# Patient Record
Sex: FEMALE | Race: WHITE | NOT HISPANIC OR LATINO | Employment: OTHER | URBAN - METROPOLITAN AREA
[De-identification: names, ages, dates, MRNs, and addresses within clinical notes are randomized per-mention and may not be internally consistent; named-entity substitution may affect disease eponyms.]

---

## 2017-01-03 ENCOUNTER — OFFICE VISIT (OUTPATIENT)
Dept: CARDIAC SURGERY | Facility: CLINIC | Age: 67
End: 2017-01-03

## 2017-01-03 VITALS
TEMPERATURE: 96.6 F | DIASTOLIC BLOOD PRESSURE: 70 MMHG | HEIGHT: 66 IN | BODY MASS INDEX: 24.59 KG/M2 | SYSTOLIC BLOOD PRESSURE: 110 MMHG | OXYGEN SATURATION: 99 % | WEIGHT: 153 LBS | HEART RATE: 48 BPM

## 2017-01-03 DIAGNOSIS — I05.9 MITRAL VALVE DISORDER: Primary | ICD-10-CM

## 2017-01-03 PROCEDURE — 99204 OFFICE O/P NEW MOD 45 MIN: CPT | Performed by: THORACIC SURGERY (CARDIOTHORACIC VASCULAR SURGERY)

## 2017-01-03 RX ORDER — LEVOTHYROXINE SODIUM 175 UG/1
200 TABLET ORAL DAILY
Refills: 1 | COMMUNITY
Start: 2016-12-19 | End: 2017-03-10

## 2017-01-03 RX ORDER — PRIMIDONE 250 MG/1
250 TABLET ORAL 3 TIMES DAILY
COMMUNITY

## 2017-01-03 RX ORDER — FUROSEMIDE 20 MG/1
40 TABLET ORAL DAILY
Refills: 1 | COMMUNITY
Start: 2016-12-19 | End: 2017-03-10

## 2017-01-03 RX ORDER — CHOLECALCIFEROL (VITAMIN D3) 125 MCG
2000 CAPSULE ORAL DAILY
COMMUNITY

## 2017-01-03 RX ORDER — ASPIRIN 81 MG/1
81 TABLET ORAL DAILY
COMMUNITY
End: 2020-07-10 | Stop reason: DRUGHIGH

## 2017-01-03 RX ORDER — CHLORAL HYDRATE 500 MG
1000 CAPSULE ORAL
COMMUNITY
End: 2020-10-22 | Stop reason: ALTCHOICE

## 2017-01-03 RX ORDER — ETHOSUXIMIDE 250 MG/1
500 CAPSULE ORAL 2 TIMES DAILY
COMMUNITY

## 2017-01-03 RX ORDER — LISINOPRIL 5 MG/1
5 TABLET ORAL DAILY
Refills: 1 | COMMUNITY
Start: 2016-12-19 | End: 2017-02-14 | Stop reason: HOSPADM

## 2017-01-03 NOTE — PROGRESS NOTES
2017  Patient Information  Tiana Cronin                                                                                          [address] [facility] [place] [address]   1950  'PCP/Referring Physician'  Aubrey Christy MD  [phone]  No ref. provider found    Chief Complaint   Patient presents with   • Shortness of Breath     NP for Severe Mitral Regurgitation per Dr. Cha       History of Present Illness:  66 year old female with a history of hypertension and seizures presents for evaluation of mitral valve regurgitation.  Ms Cronin notes worsening shortness of breath for approximately the last year.  She denies chest pain or syncope.    Patient Active Problem List   Diagnosis   • Mitral valve disorder     Past Medical History   Diagnosis Date   • Anxiety    • Aortic regurgitation    • Arthritis      Hands and Knees   • Depression    • Hypertension    • Hypothyroidism    • Mitral regurgitation    • Mitral valve prolapse    • Seizures      Several Years since last seizure   • Skin cancer, basal cell      Past Surgical History   Procedure Laterality Date   • Plantar fascia surgery Bilateral    •  section       x 2   • Elbow procedure Left      Due to FX, Plates and Screws Placed   • Knee surgery Left    • Skin cancer excision Bilateral      Bilatera Ears, Basil Cell       Current Outpatient Prescriptions:   •  aspirin 81 MG EC tablet, Take 81 mg by mouth Daily., Disp: , Rfl:   •  Cholecalciferol (VITAMIN D3) 2000 UNITS tablet, Take  by mouth Daily., Disp: , Rfl:   •  ethosuximide (ZARONTIN) 250 MG capsule, Take 250 mg by mouth 2 (Two) Times a Day. 2 Tablets BID, Disp: , Rfl:   •  furosemide (LASIX) 20 MG tablet, Take  by mouth Daily., Disp: , Rfl: 1  •  levothyroxine (SYNTHROID, LEVOTHROID) 175 MCG tablet, Daily., Disp: , Rfl: 1  •  lisinopril (PRINIVIL,ZESTRIL) 5 MG tablet, Take  by mouth Daily., Disp: , Rfl: 1  •  metoprolol tartrate (LOPRESSOR) 25 MG tablet, Take  by mouth Daily., Disp:  , Rfl: 1  •  Multiple Vitamins-Minerals (CENTRUM SILVER PO), Take  by mouth Daily., Disp: , Rfl:   •  Omega-3 Fatty Acids (FISH OIL) 1000 MG capsule capsule, Take  by mouth Daily With Breakfast., Disp: , Rfl:   •  primidone (MYSOLINE) 250 MG tablet, Take 250 mg by mouth 3 (Three) Times a Day., Disp: , Rfl:   No Known Allergies  Social History     Social History   • Marital status:      Spouse name: N/A   • Number of children: 2   • Years of education: N/A     Occupational History   • Restaurant Work      Disabled-[facility]     Social History Main Topics   • Smoking status: Never Smoker   • Smokeless tobacco: Never Used   • Alcohol use No   • Drug use: No   • Sexual activity: Not on file     Other Topics Concern   • Not on file     Social History Narrative     Family History   Problem Relation Age of Onset   • Hypertension Mother    • Alzheimer's disease Mother    • Coronary artery disease Father    • Hypertension Father    • Diabetes Father    • Heart failure Father      Review of Systems   Constitution: Positive for malaise/fatigue. Negative for chills, fever, night sweats and weight loss.   HENT: Negative for headaches, hearing loss, odynophagia and sore throat.    Cardiovascular: Positive for claudication, dyspnea on exertion, leg swelling and palpitations. Negative for chest pain and orthopnea.   Respiratory: Positive for shortness of breath and wheezing. Negative for cough and hemoptysis.    Endocrine: Negative for cold intolerance, heat intolerance, polydipsia, polyphagia and polyuria.   Hematologic/Lymphatic: Does not bruise/bleed easily.   Skin: Negative for itching and rash.   Musculoskeletal: Positive for arthritis and back pain. Negative for joint pain, joint swelling and myalgias.   Gastrointestinal: Positive for constipation. Negative for abdominal pain, diarrhea, hematemesis, hematochezia, melena, nausea and vomiting.   Genitourinary: Negative for dysuria, frequency and hematuria.   Neurological:  "Negative for focal weakness, numbness and seizures.   Psychiatric/Behavioral: Negative for suicidal ideas.   All other systems reviewed and are negative.    Vitals:    01/03/17 0757   BP: 110/70   BP Location: Right arm   Patient Position: Sitting   Pulse: (!) 48   Temp: 96.6 °F (35.9 °C)   SpO2: 99%   Weight: 153 lb (69.4 kg)   Height: 66\" (167.6 cm)      Physical Exam   Constitutional: She is oriented to person, place, and time. She appears well-developed and well-nourished. No distress.   HENT:   Head: Normocephalic and atraumatic.   Eyes: Conjunctivae and EOM are normal. No scleral icterus.   Neck: Normal range of motion. Neck supple. No JVD present. No tracheal deviation present.   Cardiovascular: Normal rate, regular rhythm and normal heart sounds.  Exam reveals no gallop and no friction rub.    No murmur heard.  Pulmonary/Chest: Effort normal and breath sounds normal. No stridor. No respiratory distress. She has no wheezes. She has no rales.   Abdominal: Soft. She exhibits no distension and no mass. There is no tenderness. There is no rebound and no guarding.   Musculoskeletal: Normal range of motion. She exhibits edema. She exhibits no deformity.   1+ pedal edema bilaterally   Lymphadenopathy:     She has no cervical adenopathy.     She has no axillary adenopathy.        Right: No supraclavicular adenopathy present.        Left: No supraclavicular adenopathy present.   Neurological: She is alert and oriented to person, place, and time.   Skin: No rash noted. No erythema.   Psychiatric: She has a normal mood and affect. Her behavior is normal. Judgment and thought content normal.       Labs/Imaging:  -I obtained and reviewed medical records from Dr. Cha.  The patient has a family member with her today who provides additional information.  -Transthoracic echocardiogram performed 11/30/16, per report (images unavailable), demonstrates severe mitral regurgitation, mild aortic, tricuspid and pulmonic " regurgitation.  EF 65-70%.       Assessment/Plan:  66 year old female with a history of seizures and hypertension who presents with symptomatic severe mitral valve regurgitation.  I will need to obtain the echo for review.  The patient will need a cardiac catheterization prior to the surgery to rule out significant stenosis.  We discussed mitral valve repair versus replacement.  The risks and benefits of surgery were discussed with the patient including pain, bleeding, infection,renal failure, stroke, heart block and death.  She understood these risks and wished to proceed with surgery.  I have answered the patient and her sister's questions to their satisfaction.  Ms. Cronin will think about which type of valve she would like.  We will get the cardiac cath set up and then proceed.    Patient Active Problem List   Diagnosis   • Mitral valve disorder     Signed by: Jeromy Gaona MD    1/3/2017    Scribed for Jeromy Gaona MD by Chantell Montaño. 1/3/2017  10:35 AM  CC:  MD Aubrey Espino MD

## 2017-01-20 ENCOUNTER — HOSPITAL ENCOUNTER (OUTPATIENT)
Dept: CARDIOLOGY | Facility: HOSPITAL | Age: 67
Discharge: HOME | End: 2017-01-20

## 2017-01-20 DIAGNOSIS — Z98.890 HISTORY OF LEFT HEART CATHETERIZATION (LHC): ICD-10-CM

## 2017-01-23 ENCOUNTER — PREP FOR SURGERY (OUTPATIENT)
Dept: CARDIAC SURGERY | Facility: CLINIC | Age: 67
End: 2017-01-23

## 2017-01-23 DIAGNOSIS — R79.1 ABNORMAL COAGULATION PROFILE: ICD-10-CM

## 2017-01-23 DIAGNOSIS — Z51.81 ENCOUNTER FOR THERAPEUTIC DRUG LEVEL MONITORING: ICD-10-CM

## 2017-01-23 DIAGNOSIS — I34.0 NON-RHEUMATIC MITRAL REGURGITATION: Primary | ICD-10-CM

## 2017-01-23 DIAGNOSIS — E11.9 TYPE 2 DIABETES MELLITUS WITHOUT COMPLICATION, UNSPECIFIED LONG TERM INSULIN USE STATUS: ICD-10-CM

## 2017-01-24 RX ORDER — CHLORHEXIDINE GLUCONATE 500 MG/1
1 CLOTH TOPICAL EVERY 12 HOURS PRN
Status: CANCELLED | OUTPATIENT
Start: 2017-02-02

## 2017-01-24 RX ORDER — ASPIRIN 325 MG
325 TABLET ORAL NIGHTLY
Status: CANCELLED | OUTPATIENT
Start: 2017-01-24 | End: 2017-01-25

## 2017-01-24 RX ORDER — NITROGLYCERIN 0.4 MG/1
0.4 TABLET SUBLINGUAL
Status: CANCELLED | OUTPATIENT
Start: 2017-01-24

## 2017-01-24 RX ORDER — CHLORHEXIDINE GLUCONATE 0.12 MG/ML
15 RINSE ORAL ONCE
Status: CANCELLED | OUTPATIENT
Start: 2017-01-24 | End: 2017-01-24

## 2017-01-24 RX ORDER — CHLORHEXIDINE GLUCONATE 500 MG/1
1 CLOTH TOPICAL EVERY 12 HOURS PRN
Status: CANCELLED | OUTPATIENT
Start: 2017-02-03

## 2017-01-24 RX ORDER — ACETAMINOPHEN 325 MG/1
650 TABLET ORAL EVERY 4 HOURS PRN
Status: CANCELLED | OUTPATIENT
Start: 2017-01-24

## 2017-02-02 ENCOUNTER — APPOINTMENT (OUTPATIENT)
Dept: PREADMISSION TESTING | Facility: HOSPITAL | Age: 67
End: 2017-02-02

## 2017-02-02 ENCOUNTER — HOSPITAL ENCOUNTER (OUTPATIENT)
Dept: GENERAL RADIOLOGY | Facility: HOSPITAL | Age: 67
Discharge: HOME | End: 2017-02-02
Admitting: PHYSICIAN ASSISTANT

## 2017-02-02 ENCOUNTER — HOSPITAL ENCOUNTER (OUTPATIENT)
Dept: PULMONOLOGY | Facility: HOSPITAL | Age: 67
Discharge: HOME | End: 2017-02-02

## 2017-02-02 VITALS — BODY MASS INDEX: 23.92 KG/M2 | HEIGHT: 66 IN | WEIGHT: 148.81 LBS

## 2017-02-02 DIAGNOSIS — I34.0 NON-RHEUMATIC MITRAL REGURGITATION: ICD-10-CM

## 2017-02-02 DIAGNOSIS — R79.1 ABNORMAL COAGULATION PROFILE: ICD-10-CM

## 2017-02-02 DIAGNOSIS — Z51.81 ENCOUNTER FOR THERAPEUTIC DRUG LEVEL MONITORING: ICD-10-CM

## 2017-02-02 DIAGNOSIS — E11.9 TYPE 2 DIABETES MELLITUS WITHOUT COMPLICATION, UNSPECIFIED LONG TERM INSULIN USE STATUS: ICD-10-CM

## 2017-02-02 LAB
ABO GROUP BLD: NORMAL
ALBUMIN SERPL-MCNC: 4.6 G/DL (ref 3.2–4.8)
ALBUMIN/GLOB SERPL: 1.5 G/DL (ref 1.5–2.5)
ALP SERPL-CCNC: 120 U/L (ref 25–100)
ALT SERPL W P-5'-P-CCNC: 15 U/L (ref 7–40)
AMPHET+METHAMPHET UR QL: NEGATIVE
AMPHETAMINES UR QL: NEGATIVE
ANION GAP SERPL CALCULATED.3IONS-SCNC: 8 MMOL/L (ref 3–11)
APTT PPP: 29.4 SECONDS (ref 24–31)
AST SERPL-CCNC: 27 U/L (ref 0–33)
BARBITURATES UR QL SCN: POSITIVE
BASOPHILS # BLD AUTO: 0.02 10*3/MM3 (ref 0–0.2)
BASOPHILS NFR BLD AUTO: 0.6 % (ref 0–1)
BENZODIAZ UR QL SCN: NEGATIVE
BILIRUB SERPL-MCNC: 0.3 MG/DL (ref 0.3–1.2)
BLD GP AB SCN SERPL QL: NEGATIVE
BUN BLD-MCNC: 18 MG/DL (ref 9–23)
BUN/CREAT SERPL: 20 (ref 7–25)
BUPRENORPHINE SERPL-MCNC: NEGATIVE NG/ML
CALCIUM SPEC-SCNC: 10.4 MG/DL (ref 8.7–10.4)
CANNABINOIDS SERPL QL: NEGATIVE
CHLORIDE SERPL-SCNC: 102 MMOL/L (ref 99–109)
CO2 SERPL-SCNC: 31 MMOL/L (ref 20–31)
COCAINE UR QL: NEGATIVE
CREAT BLD-MCNC: 0.9 MG/DL (ref 0.6–1.3)
DEPRECATED RDW RBC AUTO: 50.2 FL (ref 37–54)
EOSINOPHIL # BLD AUTO: 0.17 10*3/MM3 (ref 0.1–0.3)
EOSINOPHIL NFR BLD AUTO: 5.1 % (ref 0–3)
ERYTHROCYTE [DISTWIDTH] IN BLOOD BY AUTOMATED COUNT: 14 % (ref 11.3–14.5)
GFR SERPL CREATININE-BSD FRML MDRD: 63 ML/MIN/1.73
GLOBULIN UR ELPH-MCNC: 3.1 GM/DL
GLUCOSE BLD-MCNC: 95 MG/DL (ref 70–100)
HBA1C MFR BLD: 5.1 % (ref 4.8–5.6)
HCT VFR BLD AUTO: 37.2 % (ref 34.5–44)
HGB BLD-MCNC: 12.4 G/DL (ref 11.5–15.5)
IMM GRANULOCYTES # BLD: 0 10*3/MM3 (ref 0–0.03)
IMM GRANULOCYTES NFR BLD: 0 % (ref 0–0.6)
INR PPP: 1.04
LYMPHOCYTES # BLD AUTO: 1.12 10*3/MM3 (ref 0.6–4.8)
LYMPHOCYTES NFR BLD AUTO: 33.4 % (ref 24–44)
MAGNESIUM SERPL-MCNC: 2.1 MG/DL (ref 1.3–2.7)
MCH RBC QN AUTO: 32.5 PG (ref 27–31)
MCHC RBC AUTO-ENTMCNC: 33.3 G/DL (ref 32–36)
MCV RBC AUTO: 97.4 FL (ref 80–99)
METHADONE UR QL SCN: NEGATIVE
MONOCYTES # BLD AUTO: 0.31 10*3/MM3 (ref 0–1)
MONOCYTES NFR BLD AUTO: 9.3 % (ref 0–12)
NEUTROPHILS # BLD AUTO: 1.73 10*3/MM3 (ref 1.5–8.3)
NEUTROPHILS NFR BLD AUTO: 51.6 % (ref 41–71)
OPIATES UR QL: NEGATIVE
OXYCODONE UR QL SCN: NEGATIVE
PA ADP PRP-ACNC: 241 PRU
PCP UR QL SCN: NEGATIVE
PLATELET # BLD AUTO: 191 10*3/MM3 (ref 150–450)
PMV BLD AUTO: 9.6 FL (ref 6–12)
POTASSIUM BLD-SCNC: 4 MMOL/L (ref 3.5–5.5)
PROPOXYPH UR QL: NEGATIVE
PROT SERPL-MCNC: 7.7 G/DL (ref 5.7–8.2)
PROTHROMBIN TIME: 11.3 SECONDS (ref 9.6–11.5)
RBC # BLD AUTO: 3.82 10*6/MM3 (ref 3.89–5.14)
RH BLD: POSITIVE
SODIUM BLD-SCNC: 141 MMOL/L (ref 132–146)
TRICYCLICS UR QL SCN: NEGATIVE
WBC NRBC COR # BLD: 3.35 10*3/MM3 (ref 3.5–10.8)

## 2017-02-02 PROCEDURE — 86920 COMPATIBILITY TEST SPIN: CPT

## 2017-02-02 PROCEDURE — 94010 BREATHING CAPACITY TEST: CPT | Performed by: INTERNAL MEDICINE

## 2017-02-02 PROCEDURE — 93010 ELECTROCARDIOGRAM REPORT: CPT | Performed by: INTERNAL MEDICINE

## 2017-02-02 RX ORDER — CITALOPRAM 20 MG/1
20 TABLET ORAL DAILY
COMMUNITY

## 2017-02-02 RX ORDER — ASPIRIN 325 MG
325 TABLET ORAL NIGHTLY
Status: SHIPPED | OUTPATIENT
Start: 2017-02-02 | End: 2017-02-03

## 2017-02-02 RX ORDER — ASCORBIC ACID 500 MG
1000 TABLET ORAL DAILY
COMMUNITY
End: 2017-09-12 | Stop reason: ALTCHOICE

## 2017-02-02 RX ORDER — CHLORHEXIDINE GLUCONATE 500 MG/1
1 CLOTH TOPICAL EVERY 12 HOURS PRN
Status: SHIPPED | OUTPATIENT
Start: 2017-02-02

## 2017-02-02 NOTE — PAT
Pt wants to verify with md in preop before signing permit. Consent in computer states repair or replace. Pt reports she was informed surgery was for replacement

## 2017-02-02 NOTE — PAT
BACTROBAN APPLIED TO EACH NOSTRIL DURING PAT VISIT   MEASURED FOR TEDS/SCDS IN PAT    CALF MEASUREMENT    13.5  LENGTH MEASUREMENT 16

## 2017-02-03 ENCOUNTER — HOSPITAL ENCOUNTER (INPATIENT)
Facility: HOSPITAL | Age: 67
LOS: 11 days | Discharge: NURSING, REHAB OR LONG-TERM CARE | End: 2017-02-14
Attending: THORACIC SURGERY (CARDIOTHORACIC VASCULAR SURGERY) | Admitting: THORACIC SURGERY (CARDIOTHORACIC VASCULAR SURGERY)

## 2017-02-03 ENCOUNTER — APPOINTMENT (OUTPATIENT)
Dept: GENERAL RADIOLOGY | Facility: HOSPITAL | Age: 67
End: 2017-02-03

## 2017-02-03 ENCOUNTER — ANESTHESIA EVENT (OUTPATIENT)
Dept: PERIOP | Facility: HOSPITAL | Age: 67
End: 2017-02-03

## 2017-02-03 ENCOUNTER — ANESTHESIA (OUTPATIENT)
Dept: PERIOP | Facility: HOSPITAL | Age: 67
End: 2017-02-03

## 2017-02-03 DIAGNOSIS — I46.9 ASYSTOLE (HCC): ICD-10-CM

## 2017-02-03 DIAGNOSIS — I05.9 MITRAL VALVE DISORDER: ICD-10-CM

## 2017-02-03 DIAGNOSIS — I25.810 CORONARY ARTERY DISEASE INVOLVING OTHER CORONARY ARTERY BYPASS GRAFT: ICD-10-CM

## 2017-02-03 DIAGNOSIS — Z74.09 IMPAIRED MOBILITY AND ADLS: ICD-10-CM

## 2017-02-03 DIAGNOSIS — Z78.9 IMPAIRED MOBILITY AND ADLS: ICD-10-CM

## 2017-02-03 DIAGNOSIS — Z74.09 IMPAIRED FUNCTIONAL MOBILITY, BALANCE, GAIT, AND ENDURANCE: Primary | ICD-10-CM

## 2017-02-03 DIAGNOSIS — I34.0 NON-RHEUMATIC MITRAL REGURGITATION: ICD-10-CM

## 2017-02-03 DIAGNOSIS — I34.0 MITRAL VALVE INSUFFICIENCY, UNSPECIFIED ETIOLOGY: ICD-10-CM

## 2017-02-03 LAB
ABO GROUP BLD: NORMAL
ACT BLD: 116 SECONDS (ref 82–152)
ACT BLD: 126 SECONDS (ref 82–152)
ACT BLD: 126 SECONDS (ref 82–152)
ACT BLD: 477 SECONDS (ref 82–152)
ACT BLD: 667 SECONDS (ref 82–152)
ACT BLD: 698 SECONDS (ref 82–152)
ALBUMIN SERPL-MCNC: 3.4 G/DL (ref 3.2–4.8)
ALBUMIN SERPL-MCNC: 4 G/DL (ref 3.2–4.8)
ANION GAP SERPL CALCULATED.3IONS-SCNC: 10 MMOL/L (ref 3–11)
ANION GAP SERPL CALCULATED.3IONS-SCNC: 5 MMOL/L (ref 3–11)
APTT PPP: 34.6 SECONDS (ref 24–31)
ARTERIAL PATENCY WRIST A: ABNORMAL
ATMOSPHERIC PRESS: ABNORMAL MMHG
BACTERIA UR QL AUTO: ABNORMAL /HPF
BASE EXCESS BLDA CALC-SCNC: -2 MMOL/L (ref -5–5)
BASE EXCESS BLDA CALC-SCNC: -2 MMOL/L (ref -5–5)
BASE EXCESS BLDA CALC-SCNC: -3 MMOL/L (ref -5–5)
BASE EXCESS BLDA CALC-SCNC: -3 MMOL/L (ref -5–5)
BASE EXCESS BLDA CALC-SCNC: -4.4 MMOL/L (ref 0–2)
BASE EXCESS BLDA CALC-SCNC: 0 MMOL/L (ref -5–5)
BASE EXCESS BLDA CALC-SCNC: 2 MMOL/L (ref -5–5)
BASE EXCESS BLDA CALC-SCNC: 4 MMOL/L (ref -5–5)
BDY SITE: ABNORMAL
BILIRUB UR QL STRIP: NEGATIVE
BUN BLD-MCNC: 17 MG/DL (ref 9–23)
BUN BLD-MCNC: 17 MG/DL (ref 9–23)
BUN/CREAT SERPL: 21.3 (ref 7–25)
BUN/CREAT SERPL: 21.3 (ref 7–25)
CA-I BLDA-SCNC: 0.95 MMOL/L (ref 1.2–1.32)
CA-I BLDA-SCNC: 1.12 MMOL/L (ref 1.2–1.32)
CA-I BLDA-SCNC: 1.16 MMOL/L (ref 1.2–1.32)
CA-I BLDA-SCNC: 1.18 MMOL/L (ref 1.2–1.32)
CA-I BLDA-SCNC: 1.21 MMOL/L (ref 1.2–1.32)
CA-I BLDA-SCNC: 1.21 MMOL/L (ref 1.2–1.32)
CA-I BLDA-SCNC: 1.24 MMOL/L (ref 1.2–1.32)
CA-I SERPL ISE-MCNC: 1.24 MMOL/L (ref 1.12–1.32)
CALCIUM SPEC-SCNC: 9.1 MG/DL (ref 8.7–10.4)
CALCIUM SPEC-SCNC: 9.3 MG/DL (ref 8.7–10.4)
CHLORIDE SERPL-SCNC: 113 MMOL/L (ref 99–109)
CHLORIDE SERPL-SCNC: 114 MMOL/L (ref 99–109)
CLARITY UR: CLEAR
CO2 BLDA-SCNC: 21.4 MMOL/L (ref 22–33)
CO2 BLDA-SCNC: 23 MMOL/L (ref 24–29)
CO2 BLDA-SCNC: 23 MMOL/L (ref 24–29)
CO2 BLDA-SCNC: 24 MMOL/L (ref 24–29)
CO2 BLDA-SCNC: 25 MMOL/L (ref 24–29)
CO2 BLDA-SCNC: 27 MMOL/L (ref 24–29)
CO2 BLDA-SCNC: 29 MMOL/L (ref 24–29)
CO2 BLDA-SCNC: 30 MMOL/L (ref 24–29)
CO2 SERPL-SCNC: 22 MMOL/L (ref 20–31)
CO2 SERPL-SCNC: 26 MMOL/L (ref 20–31)
COHGB MFR BLD: 0.9 % (ref 0–2)
COLOR UR: YELLOW
CREAT BLD-MCNC: 0.8 MG/DL (ref 0.6–1.3)
CREAT BLD-MCNC: 0.8 MG/DL (ref 0.6–1.3)
DEPRECATED RDW RBC AUTO: 49.7 FL (ref 37–54)
DEPRECATED RDW RBC AUTO: 51.4 FL (ref 37–54)
ERYTHROCYTE [DISTWIDTH] IN BLOOD BY AUTOMATED COUNT: 14.1 % (ref 11.3–14.5)
ERYTHROCYTE [DISTWIDTH] IN BLOOD BY AUTOMATED COUNT: 14.4 % (ref 11.3–14.5)
GFR SERPL CREATININE-BSD FRML MDRD: 72 ML/MIN/1.73
GFR SERPL CREATININE-BSD FRML MDRD: 72 ML/MIN/1.73
GLUCOSE BLD-MCNC: 125 MG/DL (ref 70–100)
GLUCOSE BLD-MCNC: 135 MG/DL (ref 70–100)
GLUCOSE BLDC GLUCOMTR-MCNC: 102 MG/DL (ref 70–130)
GLUCOSE BLDC GLUCOMTR-MCNC: 117 MG/DL (ref 70–130)
GLUCOSE BLDC GLUCOMTR-MCNC: 118 MG/DL (ref 70–130)
GLUCOSE BLDC GLUCOMTR-MCNC: 119 MG/DL (ref 70–130)
GLUCOSE BLDC GLUCOMTR-MCNC: 124 MG/DL (ref 70–130)
GLUCOSE BLDC GLUCOMTR-MCNC: 146 MG/DL (ref 70–130)
GLUCOSE BLDC GLUCOMTR-MCNC: 164 MG/DL (ref 70–130)
GLUCOSE BLDC GLUCOMTR-MCNC: 167 MG/DL (ref 70–130)
GLUCOSE BLDC GLUCOMTR-MCNC: 167 MG/DL (ref 70–130)
GLUCOSE BLDC GLUCOMTR-MCNC: 93 MG/DL (ref 70–130)
GLUCOSE UR STRIP-MCNC: NEGATIVE MG/DL
HCO3 BLDA-SCNC: 20.4 MMOL/L (ref 20–26)
HCO3 BLDA-SCNC: 21.5 MMOL/L (ref 22–26)
HCO3 BLDA-SCNC: 22.1 MMOL/L (ref 22–26)
HCO3 BLDA-SCNC: 22.6 MMOL/L (ref 22–26)
HCO3 BLDA-SCNC: 23.8 MMOL/L (ref 22–26)
HCO3 BLDA-SCNC: 25.2 MMOL/L (ref 22–26)
HCO3 BLDA-SCNC: 27.3 MMOL/L (ref 22–26)
HCO3 BLDA-SCNC: 29 MMOL/L (ref 22–26)
HCT VFR BLD AUTO: 16.1 % (ref 34.5–44)
HCT VFR BLD AUTO: 22.5 % (ref 34.5–44)
HCT VFR BLD CALC: 21.8 %
HCT VFR BLDA CALC: 19 % (ref 38–51)
HCT VFR BLDA CALC: 20 % (ref 38–51)
HCT VFR BLDA CALC: 20 % (ref 38–51)
HCT VFR BLDA CALC: 21 % (ref 38–51)
HCT VFR BLDA CALC: 22 % (ref 38–51)
HCT VFR BLDA CALC: 27 % (ref 38–51)
HCT VFR BLDA CALC: 32 % (ref 38–51)
HGB BLD-MCNC: 5.4 G/DL (ref 11.5–15.5)
HGB BLD-MCNC: 7.6 G/DL (ref 11.5–15.5)
HGB BLDA-MCNC: 10.9 G/DL (ref 12–17)
HGB BLDA-MCNC: 6.5 G/DL (ref 12–17)
HGB BLDA-MCNC: 6.8 G/DL (ref 12–17)
HGB BLDA-MCNC: 6.8 G/DL (ref 12–17)
HGB BLDA-MCNC: 7.1 G/DL (ref 12–17)
HGB BLDA-MCNC: 7.1 G/DL (ref 14–18)
HGB BLDA-MCNC: 7.5 G/DL (ref 12–17)
HGB BLDA-MCNC: 9.2 G/DL (ref 12–17)
HGB UR QL STRIP.AUTO: ABNORMAL
HOROWITZ INDEX BLD+IHG-RTO: 100 %
HYALINE CASTS UR QL AUTO: ABNORMAL /LPF
INR PPP: 1.39
KETONES UR QL STRIP: NEGATIVE
LEUKOCYTE ESTERASE UR QL STRIP.AUTO: NEGATIVE
MAGNESIUM SERPL-MCNC: 2.5 MG/DL (ref 1.3–2.7)
MAGNESIUM SERPL-MCNC: 3 MG/DL (ref 1.3–2.7)
MCH RBC QN AUTO: 32.7 PG (ref 27–31)
MCH RBC QN AUTO: 33 PG (ref 27–31)
MCHC RBC AUTO-ENTMCNC: 33.5 G/DL (ref 32–36)
MCHC RBC AUTO-ENTMCNC: 33.8 G/DL (ref 32–36)
MCV RBC AUTO: 97.6 FL (ref 80–99)
MCV RBC AUTO: 97.8 FL (ref 80–99)
METHGB BLD QL: 1.1 % (ref 0–1.5)
MODALITY: ABNORMAL
NITRITE UR QL STRIP: NEGATIVE
OXYHGB MFR BLDV: 98.5 % (ref 94–99)
PCO2 BLDA: 32.2 MM HG (ref 35–45)
PCO2 BLDA: 34.3 MM HG (ref 35–45)
PCO2 BLDA: 35.4 MM HG (ref 35–45)
PCO2 BLDA: 36.5 MM HG (ref 35–45)
PCO2 BLDA: 43.3 MM HG (ref 35–45)
PCO2 BLDA: 45.1 MM HG (ref 35–45)
PCO2 BLDA: 45.2 MM HG (ref 35–45)
PCO2 BLDA: 49.6 MM HG (ref 35–45)
PH BLDA: 7.29 PH UNITS (ref 7.35–7.6)
PH BLDA: 7.37 PH UNITS (ref 7.35–7.6)
PH BLDA: 7.38 PH UNITS (ref 7.35–7.45)
PH BLDA: 7.39 PH UNITS (ref 7.35–7.6)
PH BLDA: 7.39 PH UNITS (ref 7.35–7.6)
PH BLDA: 7.4 PH UNITS (ref 7.35–7.6)
PH BLDA: 7.42 PH UNITS (ref 7.35–7.6)
PH BLDA: 7.44 PH UNITS (ref 7.35–7.6)
PH UR STRIP.AUTO: 5.5 [PH] (ref 5–8)
PHOSPHATE SERPL-MCNC: 2.1 MG/DL (ref 2.4–5.1)
PHOSPHATE SERPL-MCNC: 3.7 MG/DL (ref 2.4–5.1)
PLATELET # BLD AUTO: 204 10*3/MM3 (ref 150–450)
PLATELET # BLD AUTO: 93 10*3/MM3 (ref 150–450)
PMV BLD AUTO: 8.8 FL (ref 6–12)
PMV BLD AUTO: 9.2 FL (ref 6–12)
PO2 BLDA: 374 MMHG (ref 80–105)
PO2 BLDA: 430 MM HG (ref 83–108)
PO2 BLDA: 430 MMHG (ref 80–105)
PO2 BLDA: 432 MMHG (ref 80–105)
PO2 BLDA: 434 MMHG (ref 80–105)
PO2 BLDA: 447 MMHG (ref 80–105)
PO2 BLDA: 455 MMHG (ref 80–105)
PO2 BLDA: 83 MMHG (ref 80–105)
POTASSIUM BLD-SCNC: 3.7 MMOL/L (ref 3.5–5.5)
POTASSIUM BLD-SCNC: 3.8 MMOL/L (ref 3.5–5.5)
POTASSIUM BLDA-SCNC: 3.3 MMOL/L (ref 3.5–4.9)
POTASSIUM BLDA-SCNC: 3.5 MMOL/L (ref 3.5–4.9)
POTASSIUM BLDA-SCNC: 3.6 MMOL/L (ref 3.5–4.9)
POTASSIUM BLDA-SCNC: 3.6 MMOL/L (ref 3.5–4.9)
POTASSIUM BLDA-SCNC: 3.8 MMOL/L (ref 3.5–4.9)
POTASSIUM BLDA-SCNC: 4 MMOL/L (ref 3.5–4.9)
POTASSIUM BLDA-SCNC: 4.4 MMOL/L (ref 3.5–4.9)
PROT UR QL STRIP: ABNORMAL
PROTHROMBIN TIME: 15.2 SECONDS (ref 9.6–11.5)
RBC # BLD AUTO: 1.65 10*6/MM3 (ref 3.89–5.14)
RBC # BLD AUTO: 2.3 10*6/MM3 (ref 3.89–5.14)
RBC # UR: ABNORMAL /HPF
REF LAB TEST METHOD: ABNORMAL
RH BLD: POSITIVE
SAO2 % BLDA: 100 % (ref 95–98)
SAO2 % BLDA: 96 % (ref 95–98)
SODIUM BLD-SCNC: 145 MMOL/L (ref 132–146)
SODIUM BLD-SCNC: 145 MMOL/L (ref 132–146)
SODIUM BLDA-SCNC: 136 MMOL/L (ref 138–146)
SODIUM BLDA-SCNC: 136 MMOL/L (ref 138–146)
SODIUM BLDA-SCNC: 137 MMOL/L (ref 138–146)
SODIUM BLDA-SCNC: 138 MMOL/L (ref 138–146)
SODIUM BLDA-SCNC: 139 MMOL/L (ref 138–146)
SODIUM BLDA-SCNC: 140 MMOL/L (ref 138–146)
SODIUM BLDA-SCNC: 140 MMOL/L (ref 138–146)
SP GR UR STRIP: 1.02 (ref 1–1.03)
SQUAMOUS #/AREA URNS HPF: ABNORMAL /HPF
UROBILINOGEN UR QL STRIP: ABNORMAL
WBC NRBC COR # BLD: 5.4 10*3/MM3 (ref 3.5–10.8)
WBC NRBC COR # BLD: 6.12 10*3/MM3 (ref 3.5–10.8)
WBC UR QL AUTO: ABNORMAL /HPF

## 2017-02-03 PROCEDURE — 25010000002 MIDAZOLAM PER 1 MG: Performed by: ANESTHESIOLOGY

## 2017-02-03 PROCEDURE — 71010 HC CHEST PA OR AP: CPT

## 2017-02-03 PROCEDURE — 25010000002 FENTANYL CITRATE (PF) 100 MCG/2ML SOLUTION: Performed by: THORACIC SURGERY (CARDIOTHORACIC VASCULAR SURGERY)

## 2017-02-03 PROCEDURE — 94799 UNLISTED PULMONARY SVC/PX: CPT

## 2017-02-03 PROCEDURE — 25010000002 ALBUMIN HUMAN 5% PER 50 ML: Performed by: PHYSICIAN ASSISTANT

## 2017-02-03 PROCEDURE — 25010000002 MANNITOL PER 50 ML

## 2017-02-03 PROCEDURE — 33430 REPLACEMENT OF MITRAL VALVE: CPT | Performed by: THORACIC SURGERY (CARDIOTHORACIC VASCULAR SURGERY)

## 2017-02-03 PROCEDURE — 25010000002 CEFUROXIME PER 750 MG: Performed by: ANESTHESIOLOGY

## 2017-02-03 PROCEDURE — 25010000002 HEPARIN (PORCINE) PER 1000 UNITS: Performed by: ANESTHESIOLOGY

## 2017-02-03 PROCEDURE — C1894 INTRO/SHEATH, NON-LASER: HCPCS | Performed by: THORACIC SURGERY (CARDIOTHORACIC VASCULAR SURGERY)

## 2017-02-03 PROCEDURE — 25810000003 DEXTROSE 5 % WITH KCL 20 MEQ 20-5 MEQ/L-% SOLUTION: Performed by: PHYSICIAN ASSISTANT

## 2017-02-03 PROCEDURE — 81001 URINALYSIS AUTO W/SCOPE: CPT | Performed by: PHYSICIAN ASSISTANT

## 2017-02-03 PROCEDURE — 85347 COAGULATION TIME ACTIVATED: CPT

## 2017-02-03 PROCEDURE — 82805 BLOOD GASES W/O2 SATURATION: CPT | Performed by: PHYSICIAN ASSISTANT

## 2017-02-03 PROCEDURE — 25010000002 MORPHINE PER 10 MG: Performed by: ANESTHESIOLOGY

## 2017-02-03 PROCEDURE — P9016 RBC LEUKOCYTES REDUCED: HCPCS

## 2017-02-03 PROCEDURE — 87086 URINE CULTURE/COLONY COUNT: CPT | Performed by: PHYSICIAN ASSISTANT

## 2017-02-03 PROCEDURE — 86900 BLOOD TYPING SEROLOGIC ABO: CPT

## 2017-02-03 PROCEDURE — 02RG0KZ REPLACEMENT OF MITRAL VALVE WITH NONAUTOLOGOUS TISSUE SUBSTITUTE, OPEN APPROACH: ICD-10-PCS | Performed by: THORACIC SURGERY (CARDIOTHORACIC VASCULAR SURGERY)

## 2017-02-03 PROCEDURE — 25010000002 HEPARIN (PORCINE) PER 1000 UNITS: Performed by: THORACIC SURGERY (CARDIOTHORACIC VASCULAR SURGERY)

## 2017-02-03 PROCEDURE — 82947 ASSAY GLUCOSE BLOOD QUANT: CPT

## 2017-02-03 PROCEDURE — 85730 THROMBOPLASTIN TIME PARTIAL: CPT | Performed by: PHYSICIAN ASSISTANT

## 2017-02-03 PROCEDURE — 88305 TISSUE EXAM BY PATHOLOGIST: CPT | Performed by: THORACIC SURGERY (CARDIOTHORACIC VASCULAR SURGERY)

## 2017-02-03 PROCEDURE — C1751 CATH, INF, PER/CENT/MIDLINE: HCPCS | Performed by: ANESTHESIOLOGY

## 2017-02-03 PROCEDURE — 25010000002 CEFUROXIME PER 750 MG: Performed by: PHYSICIAN ASSISTANT

## 2017-02-03 PROCEDURE — P9041 ALBUMIN (HUMAN),5%, 50ML: HCPCS | Performed by: PHYSICIAN ASSISTANT

## 2017-02-03 PROCEDURE — 85610 PROTHROMBIN TIME: CPT | Performed by: PHYSICIAN ASSISTANT

## 2017-02-03 PROCEDURE — 85027 COMPLETE CBC AUTOMATED: CPT | Performed by: PHYSICIAN ASSISTANT

## 2017-02-03 PROCEDURE — 94002 VENT MGMT INPAT INIT DAY: CPT

## 2017-02-03 PROCEDURE — 25010000002 HEPARIN (PORCINE) PER 1000 UNITS

## 2017-02-03 PROCEDURE — 80069 RENAL FUNCTION PANEL: CPT | Performed by: PHYSICIAN ASSISTANT

## 2017-02-03 PROCEDURE — 85014 HEMATOCRIT: CPT

## 2017-02-03 PROCEDURE — 5A1221Z PERFORMANCE OF CARDIAC OUTPUT, CONTINUOUS: ICD-10-PCS | Performed by: THORACIC SURGERY (CARDIOTHORACIC VASCULAR SURGERY)

## 2017-02-03 PROCEDURE — B24BZZ4 ULTRASONOGRAPHY OF HEART WITH AORTA, TRANSESOPHAGEAL: ICD-10-PCS | Performed by: ANESTHESIOLOGY

## 2017-02-03 PROCEDURE — 25010000002 PROPOFOL 1000 MG/ML EMULSION: Performed by: THORACIC SURGERY (CARDIOTHORACIC VASCULAR SURGERY)

## 2017-02-03 PROCEDURE — C1887 CATHETER, GUIDING: HCPCS | Performed by: THORACIC SURGERY (CARDIOTHORACIC VASCULAR SURGERY)

## 2017-02-03 PROCEDURE — 86901 BLOOD TYPING SEROLOGIC RH(D): CPT

## 2017-02-03 PROCEDURE — 25010000002 PHENYLEPHRINE PER 1 ML

## 2017-02-03 PROCEDURE — 82330 ASSAY OF CALCIUM: CPT | Performed by: PHYSICIAN ASSISTANT

## 2017-02-03 PROCEDURE — 82803 BLOOD GASES ANY COMBINATION: CPT

## 2017-02-03 PROCEDURE — 25010000002 MAGNESIUM SULFATE PER 500 MG OF MAGNESIUM

## 2017-02-03 PROCEDURE — P9035 PLATELET PHERES LEUKOREDUCED: HCPCS

## 2017-02-03 PROCEDURE — 99291 CRITICAL CARE FIRST HOUR: CPT | Performed by: INTERNAL MEDICINE

## 2017-02-03 PROCEDURE — P9041 ALBUMIN (HUMAN),5%, 50ML: HCPCS | Performed by: ANESTHESIOLOGY

## 2017-02-03 PROCEDURE — 84295 ASSAY OF SERUM SODIUM: CPT

## 2017-02-03 PROCEDURE — 83735 ASSAY OF MAGNESIUM: CPT | Performed by: PHYSICIAN ASSISTANT

## 2017-02-03 PROCEDURE — 93005 ELECTROCARDIOGRAM TRACING: CPT | Performed by: PHYSICIAN ASSISTANT

## 2017-02-03 PROCEDURE — 25010000002 PROTAMINE SULFATE PER 10 MG: Performed by: PHYSICIAN ASSISTANT

## 2017-02-03 PROCEDURE — 25010000002 PROTAMINE SULFATE PER 10 MG: Performed by: ANESTHESIOLOGY

## 2017-02-03 PROCEDURE — 82330 ASSAY OF CALCIUM: CPT

## 2017-02-03 PROCEDURE — 94760 N-INVAS EAR/PLS OXIMETRY 1: CPT

## 2017-02-03 PROCEDURE — 84132 ASSAY OF SERUM POTASSIUM: CPT

## 2017-02-03 PROCEDURE — 36430 TRANSFUSION BLD/BLD COMPNT: CPT

## 2017-02-03 PROCEDURE — 25010000002 ALBUMIN HUMAN 5% PER 50 ML: Performed by: ANESTHESIOLOGY

## 2017-02-03 PROCEDURE — 06BP4ZZ EXCISION OF RIGHT SAPHENOUS VEIN, PERCUTANEOUS ENDOSCOPIC APPROACH: ICD-10-PCS | Performed by: THORACIC SURGERY (CARDIOTHORACIC VASCULAR SURGERY)

## 2017-02-03 DEVICE — IMPLANTABLE DEVICE: Type: IMPLANTABLE DEVICE | Site: HEART | Status: FUNCTIONAL

## 2017-02-03 RX ORDER — FAMOTIDINE 10 MG/ML
20 INJECTION, SOLUTION INTRAVENOUS 2 TIMES DAILY
Status: DISCONTINUED | OUTPATIENT
Start: 2017-02-03 | End: 2017-02-04

## 2017-02-03 RX ORDER — SODIUM CHLORIDE, SODIUM LACTATE, POTASSIUM CHLORIDE, CALCIUM CHLORIDE 600; 310; 30; 20 MG/100ML; MG/100ML; MG/100ML; MG/100ML
9 INJECTION, SOLUTION INTRAVENOUS CONTINUOUS
Status: DISCONTINUED | OUTPATIENT
Start: 2017-02-03 | End: 2017-02-06

## 2017-02-03 RX ORDER — SUFENTANIL CITRATE 50 UG/ML
INJECTION EPIDURAL; INTRAVENOUS AS NEEDED
Status: DISCONTINUED | OUTPATIENT
Start: 2017-02-03 | End: 2017-02-03 | Stop reason: SURG

## 2017-02-03 RX ORDER — ALBUMIN, HUMAN INJ 5% 5 %
500 SOLUTION INTRAVENOUS AS NEEDED
Status: DISCONTINUED | OUTPATIENT
Start: 2017-02-03 | End: 2017-02-09

## 2017-02-03 RX ORDER — NALOXONE HCL 0.4 MG/ML
0.4 VIAL (ML) INJECTION
Status: DISCONTINUED | OUTPATIENT
Start: 2017-02-03 | End: 2017-02-14 | Stop reason: HOSPADM

## 2017-02-03 RX ORDER — NITROGLYCERIN 0.4 MG/1
0.4 TABLET SUBLINGUAL
Status: DISCONTINUED | OUTPATIENT
Start: 2017-02-03 | End: 2017-02-03 | Stop reason: HOSPADM

## 2017-02-03 RX ORDER — DOBUTAMINE HYDROCHLORIDE 100 MG/100ML
2-20 INJECTION INTRAVENOUS CONTINUOUS PRN
Status: DISCONTINUED | OUTPATIENT
Start: 2017-02-03 | End: 2017-02-09

## 2017-02-03 RX ORDER — METOPROLOL TARTRATE 5 MG/5ML
2.5 INJECTION INTRAVENOUS EVERY 6 HOURS SCHEDULED
Status: DISCONTINUED | OUTPATIENT
Start: 2017-02-03 | End: 2017-02-04

## 2017-02-03 RX ORDER — DOPAMINE HYDROCHLORIDE 160 MG/100ML
2-20 INJECTION, SOLUTION INTRAVENOUS CONTINUOUS PRN
Status: DISCONTINUED | OUTPATIENT
Start: 2017-02-03 | End: 2017-02-09

## 2017-02-03 RX ORDER — SODIUM CHLORIDE 9 MG/ML
30 INJECTION, SOLUTION INTRAVENOUS CONTINUOUS PRN
Status: DISCONTINUED | OUTPATIENT
Start: 2017-02-03 | End: 2017-02-09

## 2017-02-03 RX ORDER — MAGNESIUM HYDROXIDE 1200 MG/15ML
LIQUID ORAL AS NEEDED
Status: DISCONTINUED | OUTPATIENT
Start: 2017-02-03 | End: 2017-02-03 | Stop reason: HOSPADM

## 2017-02-03 RX ORDER — FAMOTIDINE 20 MG/1
20 TABLET, FILM COATED ORAL ONCE
Status: DISCONTINUED | OUTPATIENT
Start: 2017-02-03 | End: 2017-02-03 | Stop reason: HOSPADM

## 2017-02-03 RX ORDER — MORPHINE SULFATE 2 MG/ML
2 INJECTION, SOLUTION INTRAMUSCULAR; INTRAVENOUS
Status: DISCONTINUED | OUTPATIENT
Start: 2017-02-03 | End: 2017-02-04

## 2017-02-03 RX ORDER — HEPARIN SODIUM 1000 [USP'U]/ML
INJECTION, SOLUTION INTRAVENOUS; SUBCUTANEOUS AS NEEDED
Status: DISCONTINUED | OUTPATIENT
Start: 2017-02-03 | End: 2017-02-03 | Stop reason: SURG

## 2017-02-03 RX ORDER — FENTANYL CITRATE 50 UG/ML
50 INJECTION, SOLUTION INTRAMUSCULAR; INTRAVENOUS
Status: DISCONTINUED | OUTPATIENT
Start: 2017-02-03 | End: 2017-02-03

## 2017-02-03 RX ORDER — OXYCODONE HYDROCHLORIDE AND ACETAMINOPHEN 5; 325 MG/1; MG/1
2 TABLET ORAL EVERY 4 HOURS PRN
Status: DISPENSED | OUTPATIENT
Start: 2017-02-03 | End: 2017-02-13

## 2017-02-03 RX ORDER — BISACODYL 10 MG
10 SUPPOSITORY, RECTAL RECTAL DAILY PRN
Status: DISCONTINUED | OUTPATIENT
Start: 2017-02-04 | End: 2017-02-14 | Stop reason: HOSPADM

## 2017-02-03 RX ORDER — FENTANYL CITRATE 50 UG/ML
25 INJECTION, SOLUTION INTRAMUSCULAR; INTRAVENOUS
Status: DISCONTINUED | OUTPATIENT
Start: 2017-02-03 | End: 2017-02-04

## 2017-02-03 RX ORDER — ATORVASTATIN CALCIUM 40 MG/1
40 TABLET, FILM COATED ORAL NIGHTLY
Status: DISCONTINUED | OUTPATIENT
Start: 2017-02-03 | End: 2017-02-14 | Stop reason: HOSPADM

## 2017-02-03 RX ORDER — VECURONIUM BROMIDE 1 MG/ML
10 INJECTION, POWDER, LYOPHILIZED, FOR SOLUTION INTRAVENOUS ONCE
Status: COMPLETED | OUTPATIENT
Start: 2017-02-03 | End: 2017-02-03

## 2017-02-03 RX ORDER — ACETAMINOPHEN 325 MG/1
650 TABLET ORAL EVERY 4 HOURS PRN
Status: DISCONTINUED | OUTPATIENT
Start: 2017-02-03 | End: 2017-02-03 | Stop reason: HOSPADM

## 2017-02-03 RX ORDER — MEPERIDINE HYDROCHLORIDE 25 MG/ML
25 INJECTION INTRAMUSCULAR; INTRAVENOUS; SUBCUTANEOUS EVERY 4 HOURS PRN
Status: ACTIVE | OUTPATIENT
Start: 2017-02-03 | End: 2017-02-04

## 2017-02-03 RX ORDER — DEXMEDETOMIDINE HYDROCHLORIDE 4 UG/ML
.2-1.5 INJECTION, SOLUTION INTRAVENOUS CONTINUOUS PRN
Status: DISCONTINUED | OUTPATIENT
Start: 2017-02-03 | End: 2017-02-09

## 2017-02-03 RX ORDER — CHLORHEXIDINE GLUCONATE 0.12 MG/ML
15 RINSE ORAL EVERY 12 HOURS SCHEDULED
Status: DISCONTINUED | OUTPATIENT
Start: 2017-02-03 | End: 2017-02-06

## 2017-02-03 RX ORDER — LIDOCAINE HYDROCHLORIDE 10 MG/ML
INJECTION, SOLUTION INFILTRATION; PERINEURAL AS NEEDED
Status: DISCONTINUED | OUTPATIENT
Start: 2017-02-03 | End: 2017-02-03 | Stop reason: SURG

## 2017-02-03 RX ORDER — POTASSIUM CHLORIDE 1.5 G/1.77G
40 POWDER, FOR SOLUTION ORAL AS NEEDED
Status: DISCONTINUED | OUTPATIENT
Start: 2017-02-03 | End: 2017-02-14

## 2017-02-03 RX ORDER — MORPHINE SULFATE 4 MG/ML
10 INJECTION, SOLUTION INTRAMUSCULAR; INTRAVENOUS ONCE
Status: COMPLETED | OUTPATIENT
Start: 2017-02-03 | End: 2017-02-03

## 2017-02-03 RX ORDER — POTASSIUM CHLORIDE 29.8 MG/ML
20 INJECTION INTRAVENOUS
Status: DISCONTINUED | OUTPATIENT
Start: 2017-02-03 | End: 2017-02-14

## 2017-02-03 RX ORDER — SENNA AND DOCUSATE SODIUM 50; 8.6 MG/1; MG/1
2 TABLET, FILM COATED ORAL 2 TIMES DAILY
Status: DISCONTINUED | OUTPATIENT
Start: 2017-02-03 | End: 2017-02-14 | Stop reason: HOSPADM

## 2017-02-03 RX ORDER — PROTAMINE SULFATE 10 MG/ML
INJECTION, SOLUTION INTRAVENOUS AS NEEDED
Status: DISCONTINUED | OUTPATIENT
Start: 2017-02-03 | End: 2017-02-03 | Stop reason: SURG

## 2017-02-03 RX ORDER — VECURONIUM BROMIDE 1 MG/ML
INJECTION, POWDER, LYOPHILIZED, FOR SOLUTION INTRAVENOUS
Status: COMPLETED
Start: 2017-02-03 | End: 2017-02-03

## 2017-02-03 RX ORDER — ROCURONIUM BROMIDE 10 MG/ML
INJECTION, SOLUTION INTRAVENOUS AS NEEDED
Status: DISCONTINUED | OUTPATIENT
Start: 2017-02-03 | End: 2017-02-03 | Stop reason: SURG

## 2017-02-03 RX ORDER — POTASSIUM CHLORIDE, DEXTROSE MONOHYDRATE 150; 5 MG/100ML; G/100ML
30 INJECTION, SOLUTION INTRAVENOUS CONTINUOUS
Status: DISCONTINUED | OUTPATIENT
Start: 2017-02-03 | End: 2017-02-06

## 2017-02-03 RX ORDER — PROTAMINE SULFATE 10 MG/ML
50 INJECTION, SOLUTION INTRAVENOUS ONCE
Status: COMPLETED | OUTPATIENT
Start: 2017-02-03 | End: 2017-02-03

## 2017-02-03 RX ORDER — SODIUM CHLORIDE 0.9 % (FLUSH) 0.9 %
30 SYRINGE (ML) INJECTION ONCE AS NEEDED
Status: DISCONTINUED | OUTPATIENT
Start: 2017-02-03 | End: 2017-02-14 | Stop reason: HOSPADM

## 2017-02-03 RX ORDER — AMINOCAPROIC ACID 250 MG/ML
INJECTION, SOLUTION INTRAVENOUS AS NEEDED
Status: DISCONTINUED | OUTPATIENT
Start: 2017-02-03 | End: 2017-02-03 | Stop reason: SURG

## 2017-02-03 RX ORDER — MORPHINE SULFATE 2 MG/ML
10 INJECTION, SOLUTION INTRAMUSCULAR; INTRAVENOUS ONCE
Status: DISCONTINUED | OUTPATIENT
Start: 2017-02-03 | End: 2017-02-03 | Stop reason: SDUPTHER

## 2017-02-03 RX ORDER — DOCUSATE SODIUM 100 MG/1
100 CAPSULE, LIQUID FILLED ORAL 2 TIMES DAILY PRN
Status: DISCONTINUED | OUTPATIENT
Start: 2017-02-03 | End: 2017-02-14 | Stop reason: HOSPADM

## 2017-02-03 RX ORDER — CHLORHEXIDINE GLUCONATE 500 MG/1
1 CLOTH TOPICAL EVERY 12 HOURS PRN
Status: DISCONTINUED | OUTPATIENT
Start: 2017-02-03 | End: 2017-02-03 | Stop reason: HOSPADM

## 2017-02-03 RX ORDER — NALOXONE HCL 0.4 MG/ML
0.4 VIAL (ML) INJECTION
Status: DISCONTINUED | OUTPATIENT
Start: 2017-02-03 | End: 2017-02-04

## 2017-02-03 RX ORDER — POTASSIUM CHLORIDE 750 MG/1
40 CAPSULE, EXTENDED RELEASE ORAL AS NEEDED
Status: DISCONTINUED | OUTPATIENT
Start: 2017-02-03 | End: 2017-02-14

## 2017-02-03 RX ORDER — ALBUTEROL SULFATE 90 UG/1
4 AEROSOL, METERED RESPIRATORY (INHALATION) EVERY 4 HOURS PRN
Status: ACTIVE | OUTPATIENT
Start: 2017-02-03 | End: 2017-02-04

## 2017-02-03 RX ORDER — ACETAMINOPHEN 325 MG/1
650 TABLET ORAL EVERY 4 HOURS PRN
Status: DISCONTINUED | OUTPATIENT
Start: 2017-02-03 | End: 2017-02-14 | Stop reason: HOSPADM

## 2017-02-03 RX ORDER — MIDAZOLAM HYDROCHLORIDE 1 MG/ML
INJECTION INTRAMUSCULAR; INTRAVENOUS AS NEEDED
Status: DISCONTINUED | OUTPATIENT
Start: 2017-02-03 | End: 2017-02-03 | Stop reason: SURG

## 2017-02-03 RX ORDER — SODIUM CHLORIDE 0.9 % (FLUSH) 0.9 %
1-10 SYRINGE (ML) INJECTION AS NEEDED
Status: DISCONTINUED | OUTPATIENT
Start: 2017-02-03 | End: 2017-02-03 | Stop reason: HOSPADM

## 2017-02-03 RX ORDER — SODIUM CHLORIDE 9 MG/ML
INJECTION, SOLUTION INTRAVENOUS CONTINUOUS PRN
Status: DISCONTINUED | OUTPATIENT
Start: 2017-02-03 | End: 2017-02-03 | Stop reason: SURG

## 2017-02-03 RX ORDER — HYDROCODONE BITARTRATE AND ACETAMINOPHEN 7.5; 325 MG/1; MG/1
1 TABLET ORAL EVERY 4 HOURS PRN
Status: DISCONTINUED | OUTPATIENT
Start: 2017-02-03 | End: 2017-02-10

## 2017-02-03 RX ORDER — CHLORHEXIDINE GLUCONATE 0.12 MG/ML
15 RINSE ORAL ONCE
Status: COMPLETED | OUTPATIENT
Start: 2017-02-03 | End: 2017-02-03

## 2017-02-03 RX ORDER — PHENYLEPHRINE HCL IN 0.9% NACL 0.5 MG/5ML
.5-3 SYRINGE (ML) INTRAVENOUS CONTINUOUS PRN
Status: DISCONTINUED | OUTPATIENT
Start: 2017-02-03 | End: 2017-02-10

## 2017-02-03 RX ORDER — ALBUMIN, HUMAN INJ 5% 5 %
SOLUTION INTRAVENOUS CONTINUOUS PRN
Status: DISCONTINUED | OUTPATIENT
Start: 2017-02-03 | End: 2017-02-03 | Stop reason: SURG

## 2017-02-03 RX ORDER — FAMOTIDINE 10 MG/ML
20 INJECTION, SOLUTION INTRAVENOUS ONCE
Status: CANCELLED | OUTPATIENT
Start: 2017-02-03 | End: 2017-02-03

## 2017-02-03 RX ORDER — ASPIRIN 325 MG
325 TABLET ORAL ONCE
Status: COMPLETED | OUTPATIENT
Start: 2017-02-03 | End: 2017-02-03

## 2017-02-03 RX ORDER — ETOMIDATE 2 MG/ML
INJECTION INTRAVENOUS AS NEEDED
Status: DISCONTINUED | OUTPATIENT
Start: 2017-02-03 | End: 2017-02-03 | Stop reason: SURG

## 2017-02-03 RX ORDER — ONDANSETRON 2 MG/ML
4 INJECTION INTRAMUSCULAR; INTRAVENOUS EVERY 6 HOURS PRN
Status: DISCONTINUED | OUTPATIENT
Start: 2017-02-03 | End: 2017-02-10

## 2017-02-03 RX ORDER — NITROGLYCERIN 20 MG/100ML
5-200 INJECTION INTRAVENOUS CONTINUOUS PRN
Status: DISCONTINUED | OUTPATIENT
Start: 2017-02-03 | End: 2017-02-14

## 2017-02-03 RX ORDER — FAMOTIDINE 20 MG/1
20 TABLET, FILM COATED ORAL 2 TIMES DAILY
Status: DISCONTINUED | OUTPATIENT
Start: 2017-02-03 | End: 2017-02-14 | Stop reason: HOSPADM

## 2017-02-03 RX ORDER — VECURONIUM BROMIDE 1 MG/ML
INJECTION, POWDER, LYOPHILIZED, FOR SOLUTION INTRAVENOUS AS NEEDED
Status: DISCONTINUED | OUTPATIENT
Start: 2017-02-03 | End: 2017-02-03 | Stop reason: SURG

## 2017-02-03 RX ORDER — LIDOCAINE HYDROCHLORIDE 10 MG/ML
1 INJECTION, SOLUTION EPIDURAL; INFILTRATION; INTRACAUDAL; PERINEURAL ONCE
Status: COMPLETED | OUTPATIENT
Start: 2017-02-03 | End: 2017-02-03

## 2017-02-03 RX ORDER — ASPIRIN 325 MG
325 TABLET, DELAYED RELEASE (ENTERIC COATED) ORAL DAILY
Status: DISCONTINUED | OUTPATIENT
Start: 2017-02-04 | End: 2017-02-14 | Stop reason: HOSPADM

## 2017-02-03 RX ADMIN — POTASSIUM CHLORIDE AND DEXTROSE MONOHYDRATE 30 ML/HR: 150; 5 INJECTION, SOLUTION INTRAVENOUS at 14:27

## 2017-02-03 RX ADMIN — SODIUM CHLORIDE: 9 INJECTION, SOLUTION INTRAVENOUS at 07:19

## 2017-02-03 RX ADMIN — VECURONIUM BROMIDE 10 MG: 1 INJECTION, POWDER, LYOPHILIZED, FOR SOLUTION INTRAVENOUS at 14:30

## 2017-02-03 RX ADMIN — Medication 0.02 MCG/KG/MIN: at 14:26

## 2017-02-03 RX ADMIN — ALBUMIN HUMAN: 0.05 INJECTION, SOLUTION INTRAVENOUS at 12:56

## 2017-02-03 RX ADMIN — PROTAMINE SULFATE 50 MG: 10 INJECTION, SOLUTION INTRAVENOUS at 15:41

## 2017-02-03 RX ADMIN — LIDOCAINE HYDROCHLORIDE 50 MG: 10 INJECTION, SOLUTION INFILTRATION; PERINEURAL at 07:22

## 2017-02-03 RX ADMIN — FENTANYL CITRATE 25 MCG: 50 INJECTION, SOLUTION INTRAMUSCULAR; INTRAVENOUS at 19:50

## 2017-02-03 RX ADMIN — SODIUM CHLORIDE, POTASSIUM CHLORIDE, SODIUM LACTATE AND CALCIUM CHLORIDE 9 ML/HR: 600; 310; 30; 20 INJECTION, SOLUTION INTRAVENOUS at 06:25

## 2017-02-03 RX ADMIN — AMINOCAPROIC ACID 10 G: 250 INJECTION, SOLUTION INTRAVENOUS at 12:32

## 2017-02-03 RX ADMIN — POTASSIUM CHLORIDE AND DEXTROSE MONOHYDRATE 30 ML/HR: 150; 5 INJECTION, SOLUTION INTRAVENOUS at 14:26

## 2017-02-03 RX ADMIN — AMINOCAPROIC ACID 10 G: 250 INJECTION, SOLUTION INTRAVENOUS at 07:58

## 2017-02-03 RX ADMIN — SODIUM BICARBONATE 285 MEQ: 84 INJECTION INTRAVENOUS at 15:22

## 2017-02-03 RX ADMIN — MORPHINE SULFATE 10 MG: 4 INJECTION, SOLUTION INTRAMUSCULAR; INTRAVENOUS at 14:30

## 2017-02-03 RX ADMIN — LIDOCAINE HYDROCHLORIDE 1 ML: 10 INJECTION, SOLUTION EPIDURAL; INFILTRATION; INTRACAUDAL; PERINEURAL at 06:26

## 2017-02-03 RX ADMIN — MIDAZOLAM HYDROCHLORIDE 2 MG: 1 INJECTION, SOLUTION INTRAMUSCULAR; INTRAVENOUS at 11:28

## 2017-02-03 RX ADMIN — VECURONIUM BROMIDE 20 MG: 1 INJECTION, POWDER, LYOPHILIZED, FOR SOLUTION INTRAVENOUS at 07:50

## 2017-02-03 RX ADMIN — CHLORHEXIDINE GLUCONATE 15 ML: 1.2 RINSE ORAL at 06:26

## 2017-02-03 RX ADMIN — CEFUROXIME 1.5 G: 1.5 INJECTION, SOLUTION INTRAVENOUS at 20:41

## 2017-02-03 RX ADMIN — FENTANYL CITRATE 25 MCG: 50 INJECTION, SOLUTION INTRAMUSCULAR; INTRAVENOUS at 17:34

## 2017-02-03 RX ADMIN — ATORVASTATIN CALCIUM 40 MG: 40 TABLET, FILM COATED ORAL at 20:40

## 2017-02-03 RX ADMIN — CEFUROXIME 1.5 G: 1.5 INJECTION, POWDER, FOR SOLUTION INTRAVENOUS at 12:32

## 2017-02-03 RX ADMIN — PROPOFOL 75 MCG/KG/MIN: 10 INJECTION, EMULSION INTRAVENOUS at 20:41

## 2017-02-03 RX ADMIN — HEPARIN SODIUM 25000 UNITS: 1000 INJECTION, SOLUTION INTRAVENOUS; SUBCUTANEOUS at 09:36

## 2017-02-03 RX ADMIN — ETOMIDATE 20 MG: 2 INJECTION INTRAVENOUS at 07:20

## 2017-02-03 RX ADMIN — PROPOFOL 75 MCG/KG/MIN: 10 INJECTION, EMULSION INTRAVENOUS at 17:51

## 2017-02-03 RX ADMIN — CHLORHEXIDINE GLUCONATE 15 ML: 1.2 RINSE ORAL at 20:40

## 2017-02-03 RX ADMIN — MUPIROCIN 1 APPLICATION: 20 OINTMENT TOPICAL at 06:26

## 2017-02-03 RX ADMIN — MIDAZOLAM HYDROCHLORIDE 10 MG: 1 INJECTION, SOLUTION INTRAMUSCULAR; INTRAVENOUS at 07:20

## 2017-02-03 RX ADMIN — PROTAMINE SULFATE 400 MG: 10 INJECTION, SOLUTION INTRAVENOUS at 12:33

## 2017-02-03 RX ADMIN — FAMOTIDINE 20 MG: 10 INJECTION, SOLUTION INTRAVENOUS at 20:40

## 2017-02-03 RX ADMIN — PROPOFOL 50 MCG/KG/MIN: 10 INJECTION, EMULSION INTRAVENOUS at 14:26

## 2017-02-03 RX ADMIN — NITROGLYCERIN 15 MCG: 5 INJECTION, SOLUTION INTRAVENOUS at 08:33

## 2017-02-03 RX ADMIN — ASPIRIN 325 MG ORAL TABLET 325 MG: 325 PILL ORAL at 20:40

## 2017-02-03 RX ADMIN — CEFUROXIME 1.5 G: 1.5 INJECTION, POWDER, FOR SOLUTION INTRAVENOUS at 07:58

## 2017-02-03 RX ADMIN — ROCURONIUM BROMIDE 100 MG: 10 INJECTION INTRAVENOUS at 07:20

## 2017-02-03 RX ADMIN — ALBUMIN HUMAN 500 ML: 0.05 INJECTION, SOLUTION INTRAVENOUS at 15:00

## 2017-02-03 RX ADMIN — SUFENTANIL CITRATE 200 MCG: 50 INJECTION EPIDURAL; INTRAVENOUS at 07:20

## 2017-02-03 NOTE — ANESTHESIA PREPROCEDURE EVALUATION
Anesthesia Evaluation      No history of anesthetic complications   Airway   Mallampati: II  TM distance: >3 FB  Neck ROM: full  no difficulty expected  Dental      Pulmonary    (-) asthma  Cardiovascular   (+) hypertension, valvular problems/murmurs (no echo seen at this time .  Done in [place] per patient) MR, murmur,   (-) past MI, cardiac stents    ECG reviewed  Rhythm: regular  Rate: normal    Neuro/Psych  (-) TIA, CVASeizures: years ago last seizure.  GI/Hepatic/Renal/Endo    (-) liver disease, renal disease, diabetes    Musculoskeletal     Abdominal    Substance History      OB/GYN          Other                             Anesthesia Plan    ASA 3     general   (GA swan a line cortis, DARRIAN)  intravenous induction

## 2017-02-03 NOTE — H&P
"Pre-Op H&P (See Recent Office Note Attached)    Chief complaint:  Shortness of breath.    HPI:    Patient is a 66 y.o. female who presents with 1 year history of worsening shortness of breath.  She denies any changes in presenting symptoms since last seen by MD.  No recent fever or chills. Takes ASA blood thinning agents only.    Review of Systems:  General ROS:  no fever, chills, rashes, No change since last office visit  Cardiovascular ROS: no chest pain or dyspnea on exertion  Respiratory ROS: no cough, shortness of breath, or wheezing    Immunization History:   Influenza:  unknown  Pneumococcal:  yes  Tetanus:  no    Vital Signs:  Visit Vitals   • /72 (BP Location: Left arm, Patient Position: Lying)   • Pulse 62   • Resp 16   • Ht 66\" (167.6 cm)   • Wt 148 lb 13 oz (67.5 kg)   • SpO2 97%   • BMI 24.02 kg/m2       Physical Exam:    CV:  S1S2 regular rate and rhythm, no murmur               Resp:  Clear to auscultation; respirations regular, even and unlabored    Results Review:    I reviewed the patient's new clinical results. CXR-cardiomegaly, EKG w/ brent cardia    Assessment/Plan:Mitral valve regurgitation/for mitral valve repair or replacement,DARRIAN, CABG today       SUSANA Shoemaker  2/3/2017 6:52 AM    "

## 2017-02-03 NOTE — PLAN OF CARE
Problem: Patient Care Overview (Adult)  Goal: Plan of Care Review  Outcome: Ongoing (interventions implemented as appropriate)    02/03/17 1828   Coping/Psychosocial Response Interventions   Plan Of Care Reviewed With family   Patient Care Overview   Progress improving   Outcome Evaluation   Outcome Summary/Follow up Plan give plts to reverse bleeding process.       Goal: Adult Individualization and Mutuality  Outcome: Ongoing (interventions implemented as appropriate)  Goal: Discharge Needs Assessment  Outcome: Ongoing (interventions implemented as appropriate)    Problem: Perioperative Period (Adult)  Goal: Signs and Symptoms of Listed Potential Problems Will be Absent or Manageable (Perioperative Period)  Outcome: Ongoing (interventions implemented as appropriate)    02/03/17 1828   Perioperative Period   Problems Assessed (Perioperative Period) all   Problems Present (Perioperative Period) hypothermia

## 2017-02-03 NOTE — ANESTHESIA POSTPROCEDURE EVALUATION
Patient: Tiana SHERMAN Roseanne    Procedure Summary     Date Anesthesia Start Anesthesia Stop Room / Location    02/03/17 0719  [facility] / [facility]       Procedure Diagnosis Surgeon Provider    DARRIAN, MEDIAN STERNOTOMY, CORONARY ARTERY BYPASS GRAFTING ATTEMPTED, UTILIZING THE ENDOSCOPIC VEIN HARVEST OF THE RIGHT GREATER SAPHENOUS VEIN, MITRAL VALVE REPLACEMENT (N/A Chest) Mitral valve disorder  (Mitral valve disorder [I05.9]) MD Jamie Henson MD          Anesthesia Type: general  Last vitals  BP      Temp      Pulse     Resp      SpO2        Post Anesthesia Care and Evaluation    Patient location during evaluation: PACU  Patient participation: complete - patient cannot participate  Pain score: 0  Pain management: adequate  Anesthetic complications: No anesthetic complications  PONV Status: none  Cardiovascular status: acceptable  Respiratory status: acceptable, ETT and ventilator  Hydration status: acceptable    Comments: Transport to the ICU,  Report to RN, Stable  BBS=

## 2017-02-03 NOTE — ANESTHESIA PROCEDURE NOTES
Central Line    Patient location during procedure: OR  Staff  Anesthesiologist: LUCIA DAIZ  Preanesthetic Checklist  Completed: patient identified, site marked, surgical consent, pre-op evaluation, timeout performed, IV checked, risks and benefits discussed and monitors and equipment checked  Central Line Prep  Sterile Tech:gloves, cap, gown, mask and sterile barriers  Prep: chloraprep  Patient monitoring: blood pressure monitoring, continuous pulse oximetry and EKGCentral Line Procedure  Laterality:right  Location:internal jugular  Catheter Type:Cordis  Catheter Size:9 Fr  Guidance:ultrasound guided, landmark technique and palpation technique  Assessment  Post procedure:biopatch applied, line sutured and occlusive dressing applied  Assessement:blood return through all ports, free fluid flow, chest x-ray ordered and Isaias Test  Complications:no  Patient Tolerance:patient tolerated the procedure well with no apparent complications

## 2017-02-03 NOTE — PROGRESS NOTES
INTENSIVIST / PULMONARY FOLLOW UP NOTE     Hospital:  LOS: 0 days   Ms. Tiana Cronin, 66 y.o. female is followed for:   Active Problems:    Mitral regurgitation          SUBJECTIVE   Post op 1vCABG & Bioprosthetic MVR, on vent, pacer dependent    The patient's relevant past medical, surgical, family, and social history were reviewed    Allergies and medications were reviewed    ROS:  Per subjective, all other systems were reviewed and were negative        OBJECTIVE     Vitals:    02/03/17 1430   BP: 122/79   Pulse: 85   Resp: 12   Temp: 96.2 °F (35.7 °C)   SpO2: 100%     General Appearance:  Intubated, in no acute distress  Eyes:  No scleral icterus or pallor, PERRLA  Ears, Nose, Mouth, Throat:  Atraumatic, oral endotracheal tube  Neck:  Trachea midline, thyroid normal  Respiratory:  Clear to auscultation bilaterally, normal effort  Cardiovascular:  Regular rate and rhythm, no murmurs, no peripheral edema  Gastrointestinal:  Soft, non-tender, non-distended, no hepatosplenomegaly  Skin:  Normal temperature, no rash  Psychiatric:  Intubated, sedated, no agitation  Neuro:  No focal neurologic deficits observed    Relevant imaging studies and labs from 02/03/17 were reviewed and interpreted by me    Assessment/Plan   IMPRESSION / PLAN     Inpatient Problem List:  66 y.o.female:  Active Problems:    Mitral regurgitation       Impression:  66 y.o.female with relevant PMH of HTN, Mitral Regurgitation, EF 65-70% Depression, Hypothyroidism, Arthritis, Lifetime Non-smoker admitted 2/3/2017 post op 1vCABG and Bioprosthetic MVR (op report pending)    Plan:  -post op care per ct surg  -wean vent per protocol  -transfuse prn  -continue appropriate home meds  -foot pumps, pepcid    Plan of care and goals reviewed with mulitdisciplinary team at daily rounds    Critical care time: 30 min       Hossein Del Valle MD  Intensive Care Medicine  02/03/17 3:19 PM

## 2017-02-03 NOTE — ANESTHESIA PROCEDURE NOTES
Airway  Urgency: elective    Airway not difficult    General Information and Staff    Patient location during procedure: OR  Anesthesiologist: LUCIA DIAZ    Indications and Patient Condition  Indications for airway management: airway protection    Preoxygenated: yes  Mask difficulty assessment: 1 - vent by mask    Final Airway Details  Final airway type: endotracheal airway      Successful airway: ETT  Cuffed: yes   Successful intubation technique: direct laryngoscopy  Endotracheal tube insertion site: oral  Blade: Gina  Blade size: #3  ETT size: 6.5 mm  Cormack-Lehane Classification: grade I - full view of glottis  Measured from: gums  Number of attempts at approach: 1    Additional Comments  Dl Easy, BBS=

## 2017-02-03 NOTE — ANESTHESIA PROCEDURE NOTES
Arterial Line    Patient location during procedure: pre-op Line placed for hemodynamic monitoring.  Performed By   Anesthesiologist: LUCIA DIAZ  Preanesthetic Checklist  Completed: patient identified, site marked, surgical consent, pre-op evaluation, timeout performed, IV checked, risks and benefits discussed and monitors and equipment checked  Arterial Line Prep   Sterile Tech: cap, gloves and sterile barriers  Prep: ChloraPrep  Patient monitoring: blood pressure monitoring, continuous pulse oximetry and EKG  Arterial Line Procedure   Laterality:right  Location:  radial artery  Catheter size: 20 G   Guidance: landmark technique and palpation technique  Number of attempts: 1  Successful placement: yes          Post Assessment   Dressing Type: line sutured, occlusive dressing applied, secured with tape and wrist guard applied.   Complications no  Circ/Move/Sens Assessment: normal and unchanged.   Patient Tolerance: patient tolerated the procedure well with no apparent complications

## 2017-02-04 ENCOUNTER — APPOINTMENT (OUTPATIENT)
Dept: GENERAL RADIOLOGY | Facility: HOSPITAL | Age: 67
End: 2017-02-04

## 2017-02-04 LAB
ABO + RH BLD: NORMAL
ALBUMIN SERPL-MCNC: 3.6 G/DL (ref 3.2–4.8)
ANION GAP SERPL CALCULATED.3IONS-SCNC: 6 MMOL/L (ref 3–11)
ARTERIAL PATENCY WRIST A: ABNORMAL
ARTERIAL PATENCY WRIST A: ABNORMAL
ATMOSPHERIC PRESS: ABNORMAL MMHG
ATMOSPHERIC PRESS: ABNORMAL MMHG
BASE EXCESS BLDA CALC-SCNC: -1.1 MMOL/L (ref 0–2)
BASE EXCESS BLDA CALC-SCNC: -1.5 MMOL/L (ref 0–2)
BASOPHILS # BLD AUTO: 0.02 10*3/MM3 (ref 0–0.2)
BASOPHILS # BLD AUTO: 0.02 10*3/MM3 (ref 0–0.2)
BASOPHILS NFR BLD AUTO: 0.3 % (ref 0–1)
BASOPHILS NFR BLD AUTO: 0.3 % (ref 0–1)
BDY SITE: ABNORMAL
BDY SITE: ABNORMAL
BH BB BLOOD EXPIRATION DATE: NORMAL
BH BB BLOOD TYPE BARCODE: 5100
BH BB BLOOD TYPE BARCODE: 5100
BH BB BLOOD TYPE BARCODE: 6200
BH BB BLOOD TYPE BARCODE: 6200
BH BB DISPENSE STATUS: NORMAL
BH BB PRODUCT CODE: NORMAL
BH BB UNIT NUMBER: NORMAL
BUN BLD-MCNC: 17 MG/DL (ref 9–23)
BUN/CREAT SERPL: 18.9 (ref 7–25)
CALCIUM SPEC-SCNC: 8.8 MG/DL (ref 8.7–10.4)
CHLORIDE SERPL-SCNC: 110 MMOL/L (ref 99–109)
CO2 BLDA-SCNC: 24.6 MMOL/L (ref 22–33)
CO2 BLDA-SCNC: 25.9 MMOL/L (ref 22–33)
CO2 SERPL-SCNC: 23 MMOL/L (ref 20–31)
COHGB MFR BLD: 1.3 % (ref 0–2)
COHGB MFR BLD: 1.3 % (ref 0–2)
CREAT BLD-MCNC: 0.9 MG/DL (ref 0.6–1.3)
CROSSMATCH INTERPRETATION: NORMAL
CROSSMATCH INTERPRETATION: NORMAL
DEPRECATED RDW RBC AUTO: 52.7 FL (ref 37–54)
DEPRECATED RDW RBC AUTO: 54.7 FL (ref 37–54)
EOSINOPHIL # BLD AUTO: 0.17 10*3/MM3 (ref 0.1–0.3)
EOSINOPHIL # BLD AUTO: 0.17 10*3/MM3 (ref 0.1–0.3)
EOSINOPHIL NFR BLD AUTO: 2.4 % (ref 0–3)
EOSINOPHIL NFR BLD AUTO: 2.8 % (ref 0–3)
ERYTHROCYTE [DISTWIDTH] IN BLOOD BY AUTOMATED COUNT: 15.6 % (ref 11.3–14.5)
ERYTHROCYTE [DISTWIDTH] IN BLOOD BY AUTOMATED COUNT: 16 % (ref 11.3–14.5)
GFR SERPL CREATININE-BSD FRML MDRD: 63 ML/MIN/1.73
GLUCOSE BLD-MCNC: 118 MG/DL (ref 70–100)
GLUCOSE BLDC GLUCOMTR-MCNC: 106 MG/DL (ref 70–130)
GLUCOSE BLDC GLUCOMTR-MCNC: 107 MG/DL (ref 70–130)
GLUCOSE BLDC GLUCOMTR-MCNC: 108 MG/DL (ref 70–130)
GLUCOSE BLDC GLUCOMTR-MCNC: 113 MG/DL (ref 70–130)
GLUCOSE BLDC GLUCOMTR-MCNC: 116 MG/DL (ref 70–130)
GLUCOSE BLDC GLUCOMTR-MCNC: 117 MG/DL (ref 70–130)
GLUCOSE BLDC GLUCOMTR-MCNC: 117 MG/DL (ref 70–130)
GLUCOSE BLDC GLUCOMTR-MCNC: 118 MG/DL (ref 70–130)
GLUCOSE BLDC GLUCOMTR-MCNC: 120 MG/DL (ref 70–130)
GLUCOSE BLDC GLUCOMTR-MCNC: 121 MG/DL (ref 70–130)
GLUCOSE BLDC GLUCOMTR-MCNC: 121 MG/DL (ref 70–130)
GLUCOSE BLDC GLUCOMTR-MCNC: 122 MG/DL (ref 70–130)
GLUCOSE BLDC GLUCOMTR-MCNC: 135 MG/DL (ref 70–130)
GLUCOSE BLDC GLUCOMTR-MCNC: 145 MG/DL (ref 70–130)
GLUCOSE BLDC GLUCOMTR-MCNC: 146 MG/DL (ref 70–130)
HCO3 BLDA-SCNC: 23.4 MMOL/L (ref 20–26)
HCO3 BLDA-SCNC: 24.5 MMOL/L (ref 20–26)
HCT VFR BLD AUTO: 26.6 % (ref 34.5–44)
HCT VFR BLD AUTO: 26.8 % (ref 34.5–44)
HCT VFR BLD CALC: 27.9 %
HCT VFR BLD CALC: 28.1 %
HGB BLD-MCNC: 9 G/DL (ref 11.5–15.5)
HGB BLD-MCNC: 9.1 G/DL (ref 11.5–15.5)
HGB BLDA-MCNC: 9.1 G/DL (ref 14–18)
HGB BLDA-MCNC: 9.2 G/DL (ref 14–18)
HOROWITZ INDEX BLD+IHG-RTO: 30 %
HOROWITZ INDEX BLD+IHG-RTO: 30 %
IMM GRANULOCYTES # BLD: 0.01 10*3/MM3 (ref 0–0.03)
IMM GRANULOCYTES # BLD: 0.01 10*3/MM3 (ref 0–0.03)
IMM GRANULOCYTES NFR BLD: 0.1 % (ref 0–0.6)
IMM GRANULOCYTES NFR BLD: 0.2 % (ref 0–0.6)
INR PPP: 1.1
LYMPHOCYTES # BLD AUTO: 0.47 10*3/MM3 (ref 0.6–4.8)
LYMPHOCYTES # BLD AUTO: 0.85 10*3/MM3 (ref 0.6–4.8)
LYMPHOCYTES NFR BLD AUTO: 12.1 % (ref 24–44)
LYMPHOCYTES NFR BLD AUTO: 7.6 % (ref 24–44)
MAGNESIUM SERPL-MCNC: 2.4 MG/DL (ref 1.3–2.7)
MCH RBC QN AUTO: 31.8 PG (ref 27–31)
MCH RBC QN AUTO: 31.8 PG (ref 27–31)
MCHC RBC AUTO-ENTMCNC: 33.8 G/DL (ref 32–36)
MCHC RBC AUTO-ENTMCNC: 34 G/DL (ref 32–36)
MCV RBC AUTO: 93.7 FL (ref 80–99)
MCV RBC AUTO: 94 FL (ref 80–99)
METHGB BLD QL: 0.7 % (ref 0–1.5)
METHGB BLD QL: 0.9 % (ref 0–1.5)
MODALITY: ABNORMAL
MODALITY: ABNORMAL
MONOCYTES # BLD AUTO: 0.9 10*3/MM3 (ref 0–1)
MONOCYTES # BLD AUTO: 0.92 10*3/MM3 (ref 0–1)
MONOCYTES NFR BLD AUTO: 12.8 % (ref 0–12)
MONOCYTES NFR BLD AUTO: 14.9 % (ref 0–12)
NEUTROPHILS # BLD AUTO: 4.58 10*3/MM3 (ref 1.5–8.3)
NEUTROPHILS # BLD AUTO: 5.09 10*3/MM3 (ref 1.5–8.3)
NEUTROPHILS NFR BLD AUTO: 72.3 % (ref 41–71)
NEUTROPHILS NFR BLD AUTO: 74.2 % (ref 41–71)
OXYHGB MFR BLDV: 92.8 % (ref 94–99)
OXYHGB MFR BLDV: 96.3 % (ref 94–99)
PCO2 BLDA: 39 MM HG (ref 35–45)
PCO2 BLDA: 45.5 MM HG (ref 35–45)
PH BLDA: 7.34 PH UNITS (ref 7.35–7.45)
PH BLDA: 7.39 PH UNITS (ref 7.35–7.45)
PHOSPHATE SERPL-MCNC: 2.7 MG/DL (ref 2.4–5.1)
PLATELET # BLD AUTO: 211 10*3/MM3 (ref 150–450)
PLATELET # BLD AUTO: 222 10*3/MM3 (ref 150–450)
PMV BLD AUTO: 10.1 FL (ref 6–12)
PMV BLD AUTO: 9.9 FL (ref 6–12)
PO2 BLDA: 72.2 MM HG (ref 83–108)
PO2 BLDA: 92.4 MM HG (ref 83–108)
POTASSIUM BLD-SCNC: 4.1 MMOL/L (ref 3.5–5.5)
POTASSIUM BLD-SCNC: 4.2 MMOL/L (ref 3.5–5.5)
PROTHROMBIN TIME: 12 SECONDS (ref 9.6–11.5)
RBC # BLD AUTO: 2.83 10*6/MM3 (ref 3.89–5.14)
RBC # BLD AUTO: 2.86 10*6/MM3 (ref 3.89–5.14)
SODIUM BLD-SCNC: 139 MMOL/L (ref 132–146)
UNIT  ABO: NORMAL
UNIT  RH: NORMAL
WBC NRBC COR # BLD: 6.17 10*3/MM3 (ref 3.5–10.8)
WBC NRBC COR # BLD: 7.04 10*3/MM3 (ref 3.5–10.8)

## 2017-02-04 PROCEDURE — 25810000003 DEXTROSE 5 % WITH KCL 20 MEQ 20-5 MEQ/L-% SOLUTION: Performed by: PHYSICIAN ASSISTANT

## 2017-02-04 PROCEDURE — 85610 PROTHROMBIN TIME: CPT | Performed by: PHYSICIAN ASSISTANT

## 2017-02-04 PROCEDURE — 83735 ASSAY OF MAGNESIUM: CPT | Performed by: PHYSICIAN ASSISTANT

## 2017-02-04 PROCEDURE — 93010 ELECTROCARDIOGRAM REPORT: CPT | Performed by: INTERNAL MEDICINE

## 2017-02-04 PROCEDURE — 99291 CRITICAL CARE FIRST HOUR: CPT | Performed by: INTERNAL MEDICINE

## 2017-02-04 PROCEDURE — 94799 UNLISTED PULMONARY SVC/PX: CPT | Performed by: NURSE PRACTITIONER

## 2017-02-04 PROCEDURE — 82805 BLOOD GASES W/O2 SATURATION: CPT | Performed by: THORACIC SURGERY (CARDIOTHORACIC VASCULAR SURGERY)

## 2017-02-04 PROCEDURE — 84132 ASSAY OF SERUM POTASSIUM: CPT | Performed by: THORACIC SURGERY (CARDIOTHORACIC VASCULAR SURGERY)

## 2017-02-04 PROCEDURE — 85025 COMPLETE CBC W/AUTO DIFF WBC: CPT | Performed by: THORACIC SURGERY (CARDIOTHORACIC VASCULAR SURGERY)

## 2017-02-04 PROCEDURE — 25010000002 PROPOFOL 1000 MG/ML EMULSION: Performed by: THORACIC SURGERY (CARDIOTHORACIC VASCULAR SURGERY)

## 2017-02-04 PROCEDURE — 25010000002 MORPHINE SULFATE (PF) 2 MG/ML SOLUTION: Performed by: INTERNAL MEDICINE

## 2017-02-04 PROCEDURE — 94799 UNLISTED PULMONARY SVC/PX: CPT

## 2017-02-04 PROCEDURE — 85025 COMPLETE CBC W/AUTO DIFF WBC: CPT | Performed by: PHYSICIAN ASSISTANT

## 2017-02-04 PROCEDURE — 25010000002 MORPHINE SULFATE (PF) 2 MG/ML SOLUTION: Performed by: THORACIC SURGERY (CARDIOTHORACIC VASCULAR SURGERY)

## 2017-02-04 PROCEDURE — 82962 GLUCOSE BLOOD TEST: CPT

## 2017-02-04 PROCEDURE — 25010000002 CEFUROXIME PER 750 MG: Performed by: PHYSICIAN ASSISTANT

## 2017-02-04 PROCEDURE — 80069 RENAL FUNCTION PANEL: CPT | Performed by: PHYSICIAN ASSISTANT

## 2017-02-04 PROCEDURE — 93005 ELECTROCARDIOGRAM TRACING: CPT | Performed by: PHYSICIAN ASSISTANT

## 2017-02-04 PROCEDURE — 94003 VENT MGMT INPAT SUBQ DAY: CPT

## 2017-02-04 PROCEDURE — 71010 HC CHEST PA OR AP: CPT

## 2017-02-04 RX ORDER — LEVOTHYROXINE SODIUM 175 UG/1
175 TABLET ORAL DAILY
Status: DISCONTINUED | OUTPATIENT
Start: 2017-02-04 | End: 2017-02-14 | Stop reason: HOSPADM

## 2017-02-04 RX ORDER — MORPHINE SULFATE 2 MG/ML
2 INJECTION, SOLUTION INTRAMUSCULAR; INTRAVENOUS
Status: DISCONTINUED | OUTPATIENT
Start: 2017-02-04 | End: 2017-02-14 | Stop reason: HOSPADM

## 2017-02-04 RX ORDER — NICOTINE POLACRILEX 4 MG
15 LOZENGE BUCCAL
Status: DISCONTINUED | OUTPATIENT
Start: 2017-02-04 | End: 2017-02-14 | Stop reason: HOSPADM

## 2017-02-04 RX ORDER — DEXTROSE MONOHYDRATE 25 G/50ML
25 INJECTION, SOLUTION INTRAVENOUS
Status: DISCONTINUED | OUTPATIENT
Start: 2017-02-04 | End: 2017-02-14

## 2017-02-04 RX ORDER — PRIMIDONE 250 MG/1
250 TABLET ORAL 3 TIMES DAILY
Status: DISCONTINUED | OUTPATIENT
Start: 2017-02-04 | End: 2017-02-14 | Stop reason: HOSPADM

## 2017-02-04 RX ORDER — CITALOPRAM 20 MG/1
20 TABLET ORAL DAILY
Status: DISCONTINUED | OUTPATIENT
Start: 2017-02-04 | End: 2017-02-14 | Stop reason: HOSPADM

## 2017-02-04 RX ADMIN — HYDROCODONE BITARTRATE AND ACETAMINOPHEN 1 TABLET: 7.5; 325 TABLET ORAL at 04:23

## 2017-02-04 RX ADMIN — MORPHINE SULFATE 2 MG: 2 INJECTION, SOLUTION INTRAMUSCULAR; INTRAVENOUS at 00:01

## 2017-02-04 RX ADMIN — ACETAMINOPHEN 650 MG: 325 TABLET, FILM COATED ORAL at 02:31

## 2017-02-04 RX ADMIN — DOCUSATE SODIUM AND SENNOSIDES 2 TABLET: 8.6; 5 TABLET, FILM COATED ORAL at 08:01

## 2017-02-04 RX ADMIN — HYDROCODONE BITARTRATE AND ACETAMINOPHEN 1 TABLET: 7.5; 325 TABLET ORAL at 14:03

## 2017-02-04 RX ADMIN — LEVOTHYROXINE SODIUM 175 MCG: 175 TABLET ORAL at 17:34

## 2017-02-04 RX ADMIN — CEFUROXIME 1.5 G: 1.5 INJECTION, SOLUTION INTRAVENOUS at 12:22

## 2017-02-04 RX ADMIN — FAMOTIDINE 20 MG: 10 INJECTION, SOLUTION INTRAVENOUS at 08:00

## 2017-02-04 RX ADMIN — POTASSIUM CHLORIDE AND DEXTROSE MONOHYDRATE 30 ML/HR: 150; 5 INJECTION, SOLUTION INTRAVENOUS at 21:32

## 2017-02-04 RX ADMIN — ATORVASTATIN CALCIUM 40 MG: 40 TABLET, FILM COATED ORAL at 22:05

## 2017-02-04 RX ADMIN — OXYCODONE AND ACETAMINOPHEN 2 TABLET: 5; 325 TABLET ORAL at 17:34

## 2017-02-04 RX ADMIN — PROPOFOL 75 MCG/KG/MIN: 10 INJECTION, EMULSION INTRAVENOUS at 00:01

## 2017-02-04 RX ADMIN — MORPHINE SULFATE 2 MG: 2 INJECTION, SOLUTION INTRAMUSCULAR; INTRAVENOUS at 01:34

## 2017-02-04 RX ADMIN — OXYCODONE AND ACETAMINOPHEN 2 TABLET: 5; 325 TABLET ORAL at 22:05

## 2017-02-04 RX ADMIN — Medication 0.08 MCG/KG/MIN: at 10:36

## 2017-02-04 RX ADMIN — CHLORHEXIDINE GLUCONATE 15 ML: 1.2 RINSE ORAL at 08:00

## 2017-02-04 RX ADMIN — MORPHINE SULFATE 2 MG: 2 INJECTION, SOLUTION INTRAMUSCULAR; INTRAVENOUS at 04:21

## 2017-02-04 RX ADMIN — FAMOTIDINE 20 MG: 20 TABLET ORAL at 22:05

## 2017-02-04 RX ADMIN — CEFUROXIME 1.5 G: 1.5 INJECTION, SOLUTION INTRAVENOUS at 04:21

## 2017-02-04 RX ADMIN — DOCUSATE SODIUM AND SENNOSIDES 2 TABLET: 8.6; 5 TABLET, FILM COATED ORAL at 17:35

## 2017-02-04 RX ADMIN — PRIMIDONE 250 MG: 250 TABLET ORAL at 17:34

## 2017-02-04 RX ADMIN — HYDROCODONE BITARTRATE AND ACETAMINOPHEN 1 TABLET: 7.5; 325 TABLET ORAL at 00:01

## 2017-02-04 RX ADMIN — ASPIRIN 325 MG: 325 TABLET, DELAYED RELEASE ORAL at 08:01

## 2017-02-04 RX ADMIN — CEFUROXIME 1.5 G: 1.5 INJECTION, SOLUTION INTRAVENOUS at 22:04

## 2017-02-04 RX ADMIN — PRIMIDONE 250 MG: 250 TABLET ORAL at 22:05

## 2017-02-04 RX ADMIN — MORPHINE SULFATE 2 MG: 2 INJECTION, SOLUTION INTRAMUSCULAR; INTRAVENOUS at 19:28

## 2017-02-04 RX ADMIN — CHLORHEXIDINE GLUCONATE 15 ML: 1.2 RINSE ORAL at 22:04

## 2017-02-04 RX ADMIN — CITALOPRAM HYDROBROMIDE 20 MG: 20 TABLET ORAL at 17:35

## 2017-02-04 NOTE — PLAN OF CARE
"Problem: Patient Care Overview (Adult)  Goal: Plan of Care Review  Outcome: Ongoing (interventions implemented as appropriate)    02/03/17 2100 02/04/17 0209   Coping/Psychosocial Response Interventions   Plan Of Care Reviewed With family --    Patient Care Overview   Progress --  improving   Outcome Evaluation   Outcome Summary/Follow up Plan --  Pt. given 2un. PRBCs for critically low H&H, attempting to wean vent with goal of extubation       Goal: Adult Individualization and Mutuality  Outcome: Ongoing (interventions implemented as appropriate)    02/04/17 0209   Mutuality/Individual Preferences   What Information Would Help Us Give You More Personalized Care? Pt. family stated that the patient suffers from \"severe anxiousness\" at home; also stated that the pt. takes no meds for this         Problem: Mechanical Ventilation, Invasive (Adult)  Goal: Signs and Symptoms of Listed Potential Problems Will be Absent or Manageable (Mechanical Ventilation, Invasive)  Outcome: Ongoing (interventions implemented as appropriate)    02/04/17 0209   Mechanical Ventilation, Invasive   Problems Assessed (Mechanical Ventilation, Invasive) all   Problems Present (Mechanical Ventilation, Invasive) none         Problem: Cardiac Surgery (Adult)  Goal: Signs and Symptoms of Listed Potential Problems Will be Absent or Manageable (Cardiac Surgery)  Outcome: Ongoing (interventions implemented as appropriate)    02/04/17 0209   Cardiac Surgery   Problems Assessed (Cardiac Surgery) all   Problems Present (Cardiac Surgery) fluid imbalance;hemodynamic instability;pain           "

## 2017-02-04 NOTE — PROGRESS NOTES
"Critical Care Note     LOS: 1 day   Patient Care Team:  Aubrey Christy MD as PCP - General (Cardiology)  Rangel Lopez MD as Consulting Physician (Family Medicine)  Geoffrey Hoyt MD as Consulting Physician (Cardiology)    Chief Complaint/Reason for visit:  MVR 2/3/17      Subjective   66 y.o.female with relevant PMH of HTN, Mitral Regurgitation, EF 65-70% Depression, Hypothyroidism, Arthritis, Lifetime Non-smoker admitted 2/3/2017 post op 1vCABG and Bioprosthetic MVR     Interval History:   Patient was extubated night of surgery without difficulty. She does have some incisional pain. She is currently paced.    Review of Systems:    All systems were reviewed and negative except as noted in subjective.    Medical history, surgical history, social history, family history reviewed    Objective     Intake/Output:    Intake/Output Summary (Last 24 hours) at 02/04/17 1354  Last data filed at 02/04/17 1300   Gross per 24 hour   Intake 4793.74 ml   Output   3270 ml   Net 1523.74 ml       Nutrition: Clear liquids    Infusions:    dexmedetomidine 0.2-1.5 mcg/kg/hr    dextrose 5 % with KCl 20 mEq 30 mL/hr Last Rate: 30 mL/hr (02/03/17 1427)   DOBUTamine 2-20 mcg/kg/min    DOPamine 2-20 mcg/kg/min    EPINEPHrine 0.02-0.3 mcg/kg/min    insulin regular infusion 1 unit/mL (CCU use) 0-50 Units/hr Last Rate: 0.6 Units/hr (02/04/17 0900)   lactated ringers 9 mL/hr Last Rate: 9 mL/hr (02/03/17 0625)   niCARdipine 5-15 mg/hr    nitroglycerin 5-200 mcg/min    norepinephrine 0.02-0.3 mcg/kg/min Last Rate: 0.08 mcg/kg/min (02/04/17 1036)   phenylephrine 0.5-3 mcg/kg/min    propofol 5-50 mcg/kg/min Last Rate: Stopped (02/04/17 0200)   sodium chloride 30 mL/hr    vasopressin 0.02-0.1 Units/min        Respiratory: Nasal cannula    Telemetry: Paced    Vital Signs  Blood pressure 116/66, pulse 91, temperature 100 °F (37.8 °C), temperature source Core, resp. rate 26, height 66\" (167.6 cm), weight 149 lb 14.6 oz (68 kg), SpO2 100 " %.  PA 30/11, CO 3.8, CI 2.1  Physical Exam:  General Appearance:   Pleasant older woman in no acute distress    Head:  NC/AT   Eyes:           ANURADHA, EOMI   Ears:     Throat: clear   Neck: Right IJ line, trachea midline   Back:      Lungs:    sternotomy incision dressing intact. Mediastinal tubes with serosanguineous output, scant, lungs with bilateral breath sounds, equal, clear    Heart:   regular, S1, S2 auscultated, no murmur or rub    Abdomen:    bowel sounds are present, nondistended, soft    Rectal:   Deferred   Extremities:  no edema, right radial arterial line, hand is pink with good capillary refill    Pulses:    Skin:    Lymph nodes:    Neurologic:  alert, oriented, cooperative       Results Review:     I reviewed the patient's new clinical results.     Results from last 7 days  Lab Units 02/04/17 0417 02/04/17 0024 02/03/17 1916 02/03/17  1437 02/02/17  1339   SODIUM mmol/L 139  --  145 145 141   POTASSIUM mmol/L 4.2 4.1 3.8 3.7 4.0   CHLORIDE mmol/L 110*  --  113* 114* 102   TOTAL CO2 mmol/L 23.0  --  22.0 26.0 31.0   BUN mg/dL 17 -- 17 17 18   CREATININE mg/dL 0.90  --  0.80 0.80 0.90   CALCIUM mg/dL 8.8  --  9.1 9.3 10.4   BILIRUBIN mg/dL  --   --   --   --  0.3   ALK PHOS U/L  --   --   --   --  120*   ALT (SGPT) U/L  --   --   --   --  15   AST (SGOT) U/L  --   --   --   --  27   GLUCOSE mg/dL 118*  --  125* 135* 95       Results from last 7 days  Lab Units 02/04/17 0417 02/04/17 0024 02/03/17 1916   WBC 10*3/mm3 7.04 6.17 6.12   HEMOGLOBIN g/dL 9.1* 9.0* 5.4*   HEMATOCRIT % 26.8* 26.6* 16.1*   PLATELETS 10*3/mm3 222 211 204       Results from last 7 days  Lab Units 02/04/17  0403   PH, ARTERIAL pH units 7.340*   PO2 ART mm Hg 72.2*   PCO2, ARTERIAL mm Hg 45.5*   HCO3 ART mmol/L 24.5     No results found for: BLOODCX  Lab Results   Component Value Date    URINECX No growth 02/03/2017       I reviewed the patient's new imaging including images and reports.  IMPRESSION:  Patchy left lower lobe  airspace disease, slightly more  prominent than on the previous examination. Support tubes remain well  positioned and there has otherwise been no change.      DICTATED: 02/04/2017    All medications reviewed.     aspirin 325 mg Oral Daily   atorvastatin 40 mg Oral Nightly   cefuroxime 1.5 g Intravenous Q8H   chlorhexidine 15 mL Mouth/Throat Q12H   famotidine 20 mg Intravenous BID   Or      famotidine 20 mg Oral BID   metoprolol tartrate 12.5 mg Oral Q12H   metoprolol tartrate 2.5 mg Intravenous Q6H   pharmacy consult - MTM  Does not apply Daily   sennosides-docusate sodium 2 tablet Oral BID         Assessment/Plan     Active Problems:    Mitral regurgitation    66-year-old woman, non smoker who presented with progressive shortness of air on exertion for one year. Echocardiogram revealed severe mitral regurgitation. Left heart catheterization was done at an outside hospital, normal left main, 30% LAD, 50-60% ostial circumflex, luminal irregularities right coronary artery, ejection fraction 55-60%. . 2/3 she underwent mitral valve replacement, CABGx1. She was extubated without difficulty. Preoperatively she had sinus bradycardia. Postoperatively she is requiring pacing. She is on a norepinephrine drip to support her blood pressure and heart rate. She was transfused with 2 units of packed red cells for a low hematocrit.     PLAN:   wean norepinephrine as tolerates   leave in that as long as she is requiring pacing  Restart thyroid replacement and tremor medications  Advance diet as tolerates  Transition insulin  Hold beta blocker  Aspirin, statin    VTE Prophylaxis: Foot pumps    Stress Ulcer Prophylaxis: Alfie Hoff MD  02/04/17  1:54 PM      Time: 35min  I personally provided care to this critically ill patient as documented above.  Critical care time does not include time spent on separately billed procedures.  Non of my critical care time was concurrent with other critical care providers.

## 2017-02-04 NOTE — PROGRESS NOTES
Cardiothoracic Surgery Progress Note      POD # 1 s/p MVR       LOS: 1 day      Subjective:  Sore.  Wants her chapstick.  Denies shortness of breath.  Junctional rhythm under pacer.    Objective:  Vital Signs  Temp:  [96.2 °F (35.7 °C)-101.3 °F (38.5 °C)] 99.7 °F (37.6 °C)  Heart Rate:  [] 86  Resp:  [12-28] 22  BP: ()/(50-79) 111/66  Arterial Line BP: ()/(41-73) 113/43  FiO2 (%):  [30 %-100 %] 30 %    Physical Exam:   General Appearance: alert, appears stated age and cooperative   Lungs: clear to auscultation, respirations regular, respirations even and respirations unlabored   Heart: regular rhythm & normal rate, normal S1, S2 and no murmur, no tammy, no rub   Skin: Incision c/d/i     Results:    Results from last 7 days  Lab Units 02/04/17  0417   WBC 10*3/mm3 7.04   HEMOGLOBIN g/dL 9.1*   HEMATOCRIT % 26.8*   PLATELETS 10*3/mm3 222       Results from last 7 days  Lab Units 02/04/17  0417   SODIUM mmol/L 139   POTASSIUM mmol/L 4.2   CHLORIDE mmol/L 110*   TOTAL CO2 mmol/L 23.0   BUN mg/dL 17   CREATININE mg/dL 0.90   GLUCOSE mg/dL 118*   CALCIUM mg/dL 8.8         Assessment:  POD # 1 s/p MVR, expected recovery    Plan:  D/C jessy  Wean Levophed to off  D/C hunter  Continue pacing wires and chest tubes    Jeromy Gaona MD  02/04/17  1:01 PM

## 2017-02-04 NOTE — PLAN OF CARE
Problem: Patient Care Overview (Adult)  Goal: Plan of Care Review  Outcome: Ongoing (interventions implemented as appropriate)    02/04/17 1654   Coping/Psychosocial Response Interventions   Plan Of Care Reviewed With patient;family   Patient Care Overview   Progress improving   Outcome Evaluation   Outcome Summary/Follow up Plan d/c'd swan and levophed off, attempt to get pt oob but very lightheaded and falling over.. put back in bed, advanced diet to clear liquids passed dysphagia evaluation, and patient pain is being controlled with oral narcotics, transitioned to [facility] accu checks.          Problem: Cardiac Surgery (Adult)  Goal: Signs and Symptoms of Listed Potential Problems Will be Absent or Manageable (Cardiac Surgery)  Outcome: Ongoing (interventions implemented as appropriate)    02/04/17 1654   Cardiac Surgery   Problems Assessed (Cardiac Surgery) all   Problems Present (Cardiac Surgery) pain;dysrhythmia/arrhythmia;hemodynamic instability         Problem: Pain, Acute (Adult)  Goal: Identify Related Risk Factors and Signs and Symptoms  Outcome: Ongoing (interventions implemented as appropriate)    02/04/17 1654   Pain, Acute   Related Risk Factors (Acute Pain) surgery;fear;positioning   Signs and Symptoms (Acute Pain) facial mask of pain/grimace;guarding/abnormal posturing/positioning;questions meaning of pain;verbalization of pain descriptors       Goal: Acceptable Pain Control/Comfort Level  Outcome: Ongoing (interventions implemented as appropriate)    02/04/17 1654   Pain, Acute (Adult)   Acceptable Pain Control/Comfort Level making progress toward outcome

## 2017-02-05 ENCOUNTER — APPOINTMENT (OUTPATIENT)
Dept: GENERAL RADIOLOGY | Facility: HOSPITAL | Age: 67
End: 2017-02-05

## 2017-02-05 LAB
ANION GAP SERPL CALCULATED.3IONS-SCNC: 2 MMOL/L (ref 3–11)
BACTERIA SPEC AEROBE CULT: NORMAL
BUN BLD-MCNC: 15 MG/DL (ref 9–23)
BUN/CREAT SERPL: 21.4 (ref 7–25)
CALCIUM SPEC-SCNC: 8.6 MG/DL (ref 8.7–10.4)
CHLORIDE SERPL-SCNC: 106 MMOL/L (ref 99–109)
CO2 SERPL-SCNC: 25 MMOL/L (ref 20–31)
CREAT BLD-MCNC: 0.7 MG/DL (ref 0.6–1.3)
DEPRECATED RDW RBC AUTO: 55.9 FL (ref 37–54)
ERYTHROCYTE [DISTWIDTH] IN BLOOD BY AUTOMATED COUNT: 16.2 % (ref 11.3–14.5)
GFR SERPL CREATININE-BSD FRML MDRD: 84 ML/MIN/1.73
GLUCOSE BLD-MCNC: 141 MG/DL (ref 70–100)
GLUCOSE BLDC GLUCOMTR-MCNC: 114 MG/DL (ref 70–130)
GLUCOSE BLDC GLUCOMTR-MCNC: 128 MG/DL (ref 70–130)
GLUCOSE BLDC GLUCOMTR-MCNC: 136 MG/DL (ref 70–130)
GLUCOSE BLDC GLUCOMTR-MCNC: 99 MG/DL (ref 70–130)
HCT VFR BLD AUTO: 25.8 % (ref 34.5–44)
HGB BLD-MCNC: 8.5 G/DL (ref 11.5–15.5)
MAGNESIUM SERPL-MCNC: 2.2 MG/DL (ref 1.3–2.7)
MCH RBC QN AUTO: 30.9 PG (ref 27–31)
MCHC RBC AUTO-ENTMCNC: 32.9 G/DL (ref 32–36)
MCV RBC AUTO: 93.8 FL (ref 80–99)
PLATELET # BLD AUTO: 115 10*3/MM3 (ref 150–450)
PMV BLD AUTO: 10.1 FL (ref 6–12)
POTASSIUM BLD-SCNC: 4.6 MMOL/L (ref 3.5–5.5)
RBC # BLD AUTO: 2.75 10*6/MM3 (ref 3.89–5.14)
SODIUM BLD-SCNC: 133 MMOL/L (ref 132–146)
WBC NRBC COR # BLD: 7.43 10*3/MM3 (ref 3.5–10.8)

## 2017-02-05 PROCEDURE — 93010 ELECTROCARDIOGRAM REPORT: CPT | Performed by: INTERNAL MEDICINE

## 2017-02-05 PROCEDURE — 71010 HC CHEST PA OR AP: CPT

## 2017-02-05 PROCEDURE — 82962 GLUCOSE BLOOD TEST: CPT

## 2017-02-05 PROCEDURE — 25010000002 CEFUROXIME PER 750 MG: Performed by: PHYSICIAN ASSISTANT

## 2017-02-05 PROCEDURE — 99291 CRITICAL CARE FIRST HOUR: CPT | Performed by: INTERNAL MEDICINE

## 2017-02-05 PROCEDURE — 80048 BASIC METABOLIC PNL TOTAL CA: CPT | Performed by: THORACIC SURGERY (CARDIOTHORACIC VASCULAR SURGERY)

## 2017-02-05 PROCEDURE — 93005 ELECTROCARDIOGRAM TRACING: CPT | Performed by: PHYSICIAN ASSISTANT

## 2017-02-05 PROCEDURE — 85027 COMPLETE CBC AUTOMATED: CPT | Performed by: PHYSICIAN ASSISTANT

## 2017-02-05 PROCEDURE — 83735 ASSAY OF MAGNESIUM: CPT | Performed by: THORACIC SURGERY (CARDIOTHORACIC VASCULAR SURGERY)

## 2017-02-05 RX ORDER — BUMETANIDE 0.25 MG/ML
1 INJECTION INTRAMUSCULAR; INTRAVENOUS ONCE
Status: COMPLETED | OUTPATIENT
Start: 2017-02-05 | End: 2017-02-05

## 2017-02-05 RX ADMIN — PRIMIDONE 250 MG: 250 TABLET ORAL at 20:31

## 2017-02-05 RX ADMIN — CHLORHEXIDINE GLUCONATE 15 ML: 1.2 RINSE ORAL at 08:16

## 2017-02-05 RX ADMIN — BUMETANIDE 1 MG: 0.25 INJECTION, SOLUTION INTRAMUSCULAR; INTRAVENOUS at 19:27

## 2017-02-05 RX ADMIN — ASPIRIN 325 MG: 325 TABLET, DELAYED RELEASE ORAL at 08:16

## 2017-02-05 RX ADMIN — OXYCODONE AND ACETAMINOPHEN 2 TABLET: 5; 325 TABLET ORAL at 02:33

## 2017-02-05 RX ADMIN — FAMOTIDINE 20 MG: 20 TABLET ORAL at 20:31

## 2017-02-05 RX ADMIN — HYDROCODONE BITARTRATE AND ACETAMINOPHEN 1 TABLET: 7.5; 325 TABLET ORAL at 17:33

## 2017-02-05 RX ADMIN — PRIMIDONE 250 MG: 250 TABLET ORAL at 08:16

## 2017-02-05 RX ADMIN — HYDROCODONE BITARTRATE AND ACETAMINOPHEN 1 TABLET: 7.5; 325 TABLET ORAL at 08:15

## 2017-02-05 RX ADMIN — LEVOTHYROXINE SODIUM 175 MCG: 175 TABLET ORAL at 08:16

## 2017-02-05 RX ADMIN — FAMOTIDINE 20 MG: 20 TABLET ORAL at 08:16

## 2017-02-05 RX ADMIN — PRIMIDONE 250 MG: 250 TABLET ORAL at 16:03

## 2017-02-05 RX ADMIN — CEFUROXIME 1.5 G: 1.5 INJECTION, SOLUTION INTRAVENOUS at 04:23

## 2017-02-05 RX ADMIN — OXYCODONE AND ACETAMINOPHEN 2 TABLET: 5; 325 TABLET ORAL at 19:33

## 2017-02-05 RX ADMIN — DOCUSATE SODIUM AND SENNOSIDES 2 TABLET: 8.6; 5 TABLET, FILM COATED ORAL at 08:16

## 2017-02-05 RX ADMIN — CITALOPRAM HYDROBROMIDE 20 MG: 20 TABLET ORAL at 08:16

## 2017-02-05 RX ADMIN — ATORVASTATIN CALCIUM 40 MG: 40 TABLET, FILM COATED ORAL at 20:31

## 2017-02-05 NOTE — PLAN OF CARE
Problem: Patient Care Overview (Adult)  Goal: Plan of Care Review  Outcome: Ongoing (interventions implemented as appropriate)    02/05/17 0339   Coping/Psychosocial Response Interventions   Plan Of Care Reviewed With patient   Patient Care Overview   Progress improving       Goal: Adult Individualization and Mutuality  Outcome: Ongoing (interventions implemented as appropriate)    Problem: Cardiac Surgery (Adult)  Goal: Signs and Symptoms of Listed Potential Problems Will be Absent or Manageable (Cardiac Surgery)  Outcome: Ongoing (interventions implemented as appropriate)    02/05/17 0339   Cardiac Surgery   Problems Assessed (Cardiac Surgery) all   Problems Present (Cardiac Surgery) pain;dysrhythmia/arrhythmia;hemodynamic instability         Problem: Pain, Acute (Adult)  Goal: Identify Related Risk Factors and Signs and Symptoms  Outcome: Ongoing (interventions implemented as appropriate)    02/05/17 0339   Pain, Acute   Related Risk Factors (Acute Pain) surgery   Signs and Symptoms (Acute Pain) verbalization of pain descriptors       Goal: Acceptable Pain Control/Comfort Level  Outcome: Ongoing (interventions implemented as appropriate)    02/05/17 0339   Pain, Acute (Adult)   Acceptable Pain Control/Comfort Level making progress toward outcome

## 2017-02-05 NOTE — PLAN OF CARE
Problem: Pain, Acute (Adult)  Goal: Identify Related Risk Factors and Signs and Symptoms  Outcome: Ongoing (interventions implemented as appropriate)    02/05/17 1653   Pain, Acute   Related Risk Factors (Acute Pain) disease process   Signs and Symptoms (Acute Pain) impaired thought process/concentration       Goal: Acceptable Pain Control/Comfort Level    02/05/17 1653   Pain, Acute (Adult)   Acceptable Pain Control/Comfort Level making progress toward outcome         02/05/17 1653   Pain, Acute (Adult)   Acceptable Pain Control/Comfort Level       02/05/17 1653   Pain, Acute (Adult)   Acceptable Pain Control/Comfort Level making progress toward outcome     making progress toward outcome

## 2017-02-05 NOTE — PROGRESS NOTES
"Critical Care Note     LOS: 2 days   Patient Care Team:  Aubrey Christy MD as PCP - General (Cardiology)  Rangel Lopez MD as Consulting Physician (Family Medicine)  Geoffrey Hoyt MD as Consulting Physician (Cardiology)    Chief Complaint/Reason for visit:  MVR 2/3/17      Subjective   66 y.o.female with relevant PMH of HTN, Mitral Regurgitation, EF 65-70% Depression, Hypothyroidism, Arthritis, Lifetime Non-smoker admitted 2/3/2017 post op 1vCABG and Bioprosthetic MVR     Interval History:   Patient was extubated night of surgery without difficulty. She does have some incisional pain. She is currently paced.    Review of Systems:    All systems were reviewed and negative except as noted in subjective.    Medical history, surgical history, social history, family history reviewed    Objective     Intake/Output:    Intake/Output Summary (Last 24 hours) at 02/05/17 1521  Last data filed at 02/05/17 0800   Gross per 24 hour   Intake  521.1 ml   Output    980 ml   Net -458.9 ml       Nutrition: Clear liquids    Infusions:    dexmedetomidine 0.2-1.5 mcg/kg/hr    dextrose 5 % with KCl 20 mEq 30 mL/hr Last Rate: 30 mL/hr (02/04/17 2132)   DOBUTamine 2-20 mcg/kg/min    DOPamine 2-20 mcg/kg/min    EPINEPHrine 0.02-0.3 mcg/kg/min    lactated ringers 9 mL/hr Last Rate: 9 mL/hr (02/03/17 0625)   niCARdipine 5-15 mg/hr    nitroglycerin 5-200 mcg/min    norepinephrine 0.02-0.3 mcg/kg/min Last Rate: 0.04 mcg/kg/min (02/05/17 0500)   phenylephrine 0.5-3 mcg/kg/min    sodium chloride 30 mL/hr    vasopressin 0.02-0.1 Units/min        Respiratory: Nasal cannula    Telemetry: Paced    Vital Signs  Blood pressure 107/70, pulse 82, temperature 98.6 °F (37 °C), temperature source Oral, resp. rate 20, height 66\" (167.6 cm), weight 149 lb 14.6 oz (68 kg), SpO2 100 %.  PA 30/11, CO 3.8, CI 2.1  Physical Exam:  General Appearance:   Pleasant older woman in no acute distress    Head:  NC/AT   Eyes:           ANURADHA, EOMI   Ears:   "   Throat: clear   Neck: Right IJ line, trachea midline   Back:      Lungs:    sternotomy incision dressing intact. Mediastinal tubes with serosanguineous output, scant, lungs with bilateral breath sounds, equal, clear    Heart:   regular, S1, S2 auscultated, no murmur or rub    Abdomen:    bowel sounds are present, nondistended, soft    Rectal:   Deferred   Extremities:  no edema, right radial arterial line, hand is pink with good capillary refill    Pulses:    Skin:    Lymph nodes:    Neurologic:  alert, oriented, cooperative       Results Review:     I reviewed the patient's new clinical results.     Results from last 7 days  Lab Units 02/05/17  0334 02/04/17 0417 02/04/17  0024 02/03/17  1916  02/02/17  1339   SODIUM mmol/L 133 139  --  145  < > 141   POTASSIUM mmol/L 4.6 4.2 4.1 3.8  < > 4.0   CHLORIDE mmol/L 106 110*  --  113*  < > 102   TOTAL CO2 mmol/L 25.0 23.0  --  22.0  < > 31.0   BUN mg/dL 15 17  --  17  < > 18   CREATININE mg/dL 0.70 0.90  --  0.80  < > 0.90   CALCIUM mg/dL 8.6* 8.8  --  9.1  < > 10.4   BILIRUBIN mg/dL  --   --   --   --   --  0.3   ALK PHOS U/L  --   --   --   --   --  120*   ALT (SGPT) U/L  --   --   --   --   --  15   AST (SGOT) U/L  --   --   --   --   --  27   GLUCOSE mg/dL 141* 118*  --  125*  < > 95   < > = values in this interval not displayed.    Results from last 7 days  Lab Units 02/05/17  0334 02/04/17 0417 02/04/17  0024   WBC 10*3/mm3 7.43 7.04 6.17   HEMOGLOBIN g/dL 8.5* 9.1* 9.0*   HEMATOCRIT % 25.8* 26.8* 26.6*   PLATELETS 10*3/mm3 115* 222 211       Results from last 7 days  Lab Units 02/04/17  0403   PH, ARTERIAL pH units 7.340*   PO2 ART mm Hg 72.2*   PCO2, ARTERIAL mm Hg 45.5*   HCO3 ART mmol/L 24.5     No results found for: BLOODCX  Lab Results   Component Value Date    URINECX No growth at 2 days 02/03/2017       I reviewed the patient's new imaging including images and reports.  IMPRESSION:  Patchy left lower lobe airspace disease, slightly more  prominent than  on the previous examination. Support tubes remain well  positioned and there has otherwise been no change.      DICTATED: 02/04/2017    All medications reviewed.     aspirin 325 mg Oral Daily   atorvastatin 40 mg Oral Nightly   chlorhexidine 15 mL Mouth/Throat Q12H   citalopram 20 mg Oral Daily   famotidine 20 mg Oral BID   insulin lispro 2-7 Units Subcutaneous 4x Daily AC & at Bedtime   levothyroxine 175 mcg Oral Daily   metoprolol tartrate 12.5 mg Oral Q12H   pharmacy consult - MTM  Does not apply Daily   primidone 250 mg Oral TID   sennosides-docusate sodium 2 tablet Oral BID         Assessment/Plan     Active Problems:    Mitral regurgitation    66-year-old woman, non smoker who presented with progressive shortness of air on exertion for one year. Echocardiogram revealed severe mitral regurgitation. Left heart catheterization was done at an outside hospital, normal left main, 30% LAD, 50-60% ostial circumflex, luminal irregularities right coronary artery, ejection fraction 55-60%. . 2/3 she underwent mitral valve replacement, CABGx1. She was extubated without difficulty. Preoperatively she had sinus bradycardia. Postoperatively she is requiring pacing. She was on a norepinephrine drip to support her blood pressure and heart rate, however that has been stopped. She was transfused with 2 units of packed red cells, 2 units platelets on 2/3  for a low hematocrit,16%.  Post transfusion HCT improved to 26.8%, and today is 25.8%. MT output was 820 yesterday, but only 70ml so far today.     PLAN:   monitor HCT, and MT output  Continue pacing  Cardiology to see tomorrow if remains pacer dependent  ASA/statin/  Lopressor ordered  Up to chair with PT     VTE Prophylaxis: Foot pumps    Stress Ulcer Prophylaxis: Alfie Hoff MD  02/05/17  3:21 PM      Time: 35min  I personally provided care to this critically ill patient as documented above.  Critical care time does not include time spent on separately  billed procedures.  Non of my critical care time was concurrent with other critical care providers.

## 2017-02-06 PROBLEM — I46.9 ASYSTOLE (HCC): Status: ACTIVE | Noted: 2017-02-06

## 2017-02-06 LAB
ANION GAP SERPL CALCULATED.3IONS-SCNC: 6 MMOL/L (ref 3–11)
BUN BLD-MCNC: 18 MG/DL (ref 9–23)
BUN/CREAT SERPL: 25.7 (ref 7–25)
CALCIUM SPEC-SCNC: 8.6 MG/DL (ref 8.7–10.4)
CHLORIDE SERPL-SCNC: 101 MMOL/L (ref 99–109)
CO2 SERPL-SCNC: 24 MMOL/L (ref 20–31)
CREAT BLD-MCNC: 0.7 MG/DL (ref 0.6–1.3)
DEPRECATED RDW RBC AUTO: 55.1 FL (ref 37–54)
ERYTHROCYTE [DISTWIDTH] IN BLOOD BY AUTOMATED COUNT: 15.7 % (ref 11.3–14.5)
GFR SERPL CREATININE-BSD FRML MDRD: 84 ML/MIN/1.73
GLUCOSE BLD-MCNC: 108 MG/DL (ref 70–100)
GLUCOSE BLDC GLUCOMTR-MCNC: 100 MG/DL (ref 70–130)
GLUCOSE BLDC GLUCOMTR-MCNC: 91 MG/DL (ref 70–130)
GLUCOSE BLDC GLUCOMTR-MCNC: 95 MG/DL (ref 70–130)
GLUCOSE BLDC GLUCOMTR-MCNC: 97 MG/DL (ref 70–130)
HCT VFR BLD AUTO: 24.9 % (ref 34.5–44)
HGB BLD-MCNC: 8.3 G/DL (ref 11.5–15.5)
MCH RBC QN AUTO: 31.7 PG (ref 27–31)
MCHC RBC AUTO-ENTMCNC: 33.3 G/DL (ref 32–36)
MCV RBC AUTO: 95 FL (ref 80–99)
PLATELET # BLD AUTO: 108 10*3/MM3 (ref 150–450)
PMV BLD AUTO: 10.4 FL (ref 6–12)
POTASSIUM BLD-SCNC: 4.6 MMOL/L (ref 3.5–5.5)
RBC # BLD AUTO: 2.62 10*6/MM3 (ref 3.89–5.14)
SODIUM BLD-SCNC: 131 MMOL/L (ref 132–146)
WBC NRBC COR # BLD: 8.19 10*3/MM3 (ref 3.5–10.8)

## 2017-02-06 PROCEDURE — 85027 COMPLETE CBC AUTOMATED: CPT | Performed by: PHYSICIAN ASSISTANT

## 2017-02-06 PROCEDURE — 80048 BASIC METABOLIC PNL TOTAL CA: CPT | Performed by: PHYSICIAN ASSISTANT

## 2017-02-06 PROCEDURE — 97163 PT EVAL HIGH COMPLEX 45 MIN: CPT

## 2017-02-06 PROCEDURE — 25010000002 ONDANSETRON PER 1 MG: Performed by: PHYSICIAN ASSISTANT

## 2017-02-06 PROCEDURE — 99233 SBSQ HOSP IP/OBS HIGH 50: CPT | Performed by: INTERNAL MEDICINE

## 2017-02-06 PROCEDURE — 93005 ELECTROCARDIOGRAM TRACING: CPT | Performed by: THORACIC SURGERY (CARDIOTHORACIC VASCULAR SURGERY)

## 2017-02-06 PROCEDURE — 82962 GLUCOSE BLOOD TEST: CPT

## 2017-02-06 PROCEDURE — 93010 ELECTROCARDIOGRAM REPORT: CPT | Performed by: INTERNAL MEDICINE

## 2017-02-06 RX ADMIN — FAMOTIDINE 20 MG: 20 TABLET ORAL at 20:18

## 2017-02-06 RX ADMIN — LEVOTHYROXINE SODIUM 175 MCG: 175 TABLET ORAL at 08:34

## 2017-02-06 RX ADMIN — PRIMIDONE 250 MG: 250 TABLET ORAL at 08:35

## 2017-02-06 RX ADMIN — DOCUSATE SODIUM AND SENNOSIDES 2 TABLET: 8.6; 5 TABLET, FILM COATED ORAL at 08:35

## 2017-02-06 RX ADMIN — ONDANSETRON 4 MG: 2 INJECTION INTRAMUSCULAR; INTRAVENOUS at 20:18

## 2017-02-06 RX ADMIN — CITALOPRAM HYDROBROMIDE 20 MG: 20 TABLET ORAL at 08:35

## 2017-02-06 RX ADMIN — ATORVASTATIN CALCIUM 40 MG: 40 TABLET, FILM COATED ORAL at 20:18

## 2017-02-06 RX ADMIN — DOCUSATE SODIUM AND SENNOSIDES 2 TABLET: 8.6; 5 TABLET, FILM COATED ORAL at 17:54

## 2017-02-06 RX ADMIN — ASPIRIN 325 MG: 325 TABLET, DELAYED RELEASE ORAL at 08:35

## 2017-02-06 RX ADMIN — PRIMIDONE 250 MG: 250 TABLET ORAL at 17:54

## 2017-02-06 RX ADMIN — FAMOTIDINE 20 MG: 20 TABLET ORAL at 08:34

## 2017-02-06 RX ADMIN — HYDROCODONE BITARTRATE AND ACETAMINOPHEN 1 TABLET: 7.5; 325 TABLET ORAL at 03:39

## 2017-02-06 RX ADMIN — OXYCODONE AND ACETAMINOPHEN 1 TABLET: 5; 325 TABLET ORAL at 12:35

## 2017-02-06 RX ADMIN — OXYCODONE AND ACETAMINOPHEN 2 TABLET: 5; 325 TABLET ORAL at 20:18

## 2017-02-06 RX ADMIN — OXYCODONE AND ACETAMINOPHEN 2 TABLET: 5; 325 TABLET ORAL at 01:05

## 2017-02-06 RX ADMIN — HYDROCODONE BITARTRATE AND ACETAMINOPHEN 1 TABLET: 7.5; 325 TABLET ORAL at 09:08

## 2017-02-06 RX ADMIN — PRIMIDONE 250 MG: 250 TABLET ORAL at 20:18

## 2017-02-06 NOTE — PROGRESS NOTES
CTS Progress Note      POD # 3 s/p MVR       LOS: 3 days     Subjective  In bed this am, doing well, Complaints of back pain.     Objective    Vital Signs  Temp:  [97.5 °F (36.4 °C)-98.8 °F (37.1 °C)] 97.5 °F (36.4 °C)  Heart Rate:  [82-88] 82  Resp:  [16-22] 16  BP: ()/(57-73) 106/65  Arterial Line BP: ()/(43-62) 109/54    Physical Exam:   General Appearance: alert, appears stated age and cooperative   Lungs: clear to auscultation   Heart: paced, regular rhythm & normal rate, normal S1, S2 and no murmur, no tammy, no rub   Skin: Incision c/d/i     CT 1 - 80cc / 24hrs   CT 2 - 430cc / 24hrs     Results     Results from last 7 days  Lab Units 02/06/17  0320   WBC 10*3/mm3 8.19   HEMOGLOBIN g/dL 8.3*   HEMATOCRIT % 24.9*   PLATELETS 10*3/mm3 108*       Results from last 7 days  Lab Units 02/06/17  0320   SODIUM mmol/L 131*   POTASSIUM mmol/L 4.6   CHLORIDE mmol/L 101   TOTAL CO2 mmol/L 24.0   BUN mg/dL 18   CREATININE mg/dL 0.70   GLUCOSE mg/dL 108*   CALCIUM mg/dL 8.6*         Assessment  POD # 3 s/p MVR, expected recovery  Heart block requiring pacing    Plan   Pulm toilet  Ambulate  Continue pacing wires and chest tubes  Diuresis  Keep in ICU while paced    SUSANA Hurt  02/06/17  7:35 AM

## 2017-02-06 NOTE — NURSING NOTE
Order received for Phase II Cardiac Rehab. Staff will follow up once patient is transferred to telemetry.     .

## 2017-02-06 NOTE — PLAN OF CARE
Problem: Patient Care Overview (Adult)  Goal: Plan of Care Review  Outcome: Ongoing (interventions implemented as appropriate)    02/06/17 1817   Coping/Psychosocial Response Interventions   Plan Of Care Reviewed With patient   Patient Care Overview   Progress improving   Outcome Evaluation   Outcome Summary/Follow up Plan Levo off at 0400, 2L NC, up to chair with PT. Asystole underlying rhythm. V-Paced. Consulting cardiology tomorrow. MT tubes, F/C, pacing wires remain.         Problem: Cardiac Surgery (Adult)  Goal: Signs and Symptoms of Listed Potential Problems Will be Absent or Manageable (Cardiac Surgery)  Outcome: Ongoing (interventions implemented as appropriate)    02/06/17 1817   Cardiac Surgery   Problems Assessed (Cardiac Surgery) all   Problems Present (Cardiac Surgery) pain;dysrhythmia/arrhythmia         Problem: Pressure Ulcer Risk (Chris Scale) (Adult,Obstetrics,Pediatric)  Goal: Identify Related Risk Factors and Signs and Symptoms  Outcome: Ongoing (interventions implemented as appropriate)    02/06/17 1817   Pressure Ulcer Risk (Chris Scale)   Related Risk Factors (Pressure Ulcer Risk (Chris Scale)) critical care admission;length of surgery;mobility impaired

## 2017-02-06 NOTE — PROGRESS NOTES
Discharge Planning Assessment  [facility]     Patient Name: Tiana Cronin  MRN: [id number]  Today's Date: 2/6/2017    Admit Date: 2/3/2017          Discharge Needs Assessment       02/06/17 1022    Living Environment    Lives With alone    Living Arrangements apartment    Provides Primary Care For no one    Quality Of Family Relationships supportive    Able to Return to Prior Living Arrangements yes    Discharge Needs Assessment    Concerns To Be Addressed no discharge needs identified;denies needs/concerns at this time    Readmission Within The Last 30 Days no previous admission in last 30 days    Anticipated Changes Related to Illness none    Equipment Currently Used at Home none    Equipment Needed After Discharge walker, rolling   patient states she needs a RW. will have PT eval for need.    Transportation Available car;family or friend will provide    Discharge Disposition home or self-care    Discharge Contact Information if Applicable [phone]    Discharge Planning Comments home with sister and Brother in law to [facility]            Discharge Plan       02/06/17 1022    Case Management/Social Work Plan    Plan home with sister and Brother in law to [facility]    Patient/Family In Agreement With Plan yes    Additional Comments Ms. Cronin lives alone in [place]. She was independent prior to this admission. Her sister in law is at bedside. Ms. Cronin states she has ample support at home and she will be staying with her sister and brother in law in [place] upon discharge from this admission. She was independent prior to this admission and currently she feels like she could use a RW at home as her MICH states she was a little unsteady prior to this surgery. Will have PT eval for need. She has no preference of DME provider and from the list of providers she has elected to use [facility]. CM will continue to follow.         Discharge Placement     No information found        Expected Discharge Date  and Time     Expected Discharge Date Expected Discharge Time    Feb 8, 2017               Demographic Summary       02/06/17 1020    Referral Information    Admission Type inpatient    Arrived From home or self-care    Referral Source admission list    Reason For Consult discharge planning    Record Reviewed clinical discipline documentation;history and physical;medical record    Contact Information    Permission Granted to Share Information With     Primary Care Physician Information    Name Berny Lopez            Functional Status       02/06/17 1020    Functional Status Current    Ambulation 2-->assistive person    Transferring 2-->assistive person    Toileting 2-->assistive person    Bathing 2-->assistive person    Dressing 2-->assistive person    Eating 0-->independent    Communication 0-->understands/communicates without difficulty    Swallowing (if score 2 or more for any item, consult Rehab Services) 0-->swallows foods/liquids without difficulty    Change in Functional Status Since Onset of Current Illness/Injury yes    Functional Status Prior    Ambulation 0-->independent    Transferring 0-->independent    Toileting 0-->independent    Bathing 0-->independent    Dressing 0-->independent    Eating 0-->independent    Communication 0-->understands/communicates without difficulty    Swallowing 0-->swallows foods/liquids without difficulty    IADL    Medications independent   has rx drug coverage with no issues in obtaining or affording copays    Meal Preparation independent    Housekeeping independent    Laundry independent    Shopping independent    Oral Care independent    Activity Tolerance    Current Activity Limitations driving restrictions;lifting restrictions    Usual Activity Tolerance good    Current Activity Tolerance moderate    Cognitive/Perceptual/Developmental    Current Mental Status/Cognitive Functioning no deficits noted    Recent Changes in Mental Status/Cognitive Functioning no  changes    Employment/Financial    Financial Concerns none   has medicare a/b as primary insurance and [place]icaid as secondary insurance with no recent changes to coverage            Psychosocial     None            Abuse/Neglect     None            Legal     None            Substance Abuse     None            Patient Forms     None          Loreta Ospina RN

## 2017-02-06 NOTE — PLAN OF CARE
Problem: Patient Care Overview (Adult)  Goal: Plan of Care Review  Outcome: Ongoing (interventions implemented as appropriate)    02/06/17 0200   Coping/Psychosocial Response Interventions   Plan Of Care Reviewed With patient;mother   Patient Care Overview   Progress no change   Outcome Evaluation   Outcome Summary/Follow up Plan Requiring transvenous pacing. restarted low dose levo.         Problem: Cardiac Surgery (Adult)  Goal: Signs and Symptoms of Listed Potential Problems Will be Absent or Manageable (Cardiac Surgery)  Outcome: Ongoing (interventions implemented as appropriate)    02/06/17 0200   Cardiac Surgery   Problems Assessed (Cardiac Surgery) all   Problems Present (Cardiac Surgery) pain;dysrhythmia/arrhythmia;hemodynamic instability         Problem: Pain, Acute (Adult)  Goal: Identify Related Risk Factors and Signs and Symptoms  Outcome: Ongoing (interventions implemented as appropriate)    02/06/17 0200   Pain, Acute   Related Risk Factors (Acute Pain) disease process   Signs and Symptoms (Acute Pain) fatigue/weakness

## 2017-02-06 NOTE — PROGRESS NOTES
Acute Care - Physical Therapy Initial Evaluation  [facility]     Patient Name: Tiana Cronin  : 1950  MRN: [id number]  Today's Date: 2017   Onset of Illness/Injury or Date of Surgery Date: 17  Date of Referral to PT: 17  Referring Physician: SUSANA Tucker      Admit Date: 2/3/2017     Visit Dx:    ICD-10-CM ICD-9-CM   1. Impaired functional mobility, balance, gait, and endurance Z74.09 V49.89   2. Non-rheumatic mitral regurgitation I34.0 424.0   3. Mitral valve disorder I05.9 424.0     Patient Active Problem List   Diagnosis   • Mitral valve disorder   • Mitral regurgitation     Past Medical History   Diagnosis Date   • Anxiety    • Aortic regurgitation    • Arthritis      Hands and Knees   • Depression    • Hypertension    • Hypothyroidism    • Mitral regurgitation    • Mitral valve prolapse    • Seizures      Several Years since last seizure ( last seizure cannot remember)    • Skin cancer, basal cell      basal cell skin cancer on face    • Wears dentures      full set    • Wears glasses      reading     Past Surgical History   Procedure Laterality Date   • Plantar fascia surgery Bilateral    •  section       x 2   • Elbow procedure Left      Due to FX, Plates and Screws Placed   • Knee surgery Left    • Skin cancer excision Bilateral      Bilatera Ears, Basil Cell   • Skin biopsy     • Teeth extraction     • Cardiac catheterization            PT ASSESSMENT (last 72 hours)      PT Evaluation       17 0815 17 1000    Rehab Evaluation    Document Type evaluation  -SJ     Subjective Information no complaints;agree to therapy  -SJ     Patient Effort, Rehab Treatment adequate  -SJ     Symptoms Noted During/After Treatment fatigue  -SJ     General Information    Patient Profile Review yes  -SJ     Onset of Illness/Injury or Date of Surgery Date 17  -SJ     Referring Physician SUSANA Tucker  -SJ     General Observations Pt supine in bed, awake, with multiple lines, chest  tubes, in ICU, O2 nc  -SJ     Pertinent History Of Current Problem s/p MVR, DARRIAN, CABG  -SJ     Precautions/Limitations fall precautions;oxygen therapy device and L/min;other (see comments);pacemaker   external PM  -SJ     Prior Level of Function independent:;all household mobility;community mobility;gait;transfer;bed mobility;ADL's;driving  -SJ     Equipment Currently Used at Home none  -SJ     Plans/Goals Discussed With patient;agreed upon  -SJ     Risks Reviewed patient:;LOB;dizziness;increased discomfort;lines disloged;change in vital signs  -SJ     Benefits Reviewed patient:;improve function;increase independence;increase strength;increase balance;increase knowledge  -SJ     Barriers to Rehab medically complex  -SJ     Living Environment    Lives With alone  -SJ alone  -    Living Arrangements apartment  -SJ apartment  -    Home Accessibility no concerns  - no concerns  -    Stair Railings at Home  none  -    Type of Financial/Environmental Concern  none  -    Transportation Available  car;family or friend will provide  -    Clinical Impression    Date of Referral to PT 02/03/17  -SJ     PT Diagnosis impaired mobility  -SJ     Patient/Family Goals Statement return to home  -     Criteria for Skilled Therapeutic Interventions Met yes;treatment indicated  -SJ     Pathology/Pathophysiology Noted (Describe Specifically for Each System) cardiovascular;musculoskeletal  -SJ     Impairments Found (describe specific impairments) aerobic capacity/endurance;gait, locomotion, and balance  -SJ     Functional Limitations in Following Categories (Describe Specific Limitations) self-care;home management  -SJ     Rehab Potential good, to achieve stated therapy goals  -SJ     Predicted Duration of Therapy Intervention (days/wks) 2wks  -SJ     Vital Signs    Pre Systolic BP Rehab 94  -SJ     Pre Treatment Diastolic BP 61  -SJ     Post Systolic BP Rehab 91  -SJ     Post Treatment Diastolic BP 59  -SJ      Pretreatment Heart Rate (beats/min) 82  -SJ     Posttreatment Heart Rate (beats/min) 82  -SJ     Pre SpO2 (%) 91  -SJ     O2 Delivery Pre Treatment supplemental O2  -SJ     Post SpO2 (%) 92  -SJ     O2 Delivery Post Treatment supplemental O2  -SJ     Pre Patient Position Supine  -SJ     Intra Patient Position Standing  -SJ     Post Patient Position Sitting  -SJ     Pain Assessment    Pain Assessment No/denies pain  -SJ     Cognitive Assessment/Intervention    Current Cognitive/Communication Assessment functional  -SJ     Orientation Status oriented x 4  -SJ     Follows Commands/Answers Questions 100% of the time;able to follow single-step instructions;needs cueing;needs increased time  -SJ     Personal Safety moderate impairment;decreased awareness, need for assist;decreased awareness, need for safety  -SJ     Personal Safety Interventions fall prevention program maintained;muscle strengthening facilitated;nonskid shoes/slippers when out of bed;gait belt  -SJ     ROM (Range of Motion)    General ROM no range of motion deficits identified  -SJ     MMT (Manual Muscle Testing)    General MMT Assessment lower extremity strength deficits identified  -SJ     General MMT Assessment Detail grossly 3+/5  -SJ     Bed Mobility, Assessment/Treatment    Bed Mobility, Assistive Device draw sheet  -SJ     Bed Mob, Supine to Sit, [place] maximum assist (25% patient effort);2 person assist required;verbal cues required  -SJ     Bed Mobility, Safety Issues decreased use of arms for pushing/pulling;decreased use of legs for bridging/pushing;impaired trunk control for bed mobility  -SJ     Bed Mobility, Impairments strength decreased;impaired balance;coordination impaired  -SJ     Transfer Assessment/Treatment    Transfers, Bed-Chair [place] moderate assist (50% patient effort);2 person assist required;verbal cues required  -SJ     Transfers, Sit-Stand [place] moderate assist (50% patient effort);2 person assist  required;verbal cues required  -SJ     Transfers, Stand-Sit [place] moderate assist (50% patient effort);2 person assist required;verbal cues required  -SJ     Transfer, Safety Issues balance decreased during turns;step length decreased;sequencing ability decreased;weight-shifting ability decreased;loses balance backward;knees buckling  -SJ     Transfer, Impairments strength decreased;impaired balance;coordination impaired;motor control impaired  -SJ     Transfer, Comment stand pivot t/f to chair, RN present to assist with lines  -SJ     Gait Assessment/Treatment    Gait, [place] Level not appropriate to assess  -SJ     Motor Skills/Interventions    Additional Documentation Balance Skills Training (Group)  -SJ     Balance Skills Training    Sitting-Level of Assistance Minimum assistance  -SJ     Sitting-Balance Support Left upper extremity supported;Feet supported  -SJ     Sitting-Balance Activities Trunk control activities  -SJ     Sitting # of Minutes 5  -SJ     Standing-Level of Assistance Moderate assistance;x2  -SJ     Positioning and Restraints    Pre-Treatment Position in bed  -SJ     Post Treatment Position chair  -SJ     In Chair notified nsg;reclined;call light within reach;exit alarm on;heels elevated  -SJ       User Key  (r) = Recorded By, (t) = Taken By, (c) = Cosigned By    Initials Name Provider Type     Patricia Cox PT Physical Therapist    MISTY Christy, RN Registered Nurse          Physical Therapy Education     Title: PT OT SLP Therapies (Active)     Topic: Physical Therapy (Active)     Point: Mobility training (Active)    Learning Progress Summary    Learner Readiness Method Response Comment Documented by Status   Patient Acceptance E NR   02/06/17 0940 Active               Point: Home exercise program (Active)    Learning Progress Summary    Learner Readiness Method Response Comment Documented by Status   Patient Acceptance E NR   02/06/17 0940 Active                Point: Body mechanics (Active)    Learning Progress Summary    Learner Readiness Method Response Comment Documented by Status   Patient Acceptance E NR   02/06/17 0940 Active               Point: Precautions (Active)    Learning Progress Summary    Learner Readiness Method Response Comment Documented by Status   Patient Acceptance E NR   02/06/17 0940 Active                      User Key     Initials Effective Dates Name Provider Type Discipline     06/19/15 -  Patricia Cox, PT Physical Therapist PT                PT Recommendation and Plan  Anticipated Equipment Needs At Discharge: front wheeled walker  Anticipated Discharge Disposition: inpatient rehabilitation facility  Demonstrates Need for Referral to Another Service: occupational therapy  Planned Therapy Interventions: balance training, bed mobility training, gait training, home exercise program, patient/family education, strengthening, transfer training  PT Frequency: daily  Plan of Care Review  Plan Of Care Reviewed With: patient  Progress: progress toward functional goals as expected  Outcome Summary/Follow up Plan: PT eval completed. Pt dem significant weakness, decr independence with mobility per PLOF and will benefit from PT services to address limitations. Pt with external PM, tx session limited to t/f to recliner only.           IP PT Goals       02/06/17 0943          Bed Mobility PT LTG    Bed Mobility PT LTG, Date Established 02/06/17  -      Bed Mobility PT LTG, Time to Achieve 2 wks  -SJ      Bed Mobility PT LTG, Activity Type roll left/roll right;supine to sit/sit to supine  -      Bed Mobility PT LTG, [place] Level contact guard assist  -SJ      Bed Mobility PT Goal  LTG, Assist Device bed rails  -      Transfer Training PT LTG    Transfer Training PT LTG, Date Established 02/06/17  -      Transfer Training PT LTG, Time to Achieve 2 wks  -SJ      Transfer Training PT LTG, Activity Type bed to chair /chair to bed;sit to  stand/stand to sit  -SJ      Transfer Training PT LTG, [place] Level contact guard assist  -SJ      Transfer Training PT LTG, Assist Device walker, rolling  -SJ      Gait Training PT LTG    Gait Training Goal PT LTG, Date Established 02/06/17  -SJ      Gait Training Goal PT LTG, Time to Achieve 2 wks  -SJ      Gait Training Goal PT LTG, [place] Level contact guard assist  -SJ      Gait Training Goal PT LTG, Assist Device walker, rolling  -SJ      Gait Training Goal PT LTG, Distance to Achieve 200  -SJ      Static Sitting Balance PT LTG    Static Sitting Balance PT LTG, Date Established 02/06/17  -SJ      Static Sitting Balance PT LTG, Time to Achieve 2 wks  -SJ      Static Sitting Balance PT LTG, [place] Level conditional independence  -SJ        User Key  (r) = Recorded By, (t) = Taken By, (c) = Cosigned By    Initials Name Provider Type    ZEYNEP Cox PT Physical Therapist                Outcome Measures       02/06/17 0815          How much help from another person do you currently need...    Turning from your back to your side while in flat bed without using bedrails? 2  -SJ      Moving from lying on back to sitting on the side of a flat bed without bedrails? 2  -SJ      Moving to and from a bed to a chair (including a wheelchair)? 2  -SJ      Standing up from a chair using your arms (e.g., wheelchair, bedside chair)? 2  -SJ      Climbing 3-5 steps with a railing? 1  -SJ      To walk in hospital room? 1  -SJ      AM-PAC 6 Clicks Score 10  -SJ      Functional Assessment    Outcome Measure Options AM-PAC 6 Clicks Basic Mobility (PT)  -        User Key  (r) = Recorded By, (t) = Taken By, (c) = Cosigned By    Initials Name Provider Type    ZEYNEP Cox PT Physical Therapist           Time Calculation:         PT Charges       02/06/17 0945          Time Calculation    Start Time 0815  -SJ      PT Received On 02/06/17  -      PT Goal Re-Cert Due Date 02/16/17  -        User Key   (r) = Recorded By, (t) = Taken By, (c) = Cosigned By    Initials Name Provider Type     Patricia Cox, PT Physical Therapist          Therapy Charges for Today     Code Description Service Date Service Provider Modifiers Qty    99005296938 HC PT EVAL HIGH COMPLEXITY 4 2/6/2017 Patricia Cox, PT GP 1    80966235728 HC PT THER SUPP EA 15 MIN 2/6/2017 Patricia Cox, PT GP 3          PT G-Codes  Outcome Measure Options: AM-PAC 6 Clicks Basic Mobility (PT)      Patricia Cox PT  2/6/2017

## 2017-02-06 NOTE — OP NOTE
DATE OF PROCEDURE:  02/03/2017    PREOPERATIVE DIAGNOSES:  1. Severe mitral valve regurgitation.  2. Hypertension.  3. Seizures.     POSTOPERATIVE DIAGNOSES:  1. Severe mitral valve regurgitation.   2. Hypertension.   3. Seizures.     PROCEDURES PERFORMED:  1. Mitral valve replacement (31 Magna Ease mitral tissue valve).   2. Endoscopic vein harvest (right greater saphenous vein).     SURGEON: Jeromy Gaona MD    ASSISTANTS:   1. Asaf Hilario MD   2. Dileep Tucker PA-C.     ANESTHESIA: General endotracheal anesthesia with Jamie Nguyen MD    ESTIMATED BLOOD LOSS: 700 mL.     CROSSCLAMP TIME: 117 minutes.     TOTAL CARDIOPULMONARY BYPASS TIME: 124 minutes.     INDICATIONS: The patient is a 66-year-old  female with a history of hypertension and seizures who presented with shortness of breath over the past year. The patient was found to have severe mitral regurgitation on echocardiogram and was felt to be a reasonable candidate for mitral valve repair versus replacement. The patient was found to have a 50% to 60% circumflex coronary artery disease on cardiac catheterization and was felt to be a reasonable candidate for single vessel coronary artery bypass graft. The risks and benefits of surgery were discussed with the patient including pain, bleeding, infection, renal failure, stroke, heart block, and death. The patient understood these risks and wished to proceed with surgery.     DESCRIPTION OF PROCEDURE: The patient was taken to the operating room and placed under general endotracheal anesthesia. A central line, Plain-Keke catheter, radial arterial line and Cotton catheter were placed. The patient was prepped and draped in the usual sterile fashion. A timeout was performed confirming the patient’s name, procedure, and consent. Beta blockade administration and antibiotics were verified. The right greater saphenous vein was harvested from the groin to the ankle using EVH technique. Subcutaneous  tissues were closed with a running 3-0 Vicryl suture and 4-0 Monocryl subcuticular stitch. Simultaneously, a median sternotomy incision was made. Electrocautery was utilized to gain access the sternum. A midline sternotomy was performed after lung desufflation and hemostasis was achieved with electrocautery. The pericardium was opened and stay sutures were placed to create a pericardial well. The superior vena cava was dissected and an umbilical tape was placed circumferentially around the structure and the inferior vena cava. Next, 3-0 Prolene sutures were placed in the ascending aorta and systemic heparin was administered. Additional cannulation sutures were placed in the superior vena cava, right atrial appendage, ascending aorta, and right atrium. After verification of satisfactory activated clotting time, the arterial cannula was placed and connected to the cardiopulmonary bypass circuit after being de-aired. The line was tested and a wrap was performed. The venous cannula was inserted in the superior vena cava, right atrial appendage followed by antegrade and cardioplegia lines. Vein was inspected and found to be of appropriate caliber and quality for bypass grafting. Cardiopulmonary bypass was initiated. The patient was allowed to drift in temperature and distal bypass targets were inspected. The circumflex coronary distribution was inspected. Bypass flows were dropped and the aortic crossclamp was applied. Cardioplegia was administered in an antegrade fashion with immediate cessation of cardiac activity and acceptable septal temperature response. The root vent with suction was turned on high and the circumflex coronary artery distribution was again inspected. There was a tiny obtuse marginal branch measuring well under 1 mm in diameter. This was dissected proximally to the native circumflex which was small in caliber and felt to not warrant bypass grafting, especially with a 50% to 60% stenosis. The  possibility of performing CABG was then abandoned and attention was turned to the left atrium, which was exposed along Lori's groove. The left atrium was opened and this was extended cephalad and caudally. The mitral valve was inspected and was extremely redundant in segment P1 through P3. Additionally, there was a prolapse within the anterior leaflet of the valve. The significant amount of tissue and prolapse above the valvular annular plane was consistent with a Barlows valve. I could not see any method of repairing this valve given the extensive redundant tissue, only 1 area in P2 with a possibility of chordal replacement. I elected to replace this patient’s mitral valve to safely come up with a solution for her mitral regurgitation and ovoid a prolonged mitral valve repair with possible poor result. The anterior leaflet was sharply excised along with chordae tendineae. The posterior leaflet was preserved. Pledgeted commissure sutures were placed and the valve was sized as a 31. This Magna Ease mitral valve was then prepared for insertion and the remaining pledgeted annular sutures were placed. The sutures were then connected to the 31 valve and lowered into position and tied down. The posterior leaflet was preserved and the valve was inspected and found to be seated well within the annulus. Delivery system was removed and the left atrium was closed with a running 4-0 Prolene suture in 2 layers. The patient was placed in steep Trendelenburg position and a hotshot of cardioplegia was administered. Bypass flows were dropped and aortic root suction was turned on high and the crossclamp was removed. Bypass flows were returned to normal and the patient returned to normal sinus rhythm spontaneously without defibrillation. The patient was then weaned from cardiopulmonary bypass and all cannulation sites were reinforced with additional 4-0 Prolene sutures. The left atrium was inspected and found to be hemostatic.  Atrial and ventricular pacing wires were placed along with 3 mediastinal chest tubes, 1 within the left pleural space, the mediastinum and the right pleural space. These chest tubes were secured using 0 Ethibond suture in a mattress fashion for later thoracostomy site closure. The sternum was then reapproximated with #7 stainless steel wire and the linea alba was closed with a running 0 Vicryl suture. Dermal layer was closed with a running 2-0 Vicryl suture followed by interrupted 2-0 Vicryl sutures in the dermal layer and caudal aspect of the incision. The skin was closed with a 4-0 Monocryl subcuticular stitch. Overlying skin glue was applied to the incision and gauze and tape to the chest tube sites. The patient tolerated the procedure well and was transported to the cardiac ICU in stable condition paced.      Jeromy Gaona M.D.  LAUREN/fabienne  DD: 02/06/2017 00:51:19  DT: 02/06/2017 01:24:54  Voice Rec. ID #[id number]  Voice Original ID #[id number]  Doc ID #[id number]  Rev. #1  cc:

## 2017-02-06 NOTE — PROGRESS NOTES
"Intensive Care Follow-up      LOS: 3 days     Ms. Tiana Cronin, 66 y.o. female is followed for: Mitral regurgitation     Subjective - Interval History     Awake.  Complaining of sternal incision and back pain  Eating \"okay\"  Remains a systolic under pacemaker    The patient's relevant past medical, surgical and social history were reviewed and updated in Epic as appropriate.     Objective     Infusions:    dexmedetomidine 0.2-1.5 mcg/kg/hr    DOBUTamine 2-20 mcg/kg/min    DOPamine 2-20 mcg/kg/min    EPINEPHrine 0.02-0.3 mcg/kg/min    niCARdipine 5-15 mg/hr    nitroglycerin 5-200 mcg/min    norepinephrine 0.02-0.3 mcg/kg/min Last Rate: Stopped (02/06/17 0348)   phenylephrine 0.5-3 mcg/kg/min    sodium chloride 30 mL/hr    vasopressin 0.02-0.1 Units/min      Medications:    aspirin 325 mg Oral Daily   atorvastatin 40 mg Oral Nightly   citalopram 20 mg Oral Daily   famotidine 20 mg Oral BID   insulin lispro 2-7 Units Subcutaneous 4x Daily AC & at Bedtime   levothyroxine 175 mcg Oral Daily   pharmacy consult - MTM  Does not apply Daily   primidone 250 mg Oral TID   sennosides-docusate sodium 2 tablet Oral BID     Intake/Output       02/05/17 0700 - 02/06/17 0659    Intake (ml) 240    Output (ml) 1430    Net (ml) -1190        Vital Sign Min/Max for last 24 hours  Temp  Min: 97.5 °F (36.4 °C)  Max: 98.8 °F (37.1 °C)   BP  Min: 82/63  Max: 107/70   Pulse  Min: 82  Max: 88   Resp  Min: 16  Max: 22   SpO2  Min: 94 %  Max: 100 %   Flow (L/min)  Min: 2  Max: 2        Physical Exam:   GENERAL: Awake, no distress   HEENT: Right internal jugular site okay.  No adenopathy   LUNGS: Decreased breath sounds with crackles in both bases   HEART: Regular rate and rhythm.  Sternal incision without drainage or dehiscence   ABDOMEN: Soft.  Nontender.  Bowel sounds present   EXTREMITIES: No edema or cyanosis   NEURO/PSYCH: Awake and alert.  Follows commands.  No deficits      Results from last 7 days  Lab Units 02/06/17 0320 " 02/05/17  0334 02/04/17  0417   WBC 10*3/mm3 8.19 7.43 7.04   HEMOGLOBIN g/dL 8.3* 8.5* 9.1*   PLATELETS 10*3/mm3 108* 115* 222       Results from last 7 days  Lab Units 02/06/17  0320 02/05/17  0334 02/04/17  0417  02/03/17  1916 02/03/17  1437   SODIUM mmol/L 131* 133 139  --  145 145   POTASSIUM mmol/L 4.6 4.6 4.2  < > 3.8 3.7   TOTAL CO2 mmol/L 24.0 25.0 23.0  --  22.0 26.0   BUN mg/dL 18 15 17  --  17 17   CREATININE mg/dL 0.70 0.70 0.90  --  0.80 0.80   MAGNESIUM mg/dL  --  2.2 2.4  --  2.5 3.0*   PHOSPHORUS mg/dL  --   --  2.7  --  2.1* 3.7   GLUCOSE mg/dL 108* 141* 118*  --  125* 135*   < > = values in this interval not displayed.  Estimated Creatinine Clearance: 65.6 mL/min (by C-G formula based on Cr of 0.7).      Results from last 7 days  Lab Units 02/02/17  1339   HEMOGLOBIN A1C % 5.10         Results from last 7 days  Lab Units 02/04/17  0403   PH, ARTERIAL pH units 7.340*   PCO2, ARTERIAL mm Hg 45.5*   PO2 ART mm Hg 72.2*     No results found for: LACTATE       Images: Chest x-ray reveals cardiomegaly and bibasilar atelectasis or infiltrates.  Small pleural effusions cannot be excluded.    I reviewed the patient's results and images.     Impression      Hospital Problem List     * (Principal)Mitral regurgitation    Post-Op Asystole               Plan        Push mobilization  Push nutritional supplementation  Discontinue arterial line and right IJ introducer when okay with CVT  Follow-up labs including electrolytes  Possible pacemaker per cardiology  Discontinue Lopressor; being held by nursing staff anyway     Plan of care and goals reviewed with mulitdisciplinary team at daily rounds   I discussed the patient's findings and my recommendations with patient and nursing staff       ASHVIN Del Valle MD  Pulmonary and Critical Care Medicine  02/06/17 10:00 AM     Please note that portions of this note were completed with a voice recognition program. Efforts were made to edit the dictations, but  occasionally words are mistranscribed.

## 2017-02-06 NOTE — PLAN OF CARE
Problem: Patient Care Overview (Adult)  Goal: Plan of Care Review  Outcome: Ongoing (interventions implemented as appropriate)    02/06/17 0943   Coping/Psychosocial Response Interventions   Plan Of Care Reviewed With patient   Patient Care Overview   Progress progress toward functional goals as expected   Outcome Evaluation   Outcome Summary/Follow up Plan PT eval completed. Pt dem significant weakness, decr independence with mobility per PLOF and will benefit from PT services to address limitations. Pt with external PM, tx session limited to t/f to recliner only.          Problem: Inpatient Physical Therapy  Goal: Bed Mobility Goal LTG- PT  Outcome: Ongoing (interventions implemented as appropriate)    02/06/17 0943   Bed Mobility PT LTG   Bed Mobility PT LTG, Date Established 02/06/17   Bed Mobility PT LTG, Time to Achieve 2 wks   Bed Mobility PT LTG, Activity Type roll left/roll right;supine to sit/sit to supine   Bed Mobility PT LTG, [place] Level contact guard assist   Bed Mobility PT Goal LTG, Assist Device bed rails       Goal: Transfer Training Goal 1 LTG- PT  Outcome: Ongoing (interventions implemented as appropriate)    02/06/17 0943   Transfer Training PT LTG   Transfer Training PT LTG, Date Established 02/06/17   Transfer Training PT LTG, Time to Achieve 2 wks   Transfer Training PT LTG, Activity Type bed to chair /chair to bed;sit to stand/stand to sit   Transfer Training PT LTG, [place] Level contact guard assist   Transfer Training PT LTG, Assist Device walker, rolling       Goal: Gait Training Goal LTG- PT  Outcome: Ongoing (interventions implemented as appropriate)    02/06/17 0943   Gait Training PT LTG   Gait Training Goal PT LTG, Date Established 02/06/17   Gait Training Goal PT LTG, Time to Achieve 2 wks   Gait Training Goal PT LTG, [place] Level contact guard assist   Gait Training Goal PT LTG, Assist Device walker, rolling   Gait Training Goal PT LTG, Distance to Achieve 200        Goal: Static Sitting Balance Goal LTG- PT  Outcome: Ongoing (interventions implemented as appropriate)    02/06/17 0943   Static Sitting Balance PT LTG   Static Sitting Balance PT LTG, Date Established 02/06/17   Static Sitting Balance PT LTG, Time to Achieve 2 wks   Static Sitting Balance PT LTG, Door Level conditional independence

## 2017-02-07 ENCOUNTER — APPOINTMENT (OUTPATIENT)
Dept: GENERAL RADIOLOGY | Facility: HOSPITAL | Age: 67
End: 2017-02-07

## 2017-02-07 LAB
ALBUMIN SERPL-MCNC: 3.2 G/DL (ref 3.2–4.8)
ALBUMIN/GLOB SERPL: 1.4 G/DL (ref 1.5–2.5)
ALP SERPL-CCNC: 68 U/L (ref 25–100)
ALT SERPL W P-5'-P-CCNC: 18 U/L (ref 7–40)
ANION GAP SERPL CALCULATED.3IONS-SCNC: 4 MMOL/L (ref 3–11)
AST SERPL-CCNC: 38 U/L (ref 0–33)
BILIRUB SERPL-MCNC: 0.4 MG/DL (ref 0.3–1.2)
BUN BLD-MCNC: 16 MG/DL (ref 9–23)
BUN/CREAT SERPL: 26.7 (ref 7–25)
CA-I SERPL ISE-MCNC: 1.16 MMOL/L (ref 1.12–1.32)
CALCIUM SPEC-SCNC: 8.7 MG/DL (ref 8.7–10.4)
CHLORIDE SERPL-SCNC: 101 MMOL/L (ref 99–109)
CO2 SERPL-SCNC: 25 MMOL/L (ref 20–31)
CREAT BLD-MCNC: 0.6 MG/DL (ref 0.6–1.3)
DEPRECATED RDW RBC AUTO: 52.7 FL (ref 37–54)
ERYTHROCYTE [DISTWIDTH] IN BLOOD BY AUTOMATED COUNT: 15.3 % (ref 11.3–14.5)
GFR SERPL CREATININE-BSD FRML MDRD: 100 ML/MIN/1.73
GLOBULIN UR ELPH-MCNC: 2.3 GM/DL
GLUCOSE BLD-MCNC: 87 MG/DL (ref 70–100)
GLUCOSE BLDC GLUCOMTR-MCNC: 102 MG/DL (ref 70–130)
GLUCOSE BLDC GLUCOMTR-MCNC: 113 MG/DL (ref 70–130)
GLUCOSE BLDC GLUCOMTR-MCNC: 84 MG/DL (ref 70–130)
GLUCOSE BLDC GLUCOMTR-MCNC: 96 MG/DL (ref 70–130)
HCT VFR BLD AUTO: 25.5 % (ref 34.5–44)
HGB BLD-MCNC: 8.3 G/DL (ref 11.5–15.5)
MAGNESIUM SERPL-MCNC: 2.1 MG/DL (ref 1.3–2.7)
MCH RBC QN AUTO: 31 PG (ref 27–31)
MCHC RBC AUTO-ENTMCNC: 32.5 G/DL (ref 32–36)
MCV RBC AUTO: 95.1 FL (ref 80–99)
PHOSPHATE SERPL-MCNC: 2.6 MG/DL (ref 2.4–5.1)
PLATELET # BLD AUTO: 113 10*3/MM3 (ref 150–450)
PMV BLD AUTO: 10.3 FL (ref 6–12)
POTASSIUM BLD-SCNC: 4.4 MMOL/L (ref 3.5–5.5)
PROT SERPL-MCNC: 5.5 G/DL (ref 5.7–8.2)
RBC # BLD AUTO: 2.68 10*6/MM3 (ref 3.89–5.14)
SODIUM BLD-SCNC: 130 MMOL/L (ref 132–146)
WBC NRBC COR # BLD: 7.11 10*3/MM3 (ref 3.5–10.8)

## 2017-02-07 PROCEDURE — 97110 THERAPEUTIC EXERCISES: CPT

## 2017-02-07 PROCEDURE — 71010 HC CHEST PA OR AP: CPT

## 2017-02-07 PROCEDURE — 82962 GLUCOSE BLOOD TEST: CPT

## 2017-02-07 PROCEDURE — 82330 ASSAY OF CALCIUM: CPT | Performed by: INTERNAL MEDICINE

## 2017-02-07 PROCEDURE — 84100 ASSAY OF PHOSPHORUS: CPT | Performed by: INTERNAL MEDICINE

## 2017-02-07 PROCEDURE — 99233 SBSQ HOSP IP/OBS HIGH 50: CPT | Performed by: INTERNAL MEDICINE

## 2017-02-07 PROCEDURE — 25010000002 MORPHINE SULFATE (PF) 2 MG/ML SOLUTION: Performed by: INTERNAL MEDICINE

## 2017-02-07 PROCEDURE — 83735 ASSAY OF MAGNESIUM: CPT | Performed by: INTERNAL MEDICINE

## 2017-02-07 PROCEDURE — 80053 COMPREHEN METABOLIC PANEL: CPT | Performed by: INTERNAL MEDICINE

## 2017-02-07 PROCEDURE — 85027 COMPLETE CBC AUTOMATED: CPT | Performed by: INTERNAL MEDICINE

## 2017-02-07 RX ORDER — BUMETANIDE 0.25 MG/ML
2 INJECTION INTRAMUSCULAR; INTRAVENOUS ONCE
Status: COMPLETED | OUTPATIENT
Start: 2017-02-07 | End: 2017-02-07

## 2017-02-07 RX ORDER — BUMETANIDE 0.25 MG/ML
1 INJECTION INTRAMUSCULAR; INTRAVENOUS EVERY 12 HOURS
Status: DISCONTINUED | OUTPATIENT
Start: 2017-02-07 | End: 2017-02-08

## 2017-02-07 RX ADMIN — PRIMIDONE 250 MG: 250 TABLET ORAL at 16:25

## 2017-02-07 RX ADMIN — BUMETANIDE 2 MG: 0.25 INJECTION, SOLUTION INTRAMUSCULAR; INTRAVENOUS at 06:38

## 2017-02-07 RX ADMIN — OXYCODONE AND ACETAMINOPHEN 2 TABLET: 5; 325 TABLET ORAL at 09:30

## 2017-02-07 RX ADMIN — FAMOTIDINE 20 MG: 20 TABLET ORAL at 20:09

## 2017-02-07 RX ADMIN — OXYCODONE AND ACETAMINOPHEN 2 TABLET: 5; 325 TABLET ORAL at 20:09

## 2017-02-07 RX ADMIN — PRIMIDONE 250 MG: 250 TABLET ORAL at 20:09

## 2017-02-07 RX ADMIN — ASPIRIN 325 MG: 325 TABLET, DELAYED RELEASE ORAL at 09:13

## 2017-02-07 RX ADMIN — DOCUSATE SODIUM AND SENNOSIDES 2 TABLET: 8.6; 5 TABLET, FILM COATED ORAL at 18:14

## 2017-02-07 RX ADMIN — DOCUSATE SODIUM AND SENNOSIDES 2 TABLET: 8.6; 5 TABLET, FILM COATED ORAL at 09:13

## 2017-02-07 RX ADMIN — ATORVASTATIN CALCIUM 40 MG: 40 TABLET, FILM COATED ORAL at 20:09

## 2017-02-07 RX ADMIN — MORPHINE SULFATE 2 MG: 2 INJECTION, SOLUTION INTRAMUSCULAR; INTRAVENOUS at 09:48

## 2017-02-07 RX ADMIN — CITALOPRAM HYDROBROMIDE 20 MG: 20 TABLET ORAL at 09:13

## 2017-02-07 RX ADMIN — MORPHINE SULFATE 2 MG: 2 INJECTION, SOLUTION INTRAMUSCULAR; INTRAVENOUS at 22:18

## 2017-02-07 RX ADMIN — LEVOTHYROXINE SODIUM 175 MCG: 175 TABLET ORAL at 09:13

## 2017-02-07 RX ADMIN — BUMETANIDE 1 MG: 0.25 INJECTION, SOLUTION INTRAMUSCULAR; INTRAVENOUS at 18:14

## 2017-02-07 RX ADMIN — PRIMIDONE 250 MG: 250 TABLET ORAL at 09:13

## 2017-02-07 RX ADMIN — FAMOTIDINE 20 MG: 20 TABLET ORAL at 09:13

## 2017-02-07 RX ADMIN — HYDROCODONE BITARTRATE AND ACETAMINOPHEN 1 TABLET: 7.5; 325 TABLET ORAL at 10:30

## 2017-02-07 NOTE — PROGRESS NOTES
Acute Care - Physical Therapy Treatment Note  [facility]     Patient Name: Tiana Cronin  : 1950  MRN: [id number]  Today's Date: 2017  Onset of Illness/Injury or Date of Surgery Date: 17  Date of Referral to PT: 17  Referring Physician: SUSANA Tucker    Admit Date: 2/3/2017    Visit Dx:    ICD-10-CM ICD-9-CM   1. Impaired functional mobility, balance, gait, and endurance Z74.09 V49.89   2. Non-rheumatic mitral regurgitation I34.0 424.0   3. Mitral valve disorder I05.9 424.0     Patient Active Problem List   Diagnosis   • Mitral valve disorder   • Mitral regurgitation   • Post-Op Asystole               Adult Rehabilitation Note       17 1255          Rehab Assessment/Intervention    Discipline physical therapist  -PAUL      Document Type therapy note (daily note)  -PAUL      Subjective Information no complaints;agree to therapy  -PAUL      Patient Effort, Rehab Treatment adequate  -PAUL      Symptoms Noted During/After Treatment other (see comments)   patient on external pacemaker asystole underlying  -PAUL      Recorded by [PAUL] Faina Madison, PT      Vital Signs    Pre Systolic BP Rehab 97  -PAUL      Pre Treatment Diastolic BP 51  -PAUL      Post Systolic BP Rehab 96  -PAUL      Post Treatment Diastolic BP 56  -PAUL      Pretreatment Heart Rate (beats/min) 69   externally paced underlying rhythm is asystole  -PAUL      Posttreatment Heart Rate (beats/min) 69  -PAUL      Pre SpO2 (%) 95  -PAUL      O2 Delivery Pre Treatment supplemental O2  -PAUL      Post SpO2 (%) 96  -PAUL      O2 Delivery Post Treatment supplemental O2  -PAUL      Pre Patient Position Supine  -PAUL      Intra Patient Position Supine  -PAUL      Post Patient Position Supine  -PAUL      Recorded by [PAUL] Faina Madison PT      Pain Assessment    Pain Assessment No/denies pain  -PAUL      Recorded by [PAUL] Faina Madison PT      Cognitive Assessment/Intervention    Current Cognitive/Communication Assessment functional  -PAUL      Orientation  Status oriented x 4  -PAUL      Follows Commands/Answers Questions 100% of the time  -PAUL      Personal Safety mild impairment;decreased awareness, need for assist;decreased awareness, need for safety;decreased insight to deficits  -PAUL      Personal Safety Interventions fall prevention program maintained  -PAUL      Recorded by [PAUL] Faina Madison PT      ROM (Range of Motion)    General ROM --   left elbow at 90 degrees very little movement  -PAUL      General ROM Detail left elbow sx very little movement  -PAUL      Recorded by [PAUL] Faina Madison PT      Bed Mobility, Assessment/Treatment    Bed Mobility, Assistive Device draw sheet  -PAUL      Bed Mobility, Roll Left, [place] moderate assist (50% patient effort)  -PAUL      Bed Mobility, Roll Right, [place] moderate assist (50% patient effort)  -PAUL      Bed Mobility, Comment deferred OOB due to external pacemaker with patient having asystole as an underlying rhythm  -PAUL      Recorded by [PAUL] Faina Madison PT      Transfer Assessment/Treatment    Transfer, Comment deferred OOB  -PAUL      Recorded by [PAUL] Faina Madison PT      Gait Assessment/Treatment    Gait, Comment deferred  -PAUL      Recorded by [PAUL] Faina Madison PT      Therapy Exercises    Bilateral Lower Extremities AAROM:;10 reps;supine;ankle pumps/circles;calf stretch;glut sets;heel slides;hip abduction/adduction;SAQ  -PAUL      Right Upper Extremity AROM:;10 reps;supine;elbow flexion/extension;hand pumps;shoulder abduction/adduction;shoulder extension/flexion  -PAUL      Left Upper Extremity AAROM:;10 reps;supine;hand pumps;shoulder abduction/adduction;shoulder extension/flexion  -PAUL      Recorded by [PAUL] Faina Madison PT      Positioning and Restraints    Pre-Treatment Position in bed  -PAUL      Post Treatment Position bed  -PAUL      In Bed notified nsg;supine;call light within reach;encouraged to call for assist;with family/caregiver  -PAUL      Recorded by [PAUL] Faina Madison PT         User Key  (r) = Recorded By, (t) = Taken By, (c) = Cosigned By    Initials Name Effective Dates    PAUL Madison, PT 06/19/15 -                 IP PT Goals       02/07/17 1336 02/06/17 0943       Bed Mobility PT LTG    Bed Mobility PT LTG, Date Established  02/06/17  -SJ     Bed Mobility PT LTG, Time to Achieve  2 wks  -SJ     Bed Mobility PT LTG, Activity Type  roll left/roll right;supine to sit/sit to supine  -SJ     Bed Mobility PT LTG, [place] Level  contact guard assist  -SJ     Bed Mobility PT Goal  LTG, Assist Device  bed rails  -SJ     Bed Mobility PT LTG, Outcome goal ongoing  -PAUL      Transfer Training PT LTG    Transfer Training PT LTG, Date Established  02/06/17  -SJ     Transfer Training PT LTG, Time to Achieve  2 wks  -SJ     Transfer Training PT LTG, Activity Type  bed to chair /chair to bed;sit to stand/stand to sit  -SJ     Transfer Training PT LTG, [place] Level  contact guard assist  -SJ     Transfer Training PT LTG, Assist Device  walker, rolling  -SJ     Transfer Training PT LTG, Outcome goal ongoing  -PAUL      Gait Training PT LTG    Gait Training Goal PT LTG, Date Established  02/06/17  -SJ     Gait Training Goal PT LTG, Time to Achieve  2 wks  -SJ     Gait Training Goal PT LTG, [place] Level  contact guard assist  -SJ     Gait Training Goal PT LTG, Assist Device  walker, rolling  -SJ     Gait Training Goal PT LTG, Distance to Achieve  200  -SJ     Gait Training Goal PT LTG, Outcome goal ongoing  -PAUL      Static Sitting Balance PT LTG    Static Sitting Balance PT LTG, Date Established  02/06/17  -SJ     Static Sitting Balance PT LTG, Time to Achieve  2 wks  -SJ     Static Sitting Balance PT LTG, [place] Level  conditional independence  -SJ     Static Sitting Balance PT LTG, Outcome goal ongoing  -PAUL        User Key  (r) = Recorded By, (t) = Taken By, (c) = Cosigned By    Initials Name Provider Type    PAUL Madison, PT Physical Therapist    ZEYNEP Gomez  Kenny, PT Physical Therapist          Physical Therapy Education     Title: PT OT SLP Therapies (Active)     Topic: Physical Therapy (Active)     Point: Mobility training (Active)    Learning Progress Summary    Learner Readiness Method Response Comment Documented by Status   Patient Acceptance E NR discussed HEP and bed exercises patient can do on her own patient needs cues today to perform activity PAUL 02/07/17 1335 Active    Acceptance E NR   02/06/17 0940 Active               Point: Home exercise program (Active)    Learning Progress Summary    Learner Readiness Method Response Comment Documented by Status   Patient Acceptance E NR discussed HEP and bed exercises patient can do on her own patient needs cues today to perform activity PAUL 02/07/17 1335 Active    Acceptance E NR   02/06/17 0940 Active               Point: Body mechanics (Active)    Learning Progress Summary    Learner Readiness Method Response Comment Documented by Status   Patient Acceptance E NR discussed HEP and bed exercises patient can do on her own patient needs cues today to perform activity PAUL 02/07/17 1335 Active    Acceptance E NR   02/06/17 0940 Active               Point: Precautions (Active)    Learning Progress Summary    Learner Readiness Method Response Comment Documented by Status   Patient Acceptance E NR discussed HEP and bed exercises patient can do on her own patient needs cues today to perform activity PAUL 02/07/17 1335 Active    Acceptance E NR   02/06/17 0940 Active                      User Key     Initials Effective Dates Name Provider Type Discipline    PAUL 06/19/15 -  Faina Madison, PT Physical Therapist PT     06/19/15 -  Patricia Cox, PT Physical Therapist PT                    PT Recommendation and Plan  Anticipated Equipment Needs At Discharge: front wheeled walker  Anticipated Discharge Disposition: inpatient rehabilitation facility  Demonstrates Need for Referral to Another Service: occupational  therapy  Planned Therapy Interventions: balance training, bed mobility training, gait training, home exercise program, patient/family education, strengthening, transfer training  PT Frequency: daily  Plan of Care Review  Plan Of Care Reviewed With: patient  Progress: progress toward functional goals is gradual  Outcome Summary/Follow up Plan: patient unable to make progress with activitty due to patient having asystole as an underlying rhythm and requires external pacing. Possibly will need a pacemaker implant. we will continue to follow for strengthening and mobility training as tolerated          Outcome Measures       02/07/17 1255 02/06/17 0815       How much help from another person do you currently need...    Turning from your back to your side while in flat bed without using bedrails? 2  -PAUL 2  -SJ     Moving from lying on back to sitting on the side of a flat bed without bedrails? 2  -PAUL 2  -SJ     Moving to and from a bed to a chair (including a wheelchair)? 2  -PAUL 2  -SJ     Standing up from a chair using your arms (e.g., wheelchair, bedside chair)? 2  -PAUL 2  -SJ     Climbing 3-5 steps with a railing? 1  -PAUL 1  -SJ     To walk in hospital room? 1  -PAUL 1  -SJ     AM-PAC 6 Clicks Score 10  -PAUL 10  -SJ     Functional Assessment    Outcome Measure Options  AM-PAC 6 Clicks Basic Mobility (PT)  -       User Key  (r) = Recorded By, (t) = Taken By, (c) = Cosigned By    Initials Name Provider Type    PAUL Madison, PT Physical Therapist     Patricia Cox, PT Physical Therapist           Time Calculation:         PT Charges       02/07/17 1339          Time Calculation    Start Time 1255  -      PT Received On 02/07/17  -      PT Goal Re-Cert Due Date 02/16/17  -      Time Calculation- PT    Total Timed Code Minutes- PT 24 minute(s)  -        User Key  (r) = Recorded By, (t) = Taken By, (c) = Cosigned By    Initials Name Provider Type    PAUL Madison, DEZ Physical Therapist           Therapy Charges for Today     Code Description Service Date Service Provider Modifiers Qty    33709660946 HC PT THER PROC EA 15 MIN 2/7/2017 Faina Madison, PT GP 2          PT G-Codes  Outcome Measure Options: AM-PAC 6 Clicks Basic Mobility (PT)    Faina Madison, PT  2/7/2017

## 2017-02-07 NOTE — PROGRESS NOTES
"Adult Nutrition  Assessment/PES    Patient Name:  Tiana Cronin  YOB: 1950  MRN: [id number]  Admit Date:  2/3/2017    Assessment Date:  2/7/2017        Reason for Assessment       02/07/17 1314    Reason for Assessment    Reason For Assessment/Visit admission assessment;multidisciplinary rounds    Time Spent (min) 20    Diagnosis Diagnosis    Cardiac CABG;MVR              Nutrition/Diet History       02/07/17 1315    Nutrition/Diet History    Factors Affecting Nutritional Intake Factors    Reported/Observed By Patient;RN    Appetite Poor at this Time            Anthropometrics       02/07/17 1315    Anthropometrics    Height 167.6 cm (65.98\")    Weight 67.6 kg (149 lb)    Ideal Body Weight (IBW)    Ideal Body Weight (IBW), Female 59.94    % Ideal Body Weight 112.99    Body Mass Index (BMI)    BMI (kg/m2) 24.11            Labs/Tests/Procedures/Meds       02/07/17 1315    Labs/Tests/Procedures/Meds    Labs/Tests Review Reviewed    Medication Review Reviewed, pertinent                Nutrition Prescription Ordered       02/07/17 1316    Nutrition Prescription PO    Current PO Diet Regular    Common Modifiers Cardiac;Consistent Carbohydrate            Evaluation of Received Nutrient/Fluid Intake       02/07/17 1316    PO Evaluation    Number of Days PO Intake Evaluated 2 days    Number of Meals 4    % PO Intake 25              Problem/Interventions:        Problem 1       02/07/17 1317    Nutrition Diagnoses Problem 1    Problem 1 Inadequate Nutrient Intake    Etiology (related to) Factors Affecting Nutrition    Appetite Poor at this Time    Signs/Symptoms (evidenced by) PO Intake    Percent (%) intake recorded 25 %    Over number of meals 4                    Intervention Goal       02/07/17 1317    Intervention Goal    General Nutrition support treatment    PO Increase intake            Nutrition Intervention       02/07/17 1317    Nutrition Intervention    RD/Tech Action Interview for " preference;Menu provided;Menu adjusted;Supplement provided    Recommended/Ordered Supplement            Nutrition Prescription       02/07/17 1317    Nutrition Prescription PO    PO Prescription Begin/change supplement    Supplement Boost HP    Supplement Frequency 3 times a day    New PO Prescription Ordered? Yes            Education/Evaluation       02/07/17 1318    Monitor/Evaluation    Monitor Per protocol;PO intake;Supplement intake;Pertinent labs        Comments:      Electronically signed by:  Michelle Pedersen RD  02/07/17 1:18 PM

## 2017-02-07 NOTE — CONSULTS
[facility] Consult Note      Referring Provider: Jeromy Gaona MD  Primary Provider:  Rangel Lopez MD  Primary Cardiologist:  Dr Geoffrey Hoyt MD  Reason for Consultation: asystole     Patient Care Team:  Rangel Lopez MD as PCP - General (Family Medicine)  Rangel Lopez MD as Consulting Physician (Family Medicine)    Chief complaint asystole under temporary pacing wire    Identification:  66 year old   female    Problem list:    1. Valvular Heart Disease   A.  Severe MR with MVP   B.  MVR with Dr Gaona 2/3/2017:  Bioprosthetic MVR (31 Magna Ease mitral tissue)  2.  Coronary Artery Disease    A.  CABG x 1 Dr Gaona 2/3/2017: SVG to circumflex  3.  Hypertension  4.  Seizures  5.  Hypothyroidism  6.  Anxiety/ Depression  7.  Skin cancer; basal cell  8.  Surgeries:   A.  Plantar fascia- bilateral   B.   x 2   C.  Elbow repair   D.  Knee surgery    Allergies:  Review of patient's allergies indicates no known allergies.    Medications:        aspirin 325 mg Oral Daily   atorvastatin 40 mg Oral Nightly   bumetanide 1 mg Intravenous Q12H   citalopram 20 mg Oral Daily   famotidine 20 mg Oral BID   insulin lispro 2-7 Units Subcutaneous 4x Daily AC & at Bedtime   levothyroxine 175 mcg Oral Daily   pharmacy consult - MTM  Does not apply Daily   primidone 250 mg Oral TID   sennosides-docusate sodium 2 tablet Oral BID     •  acetaminophen  •  albumin human  •  bisacodyl  •  bisacodyl  •  calcium gluconate IVPB  •  dexmedetomidine  •  dextrose  •  dextrose  •  DOBUTamine  •  docusate sodium  •  DOPamine  •  EPINEPHrine  •  glucagon (human recombinant)  •  HYDROcodone-acetaminophen  •  magnesium hydroxide  •  magnesium sulfate in D5W 1g/100mL (PREMIX) **OR** magnesium sulfate bolus in 250 mL **OR** magnesium sulfate bolus in 250 mL **OR** magnesium sulfate in D5W 1g/100mL (PREMIX)  •  Morphine  •  [] fentanyl 10 mcg/mL **AND** naloxone  •  niCARdipine  •   "nitroglycerin  •  norepinephrine  •  ondansetron  •  oxyCODONE-acetaminophen  •  phenylephrine  •  potassium chloride **OR** potassium chloride  •  potassium chloride **OR** potassium chloride  •  sodium chloride  •  sodium chloride  •  vasopressin    dexmedetomidine 0.2-1.5 mcg/kg/hr    DOBUTamine 2-20 mcg/kg/min    DOPamine 2-20 mcg/kg/min    EPINEPHrine 0.02-0.3 mcg/kg/min    niCARdipine 5-15 mg/hr    nitroglycerin 5-200 mcg/min    norepinephrine 0.02-0.3 mcg/kg/min Last Rate: Stopped (02/06/17 0348)   phenylephrine 0.5-3 mcg/kg/min    sodium chloride 30 mL/hr    vasopressin 0.02-0.1 Units/min        History of present illness:  Patient is a pleasant 66 year old female with the above noted medical history who recently underwent a MVR and CABG x 1 with Dr Gaona for severe MR and mitral valve prolapse.  Post-operatively, the patient developed a junctional rhythm under the pacing wire.  It has been left in place thus far.  Now, she is exhibiting asystole under the pacing wire and it is felt that she may require a permanent pacemaker to be placed.  She is doing very well from the post-operative standpoint otherwise.      Review of Systems  Pertinent positives as listed in the HPI.  All other systems reviewed are negative.     History  Family History   Problem Relation Age of Onset   • Hypertension Mother    • Alzheimer's disease Mother    • Coronary artery disease Father    • Hypertension Father    • Diabetes Father    • Heart failure Father     and   Social History   Substance Use Topics   • Smoking status: Never Smoker   • Smokeless tobacco: Never Used   • Alcohol use No       Objective     Vital Sign Min/Max for last 24 hours  Temp  Min: 98.2 °F (36.8 °C)  Max: 99 °F (37.2 °C)   BP  Min: 86/62  Max: 116/65   Pulse  Min: 69  Max: 87   Resp  Min: 16  Max: 22   SpO2  Min: 91 %  Max: 100 %   Flow (L/min)  Min: 2  Max: 2   No Data Recorded     Flowsheet Rows         First Filed Value    Admission Height  66\" (167.6 " cm) Documented at 02/03/2017 0621    Admission Weight  148 lb 13 oz (67.5 kg) Documented at 02/03/2017 0621          Physical Exam:     General Appearance:    Alert, cooperative, in no acute distress   Head:    Normocephalic, without obvious abnormality, atraumatic               Neck:   No adenopathy, supple, trachea midline, no thyromegaly, no     carotid bruit, no JVD   Back:     No kyphosis present, no scoliosis present, no skin lesions,       erythema or scars, no tenderness to percussion or                   palpation,   range of motion normal   Lungs:     Clear to auscultation,respirations regular, even and                   unlabored    Heart:    Regular rhythm and normal rate, normal S1 and S2, no            murmur, no gallop, no rub, no click       Abdomen:     Normal bowel sounds, no masses, no organomegaly, soft        non-tender, non-distended, no guarding, no rebound                 tenderness       Extremities:   Moves all extremities well, trace edema, no cyanosis, no              redness   Pulses:   Pulses palpable and equal bilaterally   Skin:   No bleeding, bruising or rash           Echo:  65-70% per echo at outlying     Labs:      Results from last 7 days  Lab Units 02/07/17  0346 02/06/17  0320 02/05/17  0334  02/02/17  1339   SODIUM mmol/L 130* 131* 133  < > 141   POTASSIUM mmol/L 4.4 4.6 4.6  < > 4.0   CHLORIDE mmol/L 101 101 106  < > 102   TOTAL CO2 mmol/L 25.0 24.0 25.0  < > 31.0   BUN mg/dL 16 18 15  < > 18   CREATININE mg/dL 0.60 0.70 0.70  < > 0.90   CALCIUM mg/dL 8.7 8.6* 8.6*  < > 10.4   BILIRUBIN mg/dL 0.4  --   --   --  0.3   ALK PHOS U/L 68  --   --   --  120*   ALT (SGPT) U/L 18  --   --   --  15   AST (SGOT) U/L 38*  --   --   --  27   GLUCOSE mg/dL 87 108* 141*  < > 95     Lab Results (last 24 hours)     Procedure Component Value Units Date/Time    CBC (No Diff) [[id number]]  (Abnormal) Collected:  02/07/17 0346    Specimen:  Blood Updated:  02/07/17 0426     WBC 7.11 10*3/mm3       RBC 2.68 (L) 10*6/mm3      Hemoglobin 8.3 (L) g/dL      Hematocrit 25.5 (L) %      MCV 95.1 fL      MCH 31.0 pg      MCHC 32.5 g/dL      RDW 15.3 (H) %      RDW-SD 52.7 fl      MPV 10.3 fL      Platelets 113 (L) 10*3/mm3     Calcium, Ionized [[id number]]  (Normal) Collected:  02/07/17 0346    Specimen:  Blood Updated:  02/07/17 0429     Ionized Calcium 1.16 mmol/L     Magnesium [[id number]]  (Normal) Collected:  02/07/17 0346    Specimen:  Blood Updated:  02/07/17 0444     Magnesium 2.1 mg/dL     Phosphorus [[id number]]  (Normal) Collected:  02/07/17 0346    Specimen:  Blood Updated:  02/07/17 0444     Phosphorus 2.6 mg/dL            Ejection Fraction: 65-70% per outlying echo      Results Review:  I reviewed the patients new clinical results.      Assessment/Plan   1.  Asystole under temporary pacing wire  2.  VHD    -s/p MVR 2/3/2017  3.  CAD   -s/p CABG x 1 2/3/2017      Plan  Consider permanent PPM implant in the future depending on the recovery of her conduction system.  This is postop day 4.  I feel that it is reasonable to be conservative and give the patient a couple more days for edema in the paravalvular region, near the conduction system to resolve.  I discussed the patients findings and my recommendations with patient.  MCKENZIE Shelby  02/07/17  11:22 AM    Scribed for Abigail Bee MD by MCKENZIE Ibanez on February 7, 2017 at 12:02 PM    I Abigail Bee MD personally performed the services described in this documentation as scribed by the above individual in my presence, and it is both accurate and complete.

## 2017-02-07 NOTE — PLAN OF CARE
Problem: Patient Care Overview (Adult)  Goal: Plan of Care Review  Outcome: Ongoing (interventions implemented as appropriate)    02/07/17 1710   Coping/Psychosocial Response Interventions   Plan Of Care Reviewed With patient   Patient Care Overview   Progress no change   Outcome Evaluation   Outcome Summary/Follow up Plan Pt still asystole under pacing wires. Cardiology consulted. Plan to wait a couple more days before placing PPM. Pt still on 2L NC. PT doing active ROM with pt in bed.          Problem: Cardiac Surgery (Adult)  Goal: Signs and Symptoms of Listed Potential Problems Will be Absent or Manageable (Cardiac Surgery)  Outcome: Ongoing (interventions implemented as appropriate)    02/07/17 1710   Cardiac Surgery   Problems Assessed (Cardiac Surgery) all   Problems Present (Cardiac Surgery) dysrhythmia/arrhythmia;pain         Problem: Pressure Ulcer Risk (Chris Scale) (Adult,Obstetrics,Pediatric)  Goal: Identify Related Risk Factors and Signs and Symptoms  Outcome: Ongoing (interventions implemented as appropriate)    02/07/17 1710   Pressure Ulcer Risk (Chris Scale)   Related Risk Factors (Pressure Ulcer Risk (Chris Scale)) critical care admission;length of surgery;mobility impaired

## 2017-02-07 NOTE — PLAN OF CARE
Problem: Patient Care Overview (Adult)  Goal: Plan of Care Review  Outcome: Ongoing (interventions implemented as appropriate)    02/07/17 0144   Coping/Psychosocial Response Interventions   Plan Of Care Reviewed With patient;family   Outcome Evaluation   Outcome Summary/Follow up Plan Pt. still asystole underneath temporary pacer. MT's/wires remain.        Goal: Adult Individualization and Mutuality  Outcome: Ongoing (interventions implemented as appropriate)    02/07/17 0144   Individualization   Patient Specific Goals To get home as soon as possible         Problem: Cardiac Surgery (Adult)  Goal: Signs and Symptoms of Listed Potential Problems Will be Absent or Manageable (Cardiac Surgery)  Outcome: Ongoing (interventions implemented as appropriate)    02/07/17 0144   Cardiac Surgery   Problems Assessed (Cardiac Surgery) all   Problems Present (Cardiac Surgery) pain;dysrhythmia/arrhythmia         Problem: Pressure Ulcer Risk (Chris Scale) (Adult,Obstetrics,Pediatric)  Goal: Identify Related Risk Factors and Signs and Symptoms  Outcome: Ongoing (interventions implemented as appropriate)    02/07/17 0144   Pressure Ulcer Risk (Chris Scale)   Related Risk Factors (Pressure Ulcer Risk (Chris Scale)) critical care admission;mobility impaired;length of surgery       Goal: Skin Integrity  Outcome: Ongoing (interventions implemented as appropriate)    02/07/17 0144   Pressure Ulcer Risk (Chris Scale) (Adult,Obstetrics,Pediatric)   Skin Integrity making progress toward outcome

## 2017-02-07 NOTE — PROGRESS NOTES
Cardiothoracic Surgery Progress Note      POD # 4 s/p MVR       LOS: 4 days      Subjective:  No complaints  Pleasant , talkative  Pacer dependent    Objective:  Vital Signs  Temp:  [98.2 °F (36.8 °C)-98.7 °F (37.1 °C)] 98.3 °F (36.8 °C)  Heart Rate:  [69-87] 69  Resp:  [16-20] 20  BP: ()/(54-77) 99/74  Arterial Line BP: ()/(46-56) 109/52    Physical Exam:   Gen: Laying in bed comfortably   Lungs: clear to auscultation, respirations regular, respirations even and respirations unlabored   Heart: regular rhythm & normal rate, normal S1, S2, no murmur, no tammy, no rub and no click   Skin: Incision c/d/i     Results:    Results from last 7 days  Lab Units 02/07/17  0346   WBC 10*3/mm3 7.11   HEMOGLOBIN g/dL 8.3*   HEMATOCRIT % 25.5*   PLATELETS 10*3/mm3 113*       Results from last 7 days  Lab Units 02/07/17  0346   SODIUM mmol/L 130*   POTASSIUM mmol/L 4.4   CHLORIDE mmol/L 101   TOTAL CO2 mmol/L 25.0   BUN mg/dL 16   CREATININE mg/dL 0.60   GLUCOSE mg/dL 87   CALCIUM mg/dL 8.7         Assessment:  POD # 4 s/p MVR, expected postoperative recovery.  Heart block  CV stable  CTs 400cc last 24 hours    Plan:  Decrease pacer to 70 bpm  Consult cardiology for possible pacer on Friday if remains pacer dependent  Keep in ICU while paced    SUSANA Ruiz  02/07/17  7:43 AM

## 2017-02-08 ENCOUNTER — APPOINTMENT (OUTPATIENT)
Dept: GENERAL RADIOLOGY | Facility: HOSPITAL | Age: 67
End: 2017-02-08

## 2017-02-08 LAB
ALBUMIN SERPL-MCNC: 3.1 G/DL (ref 3.2–4.8)
ALBUMIN/GLOB SERPL: 1.3 G/DL (ref 1.5–2.5)
ALP SERPL-CCNC: 75 U/L (ref 25–100)
ALT SERPL W P-5'-P-CCNC: 20 U/L (ref 7–40)
ANION GAP SERPL CALCULATED.3IONS-SCNC: 8 MMOL/L (ref 3–11)
AST SERPL-CCNC: 35 U/L (ref 0–33)
BILIRUB SERPL-MCNC: 0.3 MG/DL (ref 0.3–1.2)
BUN BLD-MCNC: 20 MG/DL (ref 9–23)
BUN/CREAT SERPL: 28.6 (ref 7–25)
CALCIUM SPEC-SCNC: 8.7 MG/DL (ref 8.7–10.4)
CHLORIDE SERPL-SCNC: 98 MMOL/L (ref 99–109)
CO2 SERPL-SCNC: 27 MMOL/L (ref 20–31)
CREAT BLD-MCNC: 0.7 MG/DL (ref 0.6–1.3)
CYTO UR: NORMAL
DEPRECATED RDW RBC AUTO: 52.5 FL (ref 37–54)
ERYTHROCYTE [DISTWIDTH] IN BLOOD BY AUTOMATED COUNT: 15.2 % (ref 11.3–14.5)
GFR SERPL CREATININE-BSD FRML MDRD: 84 ML/MIN/1.73
GLOBULIN UR ELPH-MCNC: 2.4 GM/DL
GLUCOSE BLD-MCNC: 99 MG/DL (ref 70–100)
GLUCOSE BLDC GLUCOMTR-MCNC: 100 MG/DL (ref 70–130)
GLUCOSE BLDC GLUCOMTR-MCNC: 102 MG/DL (ref 70–130)
GLUCOSE BLDC GLUCOMTR-MCNC: 124 MG/DL (ref 70–130)
GLUCOSE BLDC GLUCOMTR-MCNC: 163 MG/DL (ref 70–130)
HCT VFR BLD AUTO: 25.9 % (ref 34.5–44)
HGB BLD-MCNC: 8.5 G/DL (ref 11.5–15.5)
LAB AP CASE REPORT: NORMAL
LAB AP CLINICAL INFORMATION: NORMAL
Lab: NORMAL
MCH RBC QN AUTO: 31.3 PG (ref 27–31)
MCHC RBC AUTO-ENTMCNC: 32.8 G/DL (ref 32–36)
MCV RBC AUTO: 95.2 FL (ref 80–99)
PATH REPORT.FINAL DX SPEC: NORMAL
PATH REPORT.GROSS SPEC: NORMAL
PLATELET # BLD AUTO: 112 10*3/MM3 (ref 150–450)
PMV BLD AUTO: 9.5 FL (ref 6–12)
POTASSIUM BLD-SCNC: 4.1 MMOL/L (ref 3.5–5.5)
PROT SERPL-MCNC: 5.5 G/DL (ref 5.7–8.2)
RBC # BLD AUTO: 2.72 10*6/MM3 (ref 3.89–5.14)
SODIUM BLD-SCNC: 133 MMOL/L (ref 132–146)
WBC NRBC COR # BLD: 6.05 10*3/MM3 (ref 3.5–10.8)

## 2017-02-08 PROCEDURE — 63710000001 INSULIN LISPRO (HUMAN) PER 5 UNITS: Performed by: INTERNAL MEDICINE

## 2017-02-08 PROCEDURE — 71010 HC CHEST PA OR AP: CPT

## 2017-02-08 PROCEDURE — 80053 COMPREHEN METABOLIC PANEL: CPT | Performed by: INTERNAL MEDICINE

## 2017-02-08 PROCEDURE — 97110 THERAPEUTIC EXERCISES: CPT

## 2017-02-08 PROCEDURE — 25010000002 HEPARIN (PORCINE) PER 1000 UNITS: Performed by: INTERNAL MEDICINE

## 2017-02-08 PROCEDURE — 25010000002 ONDANSETRON PER 1 MG: Performed by: PHYSICIAN ASSISTANT

## 2017-02-08 PROCEDURE — 99233 SBSQ HOSP IP/OBS HIGH 50: CPT | Performed by: INTERNAL MEDICINE

## 2017-02-08 PROCEDURE — 25010000002 MORPHINE SULFATE (PF) 2 MG/ML SOLUTION: Performed by: INTERNAL MEDICINE

## 2017-02-08 PROCEDURE — 99232 SBSQ HOSP IP/OBS MODERATE 35: CPT | Performed by: INTERNAL MEDICINE

## 2017-02-08 PROCEDURE — 85027 COMPLETE CBC AUTOMATED: CPT | Performed by: INTERNAL MEDICINE

## 2017-02-08 PROCEDURE — 82962 GLUCOSE BLOOD TEST: CPT

## 2017-02-08 RX ORDER — POLYETHYLENE GLYCOL 3350 17 G/17G
17 POWDER, FOR SOLUTION ORAL DAILY
Status: DISCONTINUED | OUTPATIENT
Start: 2017-02-08 | End: 2017-02-14

## 2017-02-08 RX ORDER — HEPARIN SODIUM 5000 [USP'U]/ML
5000 INJECTION, SOLUTION INTRAVENOUS; SUBCUTANEOUS EVERY 8 HOURS SCHEDULED
Status: COMPLETED | OUTPATIENT
Start: 2017-02-08 | End: 2017-02-09

## 2017-02-08 RX ORDER — ETHOSUXIMIDE 250 MG/1
500 CAPSULE ORAL 2 TIMES DAILY
Status: DISCONTINUED | OUTPATIENT
Start: 2017-02-08 | End: 2017-02-14 | Stop reason: HOSPADM

## 2017-02-08 RX ORDER — BUMETANIDE 0.25 MG/ML
2 INJECTION INTRAMUSCULAR; INTRAVENOUS ONCE
Status: DISCONTINUED | OUTPATIENT
Start: 2017-02-08 | End: 2017-02-08

## 2017-02-08 RX ADMIN — OXYCODONE AND ACETAMINOPHEN 2 TABLET: 5; 325 TABLET ORAL at 00:11

## 2017-02-08 RX ADMIN — LEVOTHYROXINE SODIUM 175 MCG: 175 TABLET ORAL at 08:13

## 2017-02-08 RX ADMIN — PRIMIDONE 250 MG: 250 TABLET ORAL at 16:36

## 2017-02-08 RX ADMIN — OXYCODONE AND ACETAMINOPHEN 2 TABLET: 5; 325 TABLET ORAL at 16:36

## 2017-02-08 RX ADMIN — OXYCODONE AND ACETAMINOPHEN 2 TABLET: 5; 325 TABLET ORAL at 03:51

## 2017-02-08 RX ADMIN — DOCUSATE SODIUM AND SENNOSIDES 2 TABLET: 8.6; 5 TABLET, FILM COATED ORAL at 08:13

## 2017-02-08 RX ADMIN — MORPHINE SULFATE 2 MG: 2 INJECTION, SOLUTION INTRAMUSCULAR; INTRAVENOUS at 21:44

## 2017-02-08 RX ADMIN — POLYETHYLENE GLYCOL 3350 17 G: 17 POWDER, FOR SOLUTION ORAL at 16:52

## 2017-02-08 RX ADMIN — OXYCODONE AND ACETAMINOPHEN 2 TABLET: 5; 325 TABLET ORAL at 20:55

## 2017-02-08 RX ADMIN — ETHOSUXIMIDE 500 MG: 250 CAPSULE ORAL at 17:05

## 2017-02-08 RX ADMIN — HEPARIN SODIUM 5000 UNITS: 5000 INJECTION, SOLUTION INTRAVENOUS; SUBCUTANEOUS at 14:19

## 2017-02-08 RX ADMIN — INSULIN LISPRO 2 UNITS: 100 INJECTION, SOLUTION INTRAVENOUS; SUBCUTANEOUS at 16:38

## 2017-02-08 RX ADMIN — PRIMIDONE 250 MG: 250 TABLET ORAL at 08:13

## 2017-02-08 RX ADMIN — FAMOTIDINE 20 MG: 20 TABLET ORAL at 08:13

## 2017-02-08 RX ADMIN — ASPIRIN 325 MG: 325 TABLET, DELAYED RELEASE ORAL at 08:13

## 2017-02-08 RX ADMIN — PRIMIDONE 250 MG: 250 TABLET ORAL at 20:58

## 2017-02-08 RX ADMIN — BUMETANIDE 1 MG: 0.25 INJECTION, SOLUTION INTRAMUSCULAR; INTRAVENOUS at 05:01

## 2017-02-08 RX ADMIN — HEPARIN SODIUM 5000 UNITS: 5000 INJECTION, SOLUTION INTRAVENOUS; SUBCUTANEOUS at 21:02

## 2017-02-08 RX ADMIN — DOCUSATE SODIUM AND SENNOSIDES 2 TABLET: 8.6; 5 TABLET, FILM COATED ORAL at 17:05

## 2017-02-08 RX ADMIN — ONDANSETRON 4 MG: 2 INJECTION INTRAMUSCULAR; INTRAVENOUS at 08:13

## 2017-02-08 RX ADMIN — CITALOPRAM HYDROBROMIDE 20 MG: 20 TABLET ORAL at 08:13

## 2017-02-08 RX ADMIN — FAMOTIDINE 20 MG: 20 TABLET ORAL at 20:56

## 2017-02-08 RX ADMIN — HYDROCODONE BITARTRATE AND ACETAMINOPHEN 1 TABLET: 7.5; 325 TABLET ORAL at 08:20

## 2017-02-08 RX ADMIN — ATORVASTATIN CALCIUM 40 MG: 40 TABLET, FILM COATED ORAL at 20:55

## 2017-02-08 NOTE — PLAN OF CARE
Problem: Patient Care Overview (Adult)  Goal: Plan of Care Review  Outcome: Ongoing (interventions implemented as appropriate)    02/08/17 0935   Coping/Psychosocial Response Interventions   Plan Of Care Reviewed With patient   Patient Care Overview   Progress progress towards functional goals is fair   Outcome Evaluation   Outcome Summary/Follow up Plan Pt with good effort, limited activity due to external PM. Tx focused on t/f to chair and seated therex. COntinue POC as yoli.         Problem: Inpatient Physical Therapy  Goal: Bed Mobility Goal LTG- PT  Outcome: Ongoing (interventions implemented as appropriate)    02/06/17 0943 02/07/17 1336   Bed Mobility PT LTG   Bed Mobility PT LTG, Date Established 02/06/17 --    Bed Mobility PT LTG, Time to Achieve 2 wks --    Bed Mobility PT LTG, Activity Type roll left/roll right;supine to sit/sit to supine --    Bed Mobility PT LTG, [place] Level contact guard assist --    Bed Mobility PT Goal LTG, Assist Device bed rails --    Bed Mobility PT LTG, Outcome --  goal ongoing       Goal: Transfer Training Goal 1 LTG- PT  Outcome: Ongoing (interventions implemented as appropriate)    02/06/17 0943 02/07/17 1336   Transfer Training PT LTG   Transfer Training PT LTG, Date Established 02/06/17 --    Transfer Training PT LTG, Time to Achieve 2 wks --    Transfer Training PT LTG, Activity Type bed to chair /chair to bed;sit to stand/stand to sit --    Transfer Training PT LTG, [place] Level contact guard assist --    Transfer Training PT LTG, Assist Device walker, rolling --    Transfer Training PT LTG, Outcome --  goal ongoing       Goal: Gait Training Goal LTG- PT  Outcome: Ongoing (interventions implemented as appropriate)    02/06/17 0943 02/07/17 1336   Gait Training PT LTG   Gait Training Goal PT LTG, Date Established 02/06/17 --    Gait Training Goal PT LTG, Time to Achieve 2 wks --    Gait Training Goal PT LTG, [place] Level contact guard assist --    Gait  Training Goal PT LTG, Assist Device walker, rolling --    Gait Training Goal PT LTG, Distance to Achieve 200 --    Gait Training Goal PT LTG, Outcome --  goal ongoing       Goal: Static Sitting Balance Goal LTG- PT  Outcome: Ongoing (interventions implemented as appropriate)    02/06/17 0943 02/07/17 1336   Static Sitting Balance PT LTG   Static Sitting Balance PT LTG, Date Established 02/06/17 --    Static Sitting Balance PT LTG, Time to Achieve 2 wks --    Static Sitting Balance PT LTG, [place] Level conditional independence --    Static Sitting Balance PT LTG, Outcome --  goal ongoing

## 2017-02-08 NOTE — PROGRESS NOTES
Cardiothoracic Surgery Progress Note      POD # 5 s/p MVR       LOS: 5 days      Subjective:  Remains in good spirits  No complaints, up in chair    Objective:  Vital Signs  Temp:  [97.5 °F (36.4 °C)-99 °F (37.2 °C)] 98.6 °F (37 °C)  Heart Rate:  [69-85] 69  Resp:  [18-22] 20  BP: ()/(49-73) 107/65  Arterial Line BP: ()/(47-59) 119/59    Physical Exam:   Gen: Lying in bed comfortably   Lungs: clear to auscultation, respirations regular, respirations even and respirations unlabored   Heart: regular rhythm & normal rate, normal S1, S2, no murmur, no tammy, no rub and no click   Skin: Incision c/d/i     Results:    Results from last 7 days  Lab Units 02/08/17  0330   WBC 10*3/mm3 6.05   HEMOGLOBIN g/dL 8.5*   HEMATOCRIT % 25.9*   PLATELETS 10*3/mm3 112*       Results from last 7 days  Lab Units 02/08/17  0330   SODIUM mmol/L 133   POTASSIUM mmol/L 4.1   CHLORIDE mmol/L 98*   TOTAL CO2 mmol/L 27.0   BUN mg/dL 20   CREATININE mg/dL 0.70   GLUCOSE mg/dL 99   CALCIUM mg/dL 8.7         Assessment:  POD # 5 s/p MVR, expected recovery  Still asystole under pacer    Plan:  Continue support  Leave tubes  May very well need perm. pacer    SUSANA Ruiz  02/08/17  7:19 AM    Agree with above  Weaned pacemaker down to 60 bpm.  Consulted cardiology for possible pacer Friday if remains asystolic  Pulmonary toilet  Continue chest tubes  Diuresis

## 2017-02-08 NOTE — PLAN OF CARE
Problem: Patient Care Overview (Adult)  Goal: Plan of Care Review  Outcome: Ongoing (interventions implemented as appropriate)    02/08/17 0056   Coping/Psychosocial Response Interventions   Plan Of Care Reviewed With patient   Patient Care Overview   Progress no change       Goal: Adult Individualization and Mutuality  Outcome: Ongoing (interventions implemented as appropriate)    02/07/17 0144   Individualization   Patient Specific Goals To get home as soon as possible         Problem: Cardiac Surgery (Adult)  Goal: Signs and Symptoms of Listed Potential Problems Will be Absent or Manageable (Cardiac Surgery)  Outcome: Ongoing (interventions implemented as appropriate)    02/08/17 0056   Cardiac Surgery   Problems Assessed (Cardiac Surgery) all   Problems Present (Cardiac Surgery) pain;dysrhythmia/arrhythmia         Problem: Pressure Ulcer Risk (Chris Scale) (Adult,Obstetrics,Pediatric)  Goal: Identify Related Risk Factors and Signs and Symptoms  Outcome: Ongoing (interventions implemented as appropriate)    02/08/17 0056   Pressure Ulcer Risk (Chris Scale)   Related Risk Factors (Pressure Ulcer Risk (Chris Scale)) critical care admission;length of surgery;mobility impaired       Goal: Skin Integrity  Outcome: Ongoing (interventions implemented as appropriate)    02/08/17 0056   Pressure Ulcer Risk (Chris Scale) (Adult,Obstetrics,Pediatric)   Skin Integrity making progress toward outcome         02/08/17 0056   Pressure Ulcer Risk (Chris Scale) (Adult,Obstetrics,Pediatric)   Skin Integrity making progress toward outcome

## 2017-02-08 NOTE — PROGRESS NOTES
Continued Stay Note  [facility]     Patient Name: Tiana Cronin  MRN: [id number]  Today's Date: 2/8/2017    Admit Date: 2/3/2017          Discharge Plan       02/08/17 1242    Case Management/Social Work Plan    Plan Undecided    Additional Comments S/P 2H/SICU:  Dx with mitral regurgitation; possible pacemaker Thursday/Friday; PT when rhythm stable.              Discharge Codes     None        Expected Discharge Date and Time     Expected Discharge Date Expected Discharge Time    Feb 8, 2017             STEPHANI Elizalde

## 2017-02-08 NOTE — PATIENT CARE CONFERENCE
ICU Rounds: continue PT POC, continues with asystole under external PM, may receive permanent PM at the end of the week.

## 2017-02-08 NOTE — PROGRESS NOTES
Intensive Care Follow-up      LOS: 5 days     Ms. Tiana Cronin, 66 y.o. female is followed for: Mitral regurgitation     Subjective - Interval History     Awake and alert  Still hasn't had a bowel movement  Not eating well.  Would like some ensure supplement    Continues to have asystole underneath pacemaker    The patient's relevant past medical, surgical and social history were reviewed and updated in Epic as appropriate.     Objective     Infusions:    dexmedetomidine 0.2-1.5 mcg/kg/hr    DOBUTamine 2-20 mcg/kg/min    DOPamine 2-20 mcg/kg/min    EPINEPHrine 0.02-0.3 mcg/kg/min    niCARdipine 5-15 mg/hr    nitroglycerin 5-200 mcg/min    norepinephrine 0.02-0.3 mcg/kg/min Last Rate: Stopped (02/06/17 0348)   phenylephrine 0.5-3 mcg/kg/min    sodium chloride 30 mL/hr    vasopressin 0.02-0.1 Units/min      Medications:    aspirin 325 mg Oral Daily   atorvastatin 40 mg Oral Nightly   bumetanide 1 mg Intravenous Q12H   citalopram 20 mg Oral Daily   ethosuximide 500 mg Oral BID   famotidine 20 mg Oral BID   insulin lispro 2-7 Units Subcutaneous 4x Daily AC & at Bedtime   levothyroxine 175 mcg Oral Daily   pharmacy consult - MTM  Does not apply Daily   polyethylene glycol 17 g Oral Daily   primidone 250 mg Oral TID   sennosides-docusate sodium 2 tablet Oral BID     Intake/Output       02/07/17 0700 - 02/08/17 0659    Intake (ml) --    Output (ml) 2905    Net (ml) -2905    Last Weight 149 lb (67.6 kg)        Vital Sign Min/Max for last 24 hours  Temp  Min: 97.5 °F (36.4 °C)  Max: 98.7 °F (37.1 °C)   BP  Min: 87/55  Max: 116/62   Pulse  Min: 60  Max: 85   Resp  Min: 18  Max: 22   SpO2  Min: 93 %  Max: 100 %   Flow (L/min)  Min: 2  Max: 2        Physical Exam:   GENERAL: Awake, no distress   HEENT: Right IJ site okay.  No adenopathy   LUNGS: Decreased breath sounds without wheezes or rhonchi   HEART: Regular rate and rhythm.  Paced.  Sternal incision without drainage or dehiscence   ABDOMEN: Soft.  Nontender.  Bowel  sounds present   EXTREMITIES: Palpable pulses.  Trace edema.  No cyanosis   NEURO/PSYCH: Awake and alert.  Follows commands.  No deficit      Results from last 7 days  Lab Units 02/08/17  0330 02/07/17  0346 02/06/17  0320   WBC 10*3/mm3 6.05 7.11 8.19   HEMOGLOBIN g/dL 8.5* 8.3* 8.3*   PLATELETS 10*3/mm3 112* 113* 108*       Results from last 7 days  Lab Units 02/08/17  0330 02/07/17  0346 02/06/17  0320 02/05/17  0334 02/04/17  0417  02/03/17  1916   SODIUM mmol/L 133 130* 131* 133 139  --  145   POTASSIUM mmol/L 4.1 4.4 4.6 4.6 4.2  < > 3.8   TOTAL CO2 mmol/L 27.0 25.0 24.0 25.0 23.0  --  22.0   BUN mg/dL 20 16 18 15 17  --  17   CREATININE mg/dL 0.70 0.60 0.70 0.70 0.90  --  0.80   MAGNESIUM mg/dL  --  2.1  --  2.2 2.4  --  2.5   PHOSPHORUS mg/dL  --  2.6  --   --  2.7  --  2.1*   GLUCOSE mg/dL 99 87 108* 141* 118*  --  125*   < > = values in this interval not displayed.  Estimated Creatinine Clearance: 65.6 mL/min (by C-G formula based on Cr of 0.7).      Results from last 7 days  Lab Units 02/02/17  1339   HEMOGLOBIN A1C % 5.10         Results from last 7 days  Lab Units 02/04/17  0403   PH, ARTERIAL pH units 7.340*   PCO2, ARTERIAL mm Hg 45.5*   PO2 ART mm Hg 72.2*     No results found for: LACTATE       Images: Chest x-ray today reveals low lung volumes.  No pneumothorax.  No significant effusion or consolidation    I reviewed the patient's results and images.     Impression      Hospital Problem List     * (Principal)Mitral regurgitation    Post-Op Asystole               Plan        Possible pacemaker per Dr. Gaona  Mild diuresis  Bowel movement  Mobilize/physical therapy when rhythm stable     Plan of care and goals reviewed with mulitdisciplinary team at daily rounds   I discussed the patient's findings and my recommendations with patient and nursing staff       ASHVIN Del Valle MD  Pulmonary and Critical Care Medicine  02/08/17 8:24 AM     Please note that portions of this note were completed with a  voice recognition program. Efforts were made to edit the dictations, but occasionally words are mistranscribed.

## 2017-02-08 NOTE — PROGRESS NOTES
Acute Care - Physical Therapy Treatment Note  [facility]     Patient Name: Tiana Cronin  : 1950  MRN: [id number]  Today's Date: 2017  Onset of Illness/Injury or Date of Surgery Date: 17  Date of Referral to PT: 17  Referring Physician: SUSANA Tucker    Admit Date: 2/3/2017    Visit Dx:    ICD-10-CM ICD-9-CM   1. Impaired functional mobility, balance, gait, and endurance Z74.09 V49.89   2. Non-rheumatic mitral regurgitation I34.0 424.0   3. Mitral valve disorder I05.9 424.0     Patient Active Problem List   Diagnosis   • Mitral valve disorder   • Mitral regurgitation   • Post-Op Asystole               Adult Rehabilitation Note       17 0805 17 1255       Rehab Assessment/Intervention    Discipline physical therapist  -SJ physical therapist  -PAUL     Document Type therapy note (daily note)  -SJ therapy note (daily note)  -PAUL     Subjective Information no complaints;agree to therapy  -SJ no complaints;agree to therapy  -PAUL     Patient Effort, Rehab Treatment excellent  -SJ adequate  -PAUL     Symptoms Noted During/After Treatment  other (see comments)   patient on external pacemaker asystole underlying  -PAUL     Precautions/Limitations fall precautions;pacemaker  -SJ      Specific Treatment Considerations external PM with underlying asystole  -SJ      Recorded by [SJ] Patricia Cox PT [PAUL] Faina Madison PT     Vital Signs    Pre Systolic BP Rehab 94  -SJ 97  -PAUL     Pre Treatment Diastolic BP 70  -SJ 51  -PAUL     Post Systolic BP Rehab  96  -PAUL     Post Treatment Diastolic BP  56  -PAUL     Pretreatment Heart Rate (beats/min) 59  -SJ 69   externally paced underlying rhythm is asystole  -PAUL     Posttreatment Heart Rate (beats/min) 59  -SJ 69  -PAUL     Pre SpO2 (%) 98  -SJ 95  -PAUL     O2 Delivery Pre Treatment supplemental O2  -SJ supplemental O2  -PAUL     Post SpO2 (%) 98  -SJ 96  -PAUL     O2 Delivery Post Treatment supplemental O2  -SJ supplemental O2  -PAUL     Pre Patient Position  Supine  -SJ Supine  -PAUL     Intra Patient Position Standing  -SJ Supine  -PAUL     Post Patient Position Sitting  -SJ Supine  -PAUL     Recorded by [SJ] Patricia Cox PT [PAUL] Faina Madison PT     Pain Assessment    Pain Assessment No/denies pain  -SJ No/denies pain  -PAUL     Recorded by [SJ] Patricia Cox PT [PAUL] Faina Madison PT     Cognitive Assessment/Intervention    Current Cognitive/Communication Assessment functional  -SJ functional  -PAUL     Orientation Status oriented x 4  -SJ oriented x 4  -PAUL     Follows Commands/Answers Questions 100% of the time;able to follow single-step instructions;needs cueing  - 100% of the time  -PAUL     Personal Safety mild impairment;decreased awareness, need for assist  -SJ mild impairment;decreased awareness, need for assist;decreased awareness, need for safety;decreased insight to deficits  -PAUL     Personal Safety Interventions fall prevention program maintained;gait belt;muscle strengthening facilitated;nonskid shoes/slippers when out of bed  -SJ fall prevention program maintained  -PAUL     Recorded by [SJ] Patricia Cox PT [PAUL] Faina Madison PT     ROM (Range of Motion)    General ROM  --   left elbow at 90 degrees very little movement  -PAUL     General ROM Detail  left elbow sx very little movement  -PAUL     Recorded by  [PAUL] Faina Madison PT     Bed Mobility, Assessment/Treatment    Bed Mobility, Assistive Device draw sheet  - draw sheet  -PAUL     Bed Mobility, Roll Left, [place]  moderate assist (50% patient effort)  -PAUL     Bed Mobility, Roll Right, [place]  moderate assist (50% patient effort)  -PAUL     Bed Mob, Supine to Sit, [place] maximum assist (25% patient effort);2 person assist required;verbal cues required  -      Bed Mobility, Safety Issues decreased use of arms for pushing/pulling;decreased use of legs for bridging/pushing;impaired trunk control for bed mobility  -      Bed Mobility, Impairments strength  decreased;impaired balance;coordination impaired  -SJ      Bed Mobility, Comment RN present throughout session  -SJ deferred OOB due to external pacemaker with patient having asystole as an underlying rhythm  -PAUL     Recorded by [SJ] Patricia Cox PT [PAUL] Faina Madison PT     Transfer Assessment/Treatment    Transfers, Bed-Chair [place] moderate assist (50% patient effort);2 person assist required;verbal cues required  -SJ      Transfers, Sit-Stand [place] moderate assist (50% patient effort);2 person assist required;verbal cues required  -SJ      Transfers, Stand-Sit [place] moderate assist (50% patient effort);2 person assist required;verbal cues required  -SJ      Transfer, Safety Issues step length decreased;weight-shifting ability decreased  -SJ      Transfer, Impairments strength decreased;impaired balance;coordination impaired;motor control impaired  -SJ      Transfer, Comment Pt performed side-stepping for stand pivot t/f to recliner  -SJ deferred OOB  -PAUL     Recorded by [SJ] Patricia Cox PT [PAUL] Faina Madison PT     Gait Assessment/Treatment    Gait, [place] Level not appropriate to assess  -SJ      Gait, Comment  deferred  -PAUL     Recorded by [SJ] Patricia Cox, PT [PAUL] Faina Madison PT     Balance Skills Training    Sitting-Level of Assistance Minimum assistance  -SJ      Sitting-Balance Support Left upper extremity supported;Feet supported  -SJ      Sitting-Balance Activities Trunk control activities  -SJ      Sitting # of Minutes 5  -SJ      Standing-Level of Assistance Moderate assistance;x2  -SJ      Recorded by [SJ] Patricia Cox PT      Therapy Exercises    Bilateral Lower Extremities AROM:;10 reps;sitting;ankle pumps/circles;hip flexion;LAQ;glut sets  -SJ AAROM:;10 reps;supine;ankle pumps/circles;calf stretch;glut sets;heel slides;hip abduction/adduction;SAQ  -PAUL     Right Upper Extremity  AROM:;10 reps;supine;elbow flexion/extension;hand  pumps;shoulder abduction/adduction;shoulder extension/flexion  -PAUL     Left Upper Extremity  AAROM:;10 reps;supine;hand pumps;shoulder abduction/adduction;shoulder extension/flexion  -PAUL     Bilateral Upper Extremity AAROM:;10 reps;sitting;shoulder extension/flexion  -SJ      Recorded by [SJ] Patricia Cox, PT [PAUL] Faina Madison PT     Positioning and Restraints    Pre-Treatment Position in bed  -SJ in bed  -PAUL     Post Treatment Position chair  -SJ bed  -PAUL     In Bed  notified nsg;supine;call light within reach;encouraged to call for assist;with family/caregiver  -PAUL     In Chair notified nsg;reclined;encouraged to call for assist;call light within reach;heels elevated  -SJ      Recorded by [SJ] Patricia Cox PT [PAUL] Faina Madison PT       User Key  (r) = Recorded By, (t) = Taken By, (c) = Cosigned By    Initials Name Effective Dates    PAUL Faina Madison, PT 06/19/15 -     SJ Patricia Cox, PT 06/19/15 -                 IP PT Goals       02/07/17 1336 02/06/17 0943       Bed Mobility PT LTG    Bed Mobility PT LTG, Date Established  02/06/17  -SJ     Bed Mobility PT LTG, Time to Achieve  2 wks  -SJ     Bed Mobility PT LTG, Activity Type  roll left/roll right;supine to sit/sit to supine  -SJ     Bed Mobility PT LTG, [place] Level  contact guard assist  -SJ     Bed Mobility PT Goal  LTG, Assist Device  bed rails  -SJ     Bed Mobility PT LTG, Outcome goal ongoing  -PAUL      Transfer Training PT LTG    Transfer Training PT LTG, Date Established  02/06/17  -SJ     Transfer Training PT LTG, Time to Achieve  2 wks  -SJ     Transfer Training PT LTG, Activity Type  bed to chair /chair to bed;sit to stand/stand to sit  -SJ     Transfer Training PT LTG, [place] Level  contact guard assist  -SJ     Transfer Training PT LTG, Assist Device  walker, rolling  -SJ     Transfer Training PT LTG, Outcome goal ongoing  -PAUL      Gait Training PT LTG    Gait Training Goal PT LTG, Date Established  02/06/17   -SJ     Gait Training Goal PT LTG, Time to Achieve  2 wks  -SJ     Gait Training Goal PT LTG, [place] Level  contact guard assist  -SJ     Gait Training Goal PT LTG, Assist Device  walker, rolling  -SJ     Gait Training Goal PT LTG, Distance to Achieve  200  -SJ     Gait Training Goal PT LTG, Outcome goal ongoing  -PAUL      Static Sitting Balance PT LTG    Static Sitting Balance PT LTG, Date Established  02/06/17  -SJ     Static Sitting Balance PT LTG, Time to Achieve  2 wks  -SJ     Static Sitting Balance PT LTG, [place] Level  conditional independence  -SJ     Static Sitting Balance PT LTG, Outcome goal ongoing  -PAUL        User Key  (r) = Recorded By, (t) = Taken By, (c) = Cosigned By    Initials Name Provider Type    PAUL Madison, PT Physical Therapist     Patricia Cox, PT Physical Therapist          Physical Therapy Education     Title: PT OT SLP Therapies (Active)     Topic: Physical Therapy (Active)     Point: Mobility training (Active)    Learning Progress Summary    Learner Readiness Method Response Comment Documented by Status   Patient Acceptance E,D NR   02/08/17 0936 Active    Acceptance E NR discussed HEP and bed exercises patient can do on her own patient needs cues today to perform activity PAUL 02/07/17 1335 Active    Acceptance E NR   02/06/17 0940 Active               Point: Home exercise program (Active)    Learning Progress Summary    Learner Readiness Method Response Comment Documented by Status   Patient Acceptance ED NR   02/08/17 0936 Active    Acceptance E NR discussed HEP and bed exercises patient can do on her own patient needs cues today to perform activity PAUL 02/07/17 1335 Active    Acceptance E NR   02/06/17 0940 Active               Point: Body mechanics (Active)    Learning Progress Summary    Learner Readiness Method Response Comment Documented by Status   Patient Acceptance E,D NR   02/08/17 0936 Active    Acceptance E NR discussed HEP and bed  exercises patient can do on her own patient needs cues today to perform activity  02/07/17 1335 Active    Acceptance E NR   02/06/17 0940 Active               Point: Precautions (Active)    Learning Progress Summary    Learner Readiness Method Response Comment Documented by Status   Patient Acceptance E,D NR   02/08/17 0936 Active    Acceptance E NR discussed HEP and bed exercises patient can do on her own patient needs cues today to perform activity  02/07/17 1335 Active    Acceptance E NR   02/06/17 0940 Active                      User Key     Initials Effective Dates Name Provider Type Discipline     06/19/15 -  Faina Madison, PT Physical Therapist PT     06/19/15 -  Patricia Cox, PT Physical Therapist PT                    PT Recommendation and Plan  Anticipated Equipment Needs At Discharge: front wheeled walker  Anticipated Discharge Disposition: inpatient rehabilitation facility  Demonstrates Need for Referral to Another Service: occupational therapy  Planned Therapy Interventions: balance training, bed mobility training, gait training, home exercise program, patient/family education, strengthening, transfer training  PT Frequency: daily  Plan of Care Review  Plan Of Care Reviewed With: patient  Progress: progress towards functional goals is fair  Outcome Summary/Follow up Plan: Pt with good effort, limited activity due to external PM. Tx focused on t/f to chair and seated therex. COntinue POC as yoli.          Outcome Measures       02/08/17 0805 02/07/17 1255 02/06/17 0815    How much help from another person do you currently need...    Turning from your back to your side while in flat bed without using bedrails? 2  -SJ 2  -PAUL 2  -SJ    Moving from lying on back to sitting on the side of a flat bed without bedrails? 2  -SJ 2  -PAUL 2  -SJ    Moving to and from a bed to a chair (including a wheelchair)? 2  -SJ 2  -PAUL 2  -SJ    Standing up from a chair using your arms (e.g., wheelchair,  bedside chair)? 2  -SJ 2  -PAUL 2  -SJ    Climbing 3-5 steps with a railing? 1  -SJ 1  -PAUL 1  -SJ    To walk in hospital room? 1  -SJ 1  -PAUL 1  -SJ    AM-PAC 6 Clicks Score 10  -SJ 10  -PAUL 10  -SJ    Functional Assessment    Outcome Measure Options AM-PAC 6 Clicks Basic Mobility (PT)  -SJ  AM-PAC 6 Clicks Basic Mobility (PT)  -SJ      User Key  (r) = Recorded By, (t) = Taken By, (c) = Cosigned By    Initials Name Provider Type    PAUL Madison, PT Physical Therapist    SJ Patricia Cox, PT Physical Therapist           Time Calculation:         PT Charges       02/08/17 0937          Time Calculation    Start Time 0805  -      PT Received On 02/08/17  -      PT Goal Re-Cert Due Date 02/16/17  -      Time Calculation- PT    Total Timed Code Minutes- PT 25 minute(s)  -        User Key  (r) = Recorded By, (t) = Taken By, (c) = Cosigned By    Initials Name Provider Type    SJ Patricia Cox, PT Physical Therapist          Therapy Charges for Today     Code Description Service Date Service Provider Modifiers Qty    55332322165 HC PT THER PROC EA 15 MIN 2/8/2017 Patricia Cox, PT GP 2    98083616785 HC PT THER SUPP EA 15 MIN 2/8/2017 Patricia Cox, PT GP 2          PT G-Codes  Outcome Measure Options: AM-PAC 6 Clicks Basic Mobility (PT)    Patricia Cox PT  2/8/2017

## 2017-02-08 NOTE — PROGRESS NOTES
"[facility] Daily Note       LOS: 5 days   Patient Care Team:  Rangel Lopez MD as PCP - General (Family Medicine)  Rangel Lopez MD as Consulting Physician (Family Medicine)    Chief Complaint:  Asystole following MVR    Subjective: No complaints except that she is somewhat tired of lying in bed and that it's still somewhat difficult to take a deep breath    Review of Systems:   As above.    Medications:    aspirin 325 mg Oral Daily   atorvastatin 40 mg Oral Nightly   bumetanide 1 mg Intravenous Q12H   citalopram 20 mg Oral Daily   famotidine 20 mg Oral BID   insulin lispro 2-7 Units Subcutaneous 4x Daily AC & at Bedtime   levothyroxine 175 mcg Oral Daily   pharmacy consult - MTM  Does not apply Daily   primidone 250 mg Oral TID   sennosides-docusate sodium 2 tablet Oral BID       Vital Sign Min/Max for last 24 hours  Temp  Min: 97.5 °F (36.4 °C)  Max: 98.7 °F (37.1 °C)   BP  Min: 87/55  Max: 116/62   Pulse  Min: 60  Max: 85   Resp  Min: 18  Max: 22   SpO2  Min: 93 %  Max: 100 %   Flow (L/min)  Min: 2  Max: 2   Weight  Min: 149 lb (67.6 kg)  Max: 149 lb (67.6 kg)      Intake/Output Summary (Last 24 hours) at 02/08/17 0821  Last data filed at 02/08/17 0600   Gross per 24 hour   Intake      0 ml   Output   2175 ml   Net  -2175 ml        Flowsheet Rows         First Filed Value    Admission Height  66\" (167.6 cm) Documented at 02/03/2017 0621    Admission Weight  148 lb 13 oz (67.5 kg) Documented at 02/03/2017 0621          Physical Exam:     Cardiovascular: Heart has a nondisplaced focal PMI. Regular rate and rhythm without murmur, gallop or rub.  Lungs: Clear without rales or wheezes. Equal expansion is noted.   Abdomen: Soft, nontender.  Extremities: Show trace to 1+ edema.   Skin: warm and dry.     Results Review:    I reviewed the patient's new clinical results.  EKG:  Tele: paced.  No intrinsic heart rate at 30 bpm as of 4 AM this morning.  Labs:      Results from last 7 days  Lab Units " 02/08/17  0330 02/07/17  0346 02/06/17  0320  02/02/17  1339   SODIUM mmol/L 133 130* 131*  < > 141   POTASSIUM mmol/L 4.1 4.4 4.6  < > 4.0   CHLORIDE mmol/L 98* 101 101  < > 102   TOTAL CO2 mmol/L 27.0 25.0 24.0  < > 31.0   BUN mg/dL 20 16 18  < > 18   CREATININE mg/dL 0.70 0.60 0.70  < > 0.90   CALCIUM mg/dL 8.7 8.7 8.6*  < > 10.4   BILIRUBIN mg/dL 0.3 0.4  --   --  0.3   ALK PHOS U/L 75 68  --   --  120*   ALT (SGPT) U/L 20 18  --   --  15   AST (SGOT) U/L 35* 38*  --   --  27   GLUCOSE mg/dL 99 87 108*  < > 95   < > = values in this interval not displayed.    Results from last 7 days  Lab Units 02/08/17  0330 02/07/17  0346 02/06/17  0320   WBC 10*3/mm3 6.05 7.11 8.19   HEMOGLOBIN g/dL 8.5* 8.3* 8.3*   HEMATOCRIT % 25.9* 25.5* 24.9*   PLATELETS 10*3/mm3 112* 113* 108*     No results found for: TROPONINI, TROPONINT  No results found for: CHOL  No results found for: TRIG  No results found for: HDL  No results found for: LDLCALC  Lab Results   Component Value Date    INR 1.10 02/04/2017    INR 1.39 02/03/2017    INR 1.04 02/02/2017    PROTIME 12.0 (H) 02/04/2017    PROTIME 15.2 (H) 02/03/2017    PROTIME 11.3 02/02/2017         Ejection Fraction: 65-70%      Assessment:    1. Asystole under temporary pacing wire  2. 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 CAD  -s/p CABG x 1 2/3/2017       Plan:    Observe the patient's underlying rhythm for the next 24-48 hours.  A permanent pacemaker may be required.    Continue diuresis       Abigail Bee MD  02/08/17  8:21 AM

## 2017-02-08 NOTE — PLAN OF CARE
Problem: Patient Care Overview (Adult)  Goal: Plan of Care Review  Outcome: Ongoing (interventions implemented as appropriate)    02/08/17 1823   Coping/Psychosocial Response Interventions   Plan Of Care Reviewed With patient   Patient Care Overview   Progress improving   Outcome Evaluation   Outcome Summary/Follow up Plan Awaiting pacemaker placement. Still asystole underneath pacer. On 1L NC.          Problem: Cardiac Surgery (Adult)  Goal: Signs and Symptoms of Listed Potential Problems Will be Absent or Manageable (Cardiac Surgery)  Outcome: Ongoing (interventions implemented as appropriate)    02/08/17 1823   Cardiac Surgery   Problems Assessed (Cardiac Surgery) all   Problems Present (Cardiac Surgery) dysrhythmia/arrhythmia         Problem: Pressure Ulcer Risk (Chris Scale) (Adult,Obstetrics,Pediatric)  Goal: Identify Related Risk Factors and Signs and Symptoms  Outcome: Ongoing (interventions implemented as appropriate)    02/08/17 1823   Pressure Ulcer Risk (Chris Scale)   Related Risk Factors (Pressure Ulcer Risk (Chris Scale)) age extremes;critical care admission;hospitalization prolonged;mobility impaired

## 2017-02-09 ENCOUNTER — APPOINTMENT (OUTPATIENT)
Dept: GENERAL RADIOLOGY | Facility: HOSPITAL | Age: 67
End: 2017-02-09

## 2017-02-09 LAB
ALBUMIN SERPL-MCNC: 3.3 G/DL (ref 3.2–4.8)
ALBUMIN/GLOB SERPL: 1.4 G/DL (ref 1.5–2.5)
ALP SERPL-CCNC: 82 U/L (ref 25–100)
ALT SERPL W P-5'-P-CCNC: 28 U/L (ref 7–40)
ANION GAP SERPL CALCULATED.3IONS-SCNC: 7 MMOL/L (ref 3–11)
AST SERPL-CCNC: 42 U/L (ref 0–33)
BILIRUB SERPL-MCNC: 0.3 MG/DL (ref 0.3–1.2)
BUN BLD-MCNC: 20 MG/DL (ref 9–23)
BUN/CREAT SERPL: 33.3 (ref 7–25)
CALCIUM SPEC-SCNC: 9.1 MG/DL (ref 8.7–10.4)
CHLORIDE SERPL-SCNC: 98 MMOL/L (ref 99–109)
CO2 SERPL-SCNC: 30 MMOL/L (ref 20–31)
CREAT BLD-MCNC: 0.6 MG/DL (ref 0.6–1.3)
DEPRECATED RDW RBC AUTO: 52.9 FL (ref 37–54)
ERYTHROCYTE [DISTWIDTH] IN BLOOD BY AUTOMATED COUNT: 15.2 % (ref 11.3–14.5)
GFR SERPL CREATININE-BSD FRML MDRD: 100 ML/MIN/1.73
GLOBULIN UR ELPH-MCNC: 2.4 GM/DL
GLUCOSE BLD-MCNC: 98 MG/DL (ref 70–100)
GLUCOSE BLDC GLUCOMTR-MCNC: 102 MG/DL (ref 70–130)
GLUCOSE BLDC GLUCOMTR-MCNC: 110 MG/DL (ref 70–130)
GLUCOSE BLDC GLUCOMTR-MCNC: 131 MG/DL (ref 70–130)
GLUCOSE BLDC GLUCOMTR-MCNC: 98 MG/DL (ref 70–130)
HCT VFR BLD AUTO: 26.3 % (ref 34.5–44)
HGB BLD-MCNC: 8.6 G/DL (ref 11.5–15.5)
MCH RBC QN AUTO: 31.4 PG (ref 27–31)
MCHC RBC AUTO-ENTMCNC: 32.7 G/DL (ref 32–36)
MCV RBC AUTO: 96 FL (ref 80–99)
PLATELET # BLD AUTO: 135 10*3/MM3 (ref 150–450)
PMV BLD AUTO: 9.5 FL (ref 6–12)
POTASSIUM BLD-SCNC: 4.2 MMOL/L (ref 3.5–5.5)
PROT SERPL-MCNC: 5.7 G/DL (ref 5.7–8.2)
RBC # BLD AUTO: 2.74 10*6/MM3 (ref 3.89–5.14)
SODIUM BLD-SCNC: 135 MMOL/L (ref 132–146)
WBC NRBC COR # BLD: 6.78 10*3/MM3 (ref 3.5–10.8)

## 2017-02-09 PROCEDURE — 25010000002 HEPARIN (PORCINE) PER 1000 UNITS

## 2017-02-09 PROCEDURE — 71010 HC CHEST PA OR AP: CPT

## 2017-02-09 PROCEDURE — 80053 COMPREHEN METABOLIC PANEL: CPT | Performed by: INTERNAL MEDICINE

## 2017-02-09 PROCEDURE — 85027 COMPLETE CBC AUTOMATED: CPT | Performed by: INTERNAL MEDICINE

## 2017-02-09 PROCEDURE — 99232 SBSQ HOSP IP/OBS MODERATE 35: CPT | Performed by: INTERNAL MEDICINE

## 2017-02-09 PROCEDURE — 25010000002 HEPARIN (PORCINE) PER 1000 UNITS: Performed by: INTERNAL MEDICINE

## 2017-02-09 PROCEDURE — 99233 SBSQ HOSP IP/OBS HIGH 50: CPT | Performed by: INTERNAL MEDICINE

## 2017-02-09 PROCEDURE — 82962 GLUCOSE BLOOD TEST: CPT

## 2017-02-09 PROCEDURE — 97110 THERAPEUTIC EXERCISES: CPT

## 2017-02-09 RX ADMIN — DOCUSATE SODIUM AND SENNOSIDES 2 TABLET: 8.6; 5 TABLET, FILM COATED ORAL at 08:25

## 2017-02-09 RX ADMIN — POLYETHYLENE GLYCOL 3350 17 G: 17 POWDER, FOR SOLUTION ORAL at 08:25

## 2017-02-09 RX ADMIN — FAMOTIDINE 20 MG: 20 TABLET ORAL at 21:47

## 2017-02-09 RX ADMIN — ASPIRIN 325 MG: 325 TABLET, DELAYED RELEASE ORAL at 08:25

## 2017-02-09 RX ADMIN — ETHOSUXIMIDE 500 MG: 250 CAPSULE ORAL at 17:36

## 2017-02-09 RX ADMIN — PRIMIDONE 250 MG: 250 TABLET ORAL at 08:29

## 2017-02-09 RX ADMIN — PRIMIDONE 250 MG: 250 TABLET ORAL at 16:36

## 2017-02-09 RX ADMIN — PRIMIDONE 250 MG: 250 TABLET ORAL at 21:45

## 2017-02-09 RX ADMIN — HEPARIN SODIUM 5000 UNITS: 5000 INJECTION, SOLUTION INTRAVENOUS; SUBCUTANEOUS at 06:09

## 2017-02-09 RX ADMIN — LEVOTHYROXINE SODIUM 175 MCG: 175 TABLET ORAL at 08:29

## 2017-02-09 RX ADMIN — HEPARIN SODIUM 5000 UNITS: 5000 INJECTION, SOLUTION INTRAVENOUS; SUBCUTANEOUS at 14:38

## 2017-02-09 RX ADMIN — ETHOSUXIMIDE 500 MG: 250 CAPSULE ORAL at 08:29

## 2017-02-09 RX ADMIN — CITALOPRAM HYDROBROMIDE 20 MG: 20 TABLET ORAL at 08:30

## 2017-02-09 RX ADMIN — DOCUSATE SODIUM AND SENNOSIDES 2 TABLET: 8.6; 5 TABLET, FILM COATED ORAL at 17:37

## 2017-02-09 RX ADMIN — ATORVASTATIN CALCIUM 40 MG: 40 TABLET, FILM COATED ORAL at 21:48

## 2017-02-09 RX ADMIN — HEPARIN SODIUM 5000 UNITS: 5000 INJECTION, SOLUTION INTRAVENOUS; SUBCUTANEOUS at 21:46

## 2017-02-09 RX ADMIN — HYDROCODONE BITARTRATE AND ACETAMINOPHEN 1 TABLET: 7.5; 325 TABLET ORAL at 08:46

## 2017-02-09 RX ADMIN — OXYCODONE AND ACETAMINOPHEN 2 TABLET: 5; 325 TABLET ORAL at 01:26

## 2017-02-09 RX ADMIN — FAMOTIDINE 20 MG: 20 TABLET ORAL at 08:25

## 2017-02-09 NOTE — PROGRESS NOTES
Acute Care - Physical Therapy Treatment Note  [facility]     Patient Name: Tiana Cronin  : 1950  MRN: [id number]  Today's Date: 2017  Onset of Illness/Injury or Date of Surgery Date: 17  Date of Referral to PT: 17  Referring Physician: SUSANA Tucker    Admit Date: 2/3/2017    Visit Dx:    ICD-10-CM ICD-9-CM   1. Impaired functional mobility, balance, gait, and endurance Z74.09 V49.89   2. Non-rheumatic mitral regurgitation I34.0 424.0   3. Mitral valve disorder I05.9 424.0     Patient Active Problem List   Diagnosis   • Mitral valve disorder   • Mitral regurgitation   • Post-Op Asystole               Adult Rehabilitation Note       17 0840 17 0805 17 1255    Rehab Assessment/Intervention    Discipline physical therapist  -PAUL physical therapist  -SJ physical therapist  -PAUL    Document Type therapy note (daily note)  -PAUL therapy note (daily note)  -SJ therapy note (daily note)  -PAUL    Subjective Information no complaints;agree to therapy  -PAUL no complaints;agree to therapy  -SJ no complaints;agree to therapy  -PAUL    Patient Effort, Rehab Treatment excellent  -PAUL excellent  -SJ adequate  -PAUL    Symptoms Noted During/After Treatment   other (see comments)   patient on external pacemaker asystole underlying  -PAUL    Precautions/Limitations fall precautions;pacemaker  -PAUL fall precautions;pacemaker  -SJ     Specific Treatment Considerations external pacemaker with underlying rhythm of asystole  -PAUL external PM with underlying asystole  -SJ     Recorded by [PAUL] Faina Madison, PT [SJ] Patricia Cox PT [PAUL] Faina Madison, PT    Vital Signs    Pre Systolic BP Rehab 106  -PAUL 94  -SJ 97  -PAUL    Pre Treatment Diastolic BP 58  -PAUL 70  -SJ 51  -PAUL    Post Systolic BP Rehab 110  -PAUL  96  -PAUL    Post Treatment Diastolic BP 66  -PAUL  56  -PAUL    Pretreatment Heart Rate (beats/min) 59  -PAUL 59  -SJ 69   externally paced underlying rhythm is asystole  -PAUL    Posttreatment Heart Rate  (beats/min) 59  -PAUL 59  -SJ 69  -PAUL    Pre SpO2 (%) 98  -PAUL 98  -SJ 95  -PAUL    O2 Delivery Pre Treatment supplemental O2  -PAUL supplemental O2  -SJ supplemental O2  -PAUL    Post SpO2 (%) 98  -PAUL 98  -SJ 96  -PAUL    O2 Delivery Post Treatment supplemental O2  -PAUL supplemental O2  -SJ supplemental O2  -PAUL    Pre Patient Position Supine  -PAUL Supine  -SJ Supine  -PAUL    Intra Patient Position Standing  -PAUL Standing  -SJ Supine  -APUL    Post Patient Position Sitting  -PAUL Sitting  -SJ Supine  -APUL    Recorded by [PAUL] Faina Madison, PT [SJ] Patricia Cox PT [PAUL] Faina Madison, PT    Pain Assessment    Pain Assessment No/denies pain  -PAUL No/denies pain  -SJ No/denies pain  -PAUL    Recorded by [PAUL] Faina Madison PT [SJ] Patricia Cox PT [PAUL] Faina Madison, PT    Cognitive Assessment/Intervention    Current Cognitive/Communication Assessment functional  -PAUL functional  -SJ functional  -PAUL    Orientation Status oriented x 4  -PAUL oriented x 4  -SJ oriented x 4  -PAUL    Follows Commands/Answers Questions 100% of the time  -PAUL 100% of the time;able to follow single-step instructions;needs cueing  -% of the time  -PAUL    Personal Safety WNL/WFL  -PAUL mild impairment;decreased awareness, need for assist  -SJ mild impairment;decreased awareness, need for assist;decreased awareness, need for safety;decreased insight to deficits  -PAUL    Personal Safety Interventions fall prevention program maintained;gait belt;nonskid shoes/slippers when out of bed  -PAUL fall prevention program maintained;gait belt;muscle strengthening facilitated;nonskid shoes/slippers when out of bed  -SJ fall prevention program maintained  -PAUL    Recorded by [PAUL] Faina Madison, PT [SJ] Patricia Cox, PT [PAUL] Faina Madison, PT    ROM (Range of Motion)    General ROM   --   left elbow at 90 degrees very little movement  -PAUL    General ROM Detail   left elbow sx very little movement  -PAUL    Recorded by   [PAUL] Faina Madison PT    Bed  Mobility, Assessment/Treatment    Bed Mobility, Assistive Device draw sheet  -PAUL draw sheet  -SJ draw sheet  -PAUL    Bed Mobility, Roll Left, [place] moderate assist (50% patient effort);2 person assist required  -PAUL  moderate assist (50% patient effort)  -PAUL    Bed Mobility, Roll Right, [place] moderate assist (50% patient effort);2 person assist required  -PAUL  moderate assist (50% patient effort)  -PAUL    Bed Mob, Supine to Sit, [place] moderate assist (50% patient effort);2 person assist required  -PAUL maximum assist (25% patient effort);2 person assist required;verbal cues required  -SJ     Bed Mobility, Safety Issues decreased use of arms for pushing/pulling  -PAUL decreased use of arms for pushing/pulling;decreased use of legs for bridging/pushing;impaired trunk control for bed mobility  -SJ     Bed Mobility, Impairments strength decreased  -PAUL strength decreased;impaired balance;coordination impaired  -SJ     Bed Mobility, Comment RN present throughout session  -PAUL RN present throughout session  -SJ deferred OOB due to external pacemaker with patient having asystole as an underlying rhythm  -PAUL    Recorded by [PAUL] Faina Madison, PT [SJ] Patricia Cox PT [PAUL] Faina Madison, PT    Transfer Assessment/Treatment    Transfers, Bed-Chair [place] moderate assist (50% patient effort);2 person assist required  -PAUL moderate assist (50% patient effort);2 person assist required;verbal cues required  -SJ     Transfers, Sit-Stand [place] moderate assist (50% patient effort);2 person assist required  -PAUL moderate assist (50% patient effort);2 person assist required;verbal cues required  -SJ     Transfers, Stand-Sit [place] moderate assist (50% patient effort);2 person assist required  -PAUL moderate assist (50% patient effort);2 person assist required;verbal cues required  -SJ     Transfer, Safety Issues weight-shifting ability decreased;step length decreased  -PAUL step length  decreased;weight-shifting ability decreased  -SJ     Transfer, Impairments  strength decreased;impaired balance;coordination impaired;motor control impaired  -SJ     Transfer, Comment patient able to get hip and trunk extended. able to weight shift and take steps to the chair  -PAUL Pt performed side-stepping for stand pivot t/f to recliner  -SJ deferred OOB  -PAUL    Recorded by [PAUL] Faina Madison PT [SJ] Patricia Cox, PT [PAUL] Faina Madison PT    Gait Assessment/Treatment    Gait, [place] Level  not appropriate to assess  -SJ     Gait, Comment N/A patient still dependent on external pacemaker only OK'd for transfers  -PAUL  deferred  -PAUL    Recorded by [PAUL] Faina Madison PT [SJ] Patricia Cox, PT [PAUL] Faina Madison PT    Balance Skills Training    Sitting-Level of Assistance Close supervision  -PAUL Minimum assistance  -SJ     Sitting-Balance Support Feet supported  -PAUL Left upper extremity supported;Feet supported  -SJ     Sitting-Balance Activities  Trunk control activities  -SJ     Sitting # of Minutes  5  -SJ     Standing-Level of Assistance Moderate assistance;x2  -PAUL Moderate assistance;x2  -SJ     Recorded by [PAUL] Faina Madison PT [SJ] Patricia Cox PT     Therapy Exercises    Bilateral Lower Extremities AROM:;10 reps;sitting;ankle pumps/circles;LAQ;hip flexion  -PAUL AROM:;10 reps;sitting;ankle pumps/circles;hip flexion;LAQ;glut sets  -SJ AAROM:;10 reps;supine;ankle pumps/circles;calf stretch;glut sets;heel slides;hip abduction/adduction;SAQ  -PAUL    Right Upper Extremity   AROM:;10 reps;supine;elbow flexion/extension;hand pumps;shoulder abduction/adduction;shoulder extension/flexion  -PAUL    Left Upper Extremity   AAROM:;10 reps;supine;hand pumps;shoulder abduction/adduction;shoulder extension/flexion  -PAUL    Bilateral Upper Extremity  AAROM:;10 reps;sitting;shoulder extension/flexion  -SJ     Recorded by [PAUL] Faina Madison, PT [SJ] Patricia Cox PT [PAUL] Faina Madison,  PT    Positioning and Restraints    Pre-Treatment Position in bed  -PAUL in bed  -SJ in bed  -PAUL    Post Treatment Position chair  -PAUL chair  -SJ bed  -PAUL    In Bed   notified nsg;supine;call light within reach;encouraged to call for assist;with family/caregiver  -PAUL    In Chair notified nsg;reclined;sitting;call light within reach;exit alarm on;waffle cushion  -PAUL notified nsg;reclined;encouraged to call for assist;call light within reach;heels elevated  -SJ     Recorded by [PAUL] Faina Madison, PT [SJ] Patricia Cox, PT [PAUL] Faina Madison, PT      User Key  (r) = Recorded By, (t) = Taken By, (c) = Cosigned By    Initials Name Effective Dates    PAUL Faina Madison, PT 06/19/15 -     SJ Patricia Cox, PT 06/19/15 -                 IP PT Goals       02/07/17 1336 02/06/17 0943       Bed Mobility PT LTG    Bed Mobility PT LTG, Date Established  02/06/17  -SJ     Bed Mobility PT LTG, Time to Achieve  2 wks  -SJ     Bed Mobility PT LTG, Activity Type  roll left/roll right;supine to sit/sit to supine  -SJ     Bed Mobility PT LTG, [place] Level  contact guard assist  -SJ     Bed Mobility PT Goal  LTG, Assist Device  bed rails  -SJ     Bed Mobility PT LTG, Outcome goal ongoing  -PAUL      Transfer Training PT LTG    Transfer Training PT LTG, Date Established  02/06/17  -SJ     Transfer Training PT LTG, Time to Achieve  2 wks  -SJ     Transfer Training PT LTG, Activity Type  bed to chair /chair to bed;sit to stand/stand to sit  -SJ     Transfer Training PT LTG, [place] Level  contact guard assist  -SJ     Transfer Training PT LTG, Assist Device  walker, rolling  -SJ     Transfer Training PT LTG, Outcome goal ongoing  -PAUL      Gait Training PT LTG    Gait Training Goal PT LTG, Date Established  02/06/17  -SJ     Gait Training Goal PT LTG, Time to Achieve  2 wks  -SJ     Gait Training Goal PT LTG, [place] Level  contact guard assist  -SJ     Gait Training Goal PT LTG, Assist Device  walker, rolling  -SJ      Gait Training Goal PT LTG, Distance to Achieve  200  -SJ     Gait Training Goal PT LTG, Outcome goal ongoing  -PAUL      Static Sitting Balance PT LTG    Static Sitting Balance PT LTG, Date Established  02/06/17  -SJ     Static Sitting Balance PT LTG, Time to Achieve  2 wks  -SJ     Static Sitting Balance PT LTG, [place] Level  conditional independence  -SJ     Static Sitting Balance PT LTG, Outcome goal ongoing  -PAUL        User Key  (r) = Recorded By, (t) = Taken By, (c) = Cosigned By    Initials Name Provider Type    PAUL Madison, PT Physical Therapist    SJ Patricia Cox, PT Physical Therapist          Physical Therapy Education     Title: PT OT SLP Therapies (Active)     Topic: Physical Therapy (Active)     Point: Mobility training (Active)    Learning Progress Summary    Learner Readiness Method Response Comment Documented by Status   Patient Acceptance E NR  PAUL 02/09/17 1101 Active    Acceptance E,D NR   02/08/17 0936 Active    Acceptance E NR discussed HEP and bed exercises patient can do on her own patient needs cues today to perform activity  02/07/17 1335 Active    Acceptance E NR   02/06/17 0940 Active               Point: Home exercise program (Active)    Learning Progress Summary    Learner Readiness Method Response Comment Documented by Status   Patient Acceptance E NR  PAUL 02/09/17 1101 Active    Acceptance E,D NR   02/08/17 0936 Active    Acceptance E NR discussed HEP and bed exercises patient can do on her own patient needs cues today to perform activity PAUL 02/07/17 1335 Active    Acceptance E NR   02/06/17 0940 Active               Point: Body mechanics (Active)    Learning Progress Summary    Learner Readiness Method Response Comment Documented by Status   Patient Acceptance E NR  PAUL 02/09/17 1101 Active    Acceptance E,D NR   02/08/17 0936 Active    Acceptance E NR discussed HEP and bed exercises patient can do on her own patient needs cues today to perform activity PAUL  02/07/17 1335 Active    Acceptance E NR   02/06/17 0940 Active               Point: Precautions (Active)    Learning Progress Summary    Learner Readiness Method Response Comment Documented by Status   Patient Acceptance E NR  PAUL 02/09/17 1101 Active    Acceptance E,D NR   02/08/17 0936 Active    Acceptance E NR discussed HEP and bed exercises patient can do on her own patient needs cues today to perform activity PAUL 02/07/17 1335 Active    Acceptance E NR   02/06/17 0940 Active                      User Key     Initials Effective Dates Name Provider Type Discipline     06/19/15 -  Faina Madison, PT Physical Therapist PT     06/19/15 -  Patricia Cox, PT Physical Therapist PT                    PT Recommendation and Plan  Anticipated Equipment Needs At Discharge: front wheeled walker  Anticipated Discharge Disposition: inpatient rehabilitation facility  Demonstrates Need for Referral to Another Service: occupational therapy  Planned Therapy Interventions: balance training, bed mobility training, gait training, home exercise program, patient/family education, strengthening, transfer training  PT Frequency: daily  Plan of Care Review  Plan Of Care Reviewed With: patient  Progress: progress towards functional goals is fair  Outcome Summary/Follow up Plan: patient is still limited due to being dependent on external pacemaker she should go for permanent pacer today or tomorrow. patient is able to stand and bear weight through LEs and weight shift to take steps to the chair we will continue to work with patient on monitored activity           Outcome Measures       02/09/17 0840 02/08/17 0805 02/07/17 1255    How much help from another person do you currently need...    Turning from your back to your side while in flat bed without using bedrails? 2  -PAUL 2  -SJ 2  -PAUL    Moving from lying on back to sitting on the side of a flat bed without bedrails? 2  -PAUL 2  -SJ 2  -PAUL    Moving to and from a bed to a  chair (including a wheelchair)? 2  -PAUL 2  -SJ 2  -PAUL    Standing up from a chair using your arms (e.g., wheelchair, bedside chair)? 2  -APUL 2  -SJ 2  -PAUL    Climbing 3-5 steps with a railing? 1  -PAUL 1  -SJ 1  -PAUL    To walk in hospital room? 2  -PAUL 1  -SJ 1  -PAUL    AM-PAC 6 Clicks Score 11  -PAUL 10  -SJ 10  -PAUL    Functional Assessment    Outcome Measure Options  AM-PAC 6 Clicks Basic Mobility (PT)  -SJ       User Key  (r) = Recorded By, (t) = Taken By, (c) = Cosigned By    Initials Name Provider Type    PAUL Madison, PT Physical Therapist    ZEYNEP Cox PT Physical Therapist           Time Calculation:         PT Charges       02/09/17 1103          Time Calculation    Start Time 0840  -PAUL      PT Received On 02/09/17  -PAUL      PT Goal Re-Cert Due Date 02/16/17  -PAUL      Time Calculation- PT    Total Timed Code Minutes- PT 25 minute(s)  -PAUL        User Key  (r) = Recorded By, (t) = Taken By, (c) = Cosigned By    Initials Name Provider Type    PAUL Madison, PT Physical Therapist          Therapy Charges for Today     Code Description Service Date Service Provider Modifiers Qty    [id number] HC PT THER PROC EA 15 MIN 2/9/2017 Faina Madison, PT GP 2          PT G-Codes  Outcome Measure Options: AM-PAC 6 Clicks Basic Mobility (PT)    Faina Madison, PT  2/9/2017

## 2017-02-09 NOTE — PROGRESS NOTES
Continued Stay Note  [facility]     Patient Name: Tiana Cronin  MRN: [id number]  Today's Date: 2/9/2017    Admit Date: 2/3/2017          Discharge Plan       02/09/17 1311    Case Management/Social Work Plan    Additional Comments S/P 2H/SICU:  pacemaker today or Friday; Neuro intact-up to chair; decreased appetite/Boost              Discharge Codes     None        Expected Discharge Date and Time     Expected Discharge Date Expected Discharge Time    Feb 14, 2017             STEPHANI Elizalde

## 2017-02-09 NOTE — PLAN OF CARE
Problem: Patient Care Overview (Adult)  Goal: Plan of Care Review  Outcome: Ongoing (interventions implemented as appropriate)    02/09/17 1101   Coping/Psychosocial Response Interventions   Plan Of Care Reviewed With patient   Patient Care Overview   Progress progress towards functional goals is fair   Outcome Evaluation   Outcome Summary/Follow up Plan patient is still limited due to being dependent on external pacemaker she should go for permanent pacer today or tomorrow. patient is able to stand and bear weight through LEs and weight shift to take steps to the chair we will continue to work with patient on monitored activity          Problem: Inpatient Physical Therapy  Goal: Bed Mobility Goal LTG- PT  Outcome: Ongoing (interventions implemented as appropriate)    02/06/17 0943 02/07/17 1336   Bed Mobility PT LTG   Bed Mobility PT LTG, Date Established 02/06/17 --    Bed Mobility PT LTG, Time to Achieve 2 wks --    Bed Mobility PT LTG, Activity Type roll left/roll right;supine to sit/sit to supine --    Bed Mobility PT LTG, [place] Level contact guard assist --    Bed Mobility PT Goal LTG, Assist Device bed rails --    Bed Mobility PT LTG, Outcome --  goal ongoing       Goal: Transfer Training Goal 1 LTG- PT  Outcome: Ongoing (interventions implemented as appropriate)    02/06/17 0943 02/07/17 1336   Transfer Training PT LTG   Transfer Training PT LTG, Date Established 02/06/17 --    Transfer Training PT LTG, Time to Achieve 2 wks --    Transfer Training PT LTG, Activity Type bed to chair /chair to bed;sit to stand/stand to sit --    Transfer Training PT LTG, [place] Level contact guard assist --    Transfer Training PT LTG, Assist Device walker, rolling --    Transfer Training PT LTG, Outcome --  goal ongoing       Goal: Gait Training Goal LTG- PT  Outcome: Ongoing (interventions implemented as appropriate)    02/06/17 0943 02/07/17 1336   Gait Training PT LTG   Gait Training Goal PT LTG, Date  Established 02/06/17 --    Gait Training Goal PT LTG, Time to Achieve 2 wks --    Gait Training Goal PT LTG, [place] Level contact guard assist --    Gait Training Goal PT LTG, Assist Device walker, rolling --    Gait Training Goal PT LTG, Distance to Achieve 200 --    Gait Training Goal PT LTG, Outcome --  goal ongoing       Goal: Static Sitting Balance Goal LTG- PT  Outcome: Ongoing (interventions implemented as appropriate)    02/06/17 0943 02/07/17 1336   Static Sitting Balance PT LTG   Static Sitting Balance PT LTG, Date Established 02/06/17 --    Static Sitting Balance PT LTG, Time to Achieve 2 wks --    Static Sitting Balance PT LTG, [place] Level conditional independence --    Static Sitting Balance PT LTG, Outcome --  goal ongoing

## 2017-02-09 NOTE — PLAN OF CARE
Problem: Patient Care Overview (Adult)  Goal: Plan of Care Review  Outcome: Ongoing (interventions implemented as appropriate)    02/09/17 1820   Coping/Psychosocial Response Interventions   Plan Of Care Reviewed With patient;family   Patient Care Overview   Progress progress towards functional goals is fair   Outcome Evaluation   Outcome Summary/Follow up Plan PT remained on Temporary pacer throughout the day with HR consitently 59-60, but asystole underneath. Plan for PPM 2/10/17. PT upto chair at 0900.       Goal: Adult Individualization and Mutuality  Outcome: Ongoing (interventions implemented as appropriate)    Problem: Cardiac Surgery (Adult)  Goal: Signs and Symptoms of Listed Potential Problems Will be Absent or Manageable (Cardiac Surgery)  Outcome: Ongoing (interventions implemented as appropriate)    02/09/17 1820   Cardiac Surgery   Problems Assessed (Cardiac Surgery) all   Problems Present (Cardiac Surgery) pain;dysrhythmia/arrhythmia;situational response         Problem: Pressure Ulcer Risk (Chris Scale) (Adult,Obstetrics,Pediatric)  Goal: Identify Related Risk Factors and Signs and Symptoms  Outcome: Ongoing (interventions implemented as appropriate)    02/09/17 1820   Pressure Ulcer Risk (Chris Scale)   Related Risk Factors (Pressure Ulcer Risk (Chris Scale)) critical care admission;hospitalization prolonged;medication;mobility impaired       Goal: Skin Integrity  Outcome: Ongoing (interventions implemented as appropriate)

## 2017-02-09 NOTE — PROGRESS NOTES
Intensive Care Follow-up      LOS: 6 days     Ms. Tiana Cronin, 66 y.o. female is followed for: Mitral regurgitation     Subjective - Interval History     Awake and alert.  No bowel movement yet , passing gas  Not eating well  Plans for permanent pacemaker either today or tomorrow  Still has asystole under pacemaker    The patient's relevant past medical, surgical and social history were reviewed and updated in Epic as appropriate.     Objective     Infusions:    dexmedetomidine 0.2-1.5 mcg/kg/hr    DOBUTamine 2-20 mcg/kg/min    DOPamine 2-20 mcg/kg/min    EPINEPHrine 0.02-0.3 mcg/kg/min    niCARdipine 5-15 mg/hr    nitroglycerin 5-200 mcg/min    norepinephrine 0.02-0.3 mcg/kg/min Last Rate: Stopped (02/06/17 0348)   phenylephrine 0.5-3 mcg/kg/min    sodium chloride 30 mL/hr    vasopressin 0.02-0.1 Units/min      Medications:    aspirin 325 mg Oral Daily   atorvastatin 40 mg Oral Nightly   citalopram 20 mg Oral Daily   ethosuximide 500 mg Oral BID   famotidine 20 mg Oral BID   heparin (porcine) 5,000 Units Subcutaneous Q8H   insulin lispro 2-7 Units Subcutaneous 4x Daily AC & at Bedtime   levothyroxine 175 mcg Oral Daily   pharmacy consult - MTM  Does not apply Daily   polyethylene glycol 17 g Oral Daily   primidone 250 mg Oral TID   sennosides-docusate sodium 2 tablet Oral BID     Intake/Output       02/08/17 0700 - 02/09/17 0659    Intake (ml) --    Output (ml) 1620    Net (ml) -1620        Vital Sign Min/Max for last 24 hours  Temp  Min: 98.2 °F (36.8 °C)  Max: 99 °F (37.2 °C)   BP  Min: 91/63  Max: 117/61   Pulse  Min: 59  Max: 60   Resp  Min: 12  Max: 20   SpO2  Min: 94 %  Max: 100 %   Flow (L/min)  Min: 1  Max: 1        Physical Exam:   GENERAL: Awake, no distress   HEENT: Right IJ site okay.  No adenopathy   LUNGS: Decreased breath sounds without wheezes or crackles   HEART: Regular rate and rhythm.  Paced.  Sternal incision without drainage or dehiscence   ABDOMEN: Soft.  Nontender.  Bowel sounds  present   EXTREMITIES: Trace pitting edema.  No cyanosis   NEURO/PSYCH: Awake and alert.  Follows commands.  No deficits      Results from last 7 days  Lab Units 02/09/17  0355 02/08/17  0330 02/07/17  0346   WBC 10*3/mm3 6.78 6.05 7.11   HEMOGLOBIN g/dL 8.6* 8.5* 8.3*   PLATELETS 10*3/mm3 135* 112* 113*       Results from last 7 days  Lab Units 02/09/17  0355 02/08/17  0330 02/07/17  0346  02/05/17  0334 02/04/17  0417  02/03/17  1916   SODIUM mmol/L 135 133 130*  < > 133 139  --  145   POTASSIUM mmol/L 4.2 4.1 4.4  < > 4.6 4.2  < > 3.8   TOTAL CO2 mmol/L 30.0 27.0 25.0  < > 25.0 23.0  --  22.0   BUN mg/dL 20 20 16  < > 15 17  --  17   CREATININE mg/dL 0.60 0.70 0.60  < > 0.70 0.90  --  0.80   MAGNESIUM mg/dL  --   --  2.1  --  2.2 2.4  --  2.5   PHOSPHORUS mg/dL  --   --  2.6  --   --  2.7  --  2.1*   GLUCOSE mg/dL 98 99 87  < > 141* 118*  --  125*   < > = values in this interval not displayed.  Estimated Creatinine Clearance: 65.6 mL/min (by C-G formula based on Cr of 0.6).      Results from last 7 days  Lab Units 02/02/17  1339   HEMOGLOBIN A1C % 5.10         Results from last 7 days  Lab Units 02/04/17  0403   PH, ARTERIAL pH units 7.340*   PCO2, ARTERIAL mm Hg 45.5*   PO2 ART mm Hg 72.2*     No results found for: LACTATE       Images: Chest x-ray reveals low lung volumes and some minimal basilar atelectasis but no consolidation or effusion area postoperative changes noted    I reviewed the patient's results and images.     Impression      Hospital Problem List     * (Principal)Mitral regurgitation    Post-Op Asystole               Plan        Discontinue introducer  Discontinue Cotton catheter  Continue to get up to the chair  Pacemaker today or tomorrow     Plan of care and goals reviewed with mulitdisciplinary team at daily rounds   I discussed the patient's findings and my recommendations with patient and nursing staff       ASHVIN Del Valle MD  Pulmonary and Critical Care Medicine  02/09/17 10:52 AM      Please note that portions of this note were completed with a voice recognition program. Efforts were made to edit the dictations, but occasionally words are mistranscribed.

## 2017-02-09 NOTE — PROGRESS NOTES
Adult Nutrition  Assessment/PES    Patient Name:  Tiana Cronin  YOB: 1950  MRN: [id number]  Admit Date:  2/3/2017    Assessment Date:  2/9/2017        Reason for Assessment       02/09/17 1021    Reason for Assessment    Reason For Assessment/Visit follow up protocol;multidisciplinary rounds    Time Spent (min) 20    Diagnosis Diagnosis   per previous nutrition note.              Nutrition/Diet History       02/09/17 1021    Nutrition/Diet History    Reported/Observed By Patient    Appetite Poor at this Time    Food Habit/Preferences Uses Supplements    Other still with poor PO intake, drinking most of supplement.  No BM since admission, started on miralax, ducolac, senokot.               Labs/Tests/Procedures/Meds       02/09/17 1023    Labs/Tests/Procedures/Meds    Labs/Tests Review Reviewed    Medication Review Reviewed, pertinent;Laxative                Nutrition Prescription Ordered       02/09/17 1023    Nutrition Prescription PO    Current PO Diet Regular    Supplement Boost HP    Supplement Frequency 3 times a day    Common Modifiers Cardiac;Consistent Carbohydrate            Evaluation of Received Nutrient/Fluid Intake       02/09/17 1024    PO Evaluation    Number of Days PO Intake Evaluated 1 day    Number of Meals 3    % PO Intake 17%   drinking 75%- 100% of boost HP with all meals.               Problem/Interventions:        Problem 1       02/09/17 1025    Nutrition Diagnoses Problem 1    Problem 1 Inadequate Nutrient Intake    Etiology (related to) Factors Affecting Nutrition    Appetite Poor at this Time    Signs/Symptoms (evidenced by) PO Intake    Percent (%) intake recorded 17 %    Over number of meals 3                    Intervention Goal       02/09/17 1025    Intervention Goal    General Nutrition support treatment    PO Increase intake            Nutrition Intervention       02/09/17 1025    Nutrition Intervention    RD/Tech Action Follow Tx progress;Menu provided;Menu  adjusted;Encourage intake;Supplement provided              Education/Evaluation       02/09/17 1026    Monitor/Evaluation    Monitor Per protocol;PO intake;Pertinent labs;Supplement intake        Comments:      Electronically signed by:  Michelle Pedersen RD  02/09/17 10:26 AM

## 2017-02-09 NOTE — PROGRESS NOTES
"[facility] Daily Note       LOS: 6 days   Patient Care Team:  Rangel Lopez MD as PCP - General (Family Medicine)  Rangel Lopez MD as Consulting Physician (Family Medicine)    Chief Complaint:  Asystole following MVR    Subjective: Patient reports soreness at sternotomy site. She reports her pain is \"tolerable\".  No event noted overnight.  Drains patent.  Temporary pacemaker in place at 60 bpm. Pacer turned off briefly and underlying rhythm was still systole.  Still difficult to breathe as her chest tubes remain in place.  She is currently up in a chair eating breakfast.    Review of Systems:   As above.    Medications:    aspirin 325 mg Oral Daily   atorvastatin 40 mg Oral Nightly   citalopram 20 mg Oral Daily   ethosuximide 500 mg Oral BID   famotidine 20 mg Oral BID   heparin (porcine) 5,000 Units Subcutaneous Q8H   insulin lispro 2-7 Units Subcutaneous 4x Daily AC & at Bedtime   levothyroxine 175 mcg Oral Daily   pharmacy consult - MTM  Does not apply Daily   polyethylene glycol 17 g Oral Daily   primidone 250 mg Oral TID   sennosides-docusate sodium 2 tablet Oral BID       Vital Sign Min/Max for last 24 hours  Temp  Min: 98.2 °F (36.8 °C)  Max: 99 °F (37.2 °C)   BP  Min: 91/63  Max: 117/61   Pulse  Min: 59  Max: 60   Resp  Min: 12  Max: 20   SpO2  Min: 94 %  Max: 100 %   Flow (L/min)  Min: 1  Max: 1   No Data Recorded      Intake/Output Summary (Last 24 hours) at 02/09/17 0849  Last data filed at 02/09/17 0600   Gross per 24 hour   Intake      0 ml   Output   1190 ml   Net  -1190 ml        Flowsheet Rows         First Filed Value    Admission Height  66\" (167.6 cm) Documented at 02/03/2017 0621    Admission Weight  148 lb 13 oz (67.5 kg) Documented at 02/03/2017 0621          Physical Exam:    Cardiovascular: Heart has a nondisplaced focal PMI. Regular rate and rhythm without murmur, gallop or rub.  Lungs: Clear without rales or wheezes. Equal expansion is noted.   Abdomen: Soft, " nontender.  Extremities: Show 1+ edema.   Skin: warm and dry.     Results Review:    I reviewed the patient's new clinical results.  EKG:  Tele: paced.  No intrinsic heart rate at 30 bpm as of 4 AM this morning.  P waves only were noted on telemetry without ventricular pacing.  Labs:      Results from last 7 days  Lab Units 02/09/17 0355 02/08/17  0330 02/07/17  0346   SODIUM mmol/L 135 133 130*   POTASSIUM mmol/L 4.2 4.1 4.4   CHLORIDE mmol/L 98* 98* 101   TOTAL CO2 mmol/L 30.0 27.0 25.0   BUN mg/dL 20 20 16   CREATININE mg/dL 0.60 0.70 0.60   CALCIUM mg/dL 9.1 8.7 8.7   BILIRUBIN mg/dL 0.3 0.3 0.4   ALK PHOS U/L 82 75 68   ALT (SGPT) U/L 28 20 18   AST (SGOT) U/L 42* 35* 38*   GLUCOSE mg/dL 98 99 87       Results from last 7 days  Lab Units 02/09/17 0355 02/08/17 0330 02/07/17  0346   WBC 10*3/mm3 6.78 6.05 7.11   HEMOGLOBIN g/dL 8.6* 8.5* 8.3*   HEMATOCRIT % 26.3* 25.9* 25.5*   PLATELETS 10*3/mm3 135* 112* 113*     No results found for: TROPONINI, TROPONINT  No results found for: CHOL  No results found for: TRIG  No results found for: HDL  No results found for: LDLCALC  Lab Results   Component Value Date    INR 1.10 02/04/2017    INR 1.39 02/03/2017    INR 1.04 02/02/2017    PROTIME 12.0 (H) 02/04/2017    PROTIME 15.2 (H) 02/03/2017    PROTIME 11.3 02/02/2017         Ejection Fraction: 65-70%      Assessment:    1. Asystole under temporary pacing wire:      2. 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 CAD  -s/p CABG x 1 2/3/2017       Plan:    Discussed with Dr. Gaona.  A left sided permanent pacemaker is planned for tomorrow.      MCKENZIE Gusman  02/09/17  8:49 AM      I, Abigail Bee MD, have reviewed the note in full and agree with all aspects of the above including physical exam, assessment, labs and plan with changes made accordingly.    Abigail Bee MD, FACC

## 2017-02-09 NOTE — PROGRESS NOTES
Cardiothoracic Surgery Progress Note      POD # 6 s/p MVR       LOS: 6 days      Subjective:  Remains in good spirits  No complaints    Objective:  Vital Signs  Temp:  [98.2 °F (36.8 °C)-99 °F (37.2 °C)] 99 °F (37.2 °C)  Heart Rate:  [59-60] 60  Resp:  [12-20] 18  BP: ()/(52-72) 91/63    Physical Exam:   Gen: Sitting in bed comfortably   Lungs: clear to auscultation, respirations regular, respirations even and respirations unlabored   Heart: regular rhythm & normal rate, normal S1, S2, no murmur, no tammy, no rub and no click   Skin: Incision c/d/i     Results:    Results from last 7 days  Lab Units 02/09/17  0355   WBC 10*3/mm3 6.78   HEMOGLOBIN g/dL 8.6*   HEMATOCRIT % 26.3*   PLATELETS 10*3/mm3 135*       Results from last 7 days  Lab Units 02/09/17  0355   SODIUM mmol/L 135   POTASSIUM mmol/L 4.2   CHLORIDE mmol/L 98*   TOTAL CO2 mmol/L 30.0   BUN mg/dL 20   CREATININE mg/dL 0.60   GLUCOSE mg/dL 98   CALCIUM mg/dL 9.1         Assessment:  POD # 6 s/p MVR  Still asystole underlying  Will need pacer  CTs 370cc last 24 hours    Plan:  Pacer as per cardiology  Will remove wires and tubes tomorrow after PPM placed  Pulmonary toilet

## 2017-02-10 ENCOUNTER — APPOINTMENT (OUTPATIENT)
Dept: GENERAL RADIOLOGY | Facility: HOSPITAL | Age: 67
End: 2017-02-10

## 2017-02-10 PROBLEM — I25.10 CAD (CORONARY ARTERY DISEASE): Status: ACTIVE | Noted: 2017-02-10

## 2017-02-10 LAB
ANION GAP SERPL CALCULATED.3IONS-SCNC: 8 MMOL/L (ref 3–11)
APTT PPP: 27.1 SECONDS (ref 24–31)
BASOPHILS # BLD AUTO: 0.02 10*3/MM3 (ref 0–0.2)
BASOPHILS NFR BLD AUTO: 0.3 % (ref 0–1)
BUN BLD-MCNC: 18 MG/DL (ref 9–23)
BUN/CREAT SERPL: 30 (ref 7–25)
CALCIUM SPEC-SCNC: 9.3 MG/DL (ref 8.7–10.4)
CHLORIDE SERPL-SCNC: 99 MMOL/L (ref 99–109)
CO2 SERPL-SCNC: 29 MMOL/L (ref 20–31)
CREAT BLD-MCNC: 0.6 MG/DL (ref 0.6–1.3)
DEPRECATED RDW RBC AUTO: 54 FL (ref 37–54)
EOSINOPHIL # BLD AUTO: 0.28 10*3/MM3 (ref 0.1–0.3)
EOSINOPHIL NFR BLD AUTO: 4 % (ref 0–3)
ERYTHROCYTE [DISTWIDTH] IN BLOOD BY AUTOMATED COUNT: 15.4 % (ref 11.3–14.5)
GFR SERPL CREATININE-BSD FRML MDRD: 100 ML/MIN/1.73
GLUCOSE BLD-MCNC: 92 MG/DL (ref 70–100)
GLUCOSE BLDC GLUCOMTR-MCNC: 101 MG/DL (ref 70–130)
GLUCOSE BLDC GLUCOMTR-MCNC: 77 MG/DL (ref 70–130)
GLUCOSE BLDC GLUCOMTR-MCNC: 95 MG/DL (ref 70–130)
HCT VFR BLD AUTO: 27.6 % (ref 34.5–44)
HGB BLD-MCNC: 9.1 G/DL (ref 11.5–15.5)
IMM GRANULOCYTES # BLD: 0.03 10*3/MM3 (ref 0–0.03)
IMM GRANULOCYTES NFR BLD: 0.4 % (ref 0–0.6)
INR PPP: 0.97
LYMPHOCYTES # BLD AUTO: 1.17 10*3/MM3 (ref 0.6–4.8)
LYMPHOCYTES NFR BLD AUTO: 16.9 % (ref 24–44)
MCH RBC QN AUTO: 31.8 PG (ref 27–31)
MCHC RBC AUTO-ENTMCNC: 33 G/DL (ref 32–36)
MCV RBC AUTO: 96.5 FL (ref 80–99)
MONOCYTES # BLD AUTO: 0.5 10*3/MM3 (ref 0–1)
MONOCYTES NFR BLD AUTO: 7.2 % (ref 0–12)
NEUTROPHILS # BLD AUTO: 4.93 10*3/MM3 (ref 1.5–8.3)
NEUTROPHILS NFR BLD AUTO: 71.2 % (ref 41–71)
PLATELET # BLD AUTO: 178 10*3/MM3 (ref 150–450)
PMV BLD AUTO: 9.2 FL (ref 6–12)
POTASSIUM BLD-SCNC: 4.4 MMOL/L (ref 3.5–5.5)
PROTHROMBIN TIME: 10.6 SECONDS (ref 9.6–11.5)
RBC # BLD AUTO: 2.86 10*6/MM3 (ref 3.89–5.14)
SODIUM BLD-SCNC: 136 MMOL/L (ref 132–146)
WBC NRBC COR # BLD: 6.93 10*3/MM3 (ref 3.5–10.8)

## 2017-02-10 PROCEDURE — 25010000002 MIDAZOLAM PER 1 MG: Performed by: INTERNAL MEDICINE

## 2017-02-10 PROCEDURE — 99152 MOD SED SAME PHYS/QHP 5/>YRS: CPT | Performed by: INTERNAL MEDICINE

## 2017-02-10 PROCEDURE — 0JH606Z INSERTION OF PACEMAKER, DUAL CHAMBER INTO CHEST SUBCUTANEOUS TISSUE AND FASCIA, OPEN APPROACH: ICD-10-PCS | Performed by: INTERNAL MEDICINE

## 2017-02-10 PROCEDURE — 02H63JZ INSERTION OF PACEMAKER LEAD INTO RIGHT ATRIUM, PERCUTANEOUS APPROACH: ICD-10-PCS | Performed by: INTERNAL MEDICINE

## 2017-02-10 PROCEDURE — 25010000002 FENTANYL CITRATE (PF) 100 MCG/2ML SOLUTION: Performed by: INTERNAL MEDICINE

## 2017-02-10 PROCEDURE — 71010 HC CHEST PA OR AP: CPT

## 2017-02-10 PROCEDURE — C1898 LEAD, PMKR, OTHER THAN TRANS: HCPCS | Performed by: INTERNAL MEDICINE

## 2017-02-10 PROCEDURE — 25010000002 HEPARIN (PORCINE) PER 1000 UNITS: Performed by: INTERNAL MEDICINE

## 2017-02-10 PROCEDURE — C1892 INTRO/SHEATH,FIXED,PEEL-AWAY: HCPCS | Performed by: INTERNAL MEDICINE

## 2017-02-10 PROCEDURE — 85610 PROTHROMBIN TIME: CPT | Performed by: INTERNAL MEDICINE

## 2017-02-10 PROCEDURE — 85730 THROMBOPLASTIN TIME PARTIAL: CPT | Performed by: INTERNAL MEDICINE

## 2017-02-10 PROCEDURE — 99233 SBSQ HOSP IP/OBS HIGH 50: CPT | Performed by: INTERNAL MEDICINE

## 2017-02-10 PROCEDURE — 33208 INSRT HEART PM ATRIAL & VENT: CPT | Performed by: INTERNAL MEDICINE

## 2017-02-10 PROCEDURE — A4565 SLINGS: HCPCS | Performed by: INTERNAL MEDICINE

## 2017-02-10 PROCEDURE — 25010000002 ONDANSETRON PER 1 MG: Performed by: INTERNAL MEDICINE

## 2017-02-10 PROCEDURE — 85025 COMPLETE CBC W/AUTO DIFF WBC: CPT | Performed by: INTERNAL MEDICINE

## 2017-02-10 PROCEDURE — 0 IOPAMIDOL PER 1 ML: Performed by: INTERNAL MEDICINE

## 2017-02-10 PROCEDURE — 80048 BASIC METABOLIC PNL TOTAL CA: CPT | Performed by: INTERNAL MEDICINE

## 2017-02-10 PROCEDURE — C1785 PMKR, DUAL, RATE-RESP: HCPCS | Performed by: INTERNAL MEDICINE

## 2017-02-10 PROCEDURE — 02HK3JZ INSERTION OF PACEMAKER LEAD INTO RIGHT VENTRICLE, PERCUTANEOUS APPROACH: ICD-10-PCS | Performed by: INTERNAL MEDICINE

## 2017-02-10 PROCEDURE — 25010000002 VANCOMYCIN HCL IN NACL 1.25-0.9 GM/250ML-% SOLUTION: Performed by: INTERNAL MEDICINE

## 2017-02-10 PROCEDURE — 82962 GLUCOSE BLOOD TEST: CPT

## 2017-02-10 DEVICE — PACEMAKER
Type: IMPLANTABLE DEVICE | Site: CHEST | Status: FUNCTIONAL
Brand: ESSENTIO™ MRI DR

## 2017-02-10 DEVICE — STEROX BIPOLAR IS-1 ATRIAL/VENTRICULAR
Type: IMPLANTABLE DEVICE | Status: FUNCTIONAL
Brand: FINELINE® II EZ STEROX

## 2017-02-10 DEVICE — IMPLANTABLE DEVICE: Type: IMPLANTABLE DEVICE | Site: HEART | Status: FUNCTIONAL

## 2017-02-10 RX ORDER — ONDANSETRON 2 MG/ML
4 INJECTION INTRAMUSCULAR; INTRAVENOUS EVERY 6 HOURS PRN
Status: DISCONTINUED | OUTPATIENT
Start: 2017-02-10 | End: 2017-02-14

## 2017-02-10 RX ORDER — LIDOCAINE HYDROCHLORIDE 10 MG/ML
INJECTION, SOLUTION INFILTRATION; PERINEURAL AS NEEDED
Status: DISCONTINUED | OUTPATIENT
Start: 2017-02-10 | End: 2017-02-10 | Stop reason: HOSPADM

## 2017-02-10 RX ORDER — OXYCODONE HYDROCHLORIDE AND ACETAMINOPHEN 5; 325 MG/1; MG/1
1 TABLET ORAL EVERY 4 HOURS PRN
Status: DISCONTINUED | OUTPATIENT
Start: 2017-02-10 | End: 2017-02-14 | Stop reason: HOSPADM

## 2017-02-10 RX ORDER — FENTANYL CITRATE 50 UG/ML
INJECTION, SOLUTION INTRAMUSCULAR; INTRAVENOUS AS NEEDED
Status: DISCONTINUED | OUTPATIENT
Start: 2017-02-10 | End: 2017-02-10 | Stop reason: HOSPADM

## 2017-02-10 RX ORDER — MIDAZOLAM HYDROCHLORIDE 1 MG/ML
INJECTION INTRAMUSCULAR; INTRAVENOUS AS NEEDED
Status: DISCONTINUED | OUTPATIENT
Start: 2017-02-10 | End: 2017-02-10 | Stop reason: HOSPADM

## 2017-02-10 RX ORDER — ONDANSETRON 2 MG/ML
INJECTION INTRAMUSCULAR; INTRAVENOUS AS NEEDED
Status: DISCONTINUED | OUTPATIENT
Start: 2017-02-10 | End: 2017-02-10 | Stop reason: HOSPADM

## 2017-02-10 RX ORDER — VANCOMYCIN HYDROCHLORIDE 1 G/200ML
15 INJECTION, SOLUTION INTRAVENOUS ONCE
Status: COMPLETED | OUTPATIENT
Start: 2017-02-11 | End: 2017-02-11

## 2017-02-10 RX ORDER — BUPIVACAINE HYDROCHLORIDE 5 MG/ML
INJECTION, SOLUTION EPIDURAL; INTRACAUDAL AS NEEDED
Status: DISCONTINUED | OUTPATIENT
Start: 2017-02-10 | End: 2017-02-10 | Stop reason: HOSPADM

## 2017-02-10 RX ORDER — SODIUM CHLORIDE 0.9 % (FLUSH) 0.9 %
1-10 SYRINGE (ML) INJECTION AS NEEDED
Status: DISCONTINUED | OUTPATIENT
Start: 2017-02-10 | End: 2017-02-10

## 2017-02-10 RX ORDER — VANCOMYCIN HYDROCHLORIDE 1 G/200ML
15 INJECTION, SOLUTION INTRAVENOUS ONCE
Status: DISCONTINUED | OUTPATIENT
Start: 2017-02-10 | End: 2017-02-10 | Stop reason: SDUPTHER

## 2017-02-10 RX ORDER — VANCOMYCIN HYDROCHLORIDE
20 ONCE
Status: COMPLETED | OUTPATIENT
Start: 2017-02-10 | End: 2017-02-10

## 2017-02-10 RX ORDER — ACETAMINOPHEN 325 MG/1
650 TABLET ORAL EVERY 4 HOURS PRN
Status: DISCONTINUED | OUTPATIENT
Start: 2017-02-10 | End: 2017-02-12 | Stop reason: SDUPTHER

## 2017-02-10 RX ORDER — SODIUM CHLORIDE 9 MG/ML
INJECTION, SOLUTION INTRAVENOUS CONTINUOUS PRN
Status: DISCONTINUED | OUTPATIENT
Start: 2017-02-10 | End: 2017-02-10 | Stop reason: HOSPADM

## 2017-02-10 RX ADMIN — PRIMIDONE 250 MG: 250 TABLET ORAL at 16:05

## 2017-02-10 RX ADMIN — OXYCODONE AND ACETAMINOPHEN 1 TABLET: 5; 325 TABLET ORAL at 20:44

## 2017-02-10 RX ADMIN — DOCUSATE SODIUM AND SENNOSIDES 2 TABLET: 8.6; 5 TABLET, FILM COATED ORAL at 17:13

## 2017-02-10 RX ADMIN — OXYCODONE AND ACETAMINOPHEN 1 TABLET: 5; 325 TABLET ORAL at 18:41

## 2017-02-10 RX ADMIN — HYDROCODONE BITARTRATE AND ACETAMINOPHEN 1 TABLET: 7.5; 325 TABLET ORAL at 13:07

## 2017-02-10 RX ADMIN — ETHOSUXIMIDE 500 MG: 250 CAPSULE ORAL at 17:13

## 2017-02-10 RX ADMIN — PRIMIDONE 250 MG: 250 TABLET ORAL at 20:44

## 2017-02-10 RX ADMIN — VANCOMYCIN HYDROCHLORIDE 1250 MG: 1 INJECTION, POWDER, LYOPHILIZED, FOR SOLUTION INTRAVENOUS at 11:24

## 2017-02-10 RX ADMIN — ATORVASTATIN CALCIUM 40 MG: 40 TABLET, FILM COATED ORAL at 20:43

## 2017-02-10 RX ADMIN — FAMOTIDINE 20 MG: 20 TABLET ORAL at 20:43

## 2017-02-10 NOTE — PLAN OF CARE
Problem: Patient Care Overview (Adult)  Goal: Plan of Care Review  Outcome: Ongoing (interventions implemented as appropriate)    02/10/17 0348   Coping/Psychosocial Response Interventions   Plan Of Care Reviewed With patient;sibling   Patient Care Overview   Progress progress toward functional goals as expected       Goal: Adult Individualization and Mutuality  Outcome: Ongoing (interventions implemented as appropriate)    02/10/17 0348   Mutuality/Individual Preferences   What Anxieties, Fears or Concerns Do You Have About Your Health or Care? patient is concerned about scheduled permanent pacemaker placement 2/10/17   What Questions Do You Have About Your Health or Care? patient asking questions about procedure   What Information Would Help Us Give You More Personalized Care? Family kept informed   Individualization   Patient Specific Preferences Patient is concerned about prolonged ICU stay   Patient Specific Goals Patient is hopeful to get out of ICU after procedure 2/10/17   Patient Specific Interventions All patients questions are answered about upcoming procedure         Problem: Cardiac Surgery (Adult)  Goal: Signs and Symptoms of Listed Potential Problems Will be Absent or Manageable (Cardiac Surgery)  Outcome: Ongoing (interventions implemented as appropriate)    02/10/17 0348   Cardiac Surgery   Problems Assessed (Cardiac Surgery) all   Problems Present (Cardiac Surgery) dysrhythmia/arrhythmia;other (see comments)  (permanent pacemaker to be placed 2/10/17)         Problem: Pressure Ulcer Risk (Chris Scale) (Adult,Obstetrics,Pediatric)  Goal: Identify Related Risk Factors and Signs and Symptoms  Outcome: Ongoing (interventions implemented as appropriate)    02/10/17 0348   Pressure Ulcer Risk (Chris Scale)   Related Risk Factors (Pressure Ulcer Risk (Chris Scale)) critical care admission;length of surgery;tissue perfusion altered       Goal: Skin Integrity  Outcome: Ongoing (interventions implemented  as appropriate)    02/10/17 0143   Pressure Ulcer Risk (Chris Scale) (Adult,Obstetrics,Pediatric)   Skin Integrity making progress toward outcome

## 2017-02-10 NOTE — PLAN OF CARE
Problem: Patient Care Overview (Adult)  Goal: Plan of Care Review  Outcome: Ongoing (interventions implemented as appropriate)    Problem: Cardiac Surgery (Adult)  Goal: Signs and Symptoms of Listed Potential Problems Will be Absent or Manageable (Cardiac Surgery)  Outcome: Ongoing (interventions implemented as appropriate)    02/10/17 1658   Cardiac Surgery   Problems Assessed (Cardiac Surgery) all   Problems Present (Cardiac Surgery) pain  (patient pain r/t pacemaker placement.)         Problem: Pressure Ulcer Risk (Chris Scale) (Adult,Obstetrics,Pediatric)  Goal: Identify Related Risk Factors and Signs and Symptoms  Outcome: Ongoing (interventions implemented as appropriate)    02/10/17 1658   Pressure Ulcer Risk (Chris Scale)   Related Risk Factors (Pressure Ulcer Risk (Chris Scale)) critical care admission;hospitalization prolonged;mobility impaired       Goal: Skin Integrity  Outcome: Ongoing (interventions implemented as appropriate)    02/10/17 1658   Pressure Ulcer Risk (Chris Scale) (Adult,Obstetrics,Pediatric)   Skin Integrity making progress toward outcome

## 2017-02-10 NOTE — PROGRESS NOTES
Cardiothoracic Surgery Progress Note      POD # 7 s/p MVR       LOS: 7 days      Subjective:  Remains in good spirits  A little weak    Objective:  Vital Signs  Temp:  [98.3 °F (36.8 °C)-98.9 °F (37.2 °C)] 98.8 °F (37.1 °C)  Heart Rate:  [59-60] 59  Resp:  [10-24] 10  BP: ()/(52-81) 102/63    Physical Exam:   Gen:Sitting up in chair.  Awake and alert.     Lungs: clear to auscultation, respirations regular, respirations even and respirations unlabored   Heart: regular rhythm & normal rate, normal S1, S2, no murmur, no tammy, no rub and no click   Skin: Incision c/d/i     Results:    Results from last 7 days  Lab Units 02/09/17  0355   WBC 10*3/mm3 6.78   HEMOGLOBIN g/dL 8.6*   HEMATOCRIT % 26.3*   PLATELETS 10*3/mm3 135*       Results from last 7 days  Lab Units 02/09/17  0355   SODIUM mmol/L 135   POTASSIUM mmol/L 4.2   CHLORIDE mmol/L 98*   TOTAL CO2 mmol/L 30.0   BUN mg/dL 20   CREATININE mg/dL 0.60   GLUCOSE mg/dL 98   CALCIUM mg/dL 9.1         Assessment:  POD # 7 s/p MVR  Still asystole under pacer    Plan:  Permanent Pacer today  Chest tubes and wires out tomorrow in am, then to floor  PT/OT    SUSANA Ruiz  02/10/17  6:52 AM

## 2017-02-10 NOTE — PROGRESS NOTES
Intensive Care Follow-up      LOS: 7 days     Ms. Tiana Cronin, 66 y.o. female is followed for: Mitral regurgitation     Subjective - Interval History     Awake and alert.  No problems overnight  Plans for permanent pacemaker placement today    The patient's relevant past medical, surgical and social history were reviewed and updated in Epic as appropriate.     Objective     Infusions:    niCARdipine 5-15 mg/hr    nitroglycerin 5-200 mcg/min    norepinephrine 0.02-0.3 mcg/kg/min Last Rate: Stopped (02/06/17 0348)   phenylephrine 0.5-3 mcg/kg/min      Medications:    aspirin 325 mg Oral Daily   atorvastatin 40 mg Oral Nightly   citalopram 20 mg Oral Daily   ethosuximide 500 mg Oral BID   famotidine 20 mg Oral BID   insulin lispro 2-7 Units Subcutaneous 4x Daily AC & at Bedtime   levothyroxine 175 mcg Oral Daily   pharmacy consult - MTM  Does not apply Daily   polyethylene glycol 17 g Oral Daily   primidone 250 mg Oral TID   sennosides-docusate sodium 2 tablet Oral BID   vancomycin 15 mg/kg Intravenous Once   vancomycin 20 mg/kg Intravenous Once     Intake/Output       02/09/17 0700 - 02/10/17 0659 02/10/17 0700 - 02/11/17 0659    Intake (ml) 720 --    Output (ml) 1290 210    Net (ml) -570 -210        Vital Sign Min/Max for last 24 hours  Temp  Min: 98.3 °F (36.8 °C)  Max: 98.9 °F (37.2 °C)   BP  Min: 94/50  Max: 118/52   Pulse  Min: 59  Max: 60   Resp  Min: 10  Max: 24   SpO2  Min: 92 %  Max: 99 %   Flow (L/min)  Min: 1  Max: 1        Physical Exam:   GENERAL: Awake, no distress   HEENT: No adenopathy.  Right IJ site okay   LUNGS: No wheezes or crackles   HEART: Regular rate and rhythm.  Sternal incision without drainage or dehiscence.  Paced   ABDOMEN: Soft.  Bowel sounds present.  Nontender   EXTREMITIES: Palpable pulses.  No cyanosis and trace edema   NEURO/PSYCH: Awake and alert and follows commands and no deficit      Results from last 7 days  Lab Units 02/10/17  0755 02/09/17  0355 02/08/17  0330   WBC  10*3/mm3 6.93 6.78 6.05   HEMOGLOBIN g/dL 9.1* 8.6* 8.5*   PLATELETS 10*3/mm3 178 135* 112*       Results from last 7 days  Lab Units 02/10/17  0755 02/09/17  0355 02/08/17  0330 02/07/17  0346  02/05/17  0334 02/04/17  0417  02/03/17  1916   SODIUM mmol/L 136 135 133 130*  < > 133 139  --  145   POTASSIUM mmol/L 4.4 4.2 4.1 4.4  < > 4.6 4.2  < > 3.8   TOTAL CO2 mmol/L 29.0 30.0 27.0 25.0  < > 25.0 23.0  --  22.0   BUN mg/dL 18 20 20 16  < > 15 17  --  17   CREATININE mg/dL 0.60 0.60 0.70 0.60  < > 0.70 0.90  --  0.80   MAGNESIUM mg/dL  --   --   --  2.1  --  2.2 2.4  --  2.5   PHOSPHORUS mg/dL  --   --   --  2.6  --   --  2.7  --  2.1*   GLUCOSE mg/dL 92 98 99 87  < > 141* 118*  --  125*   < > = values in this interval not displayed.  Estimated Creatinine Clearance: 65.6 mL/min (by C-G formula based on Cr of 0.6).            Results from last 7 days  Lab Units 02/04/17  0403   PH, ARTERIAL pH units 7.340*   PCO2, ARTERIAL mm Hg 45.5*   PO2 ART mm Hg 72.2*     No results found for: LACTATE       Images: Postoperative changes without consolidation or effusions    I reviewed the patient's results and images.     Impression      Hospital Problem List     * (Principal)Severe mitral regurgitation (S/P 31 Magna Ease mitral tissue valve)    CAD (S/P CABG x 1)    Post-Op Asystole               Plan        Permanent pacemaker today  Discontinue Accu-Cheks  Push physical therapy  We'll probably be ready for discharge home once physical therapy goals met     Plan of care and goals reviewed with mulitdisciplinary team at daily rounds   I discussed the patient's findings and my recommendations with patient and nursing staff       ASHVIN Del Valle MD  Pulmonary and Critical Care Medicine  02/10/17 10:56 AM     Please note that portions of this note were completed with a voice recognition program. Efforts were made to edit the dictations, but occasionally words are mistranscribed.

## 2017-02-10 NOTE — PROGRESS NOTES
[facility]  Cardiovascular Progress Note  Tiana Cronni  S253/1  [id number]  [unfilled]  1950    DATE OF ADMISSION: 2/3/2017  DATE OF FOLLOW UP:  2/10/17    Rangel Lopez MD    Subjective:     Patient ID: Tiana Cronin is a 66 y.o. female.    Chief Complaint: s/p MVR f/u     No Known Allergies    Current Facility-Administered Medications:   •  acetaminophen (TYLENOL) tablet 650 mg, 650 mg, Oral, Q4H PRN, Jeromy Gaona MD, 650 mg at 02/04/17 0231  •  aspirin EC tablet 325 mg, 325 mg, Oral, Daily, SUSANA Arciniega, 325 mg at 02/09/17 0825  •  atorvastatin (LIPITOR) tablet 40 mg, 40 mg, Oral, Nightly, SUSANA Arciniega, 40 mg at 02/09/17 2148  •  bisacodyl (DULCOLAX) EC tablet 10 mg, 10 mg, Oral, Daily PRN, SUSANA Arciniega  •  bisacodyl (DULCOLAX) suppository 10 mg, 10 mg, Rectal, Daily PRN, SUSANA Arciniega  •  citalopram (CeleXA) tablet 20 mg, 20 mg, Oral, Daily, Kelly Hoff MD, 20 mg at 02/09/17 0830  •  dextrose (D50W) solution 25 g, 25 g, Intravenous, Q15 Min PRN, Kelly Hoff MD  •  dextrose (GLUTOSE) oral gel 15 g, 15 g, Oral, Q15 Min PRN, Kelly Hoff MD  •  docusate sodium (COLACE) capsule 100 mg, 100 mg, Oral, BID PRN, SUSANA Arciniega  •  ethosuximide (ZARONTIN) capsule 500 mg, 500 mg, Oral, BID, Jeromy Del Valle MD, 500 mg at 02/09/17 1736  •  [DISCONTINUED] famotidine (PEPCID) injection 20 mg, 20 mg, Intravenous, BID, 20 mg at 02/04/17 0800 **OR** famotidine (PEPCID) tablet 20 mg, 20 mg, Oral, BID, SUSANA Arciniega, 20 mg at 02/09/17 6027  •  glucagon (human recombinant) (GLUCAGEN DIAGNOSTIC) injection 1 mg, 1 mg, Subcutaneous, Q15 Min PRN, Kelly Hoff MD  •  HYDROcodone-acetaminophen (NORCO) 7.5-325 MG per tablet 1 tablet, 1 tablet, Oral, Q4H PRN, Jeromy Gaona MD, 1 tablet at 02/09/17 0846  •  insulin lispro (humaLOG) injection 2-7 Units, 2-7 Units, Subcutaneous, 4x Daily AC & at Bedtime,  Kelly Hoff MD, 2 Units at 17 1638  •  levothyroxine (SYNTHROID, LEVOTHROID) tablet 175 mcg, 175 mcg, Oral, Daily, Kelly Hoff MD, 175 mcg at 17 0829  •  magnesium hydroxide (MILK OF MAGNESIA) suspension 2400 mg/10mL 10 mL, 10 mL, Oral, Daily PRN, SUSANA Arciniega  •  magnesium sulfate in D5W 1g/100mL (PREMIX) IVPB 1 g, 1 g, Intravenous, PRN **OR** magnesium sulfate 8 g in dextrose (D5W) 5 % 250 mL infusion, 8 g, Intravenous, PRN **OR** magnesium sulfate 6 g in dextrose (D5W) 5 % 250 mL infusion, 6 g, Intravenous, PRN **OR** magnesium sulfate in D5W 1g/100mL (PREMIX) IVPB 1 g, 1 g, Intravenous, PRN, Dileep Tucker, PA  •  Morphine sulfate (PF) injection 2 mg, 2 mg, Intravenous, Q2H PRN, Kelly Hoff MD, 2 mg at 17 2144  •  [] fentanyl 1000 mcg/100 mL NS,  mcg/hr, Intravenous, Titrated, 50 mcg/hr at 17 1750 **AND** naloxone (NARCAN) injection 0.4 mg, 0.4 mg, Intravenous, Q5 Min PRN, SUSANA Arciniega  •  niCARdipine (CARDENE) 50 mg/250 mL 0.9% NS, 5-15 mg/hr, Intravenous, Continuous PRN, SUSANA Arciniega  •  nitroglycerin infusion 200 mcg/mL, 5-200 mcg/min, Intravenous, Continuous PRN, SUSANA Arciniega  •  norepinephrine (LEVOPHED) 32 mcg/mL (8 mg/250 mL) in sodium chloride 0.9% infusion (premix), 0.02-0.3 mcg/kg/min, Intravenous, Continuous PRN, SUSANA Arciniega, Stopped at 17 0348  •  ondansetron (ZOFRAN) injection 4 mg, 4 mg, Intravenous, Q6H PRN, SUSANA Arciniega, 4 mg at 17 0813  •  oxyCODONE-acetaminophen (PERCOCET) 5-325 MG per tablet 2 tablet, 2 tablet, Oral, Q4H PRN, Jeromy Gaona MD, 2 tablet at 17 0126  •  Pharmacy Consult - [facility], , Does not apply, Daily, Peggy Tenorio [facility]  •  phenylephrine (JUAN-SYNEPHRINE) in 0.9% NS 50 mg/250 mL infusion, 0.5-3 mcg/kg/min, Intravenous, Continuous PRN, SUSANA Arciniega  •  polyethylene glycol (MIRALAX) powder 17 g, 17 g, Oral, Daily, Jeromy Del Valle MD, 17 g at  02/09/17 0825  •  potassium chloride (MICRO-K) CR capsule 40 mEq, 40 mEq, Oral, PRN **OR** potassium chloride (KLOR-CON) packet 40 mEq, 40 mEq, Oral, PRN, SUSANA Arciniega  •  potassium chloride 20 mEq in 50 mL IVPB, 20 mEq, Intravenous, Q1H PRN **OR** potassium chloride 20 mEq in 50 mL IVPB, 20 mEq, Intravenous, Q1H PRN, SUSANA Arciniega  •  primidone (MYSOLINE) tablet 250 mg, 250 mg, Oral, TID, Kelly Hoff MD, 250 mg at 02/09/17 2145  •  sennosides-docusate sodium (SENOKOT-S) 8.6-50 MG tablet 2 tablet, 2 tablet, Oral, BID, SUSANA Arciniega, 2 tablet at 02/09/17 1737  •  sodium chloride 0.9 % flush 30 mL, 30 mL, Intravenous, Once PRN, SUSANA Arciniega    Facility-Administered Medications Ordered in Other Encounters:   •  Chlorhexidine Gluconate Cloth 2 % pads 1 application, 1 application, Topical, Q12H PRN, Aubrie Anaya PA-C    History of Present Illness    Feeling a little weak this AM. Ready to get pacemaker implanted.     ROS   14 point ROS negative except as outlined in problem list, HPI and other parts of the note.    Procedures       Objective:       Vitals:    02/10/17 0300 02/10/17 0400 02/10/17 0500 02/10/17 0600   BP:  103/62 109/64 102/63   BP Location:       Patient Position:       Pulse: 59 59 59 59   Resp: 10      Temp:       TempSrc:       SpO2: 95% 97% 94% 96%   Weight:       Height:           Intake/Output Summary (Last 24 hours) at 02/10/17 0850  Last data filed at 02/10/17 0600   Gross per 24 hour   Intake    480 ml   Output   1190 ml   Net   -710 ml       GENERAL: Well-developed, well-nourished patient in no acute distress.  HEENT: Normocephalic, atraumatic, PERRLA. Moist mucous membranes.  NECK: No JVD present at 30°. No carotid bruits auscultated.  LUNGS: Clear to auscultation.  CARDIOVASCULAR: Heart has a regular rate and rhythm. No murmurs, gallops or rubs noted.   ABDOMEN: Soft, nontender. Positive bowel sounds.  MUSCULOSKELETAL: No gross deformities. No clubbing,  cyanosis  EXT: pulses intact, no swelling  SKIN: Pink, warm  Neuro: Nonfocal exam. Gait intact    The patient's old records including ambulatory rhythm recordings (ECGs, Holter/event monitor) were reviewed and discussed.      Lab Review:     Results from last 7 days  Lab Units 02/10/17  0755 02/09/17  0355 02/08/17  0330   SODIUM mmol/L 136 135 133   POTASSIUM mmol/L 4.4 4.2 4.1   CHLORIDE mmol/L 99 98* 98*   TOTAL CO2 mmol/L 29.0 30.0 27.0   BUN mg/dL 18 20 20   CREATININE mg/dL 0.60 0.60 0.70   GLUCOSE mg/dL 92 98 99   CALCIUM mg/dL 9.3 9.1 8.7           Results from last 7 days  Lab Units 02/10/17  0755 02/09/17  0355 02/08/17  0330   WBC 10*3/mm3 6.93 6.78 6.05   HEMOGLOBIN g/dL 9.1* 8.6* 8.5*   HEMATOCRIT % 27.6* 26.3* 25.9*   PLATELETS 10*3/mm3 178 135* 112*       Results from last 7 days  Lab Units 02/10/17  0755 02/04/17  0417 02/03/17  1437   INR  0.97 1.10 1.39   APTT seconds 27.1  --  34.6*           Results from last 7 days  Lab Units 02/07/17  0346   MAGNESIUM mg/dL 2.1               Assessment & Plan:     Assessment:     1. Asystole under temporary pacing wire: Normal EF  2. 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 CAD  -s/p CABG x 1 2/3/2017         Plan:     Left sided PPM today with Dr. Dang.          SUSANA Dean  02/10/17  8:50 AM    ITon have reviewed the note in full and agree with all aspects of the above including physical exam, assessment, labs and plan with changes made accordingly.     Ton Dang DO  02/10/17  12:26 PM        EMR Dragon/Transcription disclaimer:  Much of this encounter note is an electronic transcription/translation of spoken language to printed text. Electronic translation of spoken language may permit erroneous, or at times, nonsensical words or phrases to be inadvertently transcribed. Although I have reviewed the note for such errors, some may still exist.

## 2017-02-10 NOTE — PROGRESS NOTES
Continued Stay Note  [facility]     Patient Name: Tiana Cronin  MRN: [id number]  Today's Date: 2/10/2017    Admit Date: 2/3/2017          Discharge Plan       02/10/17 1448    Case Management/Social Work Plan    Plan Pacemaker    Additional Comments S/P 2H/SICU Rounds:  permanent pacemaker today; plan PT post procedure-d/c home per PT recommendation              Discharge Codes     None        Expected Discharge Date and Time     Expected Discharge Date Expected Discharge Time    Feb 14, 2017             STEPHANI Elizalde

## 2017-02-11 ENCOUNTER — APPOINTMENT (OUTPATIENT)
Dept: GENERAL RADIOLOGY | Facility: HOSPITAL | Age: 67
End: 2017-02-11

## 2017-02-11 LAB — GLUCOSE BLDC GLUCOMTR-MCNC: 105 MG/DL (ref 70–130)

## 2017-02-11 PROCEDURE — 71020 HC CHEST PA AND LATERAL: CPT

## 2017-02-11 PROCEDURE — 93010 ELECTROCARDIOGRAM REPORT: CPT | Performed by: INTERNAL MEDICINE

## 2017-02-11 PROCEDURE — 82962 GLUCOSE BLOOD TEST: CPT

## 2017-02-11 PROCEDURE — 97110 THERAPEUTIC EXERCISES: CPT

## 2017-02-11 PROCEDURE — 99233 SBSQ HOSP IP/OBS HIGH 50: CPT | Performed by: INTERNAL MEDICINE

## 2017-02-11 PROCEDURE — 93005 ELECTROCARDIOGRAM TRACING: CPT | Performed by: INTERNAL MEDICINE

## 2017-02-11 PROCEDURE — 97116 GAIT TRAINING THERAPY: CPT

## 2017-02-11 PROCEDURE — 71010 HC CHEST PA OR AP: CPT

## 2017-02-11 PROCEDURE — 25010000002 VANCOMYCIN PER 500 MG: Performed by: INTERNAL MEDICINE

## 2017-02-11 RX ORDER — POTASSIUM CHLORIDE 750 MG/1
20 CAPSULE, EXTENDED RELEASE ORAL ONCE
Status: COMPLETED | OUTPATIENT
Start: 2017-02-11 | End: 2017-02-11

## 2017-02-11 RX ORDER — BUMETANIDE 0.25 MG/ML
2 INJECTION INTRAMUSCULAR; INTRAVENOUS ONCE
Status: COMPLETED | OUTPATIENT
Start: 2017-02-11 | End: 2017-02-11

## 2017-02-11 RX ADMIN — OXYCODONE AND ACETAMINOPHEN 2 TABLET: 5; 325 TABLET ORAL at 10:41

## 2017-02-11 RX ADMIN — ETHOSUXIMIDE 500 MG: 250 CAPSULE ORAL at 17:52

## 2017-02-11 RX ADMIN — OXYCODONE AND ACETAMINOPHEN 2 TABLET: 5; 325 TABLET ORAL at 04:03

## 2017-02-11 RX ADMIN — FAMOTIDINE 20 MG: 20 TABLET ORAL at 21:16

## 2017-02-11 RX ADMIN — FAMOTIDINE 20 MG: 20 TABLET ORAL at 08:10

## 2017-02-11 RX ADMIN — PRIMIDONE 250 MG: 250 TABLET ORAL at 15:49

## 2017-02-11 RX ADMIN — ATORVASTATIN CALCIUM 40 MG: 40 TABLET, FILM COATED ORAL at 21:16

## 2017-02-11 RX ADMIN — POTASSIUM CHLORIDE 20 MEQ: 750 CAPSULE, EXTENDED RELEASE ORAL at 11:35

## 2017-02-11 RX ADMIN — OXYCODONE AND ACETAMINOPHEN 1 TABLET: 5; 325 TABLET ORAL at 08:13

## 2017-02-11 RX ADMIN — DOCUSATE SODIUM AND SENNOSIDES 2 TABLET: 8.6; 5 TABLET, FILM COATED ORAL at 17:52

## 2017-02-11 RX ADMIN — OXYCODONE AND ACETAMINOPHEN 1 TABLET: 5; 325 TABLET ORAL at 14:42

## 2017-02-11 RX ADMIN — DOCUSATE SODIUM AND SENNOSIDES 2 TABLET: 8.6; 5 TABLET, FILM COATED ORAL at 08:11

## 2017-02-11 RX ADMIN — VANCOMYCIN HYDROCHLORIDE 1000 MG: 1 INJECTION, SOLUTION INTRAVENOUS at 04:09

## 2017-02-11 RX ADMIN — ETHOSUXIMIDE 500 MG: 250 CAPSULE ORAL at 08:11

## 2017-02-11 RX ADMIN — POLYETHYLENE GLYCOL 3350 17 G: 17 POWDER, FOR SOLUTION ORAL at 08:10

## 2017-02-11 RX ADMIN — OXYCODONE AND ACETAMINOPHEN 2 TABLET: 5; 325 TABLET ORAL at 18:46

## 2017-02-11 RX ADMIN — BUMETANIDE 2 MG: 0.25 INJECTION, SOLUTION INTRAMUSCULAR; INTRAVENOUS at 11:35

## 2017-02-11 RX ADMIN — PRIMIDONE 250 MG: 250 TABLET ORAL at 21:16

## 2017-02-11 RX ADMIN — LEVOTHYROXINE SODIUM 175 MCG: 175 TABLET ORAL at 08:11

## 2017-02-11 RX ADMIN — PRIMIDONE 250 MG: 250 TABLET ORAL at 08:11

## 2017-02-11 RX ADMIN — CITALOPRAM HYDROBROMIDE 20 MG: 20 TABLET ORAL at 08:11

## 2017-02-11 RX ADMIN — ASPIRIN 325 MG: 325 TABLET, DELAYED RELEASE ORAL at 08:10

## 2017-02-11 NOTE — PLAN OF CARE
Problem: Patient Care Overview (Adult)  Goal: Plan of Care Review  Outcome: Ongoing (interventions implemented as appropriate)    02/11/17 1310   Coping/Psychosocial Response Interventions   Plan Of Care Reviewed With patient   Patient Care Overview   Progress progress toward functional goals as expected   Outcome Evaluation   Outcome Summary/Follow up Plan Pt now s/p PPM placement 2/10; no significant change in medical status (cont PT POC). Pt demonstrated ability to progress forward ambulation distance in hallway; limited by fatigue and noted slight R knee buckling. Will cont to progress as clinically warranted.          Problem: Inpatient Physical Therapy  Goal: Bed Mobility Goal LTG- PT  Outcome: Ongoing (interventions implemented as appropriate)    02/06/17 0943 02/11/17 1310   Bed Mobility PT LTG   Bed Mobility PT LTG, Date Established 02/06/17 --    Bed Mobility PT LTG, Time to Achieve 2 wks --    Bed Mobility PT LTG, Activity Type roll left/roll right;supine to sit/sit to supine --    Bed Mobility PT LTG, [place] Level contact guard assist --    Bed Mobility PT Goal LTG, Assist Device bed rails --    Bed Mobility PT LTG, Outcome --  goal ongoing       Goal: Transfer Training Goal 1 LTG- PT  Outcome: Ongoing (interventions implemented as appropriate)    02/06/17 0943 02/11/17 1310   Transfer Training PT LTG   Transfer Training PT LTG, Date Established 02/06/17 --    Transfer Training PT LTG, Time to Achieve 2 wks --    Transfer Training PT LTG, Activity Type bed to chair /chair to bed;sit to stand/stand to sit --    Transfer Training PT LTG, [place] Level contact guard assist --    Transfer Training PT LTG, Assist Device walker, rolling --    Transfer Training PT LTG, Outcome --  goal ongoing       Goal: Gait Training Goal LTG- PT  Outcome: Ongoing (interventions implemented as appropriate)    02/06/17 0943 02/11/17 1310   Gait Training PT LTG   Gait Training Goal PT LTG, Date Established 02/06/17  --    Gait Training Goal PT LTG, Time to Achieve 2 wks --    Gait Training Goal PT LTG, [place] Level contact guard assist --    Gait Training Goal PT LTG, Assist Device walker, rolling --    Gait Training Goal PT LTG, Distance to Achieve 200 --    Gait Training Goal PT LTG, Outcome --  goal ongoing       Goal: Static Sitting Balance Goal LTG- PT  Outcome: Ongoing (interventions implemented as appropriate)    02/06/17 0943 02/11/17 1310   Static Sitting Balance PT LTG   Static Sitting Balance PT LTG, Date Established 02/06/17 --    Static Sitting Balance PT LTG, Time to Achieve 2 wks --    Static Sitting Balance PT LTG, [place] Level conditional independence --    Static Sitting Balance PT LTG, Outcome --  goal ongoing

## 2017-02-11 NOTE — PLAN OF CARE
Problem: Patient Care Overview (Adult)  Goal: Plan of Care Review  Outcome: Ongoing (interventions implemented as appropriate)    02/11/17 1511   Coping/Psychosocial Response Interventions   Plan Of Care Reviewed With patient;alpa   Patient Care Overview   Progress improving   Outcome Evaluation   Outcome Summary/Follow up Plan PT to remain in ICU d/t consistent MT drainage per CT surgery. Will revevaluate removal of MTs on 2/12. PT participated with PT today and will continue to mobilize based on PTs presentation. Diuresis and K+ given today 2/11 to which PT mildly repsonded. Approx 1410, PT appears to be in A-flutter and Cardiology contacted via page with no new orders given....Dr Larsen to be informed by PA.        Goal: Adult Individualization and Mutuality  Outcome: Ongoing (interventions implemented as appropriate)    Problem: Cardiac Surgery (Adult)  Goal: Signs and Symptoms of Listed Potential Problems Will be Absent or Manageable (Cardiac Surgery)  Outcome: Ongoing (interventions implemented as appropriate)    02/11/17 1511   Cardiac Surgery   Problems Assessed (Cardiac Surgery) all   Problems Present (Cardiac Surgery) pain;dysrhythmia/arrhythmia;situational response         Problem: Pressure Ulcer Risk (Chris Scale) (Adult,Obstetrics,Pediatric)  Goal: Identify Related Risk Factors and Signs and Symptoms  Outcome: Ongoing (interventions implemented as appropriate)    02/11/17 1511   Pressure Ulcer Risk (Chris Scale)   Related Risk Factors (Pressure Ulcer Risk (Chris Scale)) critical care admission;hospitalization prolonged;mobility impaired       Goal: Skin Integrity  Outcome: Ongoing (interventions implemented as appropriate)

## 2017-02-11 NOTE — PROGRESS NOTES
[facility]  Cardiovascular Progress Note  Tiana Cronin  S253/1  [id number]  [unfilled]  1950    DATE OF ADMISSION: 2/3/2017  DATE OF FOLLOW UP:  2/10/17    Rangel Lopez MD    Subjective:     Patient ID: Tiana Cronin is a 66 y.o. female. No chest pain , some sob and palps    Chief Complaint: s/p MVR f/u     No Known Allergies    Current Facility-Administered Medications:   •  acetaminophen (TYLENOL) tablet 650 mg, 650 mg, Oral, Q4H PRN, Jeromy Gaona MD, 650 mg at 02/04/17 0231  •  acetaminophen (TYLENOL) tablet 650 mg, 650 mg, Oral, Q4H PRN, Ton Dang DO  •  aspirin EC tablet 325 mg, 325 mg, Oral, Daily, SUSANA Arciniega, 325 mg at 02/11/17 0810  •  atorvastatin (LIPITOR) tablet 40 mg, 40 mg, Oral, Nightly, SUSANA Arciniega, 40 mg at 02/10/17 2043  •  bisacodyl (DULCOLAX) EC tablet 10 mg, 10 mg, Oral, Daily PRN, SUSANA Arciniega  •  bisacodyl (DULCOLAX) suppository 10 mg, 10 mg, Rectal, Daily PRN, SUSANA Arciniega  •  citalopram (CeleXA) tablet 20 mg, 20 mg, Oral, Daily, Kelyl Hoff MD, 20 mg at 02/11/17 0811  •  dextrose (D50W) solution 25 g, 25 g, Intravenous, Q15 Min PRN, Kelly Hoff MD  •  dextrose (GLUTOSE) oral gel 15 g, 15 g, Oral, Q15 Min PRN, Kelly Hoff MD  •  docusate sodium (COLACE) capsule 100 mg, 100 mg, Oral, BID PRN, SUSANA Arcinigea  •  ethosuximide (ZARONTIN) capsule 500 mg, 500 mg, Oral, BID, Jeromy Del Valle MD, 500 mg at 02/11/17 0811  •  [DISCONTINUED] famotidine (PEPCID) injection 20 mg, 20 mg, Intravenous, BID, 20 mg at 02/04/17 0800 **OR** famotidine (PEPCID) tablet 20 mg, 20 mg, Oral, BID, SUSANA Arciniega, 20 mg at 02/11/17 0810  •  glucagon (human recombinant) (GLUCAGEN DIAGNOSTIC) injection 1 mg, 1 mg, Subcutaneous, Q15 Min PRN, Kelly Hoff MD  •  insulin lispro (humaLOG) injection 2-7 Units, 2-7 Units, Subcutaneous, 4x Daily AC & at Bedtime, Kelly Hoff,  MD, 2 Units at 17 1638  •  levothyroxine (SYNTHROID, LEVOTHROID) tablet 175 mcg, 175 mcg, Oral, Daily, Kelly Hoff MD, 175 mcg at 17 0811  •  magnesium hydroxide (MILK OF MAGNESIA) suspension 2400 mg/10mL 10 mL, 10 mL, Oral, Daily PRN, SUSANA Arciniega  •  magnesium sulfate in D5W 1g/100mL (PREMIX) IVPB 1 g, 1 g, Intravenous, PRN **OR** magnesium sulfate 8 g in dextrose (D5W) 5 % 250 mL infusion, 8 g, Intravenous, PRN **OR** magnesium sulfate 6 g in dextrose (D5W) 5 % 250 mL infusion, 6 g, Intravenous, PRN **OR** magnesium sulfate in D5W 1g/100mL (PREMIX) IVPB 1 g, 1 g, Intravenous, PRN, SUSANA Arciniega  •  Morphine sulfate (PF) injection 2 mg, 2 mg, Intravenous, Q2H PRN, Kelly Hoff MD, 2 mg at 17 2144  •  [] fentanyl 1000 mcg/100 mL NS,  mcg/hr, Intravenous, Titrated, 50 mcg/hr at 17 1750 **AND** naloxone (NARCAN) injection 0.4 mg, 0.4 mg, Intravenous, Q5 Min PRN, SUSANA Arciniega  •  niCARdipine (CARDENE) 50 mg/250 mL 0.9% NS, 5-15 mg/hr, Intravenous, Continuous PRN, SUSANA Arciniega  •  nitroglycerin infusion 200 mcg/mL, 5-200 mcg/min, Intravenous, Continuous PRN, SUSANA Arciniega  •  ondansetron (ZOFRAN) injection 4 mg, 4 mg, Intravenous, Q6H PRN, Ton Dang, DO  •  oxyCODONE-acetaminophen (PERCOCET) 5-325 MG per tablet 1 tablet, 1 tablet, Oral, Q4H PRN, Ton Dang DO, 1 tablet at 17 0813  •  oxyCODONE-acetaminophen (PERCOCET) 5-325 MG per tablet 2 tablet, 2 tablet, Oral, Q4H PRN, Jeromy Gaona MD, 2 tablet at 17 1041  •  Pharmacy Consult - [facility], , Does not apply, Daily, Peggy Tenorio [facility]  •  polyethylene glycol (MIRALAX) powder 17 g, 17 g, Oral, Daily, Jeromy Del Valle MD, 17 g at 17 0810  •  potassium chloride (MICRO-K) CR capsule 40 mEq, 40 mEq, Oral, PRN **OR** potassium chloride (KLOR-CON) packet 40 mEq, 40 mEq, Oral, PRN, SUSANA Arciniega  •  potassium chloride 20 mEq in 50 mL IVPB, 20 mEq,  Intravenous, Q1H PRN **OR** potassium chloride 20 mEq in 50 mL IVPB, 20 mEq, Intravenous, Q1H PRN, SUSANA Arciniega  •  primidone (MYSOLINE) tablet 250 mg, 250 mg, Oral, TID, Kelly Hoff MD, 250 mg at 02/11/17 0811  •  sennosides-docusate sodium (SENOKOT-S) 8.6-50 MG tablet 2 tablet, 2 tablet, Oral, BID, SUSANA Arciniega, 2 tablet at 02/11/17 0811  •  sodium chloride 0.9 % flush 30 mL, 30 mL, Intravenous, Once PRN, SUSANA Arciniega    Facility-Administered Medications Ordered in Other Encounters:   •  Chlorhexidine Gluconate Cloth 2 % pads 1 application, 1 application, Topical, Q12H PRN, Aubrie Anaya PA-C    History of Present Illness    S/p ppmaker implanted.     ROS   14 point ROS negative except as outlined in problem list, HPI and other parts of the note.    Procedures       Objective:       Vitals:    02/11/17 0730 02/11/17 0800 02/11/17 0830 02/11/17 0900   BP:  102/53  102/58   BP Location:  Right arm     Patient Position:  Lying     Pulse: 68 74 100 70   Resp:  16     Temp:  97.9 °F (36.6 °C)     TempSrc:  Oral     SpO2: 95% 92% (!) 89% 94%   Weight:       Height:           Intake/Output Summary (Last 24 hours) at 02/11/17 1301  Last data filed at 02/11/17 0400   Gross per 24 hour   Intake    600 ml   Output   1130 ml   Net   -530 ml       GENERAL: Well-developed, well-nourished patient in no acute distress.  HEENT: Normocephalic, atraumatic, PERRLA. Moist mucous membranes.  NECK: No JVD present at 30°. No carotid bruits auscultated.  LUNGS: Clear to auscultation.  CARDIOVASCULAR: Heart has a regular rate and rhythm. No murmurs, gallops or rubs noted.   ABDOMEN: Soft, nontender. Positive bowel sounds.  MUSCULOSKELETAL: No gross deformities. No clubbing, cyanosis  EXT: pulses intact, no swelling  SKIN: Pink, warm  Neuro: Nonfocal exam. Gait intact    The patient's old records including ambulatory rhythm recordings (ECGs, Holter/event monitor) were reviewed and discussed.      Lab Review:      Results from last 7 days  Lab Units 02/10/17  0755 02/09/17  0355 02/08/17  0330   SODIUM mmol/L 136 135 133   POTASSIUM mmol/L 4.4 4.2 4.1   CHLORIDE mmol/L 99 98* 98*   TOTAL CO2 mmol/L 29.0 30.0 27.0   BUN mg/dL 18 20 20   CREATININE mg/dL 0.60 0.60 0.70   GLUCOSE mg/dL 92 98 99   CALCIUM mg/dL 9.3 9.1 8.7           Results from last 7 days  Lab Units 02/10/17  0755 02/09/17  0355 02/08/17  0330   WBC 10*3/mm3 6.93 6.78 6.05   HEMOGLOBIN g/dL 9.1* 8.6* 8.5*   HEMATOCRIT % 27.6* 26.3* 25.9*   PLATELETS 10*3/mm3 178 135* 112*       Results from last 7 days  Lab Units 02/10/17  0755   INR  0.97   APTT seconds 27.1           Results from last 7 days  Lab Units 02/07/17  0346   MAGNESIUM mg/dL 2.1               Assessment & Plan:     Assessment:     1. S/p ppm  2. 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 CAD  -s/p CABG x 1 2/3/2017         Plan:     Increase activity    Cy Murillo MD  02/11/17  1:01 PM      Cy Murillo MD  02/11/17  1:01 PM        EMR Dragon/Transcription disclaimer:  Much of this encounter note is an electronic transcription/translation of spoken language to printed text. Electronic translation of spoken language may permit erroneous, or at times, nonsensical words or phrases to be inadvertently transcribed. Although I have reviewed the note for such errors, some may still exist.

## 2017-02-11 NOTE — PROGRESS NOTES
Acute Care - Physical Therapy Treatment Note  [facility]     Patient Name: Tiana Cronin  : 1950  MRN: [id number]  Today's Date: 2017  Onset of Illness/Injury or Date of Surgery Date: 17  Date of Referral to PT: 17  Referring Physician: SUSANA Tucker    Admit Date: 2/3/2017    Visit Dx:    ICD-10-CM ICD-9-CM   1. Impaired functional mobility, balance, gait, and endurance Z74.09 V49.89   2. Non-rheumatic mitral regurgitation I34.0 424.0   3. Mitral valve disorder I05.9 424.0   4. Post-Op Asystole I46.9 427.5     Patient Active Problem List   Diagnosis   • Mitral valve disorder   • Severe mitral regurgitation (S/P 31 Magna Ease mitral tissue valve)   • Post-Op Asystole   • CAD (S/P CABG x 1)               Adult Rehabilitation Note       17 0955 17 0840       Rehab Assessment/Intervention    Discipline physical therapist  - physical therapist  -PAUL     Document Type therapy note (daily note)  - therapy note (daily note)  -PAUL     Subjective Information agree to therapy;complains of;weakness;pain  - no complaints;agree to therapy  -PAUL     Patient Effort, Rehab Treatment good  -LS excellent  -PAUL     Precautions/Limitations fall precautions;cardiac precautions;oxygen therapy device and L/min;sternal precautions;pacemaker  -LS fall precautions;pacemaker  -PAUL     Specific Treatment Considerations s/p PM placement 2/10  -LS external pacemaker with underlying rhythm of asystole  -PAUL     Recorded by [LS] Francesca Cantu, PT [PAUL] Faina Madison PT     Vital Signs    Pre Systolic BP Rehab 102  -  -PAUL     Pre Treatment Diastolic BP 58  -LS 58  -PAUL     Post Systolic BP Rehab 127  -  -PAUL     Post Treatment Diastolic BP 64  -LS 66  -PAUL     Pretreatment Heart Rate (beats/min) 69  -LS 59  -PAUL     Posttreatment Heart Rate (beats/min) 70  -LS 59  -PAUL     Pre SpO2 (%) 95  -LS 98  -PAUL     O2 Delivery Pre Treatment supplemental O2   1L  -LS supplemental O2  -PAUL     Post SpO2 (%) 93   -LS 98  -PAUL     O2 Delivery Post Treatment supplemental O2  -LS supplemental O2  -PAUL     Pre Patient Position Supine  -LS Supine  -PAUL     Intra Patient Position Standing  -LS Standing  -PAUL     Post Patient Position Supine  -LS Sitting  -PAUL     Recorded by [LS] Francesca Cantu, PT [PAUL] Faina Madison, PT     Pain Assessment    Pain Assessment 0-10  -LS No/denies pain  -PAUL     Pain Score 5  -LS      Post Pain Score 6  -LS      Pain Type Surgical pain  -LS      Pain Location Chest  -LS      Pain Intervention(s) Repositioned;Ambulation/increased activity  -LS      Recorded by [LS] Francesca Cantu, PT [PAUL] Faina Madison, PT     Cognitive Assessment/Intervention    Current Cognitive/Communication Assessment functional  -LS functional  -PAUL     Orientation Status oriented x 4  -LS oriented x 4  -PAUL     Follows Commands/Answers Questions able to follow single-step instructions;100% of the time;needs cueing;needs increased time;needs repetition  -% of the time  -PAUL     Personal Safety mild impairment;decreased awareness, need for assist;decreased awareness, need for safety  -LS WNL/WFL  -PAUL     Personal Safety Interventions fall prevention program maintained;gait belt;nonskid shoes/slippers when out of bed  -LS fall prevention program maintained;gait belt;nonskid shoes/slippers when out of bed  -PAUL     Recorded by [LS] Francesca Cantu, PT [PAUL] Faina Madison, PT     Bed Mobility, Assessment/Treatment    Bed Mobility, Assistive Device  draw sheet  -PAUL     Bed Mobility, Roll Left, [place]  moderate assist (50% patient effort);2 person assist required  -PAUL     Bed Mobility, Roll Right, [place]  moderate assist (50% patient effort);2 person assist required  -PAUL     Bed Mob, Supine to Sit, [place] moderate assist (50% patient effort);2 person assist required;verbal cues required  -LS moderate assist (50% patient effort);2 person assist required  -PAUL     Bed Mob, Sit to Supine, [place] moderate assist  (50% patient effort);2 person assist required;verbal cues required  -LS      Bed Mobility, Safety Issues decreased use of arms for pushing/pulling;decreased use of legs for bridging/pushing  -LS decreased use of arms for pushing/pulling  -PAUL     Bed Mobility, Impairments  strength decreased  -PAUL     Bed Mobility, Comment  RN present throughout session  -PAUL     Recorded by [LS] Francesca Cantu, PT [PAUL] Faina Madison, PT     Transfer Assessment/Treatment    Transfers, Bed-Chair [place]  moderate assist (50% patient effort);2 person assist required  -PAUL     Transfers, Sit-Stand [place] minimum assist (75% patient effort);2 person assist required;verbal cues required  -LS moderate assist (50% patient effort);2 person assist required  -PAUL     Transfers, Stand-Sit [place] minimum assist (75% patient effort);2 person assist required;verbal cues required  -LS moderate assist (50% patient effort);2 person assist required  -PAUL     Toilet Transfer, [place] minimum assist (75% patient effort);2 person assist required;verbal cues required  -LS      Transfer, Safety Issues  weight-shifting ability decreased;step length decreased  -PAUL     Transfer, Impairments strength decreased  -LS      Transfer, Comment  patient able to get hip and trunk extended. able to weight shift and take steps to the chair  -PAUL     Recorded by [LS] Francesca Cantu, PT [PAUL] Faina Madison PT     Gait Assessment/Treatment    Gait, [place] Level minimum assist (75% patient effort);2 person assist required;verbal cues required  -LS      Gait, Assistive Device --   HHA of PT on R  -LS      Gait, Distance (Feet) 40  -LS      Gait, Gait Deviations donna decreased;forward flexed posture;step length decreased  -LS      Gait, Safety Issues supplemental O2;sequencing ability decreased;step length decreased  -LS      Gait, Impairments pain;strength decreased  -LS      Gait, Comment Noted slight knee bucling R. VC's for posture and  increasing step length bilat. Req'dseated rest break after 20 ft.   -LS N/A patient still dependent on external pacemaker only OK'd for transfers  -PAUL     Recorded by [LS] Francesca Cantu, PT [PAUL] Faina Madison PT     Balance Skills Training    Sitting-Level of Assistance  Close supervision  -PAUL     Sitting-Balance Support  Feet supported  -PAUL     Standing-Level of Assistance  Moderate assistance;x2  -PAUL     Recorded by  [PAUL] Faina Madison PT     Therapy Exercises    Bilateral Lower Extremities AROM:;10 reps;supine;ankle pumps/circles;glut sets;quad sets  -LS AROM:;10 reps;sitting;ankle pumps/circles;LAQ;hip flexion  -PAUL     Recorded by [LS] Francesca Cantu, PT [PAUL] Faina Madison PT     Positioning and Restraints    Pre-Treatment Position in bed  -LS in bed  -PAUL     Post Treatment Position bed  -LS chair  -PAUL     In Bed notified nsg;supine;call light within reach;encouraged to call for assist;with brace   LUE sling  -LS      In Chair  notified nsg;reclined;sitting;call light within reach;exit alarm on;waffle cushion  -PAUL     Recorded by [LS] Francesca Cantu, PT [PAUL] Faina Madison PT       User Key  (r) = Recorded By, (t) = Taken By, (c) = Cosigned By    Initials Name Effective Dates    PAUL Madison, PT 06/19/15 -     LS Francesca Cantu, PT 06/19/15 -                 IP PT Goals       02/11/17 1310 02/07/17 1336 02/06/17 0943    Bed Mobility PT LTG    Bed Mobility PT LTG, Date Established   02/06/17  -SJ    Bed Mobility PT LTG, Time to Achieve   2 wks  -SJ    Bed Mobility PT LTG, Activity Type   roll left/roll right;supine to sit/sit to supine  -SJ    Bed Mobility PT LTG, [place] Level   contact guard assist  -SJ    Bed Mobility PT Goal  LTG, Assist Device   bed rails  -SJ    Bed Mobility PT LTG, Outcome goal ongoing  -LS goal ongoing  -PAUL     Transfer Training PT LTG    Transfer Training PT LTG, Date Established   02/06/17  -SJ    Transfer Training PT LTG, Time to Achieve   2 wks  -SJ     Transfer Training PT LTG, Activity Type   bed to chair /chair to bed;sit to stand/stand to sit  -SJ    Transfer Training PT LTG, [place] Level   contact guard assist  -SJ    Transfer Training PT LTG, Assist Device   walker, rolling  -SJ    Transfer Training PT LTG, Outcome goal ongoing  -LS goal ongoing  -PAUL     Gait Training PT LTG    Gait Training Goal PT LTG, Date Established   02/06/17  -SJ    Gait Training Goal PT LTG, Time to Achieve   2 wks  -SJ    Gait Training Goal PT LTG, [place] Level   contact guard assist  -SJ    Gait Training Goal PT LTG, Assist Device   walker, rolling  -SJ    Gait Training Goal PT LTG, Distance to Achieve   200  -SJ    Gait Training Goal PT LTG, Outcome goal ongoing  -LS goal ongoing  -PAUL     Static Sitting Balance PT LTG    Static Sitting Balance PT LTG, Date Established   02/06/17  -SJ    Static Sitting Balance PT LTG, Time to Achieve   2 wks  -SJ    Static Sitting Balance PT LTG, [place] Level   conditional independence  -SJ    Static Sitting Balance PT LTG, Outcome goal ongoing  -LS goal ongoing  -PAUL       User Key  (r) = Recorded By, (t) = Taken By, (c) = Cosigned By    Initials Name Provider Type    PAUL Madison, PT Physical Therapist    SJ Patricia Cox, PT Physical Therapist    LS Francesca Cantu, PT Physical Therapist          Physical Therapy Education     Title: PT OT SLP Therapies (Active)     Topic: Physical Therapy (Active)     Point: Mobility training (Active)    Learning Progress Summary    Learner Readiness Method Response Comment Documented by Status   Patient Acceptance E,D NR  LS 02/11/17 1309 Active    Acceptance E NR  PAUL 02/09/17 1101 Active    Acceptance E,D NR  SJ 02/08/17 0936 Active    Acceptance E NR discussed HEP and bed exercises patient can do on her own patient needs cues today to perform activity PAUL 02/07/17 1335 Active    Acceptance E NR  SJ 02/06/17 0940 Active               Point: Home exercise program (Active)    Learning  Progress Summary    Learner Readiness Method Response Comment Documented by Status   Patient Acceptance E,D NR   02/11/17 1309 Active    Acceptance E NR   02/09/17 1101 Active    Acceptance E,D NR   02/08/17 0936 Active    Acceptance E NR discussed HEP and bed exercises patient can do on her own patient needs cues today to perform activity PAUL 02/07/17 1335 Active    Acceptance E NR   02/06/17 0940 Active               Point: Body mechanics (Active)    Learning Progress Summary    Learner Readiness Method Response Comment Documented by Status   Patient Acceptance E,D NR   02/11/17 1309 Active    Acceptance E NR   02/09/17 1101 Active    Acceptance E,D NR   02/08/17 0936 Active    Acceptance E NR discussed HEP and bed exercises patient can do on her own patient needs cues today to perform activity PAUL 02/07/17 1335 Active    Acceptance E NR   02/06/17 0940 Active               Point: Precautions (Active)    Learning Progress Summary    Learner Readiness Method Response Comment Documented by Status   Patient Acceptance E,D NR   02/11/17 1309 Active    Acceptance E NR   02/09/17 1101 Active    Acceptance E,D NR   02/08/17 0936 Active    Acceptance E NR discussed HEP and bed exercises patient can do on her own patient needs cues today to perform activity PAUL 02/07/17 1335 Active    Acceptance E NR   02/06/17 0940 Active                      User Key     Initials Effective Dates Name Provider Type Discipline     06/19/15 -  Faina Madison, PT Physical Therapist PT     06/19/15 -  Patricia Cox, PT Physical Therapist PT     06/19/15 -  Francesca Cantu PT Physical Therapist PT                    PT Recommendation and Plan  Anticipated Equipment Needs At Discharge: front wheeled walker  Anticipated Discharge Disposition: inpatient rehabilitation facility  Demonstrates Need for Referral to Another Service: occupational therapy  Planned Therapy Interventions: balance training, bed mobility  training, gait training, home exercise program, patient/family education, strengthening, transfer training  PT Frequency: daily  Plan of Care Review  Plan Of Care Reviewed With: patient  Progress: progress toward functional goals as expected  Outcome Summary/Follow up Plan: Pt now s/p PPM placement 2/10; no significant change in medical status (cont PT POC). Pt demonstrated ability to progress forward ambulation distance in hallway; limited by fatigue and noted slight R knee buckling. Will cont to progress as clinically warranted.           Outcome Measures       02/11/17 0955 02/09/17 0840       How much help from another person do you currently need...    Turning from your back to your side while in flat bed without using bedrails? 2  -LS 2  -PAUL     Moving from lying on back to sitting on the side of a flat bed without bedrails? 2  -LS 2  -PAUL     Moving to and from a bed to a chair (including a wheelchair)? 3  -LS 2  -PAUL     Standing up from a chair using your arms (e.g., wheelchair, bedside chair)? 3  -LS 2  -PAUL     Climbing 3-5 steps with a railing? 1  -LS 1  -PAUL     To walk in hospital room? 3  -LS 2  -PAUL     AM-PAC 6 Clicks Score 14  -LS 11  -PAUL     Functional Assessment    Outcome Measure Options AM-PAC 6 Clicks Basic Mobility (PT)  -        User Key  (r) = Recorded By, (t) = Taken By, (c) = Cosigned By    Initials Name Provider Type    PAUL Madison, PT Physical Therapist    KEITH Cantu, DEZ Physical Therapist           Time Calculation:         PT Charges       02/11/17 1312          Time Calculation    Start Time 0955  -      PT Received On 02/11/17  -      PT Goal Re-Cert Due Date 02/16/17  -      Time Calculation- PT    Total Timed Code Minutes- PT 24 minute(s)  -        User Key  (r) = Recorded By, (t) = Taken By, (c) = Cosigned By    Initials Name Provider Type    KEITH Cantu PT Physical Therapist          Therapy Charges for Today     Code Description Service Date Service  Provider Modifiers Qty    31941950408 HC GAIT TRAINING EA 15 MIN 2/11/2017 Francesca Cantu, PT GP 1    61977370234 HC PT THER PROC EA 15 MIN 2/11/2017 Francesca Cantu, PT GP 1    04490664872 HC PT THER SUPP EA 15 MIN 2/11/2017 Francesca Cantu, PT GP 2          PT G-Codes  Outcome Measure Options: AM-PAC 6 Clicks Basic Mobility (PT)    Francesca Cantu, PT  2/11/2017

## 2017-02-11 NOTE — PROGRESS NOTES
Cardiothoracic Surgery Progress Note      POD # 8 s/p MVR  POD # 1 s/p Pacemaker placement     LOS: 8 days      Subjective:  No complaints    Objective:  Vital Signs  Temp:  [97.7 °F (36.5 °C)-98.9 °F (37.2 °C)] 97.9 °F (36.6 °C)  Heart Rate:  [] 70  Resp:  [6-18] 16  BP: ()/(48-73) 102/58    Physical Exam:   General Appearance: alert, appears stated age and cooperative   Lungs: CTAB   Heart: RRR   Skin: Incision c/d/i   CT Output: 30 mL/24 hours  Results:    Results from last 7 days  Lab Units 02/10/17  0755   WBC 10*3/mm3 6.93   HEMOGLOBIN g/dL 9.1*   HEMATOCRIT % 27.6*   PLATELETS 10*3/mm3 178       Results from last 7 days  Lab Units 02/10/17  0755   SODIUM mmol/L 136   POTASSIUM mmol/L 4.4   CHLORIDE mmol/L 99   TOTAL CO2 mmol/L 29.0   BUN mg/dL 18   CREATININE mg/dL 0.60   GLUCOSE mg/dL 92   CALCIUM mg/dL 9.3     CXR: Right middle lobe atelectasis, mild pulmonary interstitial edema, no identifiable pneumothorax    Assessment:  POD # 8 s/p MVR  POD # 1 s/p Pacemaker placement    Plan:  D/C chest tubes  Transfer to telemetry  Possible D/C home in [place]  02/11/17  9:46 AM

## 2017-02-11 NOTE — PLAN OF CARE
Problem: Patient Care Overview (Adult)  Goal: Plan of Care Review  Outcome: Ongoing (interventions implemented as appropriate)    02/11/17 0635   Coping/Psychosocial Response Interventions   Plan Of Care Reviewed With patient   Patient Care Overview   Progress progress toward functional goals as expected         Problem: Cardiac Surgery (Adult)  Goal: Signs and Symptoms of Listed Potential Problems Will be Absent or Manageable (Cardiac Surgery)  Outcome: Ongoing (interventions implemented as appropriate)    Problem: Pressure Ulcer Risk (Chris Scale) (Adult,Obstetrics,Pediatric)  Goal: Identify Related Risk Factors and Signs and Symptoms  Outcome: Ongoing (interventions implemented as appropriate)    02/11/17 0635   Pressure Ulcer Risk (Chris Scale)   Related Risk Factors (Pressure Ulcer Risk (Chris Scale)) age extremes;cognitive impairment;critical care admission;medical devices;medication;mental impairment;nutritional deficiencies;tissue perfusion altered       Goal: Skin Integrity  Outcome: Ongoing (interventions implemented as appropriate)    02/11/17 0635   Pressure Ulcer Risk (Chris Scale) (Adult,Obstetrics,Pediatric)   Skin Integrity making progress toward outcome

## 2017-02-11 NOTE — PROGRESS NOTES
"Intensive Care Follow-up     Hospital:  LOS: 8 days   Ms. Tiana Cronin, 66 y.o. female is followed for:   Mitral regurgitation        Subjective   Interval History:  The chart is reviewed. The patient has remained afebrile overnight. She states that she is breathing well. She has no specific complaints other than some discomfort at the mediastinal tube insertion sites.    The patient's relevant past medical, surgical and social history were reviewed and updated in Epic as appropriate.        Objective     Infusions:    niCARdipine 5-15 mg/hr   nitroglycerin 5-200 mcg/min     Medications:    aspirin 325 mg Oral Daily   atorvastatin 40 mg Oral Nightly   citalopram 20 mg Oral Daily   ethosuximide 500 mg Oral BID   famotidine 20 mg Oral BID   insulin lispro 2-7 Units Subcutaneous 4x Daily AC & at Bedtime   levothyroxine 175 mcg Oral Daily   pharmacy consult - MTM  Does not apply Daily   polyethylene glycol 17 g Oral Daily   primidone 250 mg Oral TID   sennosides-docusate sodium 2 tablet Oral BID       Vital Sign Min/Max for last 24 hours  Temp  Min: 97.7 °F (36.5 °C)  Max: 98.9 °F (37.2 °C)   BP  Min: 100/59  Max: 117/54   Pulse  Min: 59  Max: 100   Resp  Min: 6  Max: 18   SpO2  Min: 89 %  Max: 100 %   Flow (L/min)  Min: 2  Max: 2       Input/Output for last 24 hour shift  02/10 0701 - 02/11 0700  In: 600 [P.O.:600]  Out: 1590 [Urine:1560]      Objective:  General Appearance:  Comfortable, well-appearing and in no acute distress.    Vital signs: (most recent): Blood pressure 102/58, pulse 70, temperature 97.9 °F (36.6 °C), temperature source Oral, resp. rate 16, height 65.98\" (167.6 cm), weight 149 lb (67.6 kg), SpO2 94 %.  No fever.    Output: Producing urine.    Lungs:  Normal respiratory rate and normal effort.  She is not in respiratory distress.  No stridor.  There are decreased breath sounds.  No wheezes, rales or rhonchi.    Heart: Normal rate.  Regular rhythm.  S1 normal and S2 normal.  No murmur or friction " rub.   Chest: Chest wall tenderness present.  Symmetric chest wall expansion. (Status post median sternotomy. Mediastinal tubes are in place.)  Abdomen: Abdomen is soft.  Hypoactive bowel sounds.   There is no abdominal tenderness.     Extremities: Normal range of motion.  There is no deformity or dependent edema.    Neurological: Patient is alert and oriented to person, place and time.  Normal strength.    Pupils:  Pupils are equal, round, and reactive to light.  Pupils are equal.   Skin:  Warm.  No rash, ecchymosis, cyanosis or ulceration.               Results from last 7 days  Lab Units 02/10/17  0755 02/09/17  0355 02/08/17  0330   WBC 10*3/mm3 6.93 6.78 6.05   HEMOGLOBIN g/dL 9.1* 8.6* 8.5*   PLATELETS 10*3/mm3 178 135* 112*       Results from last 7 days  Lab Units 02/10/17  0755 02/09/17  0355 02/08/17  0330 02/07/17  0346  02/05/17  0334   SODIUM mmol/L 136 135 133 130*  < > 133   POTASSIUM mmol/L 4.4 4.2 4.1 4.4  < > 4.6   TOTAL CO2 mmol/L 29.0 30.0 27.0 25.0  < > 25.0   BUN mg/dL 18 20 20 16  < > 15   CREATININE mg/dL 0.60 0.60 0.70 0.60  < > 0.70   MAGNESIUM mg/dL  --   --   --  2.1  --  2.2   PHOSPHORUS mg/dL  --   --   --  2.6  --   --    GLUCOSE mg/dL 92 98 99 87  < > 141*   < > = values in this interval not displayed.  Estimated Creatinine Clearance: 65.6 mL/min (by C-G formula based on Cr of 0.6).          Images:   There is bibasilar right greater than left atelectasis. Support tubes including mediastinal tubes are in place. There are no infiltrates. Chest x-ray is unchanged compared with yesterday.    I reviewed the patient's results and images.     Assessment/Plan   Impression      Principal Problem:    Severe mitral regurgitation (S/P 31 Magna Ease mitral tissue valve)  Active Problems:    Post-Op Asystole    CAD (S/P CABG x 1)       Plan        Continue with pulmonary hygiene.  Mobilize as tolerated.  Pain control until mediastinal tubes are out.  Follow-up labs and images a been ordered for  tomorrow morning.    Plan of care and goals reviewed with mulitdisciplinary team at daily rounds.   I discussed the patient's findings and my recommendations with patient and nursing staff         Estevan Godfrey MD, [facility]  Pulmonary and Critical Care Medicine  02/11/17 10:26 AM

## 2017-02-12 ENCOUNTER — APPOINTMENT (OUTPATIENT)
Dept: GENERAL RADIOLOGY | Facility: HOSPITAL | Age: 67
End: 2017-02-12

## 2017-02-12 LAB
ANION GAP SERPL CALCULATED.3IONS-SCNC: 3 MMOL/L (ref 3–11)
BUN BLD-MCNC: 16 MG/DL (ref 9–23)
BUN/CREAT SERPL: 22.9 (ref 7–25)
CALCIUM SPEC-SCNC: 8.7 MG/DL (ref 8.7–10.4)
CHLORIDE SERPL-SCNC: 99 MMOL/L (ref 99–109)
CO2 SERPL-SCNC: 32 MMOL/L (ref 20–31)
CREAT BLD-MCNC: 0.7 MG/DL (ref 0.6–1.3)
GFR SERPL CREATININE-BSD FRML MDRD: 84 ML/MIN/1.73
GLUCOSE BLD-MCNC: 102 MG/DL (ref 70–100)
GLUCOSE BLDC GLUCOMTR-MCNC: 103 MG/DL (ref 70–130)
GLUCOSE BLDC GLUCOMTR-MCNC: 109 MG/DL (ref 70–130)
GLUCOSE BLDC GLUCOMTR-MCNC: 113 MG/DL (ref 70–130)
GLUCOSE BLDC GLUCOMTR-MCNC: 161 MG/DL (ref 70–130)
POTASSIUM BLD-SCNC: 4.1 MMOL/L (ref 3.5–5.5)
SODIUM BLD-SCNC: 134 MMOL/L (ref 132–146)

## 2017-02-12 PROCEDURE — 25010000002 MORPHINE SULFATE (PF) 2 MG/ML SOLUTION: Performed by: INTERNAL MEDICINE

## 2017-02-12 PROCEDURE — 80048 BASIC METABOLIC PNL TOTAL CA: CPT | Performed by: PHYSICIAN ASSISTANT

## 2017-02-12 PROCEDURE — 99232 SBSQ HOSP IP/OBS MODERATE 35: CPT | Performed by: INTERNAL MEDICINE

## 2017-02-12 PROCEDURE — 82962 GLUCOSE BLOOD TEST: CPT

## 2017-02-12 PROCEDURE — 71010 HC CHEST PA OR AP: CPT

## 2017-02-12 RX ORDER — BUMETANIDE 0.25 MG/ML
1 INJECTION INTRAMUSCULAR; INTRAVENOUS ONCE
Status: COMPLETED | OUTPATIENT
Start: 2017-02-12 | End: 2017-02-12

## 2017-02-12 RX ADMIN — OXYCODONE AND ACETAMINOPHEN 1 TABLET: 5; 325 TABLET ORAL at 23:40

## 2017-02-12 RX ADMIN — OXYCODONE AND ACETAMINOPHEN 1 TABLET: 5; 325 TABLET ORAL at 03:24

## 2017-02-12 RX ADMIN — OXYCODONE AND ACETAMINOPHEN 1 TABLET: 5; 325 TABLET ORAL at 07:46

## 2017-02-12 RX ADMIN — FAMOTIDINE 20 MG: 20 TABLET ORAL at 08:09

## 2017-02-12 RX ADMIN — BUMETANIDE 1 MG: 0.25 INJECTION, SOLUTION INTRAMUSCULAR; INTRAVENOUS at 16:46

## 2017-02-12 RX ADMIN — DOCUSATE SODIUM AND SENNOSIDES 2 TABLET: 8.6; 5 TABLET, FILM COATED ORAL at 08:10

## 2017-02-12 RX ADMIN — PRIMIDONE 250 MG: 250 TABLET ORAL at 08:10

## 2017-02-12 RX ADMIN — FAMOTIDINE 20 MG: 20 TABLET ORAL at 20:56

## 2017-02-12 RX ADMIN — ATORVASTATIN CALCIUM 40 MG: 40 TABLET, FILM COATED ORAL at 20:56

## 2017-02-12 RX ADMIN — ASPIRIN 325 MG: 325 TABLET, DELAYED RELEASE ORAL at 08:09

## 2017-02-12 RX ADMIN — DOCUSATE SODIUM AND SENNOSIDES 2 TABLET: 8.6; 5 TABLET, FILM COATED ORAL at 16:47

## 2017-02-12 RX ADMIN — PRIMIDONE 250 MG: 250 TABLET ORAL at 21:00

## 2017-02-12 RX ADMIN — ETHOSUXIMIDE 500 MG: 250 CAPSULE ORAL at 08:10

## 2017-02-12 RX ADMIN — CITALOPRAM HYDROBROMIDE 20 MG: 20 TABLET ORAL at 08:10

## 2017-02-12 RX ADMIN — PRIMIDONE 250 MG: 250 TABLET ORAL at 16:47

## 2017-02-12 RX ADMIN — ETHOSUXIMIDE 500 MG: 250 CAPSULE ORAL at 16:47

## 2017-02-12 RX ADMIN — LEVOTHYROXINE SODIUM 175 MCG: 175 TABLET ORAL at 08:10

## 2017-02-12 RX ADMIN — MORPHINE SULFATE 2 MG: 2 INJECTION, SOLUTION INTRAMUSCULAR; INTRAVENOUS at 10:23

## 2017-02-12 NOTE — PLAN OF CARE
Problem: Patient Care Overview (Adult)  Goal: Plan of Care Review  Outcome: Ongoing (interventions implemented as appropriate)  Orders to tele.  MT's dc'd.  Up to chair and ambulated in [place] per nursing staff.  Bumex given.  Minimal pain meds needed.   Goal: Adult Individualization and Mutuality  Outcome: Ongoing (interventions implemented as appropriate)    Problem: Cardiac Surgery (Adult)  Goal: Signs and Symptoms of Listed Potential Problems Will be Absent or Manageable (Cardiac Surgery)  Outcome: Ongoing (interventions implemented as appropriate)

## 2017-02-12 NOTE — PROGRESS NOTES
"Intensive Care Follow-up     Hospital:  LOS: 9 days   Ms. Tiana Cronin, 66 y.o. female is followed for:   Mitral regurgitation        Subjective   Interval History:  The chart has been reviewed. The patient states that she is much more comfortable now that the mediastinal tubes have been removed. She is not coughing and denies any shortness of breath at this time.    The patient's relevant past medical, surgical and social history were reviewed and updated in Epic as appropriate.        Objective     Infusions:    niCARdipine 5-15 mg/hr   nitroglycerin 5-200 mcg/min     Medications:    aspirin 325 mg Oral Daily   atorvastatin 40 mg Oral Nightly   citalopram 20 mg Oral Daily   ethosuximide 500 mg Oral BID   famotidine 20 mg Oral BID   insulin lispro 2-7 Units Subcutaneous 4x Daily AC & at Bedtime   levothyroxine 175 mcg Oral Daily   pharmacy consult - MTM  Does not apply Daily   polyethylene glycol 17 g Oral Daily   primidone 250 mg Oral TID   sennosides-docusate sodium 2 tablet Oral BID       Vital Sign Min/Max for last 24 hours  Temp  Min: 97.8 °F (36.6 °C)  Max: 98.6 °F (37 °C)   BP  Min: 80/77  Max: 115/62   Pulse  Min: 59  Max: 69   Resp  Min: 14  Max: 16   SpO2  Min: 92 %  Max: 99 %   Flow (L/min)  Min: 2  Max: 2       Input/Output for last 24 hour shift  02/11 0701 - 02/12 0700  In: 1080 [P.O.:1080]  Out: 1990 [Urine:1750]      Objective:  General Appearance:  Comfortable, well-appearing and in no acute distress.    Vital signs: (most recent): Blood pressure 105/57, pulse 59, temperature 97.8 °F (36.6 °C), temperature source Axillary, resp. rate 16, height 65.98\" (167.6 cm), weight 149 lb (67.6 kg), SpO2 98 %.  No fever.    Output: Producing urine.    Lungs:  Normal respiratory rate and normal effort.  She is not in respiratory distress.  No stridor.  There are decreased breath sounds.  No wheezes, rales or rhonchi.    Heart: Normal rate.  Regular rhythm.  S1 normal and S2 normal.  No murmur or friction " rub.   Chest: No chest wall tenderness.  Symmetric chest wall expansion. (Status post median sternotomy. )  Abdomen: Abdomen is soft.  Hypoactive bowel sounds.   There is no abdominal tenderness.     Extremities: Normal range of motion.  There is no deformity or dependent edema.    Neurological: Patient is alert and oriented to person, place and time.  Normal strength.    Pupils:  Pupils are equal, round, and reactive to light.  Pupils are equal.   Skin:  Warm.  No rash, ecchymosis, cyanosis or ulceration.               Results from last 7 days  Lab Units 02/10/17  0755 02/09/17 0355 02/08/17  0330   WBC 10*3/mm3 6.93 6.78 6.05   HEMOGLOBIN g/dL 9.1* 8.6* 8.5*   PLATELETS 10*3/mm3 178 135* 112*       Results from last 7 days  Lab Units 02/12/17  0336 02/10/17  0755 02/09/17 0355  02/07/17  0346   SODIUM mmol/L 134 136 135  < > 130*   POTASSIUM mmol/L 4.1 4.4 4.2  < > 4.4   TOTAL CO2 mmol/L 32.0* 29.0 30.0  < > 25.0   BUN mg/dL 16 18 20  < > 16   CREATININE mg/dL 0.70 0.60 0.60  < > 0.60   MAGNESIUM mg/dL  --   --   --   --  2.1   PHOSPHORUS mg/dL  --   --   --   --  2.6   GLUCOSE mg/dL 102* 92 98  < > 87   < > = values in this interval not displayed.  Estimated Creatinine Clearance: 65.6 mL/min (by C-G formula based on Cr of 0.7).            I reviewed the patient's results and images.     Assessment/Plan   Impression      Principal Problem:    Severe mitral regurgitation (S/P 31 Magna Ease mitral tissue valve)  Active Problems:    Post-Op Asystole    CAD (S/P CABG x 1)       Plan        Continue to mobilize and encourage incentive spirometry.  She will be transferred to the floor when a bed is available. I last hospitalist service to follow her for her medical needs.  Follow-up labs a been ordered for tomorrow morning.    Plan of care and goals reviewed with mulitdisciplinary team at daily rounds.   I discussed the patient's findings and my recommendations with patient and nursing staff         Estevan Godfrey,  MD, [facility]  Pulmonary and Critical Care Medicine  02/12/17 1:32 PM

## 2017-02-12 NOTE — PROGRESS NOTES
[facility]       LOS: 9 days   Patient Care Team:  Rangel Lopez MD as PCP - General (Family Medicine)  Rangel Lopez MD as Consulting Physician (Family Medicine)        Subjective       Patient Denies:  Sob, palps.  Sore      Vital Signs  Temp:  [97.8 °F (36.6 °C)-98.6 °F (37 °C)] 97.8 °F (36.6 °C)  Heart Rate:  [59-70] 60  Resp:  [14-16] 16  BP: ()/(48-77) 97/73    Intake/Output Summary (Last 24 hours) at 02/12/17 1134  Last data filed at 02/12/17 1000   Gross per 24 hour   Intake    840 ml   Output   2160 ml   Net  -1320 ml     I/O this shift:  In: -   Out: 530 [Urine:450; Other:80]    Physical Exam:     General Appearance:    Alert, cooperative, in no acute distress       Neck:   No adenopathy, supple, trachea midline, no JVD       Lungs:     Clear to auscultation,respirations regular, even and                  unlabored    Heart:    Regular rhythm and normal rate, normal S1 and S2, no            murmur, no gallop, no rub, no click   Chest Wall:    No abnormalities observed   Abdomen:     Normal bowel sounds, no masses, no organomegaly, soft        non-tender       Extremities:   Moves all extremities well, no edema, no cyanosis, no             redness   Pulses:   Pulses palpable and equal bilaterally     Results Review:     I reviewed the patient's new clinical results.      WBC WBC   Date/Time Value Ref Range Status   02/10/2017 0755 6.93 3.50 - 10.80 10*3/mm3 Final            HGB HEMOGLOBIN   Date/Time Value Ref Range Status   02/10/2017 0755 9.1 (L) 11.5 - 15.5 g/dL Final           HCT HEMATOCRIT   Date/Time Value Ref Range Status   02/10/2017 0755 27.6 (L) 34.5 - 44.0 % Final            Platlets No results found for: LABPLAT  Sodium  SODIUM   Date/Time Value Ref Range Status   02/12/2017 0336 134 132 - 146 mmol/L Final   02/10/2017 0755 136 132 - 146 mmol/L Final     Potassium  POTASSIUM   Date/Time Value Ref Range Status    02/12/2017 0336 4.1 3.5 - 5.5 mmol/L Final   02/10/2017 0755 4.4 3.5 - 5.5 mmol/L Final     Chloride  CHLORIDE   Date/Time Value Ref Range Status   02/12/2017 0336 99 99 - 109 mmol/L Final   02/10/2017 0755 99 99 - 109 mmol/L Final     BicarbonateNo results found for: PLASMABICARB    BUN BUN   Date/Time Value Ref Range Status   02/12/2017 0336 16 9 - 23 mg/dL Final   02/10/2017 0755 18 9 - 23 mg/dL Final      Creatinine CREATININE   Date/Time Value Ref Range Status   02/12/2017 0336 0.70 0.60 - 1.30 mg/dL Final   02/10/2017 0755 0.60 0.60 - 1.30 mg/dL Final      Calcium CALCIUM   Date/Time Value Ref Range Status   02/12/2017 0336 8.7 8.7 - 10.4 mg/dL Final   02/10/2017 0755 9.3 8.7 - 10.4 mg/dL Final      Mag No results found for: MG        PT/INR:    PROTIME   Date Value Ref Range Status   02/10/2017 10.6 9.6 - 11.5 Seconds Final   /  INR   Date Value Ref Range Status   02/10/2017 0.97  Final     Troponin I   No results found for: CKTOTAL, CKMB, CKMBINDEX, TROPONINI, TROPONINT      aspirin 325 mg Oral Daily   atorvastatin 40 mg Oral Nightly   citalopram 20 mg Oral Daily   ethosuximide 500 mg Oral BID   famotidine 20 mg Oral BID   insulin lispro 2-7 Units Subcutaneous 4x Daily AC & at Bedtime   levothyroxine 175 mcg Oral Daily   pharmacy consult - MTM  Does not apply Daily   polyethylene glycol 17 g Oral Daily   primidone 250 mg Oral TID   sennosides-docusate sodium 2 tablet Oral BID       niCARdipine 5-15 mg/hr   nitroglycerin 5-200 mcg/min       Assessment/Plan     Patient Active Problem List   Diagnosis Code   • Mitral valve disorder I05.9   • Severe mitral regurgitation (S/P 31 Magna Ease mitral tissue valve) I34.0   • Post-Op Asystole I46.9   • CAD (S/P CABG x 1) I25.10     Progressing  Ambulate       SUSANA Ibarra  02/12/17  11:34 AM

## 2017-02-12 NOTE — PROGRESS NOTES
Cardiothoracic Surgery Progress Note      POD # 9 s/p MVR  POD # 2 s/p Pacemaker placement     LOS: 9 days      Subjective:  No complaints.  Ambulating in halls.    Objective:  Vital Signs  Temp:  [97.8 °F (36.6 °C)-98.3 °F (36.8 °C)] 97.8 °F (36.6 °C)  Heart Rate:  [59-63] 60  Resp:  [16-18] 18  BP: ()/(48-77) 102/53    Physical Exam:   General Appearance: alert, appears stated age and cooperative   Lungs: CTAB   Heart: RRR   Skin: Incision c/d/i    Results:    Results from last 7 days  Lab Units 02/10/17  0755   WBC 10*3/mm3 6.93   HEMOGLOBIN g/dL 9.1*   HEMATOCRIT % 27.6*   PLATELETS 10*3/mm3 178       Results from last 7 days  Lab Units 02/12/17  0336   SODIUM mmol/L 134   POTASSIUM mmol/L 4.1   CHLORIDE mmol/L 99   TOTAL CO2 mmol/L 32.0*   BUN mg/dL 16   CREATININE mg/dL 0.70   GLUCOSE mg/dL 102*   CALCIUM mg/dL 8.7       Assessment:  POD # 9 s/p MVR  POD # 2 s/p Pacemaker placement    Plan:  Chest tubes removed  Transfer to telemetry when bed available  Wean off oxygen  D/C home versus rehab 24-48 hours    Jeromy Gaona MD  02/12/17  2:41 PM

## 2017-02-13 ENCOUNTER — APPOINTMENT (OUTPATIENT)
Dept: GENERAL RADIOLOGY | Facility: HOSPITAL | Age: 67
End: 2017-02-13

## 2017-02-13 LAB
ANION GAP SERPL CALCULATED.3IONS-SCNC: 6 MMOL/L (ref 3–11)
BUN BLD-MCNC: 11 MG/DL (ref 9–23)
BUN/CREAT SERPL: 18.3 (ref 7–25)
CALCIUM SPEC-SCNC: 8.8 MG/DL (ref 8.7–10.4)
CHLORIDE SERPL-SCNC: 98 MMOL/L (ref 99–109)
CO2 SERPL-SCNC: 31 MMOL/L (ref 20–31)
CREAT BLD-MCNC: 0.6 MG/DL (ref 0.6–1.3)
GFR SERPL CREATININE-BSD FRML MDRD: 100 ML/MIN/1.73
GLUCOSE BLD-MCNC: 105 MG/DL (ref 70–100)
GLUCOSE BLDC GLUCOMTR-MCNC: 104 MG/DL (ref 70–130)
POTASSIUM BLD-SCNC: 4.1 MMOL/L (ref 3.5–5.5)
SODIUM BLD-SCNC: 135 MMOL/L (ref 132–146)

## 2017-02-13 PROCEDURE — 94799 UNLISTED PULMONARY SVC/PX: CPT

## 2017-02-13 PROCEDURE — 71010 HC CHEST PA OR AP: CPT

## 2017-02-13 PROCEDURE — 99024 POSTOP FOLLOW-UP VISIT: CPT | Performed by: INTERNAL MEDICINE

## 2017-02-13 PROCEDURE — 97110 THERAPEUTIC EXERCISES: CPT

## 2017-02-13 PROCEDURE — 97116 GAIT TRAINING THERAPY: CPT

## 2017-02-13 PROCEDURE — 82962 GLUCOSE BLOOD TEST: CPT

## 2017-02-13 PROCEDURE — 80048 BASIC METABOLIC PNL TOTAL CA: CPT | Performed by: INTERNAL MEDICINE

## 2017-02-13 PROCEDURE — 99233 SBSQ HOSP IP/OBS HIGH 50: CPT | Performed by: INTERNAL MEDICINE

## 2017-02-13 RX ORDER — MIDODRINE HYDROCHLORIDE 5 MG/1
5 TABLET ORAL EVERY 8 HOURS SCHEDULED
Status: DISCONTINUED | OUTPATIENT
Start: 2017-02-13 | End: 2017-02-14

## 2017-02-13 RX ORDER — OXYCODONE HYDROCHLORIDE AND ACETAMINOPHEN 5; 325 MG/1; MG/1
2 TABLET ORAL EVERY 4 HOURS PRN
Qty: 30 TABLET | Refills: 0 | Status: SHIPPED | OUTPATIENT
Start: 2017-02-13 | End: 2017-03-10

## 2017-02-13 RX ORDER — BUMETANIDE 0.25 MG/ML
1 INJECTION INTRAMUSCULAR; INTRAVENOUS ONCE
Status: COMPLETED | OUTPATIENT
Start: 2017-02-13 | End: 2017-02-13

## 2017-02-13 RX ADMIN — POLYETHYLENE GLYCOL 3350 17 G: 17 POWDER, FOR SOLUTION ORAL at 08:20

## 2017-02-13 RX ADMIN — BUMETANIDE 1 MG: 0.25 INJECTION, SOLUTION INTRAMUSCULAR; INTRAVENOUS at 13:14

## 2017-02-13 RX ADMIN — PRIMIDONE 250 MG: 250 TABLET ORAL at 08:21

## 2017-02-13 RX ADMIN — ASPIRIN 325 MG: 325 TABLET, DELAYED RELEASE ORAL at 08:19

## 2017-02-13 RX ADMIN — MIDODRINE HYDROCHLORIDE 5 MG: 5 TABLET ORAL at 20:17

## 2017-02-13 RX ADMIN — MIDODRINE HYDROCHLORIDE 5 MG: 5 TABLET ORAL at 11:00

## 2017-02-13 RX ADMIN — PRIMIDONE 250 MG: 250 TABLET ORAL at 17:03

## 2017-02-13 RX ADMIN — ETHOSUXIMIDE 500 MG: 250 CAPSULE ORAL at 17:03

## 2017-02-13 RX ADMIN — DOCUSATE SODIUM AND SENNOSIDES 2 TABLET: 8.6; 5 TABLET, FILM COATED ORAL at 08:20

## 2017-02-13 RX ADMIN — FAMOTIDINE 20 MG: 20 TABLET ORAL at 08:20

## 2017-02-13 RX ADMIN — PRIMIDONE 250 MG: 250 TABLET ORAL at 20:17

## 2017-02-13 RX ADMIN — ETHOSUXIMIDE 500 MG: 250 CAPSULE ORAL at 08:21

## 2017-02-13 RX ADMIN — CITALOPRAM HYDROBROMIDE 20 MG: 20 TABLET ORAL at 08:20

## 2017-02-13 RX ADMIN — FAMOTIDINE 20 MG: 20 TABLET ORAL at 20:18

## 2017-02-13 RX ADMIN — ATORVASTATIN CALCIUM 40 MG: 40 TABLET, FILM COATED ORAL at 20:17

## 2017-02-13 RX ADMIN — LEVOTHYROXINE SODIUM 175 MCG: 175 TABLET ORAL at 08:20

## 2017-02-13 NOTE — PROGRESS NOTES
Cardiothoracic Surgery Progress Note      POD # 10,MVR # 3 Pacer     LOS: 10 days      Subjective:  No complaints, except weak    Objective:  Vital Signs  Temp:  [97.8 °F (36.6 °C)-98.6 °F (37 °C)] 98.6 °F (37 °C)  Heart Rate:  [59-70] 61  Resp:  [16-18] 16  BP: ()/(53-73) 104/64    Physical Exam:   Gen: Sitting up in chair.  Appears comfortable   Lungs: clear to auscultation, respirations regular, respirations even and respirations unlabored   Heart: regular rhythm & normal rate, normal S1, S2, no murmur, no tammy, no rub and no click   Skin: Incision c/d/i     Results:    Results from last 7 days  Lab Units 02/10/17  0755   WBC 10*3/mm3 6.93   HEMOGLOBIN g/dL 9.1*   HEMATOCRIT % 27.6*   PLATELETS 10*3/mm3 178       Results from last 7 days  Lab Units 02/13/17  0347   SODIUM mmol/L 135   POTASSIUM mmol/L 4.1   CHLORIDE mmol/L 98*   TOTAL CO2 mmol/L 31.0   BUN mg/dL 11   CREATININE mg/dL 0.60   GLUCOSE mg/dL 105*   CALCIUM mg/dL 8.8         Assessment:  POD # 10,MVR # 3 Pacer, slow recovery    Plan:  Awaiting telemetry bed  Will likely need rehab  Wean oxygen  Diuresis    SUSANA Ruiz  02/13/17  7:22 AM

## 2017-02-13 NOTE — PROGRESS NOTES
Adult Nutrition  Assessment/PES    Patient Name:  Tiana Cronin  YOB: 1950  MRN: [id number]  Admit Date:  2/3/2017    Assessment Date:  2/13/2017        Reason for Assessment       02/13/17 1235    Reason for Assessment    Reason For Assessment/Visit follow up protocol;multidisciplinary rounds;TF/PN    Time Spent (min) 20    Diagnosis Diagnosis   per previous nutrition note.     Cardiac --   S/P pacemaker 12/10              Nutrition/Diet History       02/13/17 1235    Nutrition/Diet History    Reported/Observed By Patient    Other improved PO intake, drinking supplement.               Labs/Tests/Procedures/Meds       02/13/17 1236    Labs/Tests/Procedures/Meds    Labs/Tests Review Reviewed    Medication Review Reviewed, pertinent                Nutrition Prescription Ordered       02/13/17 1236    Nutrition Prescription PO    Current PO Diet Regular    Supplement Boost HP    Supplement Frequency 3 times a day    Common Modifiers Cardiac;Consistent Carbohydrate            Evaluation of Received Nutrient/Fluid Intake       02/13/17 1236    PO Evaluation    Number of Days PO Intake Evaluated 2 days    Number of Meals 5    % PO Intake 40              Problem/Interventions:        Problem 1       02/13/17 1237    Nutrition Diagnoses Problem 1    Problem 1 Inadequate Nutrient Intake    Etiology (related to) Factors Affecting Nutrition    Appetite Poor at this Time    Signs/Symptoms (evidenced by) PO Intake    Percent (%) intake recorded 40 % + Ensure HP TID.    Over number of meals 5                    Intervention Goal       02/13/17 1237    Intervention Goal    General Nutrition support treatment    PO Continue positive trend            Nutrition Intervention       02/13/17 1237    Nutrition Intervention    RD/Tech Action Follow Tx progress;Interview for preference;Supplement provided;Menu provided;Menu adjusted              Education/Evaluation       02/13/17 1238    Monitor/Evaluation    Monitor Per  protocol;Pertinent labs;PO intake;Supplement intake        Comments:      Electronically signed by:  Michelle Pedersen RD  02/13/17 12:39 PM

## 2017-02-13 NOTE — PROGRESS NOTES
Continued Stay Note   [facility]     Patient Name: Tiana Cronin  MRN: [id number]  Today's Date: 2/13/2017    Admit Date: 2/3/2017          Discharge Plan       02/13/17 1211    Case Management/Social Work Plan    Plan home    Patient/Family In Agreement With Plan yes    Additional Comments Revisited with Ms. Cronin and her plans remain to return to [place] to stay with her sister and brother in law at time of discharge. After discussion with PT therapist, RW is recommended and has been ordered. From initial conversation with Ms. Cronin, she elected to use [facility] and confirms today she wishes to use [facility] to supply her walker. Called Jennifer with [facility] with referral and per her request, have printed order and clinicals that she will  from  this afternoon.               Discharge Codes     None        Expected Discharge Date and Time     Expected Discharge Date Expected Discharge Time    Feb 15, 2017             Loreta Ospina RN

## 2017-02-13 NOTE — PROGRESS NOTES
"[facility] Daily Note       LOS: 10 days   Patient Care Team:  Rangel Lopez MD as PCP - General (Family Medicine)  Rangel Lopez MD as Consulting Physician (Family Medicine)    Chief Complaint:  Asystole following MVR    Subjective: Feels much better now the chest tubes are out and her permanent pacemaker is in place.  She does complain of a little lightheadedness when she stands up at times.  On review of her blood pressures her systolics have been from .    Review of Systems:   As above.    Medications:    aspirin 325 mg Oral Daily   atorvastatin 40 mg Oral Nightly   citalopram 20 mg Oral Daily   ethosuximide 500 mg Oral BID   famotidine 20 mg Oral BID   insulin lispro 2-7 Units Subcutaneous 4x Daily AC & at Bedtime   levothyroxine 175 mcg Oral Daily   pharmacy consult - MTM  Does not apply Daily   polyethylene glycol 17 g Oral Daily   primidone 250 mg Oral TID   sennosides-docusate sodium 2 tablet Oral BID       Vital Sign Min/Max for last 24 hours  Temp  Min: 98.2 °F (36.8 °C)  Max: 98.6 °F (37 °C)   BP  Min: 85/56  Max: 120/68   Pulse  Min: 59  Max: 70   Resp  Min: 16  Max: 18   SpO2  Min: 94 %  Max: 99 %   Flow (L/min)  Min: 1  Max: 2   No Data Recorded      Intake/Output Summary (Last 24 hours) at 02/13/17 0947  Last data filed at 02/13/17 0800   Gross per 24 hour   Intake    980 ml   Output   1355 ml   Net   -375 ml        Flowsheet Rows         First Filed Value    Admission Height  66\" (167.6 cm) Documented at 02/03/2017 0621    Admission Weight  148 lb 13 oz (67.5 kg) Documented at 02/03/2017 0621          Physical Exam:     Cardiovascular: Heart has a nondisplaced focal PMI. Regular rate and rhythm without murmur, gallop or rub.  The pacemaker site is clean and dry.  The pressure dressing was removed.  Lungs: Clear without rales or wheezes.  A few basilar rales are noted.  Equal expansion is noted.   Abdomen: Soft, nontender.  Extremities: Show trace edema.   Skin: warm " and dry.     Results Review:    I reviewed the patient's new clinical results.  EKG:  Tele: paced.  AV paced  Labs:      Results from last 7 days  Lab Units 02/13/17  0347 02/12/17  0336 02/10/17  0755 02/09/17  0355 02/08/17  0330 02/07/17  0346   SODIUM mmol/L 135 134 136 135 133 130*   POTASSIUM mmol/L 4.1 4.1 4.4 4.2 4.1 4.4   CHLORIDE mmol/L 98* 99 99 98* 98* 101   TOTAL CO2 mmol/L 31.0 32.0* 29.0 30.0 27.0 25.0   BUN mg/dL 11 16 18 20 20 16   CREATININE mg/dL 0.60 0.70 0.60 0.60 0.70 0.60   CALCIUM mg/dL 8.8 8.7 9.3 9.1 8.7 8.7   BILIRUBIN mg/dL  --   --   --  0.3 0.3 0.4   ALK PHOS U/L  --   --   --  82 75 68   ALT (SGPT) U/L  --   --   --  28 20 18   AST (SGOT) U/L  --   --   --  42* 35* 38*   GLUCOSE mg/dL 105* 102* 92 98 99 87       Results from last 7 days  Lab Units 02/10/17  0755 02/09/17  0355 02/08/17  0330   WBC 10*3/mm3 6.93 6.78 6.05   HEMOGLOBIN g/dL 9.1* 8.6* 8.5*   HEMATOCRIT % 27.6* 26.3* 25.9*   PLATELETS 10*3/mm3 178 135* 112*     No results found for: TROPONINI, TROPONINT  No results found for: CHOL  No results found for: TRIG  No results found for: HDL  No results found for: LDLCALC  Lab Results   Component Value Date    INR 0.97 02/10/2017    INR 1.10 02/04/2017    INR 1.39 02/03/2017    PROTIME 10.6 02/10/2017    PROTIME 12.0 (H) 02/04/2017    PROTIME 15.2 (H) 02/03/2017         Ejection Fraction: 65-70%      Assessment:    1. [facility] , serial #[id number] on February 10, 2017 by Dr. Andrade  2. 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 CAD  -s/p CABG x 1 2/3/2017  -Hypotension       Plan:    Proamatine 5 mg by mouth every 8 hours  Watch blood pressures closely  To telemetry when a bed is available.      Abigail Bee MD  02/13/17  9:47 AM

## 2017-02-13 NOTE — NURSING NOTE
Pt. Referred for Phase II Cardiac Rehab. Staff discussed benefits of exercise, program protocol, and educational material provided. Teach back verified.  Permission granted from patient for staff to fax referral information to outlying program at this time.  Staff to fax referral info to [facility].

## 2017-02-13 NOTE — PLAN OF CARE
Problem: Patient Care Overview (Adult)  Goal: Plan of Care Review  Outcome: Ongoing (interventions implemented as appropriate)    02/13/17 0039   Coping/Psychosocial Response Interventions   Plan Of Care Reviewed With patient   Patient Care Overview   Progress progress toward functional goals as expected       Goal: Adult Individualization and Mutuality  Outcome: Ongoing (interventions implemented as appropriate)    02/11/17 1511   Mutuality/Individual Preferences   What Anxieties, Fears or Concerns Do You Have About Your Health or Care? Pt still concerned about ICU stay and would like more physical therapy work.   What Questions Do You Have About Your Health or Care? Once again PT is curious on when she will ultimately be discharged from [facility]   What Information Would Help Us Give You More Personalized Care? PTs family would like to be informed of when DRs round but are agreeable on plan of care and appropriate.   Individualization   Patient Specific Preferences PT would like to be ordered to Telemetry floor despite slow recovery   Patient Specific Goals PT continues to proactive and compliant towards mobilizatioin.   Patient Specific Interventions pt walked with PT and is eager to progress care. PT feels like she is not doing well but remains positive.         Problem: Cardiac Surgery (Adult)  Goal: Signs and Symptoms of Listed Potential Problems Will be Absent or Manageable (Cardiac Surgery)  Outcome: Ongoing (interventions implemented as appropriate)    02/13/17 0039   Cardiac Surgery   Problems Assessed (Cardiac Surgery) all   Problems Present (Cardiac Surgery) pain         Problem: Pressure Ulcer Risk (Chris Scale) (Adult,Obstetrics,Pediatric)  Goal: Identify Related Risk Factors and Signs and Symptoms  Outcome: Ongoing (interventions implemented as appropriate)    02/13/17 0039   Pressure Ulcer Risk (Chris Scale)   Related Risk Factors (Pressure Ulcer Risk (Chris Scale)) body weight extremes;critical care  admission;hospitalization prolonged;mobility impaired       Goal: Skin Integrity  Outcome: Ongoing (interventions implemented as appropriate)    02/13/17 0039   Pressure Ulcer Risk (Chris Scale) (Adult,Obstetrics,Pediatric)   Skin Integrity making progress toward outcome

## 2017-02-13 NOTE — PROGRESS NOTES
Acute Care - Physical Therapy Treatment Note  [facility]     Patient Name: Tiana Cronin  : 1950  MRN: [id number]  Today's Date: 2017  Onset of Illness/Injury or Date of Surgery Date: 17  Date of Referral to PT: 17  Referring Physician: SUSANA Tucker    Admit Date: 2/3/2017    Visit Dx:    ICD-10-CM ICD-9-CM   1. Impaired functional mobility, balance, gait, and endurance Z74.09 V49.89   2. Non-rheumatic mitral regurgitation I34.0 424.0   3. Mitral valve disorder I05.9 424.0   4. Post-Op Asystole I46.9 427.5   5. Mitral valve insufficiency, unspecified etiology I34.0 424.0   6. Coronary artery disease involving other coronary artery bypass graft I25.810      Patient Active Problem List   Diagnosis   • Mitral valve disorder   • Severe mitral regurgitation (S/P 31 Magna Ease mitral tissue valve)   • Post-Op Asystole   • CAD (S/P CABG x 1)               Adult Rehabilitation Note       17 1005 17 0955       Rehab Assessment/Intervention    Discipline physical therapist  -LS physical therapist  -LS     Document Type therapy note (daily note)  -LS therapy note (daily note)  -LS     Subjective Information agree to therapy;complains of;pain  -LS agree to therapy;complains of;weakness;pain  -LS     Patient Effort, Rehab Treatment good  -LS good  -LS     Precautions/Limitations fall precautions;cardiac precautions;oxygen therapy device and L/min;pacemaker;sternal precautions  -LS fall precautions;cardiac precautions;oxygen therapy device and L/min;sternal precautions;pacemaker  -LS     Specific Treatment Considerations  s/p PM placement 2/10  -LS     Recorded by [LS] Francesca Cantu, PT [LS] Francesca Cantu, PT     Vital Signs    Pre Systolic BP Rehab 95  -  -LS     Pre Treatment Diastolic BP 53  -LS 58  -LS     Post Systolic BP Rehab 113  -  -LS     Post Treatment Diastolic BP 65  -LS 64  -LS     Pretreatment Heart Rate (beats/min) 68  -LS 69  -LS     Posttreatment Heart Rate  (beats/min) 71  -LS 70  -LS     Pre SpO2 (%) 97  -LS 95  -LS     O2 Delivery Pre Treatment room air  -LS supplemental O2   1L  -LS     Post SpO2 (%) 98  -LS 93  -LS     O2 Delivery Post Treatment room air  -LS supplemental O2  -LS     Pre Patient Position Sitting  -LS Supine  -LS     Intra Patient Position Standing  -LS Standing  -LS     Post Patient Position Sitting  -LS Supine  -LS     Recorded by [LS] Francesca Cantu, PT [LS] Francesca Cantu, PT     Pain Assessment    Pain Assessment 0-10  -LS 0-10  -LS     Pain Score 3  -LS 5  -LS     Post Pain Score 3  -LS 6  -LS     Pain Type Surgical pain  -LS Surgical pain  -LS     Pain Location Chest  -LS Chest  -LS     Pain Intervention(s) Repositioned;Ambulation/increased activity  -LS Repositioned;Ambulation/increased activity  -LS     Recorded by [LS] Francesca Cantu, PT [LS] Francesca Cantu PT     Cognitive Assessment/Intervention    Current Cognitive/Communication Assessment functional  -LS functional  -LS     Orientation Status oriented x 4  -LS oriented x 4  -LS     Follows Commands/Answers Questions able to follow single-step instructions;100% of the time;needs cueing  -LS able to follow single-step instructions;100% of the time;needs cueing;needs increased time;needs repetition  -LS     Personal Safety good awareness, safety precautions  -LS mild impairment;decreased awareness, need for assist;decreased awareness, need for safety  -LS     Personal Safety Interventions fall prevention program maintained;gait belt;nonskid shoes/slippers when out of bed  -LS fall prevention program maintained;gait belt;nonskid shoes/slippers when out of bed  -LS     Recorded by [LS] Francesca Cantu, PT [LS] Francesca Cantu, PT     Bed Mobility, Assessment/Treatment    Bed Mob, Supine to Sit, [place]  moderate assist (50% patient effort);2 person assist required;verbal cues required  -LS     Bed Mob, Sit to Supine, [place]  moderate assist (50% patient effort);2 person assist  required;verbal cues required  -LS     Bed Mobility, Safety Issues  decreased use of arms for pushing/pulling;decreased use of legs for bridging/pushing  -LS     Bed Mobility, Comment UIC  -LS      Recorded by [LS] Francesca Cantu, PT [LS] Francesca Cantu, PT     Transfer Assessment/Treatment    Transfers, Sit-Stand [place] minimum assist (75% patient effort);1 person + 1 person to manage equipment;verbal cues required  -LS minimum assist (75% patient effort);2 person assist required;verbal cues required  -LS     Transfers, Stand-Sit [place] minimum assist (75% patient effort);1 person + 1 person to manage equipment;verbal cues required  -LS minimum assist (75% patient effort);2 person assist required;verbal cues required  -LS     Transfers, Sit-Stand-Sit, Assist Device rolling walker  -LS      Toilet Transfer, [place]  minimum assist (75% patient effort);2 person assist required;verbal cues required  -LS     Transfer, Impairments  strength decreased  -LS     Transfer, Comment VC's for hand placement; sternal precautions.   -LS      Recorded by [LS] Francesca Cantu, PT [LS] Francesca Cantu, PT     Gait Assessment/Treatment    Gait, [place] Level contact guard assist;1 person + 1 person to manage equipment;verbal cues required  -LS minimum assist (75% patient effort);2 person assist required;verbal cues required  -LS     Gait, Assistive Device rolling walker  -LS --   HHA of PT on R  -LS     Gait, Distance (Feet) 180  -LS 40  -LS     Gait, Gait Deviations donna decreased;forward flexed posture;step length decreased  -LS donna decreased;forward flexed posture;step length decreased  -LS     Gait, Safety Issues  supplemental O2;sequencing ability decreased;step length decreased  -LS     Gait, Impairments impaired balance;strength decreased  -LS pain;strength decreased  -LS     Gait, Comment VC's for posture and increasing step length within RWx.   -LS Noted slight knee bucling R. VC's for posture and  increasing step length bilat. Req'dseated rest break after 20 ft.   -LS     Recorded by [LS] Francesca Cantu, PT [LS] Francesca Cantu, PT     Therapy Exercises    Bilateral Lower Extremities AROM:;10 reps;sitting;ankle pumps/circles;LAQ;hip flexion  -LS AROM:;10 reps;supine;ankle pumps/circles;glut sets;quad sets  -LS     Bilateral Upper Extremity AROM:;10 reps;sitting;elbow flexion/extension;shoulder abduction/adduction;shoulder protraction/retraction  -LS      Recorded by [LS] Francesca Cantu, PT [LS] Francesca Cantu, PT     Positioning and Restraints    Pre-Treatment Position sitting in chair/recliner  -LS in bed  -LS     Post Treatment Position chair  -LS bed  -LS     In Bed  notified nsg;supine;call light within reach;encouraged to call for assist;with brace   LUE sling  -LS     In Chair notified nsg;reclined;call light within reach;encouraged to call for assist;legs elevated  -LS      Recorded by [LS] Francesca Cantu, PT [LS] Francesca Cantu, PT       User Key  (r) = Recorded By, (t) = Taken By, (c) = Cosigned By    Initials Name Effective Dates    LS Francesca Cantu, PT 06/19/15 -                 IP PT Goals       02/13/17 1308 02/11/17 1310 02/07/17 1336    Bed Mobility PT LTG    Bed Mobility PT LTG, Outcome goal ongoing  -LS goal ongoing  -LS goal ongoing  -PAUL    Transfer Training PT LTG    Transfer Training PT LTG, Outcome goal ongoing  -LS goal ongoing  -LS goal ongoing  -PAUL    Gait Training PT LTG    Gait Training Goal PT LTG, Outcome goal ongoing  -LS goal ongoing  -LS goal ongoing  -PAUL    Static Sitting Balance PT LTG    Static Sitting Balance PT LTG, Outcome goal ongoing  -LS goal ongoing  -LS goal ongoing  -PAUL      02/06/17 0943          Bed Mobility PT LTG    Bed Mobility PT LTG, Date Established 02/06/17  -SJ      Bed Mobility PT LTG, Time to Achieve 2 wks  -SJ      Bed Mobility PT LTG, Activity Type roll left/roll right;supine to sit/sit to supine  -SJ      Bed Mobility PT LTG, [place] Level contact guard  assist  -SJ      Bed Mobility PT Goal  LTG, Assist Device bed rails  -SJ      Transfer Training PT LTG    Transfer Training PT LTG, Date Established 02/06/17  -SJ      Transfer Training PT LTG, Time to Achieve 2 wks  -SJ      Transfer Training PT LTG, Activity Type bed to chair /chair to bed;sit to stand/stand to sit  -SJ      Transfer Training PT LTG, [place] Level contact guard assist  -SJ      Transfer Training PT LTG, Assist Device walker, rolling  -SJ      Gait Training PT LTG    Gait Training Goal PT LTG, Date Established 02/06/17  -SJ      Gait Training Goal PT LTG, Time to Achieve 2 wks  -SJ      Gait Training Goal PT LTG, [place] Level contact guard assist  -SJ      Gait Training Goal PT LTG, Assist Device walker, rolling  -SJ      Gait Training Goal PT LTG, Distance to Achieve 200  -SJ      Static Sitting Balance PT LTG    Static Sitting Balance PT LTG, Date Established 02/06/17  -SJ      Static Sitting Balance PT LTG, Time to Achieve 2 wks  -SJ      Static Sitting Balance PT LTG, [place] Level conditional independence  -SJ        User Key  (r) = Recorded By, (t) = Taken By, (c) = Cosigned By    Initials Name Provider Type    PAUL Madison, PT Physical Therapist    SJ Patricia Cox, PT Physical Therapist    LS Francesca Cantu, PT Physical Therapist          Physical Therapy Education     Title: PT OT SLP Therapies (Done)     Topic: Physical Therapy (Done)     Point: Mobility training (Done)    Learning Progress Summary    Learner Readiness Method Response Comment Documented by Status   Patient Acceptance E,D VU,NR   02/13/17 1308 Done    Acceptance E,D NR   02/11/17 1309 Active    Acceptance E NR  PAUL 02/09/17 1101 Active    Acceptance E,D NR   02/08/17 0936 Active    Acceptance E NR discussed HEP and bed exercises patient can do on her own patient needs cues today to perform activity PAUL 02/07/17 1335 Active    Acceptance E NR   02/06/17 0940 Active               Point: Home  exercise program (Done)    Learning Progress Summary    Learner Readiness Method Response Comment Documented by Status   Patient Acceptance E,D VU,NR   02/13/17 1308 Done    Acceptance E,D NR   02/11/17 1309 Active    Acceptance E NR   02/09/17 1101 Active    Acceptance E,D NR   02/08/17 0936 Active    Acceptance E NR discussed HEP and bed exercises patient can do on her own patient needs cues today to perform activity PAUL 02/07/17 1335 Active    Acceptance E NR   02/06/17 0940 Active               Point: Body mechanics (Done)    Learning Progress Summary    Learner Readiness Method Response Comment Documented by Status   Patient Acceptance E,D VU,NR   02/13/17 1308 Done    Acceptance E,D NR   02/11/17 1309 Active    Acceptance E NR   02/09/17 1101 Active    Acceptance E,D NR   02/08/17 0936 Active    Acceptance E NR discussed HEP and bed exercises patient can do on her own patient needs cues today to perform activity  02/07/17 1335 Active    Acceptance E NR   02/06/17 0940 Active               Point: Precautions (Done)    Learning Progress Summary    Learner Readiness Method Response Comment Documented by Status   Patient Acceptance E,D VU,NR   02/13/17 1308 Done    Acceptance E,D NR   02/11/17 1309 Active    Acceptance E NR   02/09/17 1101 Active    Acceptance E,D NR   02/08/17 0936 Active    Acceptance E NR discussed HEP and bed exercises patient can do on her own patient needs cues today to perform activity  02/07/17 1335 Active    Acceptance E NR   02/06/17 0940 Active                      User Key     Initials Effective Dates Name Provider Type Discipline     06/19/15 -  Faina Madison, PT Physical Therapist PT     06/19/15 -  Patricia Cox, PT Physical Therapist PT     06/19/15 -  Francesca Cantu, PT Physical Therapist PT                    PT Recommendation and Plan  Anticipated Equipment Needs At Discharge: front wheeled walker  Anticipated Discharge Disposition:  inpatient rehabilitation facility  Demonstrates Need for Referral to Another Service: occupational therapy  Planned Therapy Interventions: balance training, bed mobility training, gait training, home exercise program, patient/family education, strengthening, transfer training  PT Frequency: daily  Plan of Care Review  Plan Of Care Reviewed With: patient  Progress: improving  Outcome Summary/Follow up Plan: Pt demonstrated increased indep with ambulation today; able to progress distance to 180 total ft with use of RWx (on RA). Cont to be limited by fatigue; gave excellent effort with seated ther ex. Will cont to progress as clinically warranted.          Outcome Measures       02/13/17 1005 02/11/17 0955       How much help from another person do you currently need...    Turning from your back to your side while in flat bed without using bedrails? 3  -LS 2  -LS     Moving from lying on back to sitting on the side of a flat bed without bedrails? 2  -LS 2  -LS     Moving to and from a bed to a chair (including a wheelchair)? 3  -LS 3  -LS     Standing up from a chair using your arms (e.g., wheelchair, bedside chair)? 3  -LS 3  -LS     Climbing 3-5 steps with a railing? 2  -LS 1  -LS     To walk in hospital room? 3  -LS 3  -LS     AM-PAC 6 Clicks Score 16  -LS 14  -LS     Functional Assessment    Outcome Measure Options AM-PAC 6 Clicks Basic Mobility (PT)  -LS AM-PAC 6 Clicks Basic Mobility (PT)  -LS       User Key  (r) = Recorded By, (t) = Taken By, (c) = Cosigned By    Initials Name Provider Type    KEITH Cantu, PT Physical Therapist           Time Calculation:         PT Charges       02/13/17 1311          Time Calculation    Start Time 1005  -LS      PT Received On 02/13/17  -      PT Goal Re-Cert Due Date 02/16/17  -      Time Calculation- PT    Total Timed Code Minutes- PT 23 minute(s)  -        User Key  (r) = Recorded By, (t) = Taken By, (c) = Cosigned By    Initials Name Provider Type    KEITH LOPEZ  Pepe, PT Physical Therapist          Therapy Charges for Today     Code Description Service Date Service Provider Modifiers Qty    67433029948 HC GAIT TRAINING EA 15 MIN 2/13/2017 Francesca Cantu, PT GP 1    87443285367 HC PT THER PROC EA 15 MIN 2/13/2017 Francesca Cantu, PT GP 1    93578634947 HC PT THER SUPP EA 15 MIN 2/13/2017 Francesca Cantu, PT GP 2          PT G-Codes  Outcome Measure Options: AM-PAC 6 Clicks Basic Mobility (PT)    Francesca Cantu, PT  2/13/2017

## 2017-02-13 NOTE — PROGRESS NOTES
Pulmonary/Critical Care Follow-up     LOS: 10 days   Patient Care Team:  Rangel Lopez MD as PCP - General (Family Medicine)  Rangel Lopez MD as Consulting Physician (Family Medicine)    Chief Complaint / reason : valvular heard disease.      No chief complaint on file.      Subjective    66-year-old lifetime nonsmoking female who presented with a one-year history of progressive dyspnea and was found to have severe mitral regurgitation and 50-60% circumflex coronary artery disease and underwent a CABG and MVR with a tissue valve by Dr. Gaona on 2/3/2017.  The patient had a permanent pacemaker placed on 2/10/2017 by Dr. Dang.    Interval History:   Patient is without current complaints.  She just walked in the [place].  She has had lower extremity edema.  Her postoperative pain is well controlled.  She denies nausea, vomiting, chest pain, dizziness.      History taken from: patient    PMH/FH/Social History were reviewed and updated appropriately in the electronic medical record.     Review of Systems:    Review of 14 systems was completed with positives and pertinent negatives noted in the subjective section.  All other systems reviewed and are negative.         Objective     Vital Signs  Temp:  [98.2 °F (36.8 °C)-98.6 °F (37 °C)] 98.5 °F (36.9 °C)  Heart Rate:  [59-70] 66  Resp:  [16-18] 16  BP: ()/(53-73) 90/60  02/12 0701 - 02/13 0700  In: 500 [P.O.:500]  Out: 1705 [Urine:1625]  Body mass index is 24.06 kg/(m^2).      O2 sat: 97% room air  Physical Exam:     Constitutional:    Alert, cooperative, in no acute distress   Head:    Normocephalic, without obvious abnormality, atraumatic   Eyes:            Lids and lashes normal, conjunctivae and sclerae normal, no   icterus, no pallor, corneas clear, PER   ENMT:   Ears appear intact with no abnormalities noted      No oral lesions, no thrush, oral mucosa moist   Neck:   No adenopathy, supple, trachea midline, no thyromegaly, no   carotid bruit,  no JVD       Lungs/Resp:     Normal effort, symmetric chest rise, left chest pacemaker site dressing in place.  No crepitus, mild bibasilar rales, no chest wall tenderness                Heart/CV:    Regular rhythm and normal rate, normal S1 and S2, no            murmur   Abdomen/GI:     Normal bowel sounds, no masses, no organomegaly, soft        non-tender, non-distended   :     Deferred   Extremities/MSK:   No clubbing or cyanosis.  2+ bilateral lower extremity edema.  Normal tone.  No deformities.    Pulses:   Pulses palpable and equal bilaterally   Skin:   No bleeding, bruising or rash   Heme/Lymph:   No cervical or supraclavicular adenopathy.    Neurologic:    Psychiatric:       Moves all extremities with no obvious focal motor deficit.  Cranial nerves 2 - 12 grossly intact   Oriented x 3, normal affect.             Results Review:     I reviewed the patient's new clinical results.     Results from last 7 days  Lab Units 02/13/17  0347 02/12/17  0336 02/10/17  0755 02/09/17  0355 02/08/17  0330 02/07/17  0346   SODIUM mmol/L 135 134 136 135 133 130*   POTASSIUM mmol/L 4.1 4.1 4.4 4.2 4.1 4.4   CHLORIDE mmol/L 98* 99 99 98* 98* 101   TOTAL CO2 mmol/L 31.0 32.0* 29.0 30.0 27.0 25.0   BUN mg/dL 11 16 18 20 20 16   CREATININE mg/dL 0.60 0.70 0.60 0.60 0.70 0.60   CALCIUM mg/dL 8.8 8.7 9.3 9.1 8.7 8.7   BILIRUBIN mg/dL  --   --   --  0.3 0.3 0.4   ALK PHOS U/L  --   --   --  82 75 68   ALT (SGPT) U/L  --   --   --  28 20 18   AST (SGOT) U/L  --   --   --  42* 35* 38*   GLUCOSE mg/dL 105* 102* 92 98 99 87       Results from last 7 days  Lab Units 02/10/17  0755 02/09/17 0355 02/08/17 0330   WBC 10*3/mm3 6.93 6.78 6.05   HEMOGLOBIN g/dL 9.1* 8.6* 8.5*   HEMATOCRIT % 27.6* 26.3* 25.9*   PLATELETS 10*3/mm3 178 135* 112*           Results from last 7 days  Lab Units 02/07/17  0346   MAGNESIUM mg/dL 2.1   PHOSPHORUS mg/dL 2.6   Hemoglobin A1C 5.1%          I reviewed the patient's new imaging including images and  reports.  Chest x-ray from 2/12/2017 was reviewed.  It showed no definite pneumothorax with pacer leads in place and mildly increased interstitial markings.    Medication Review:     aspirin 325 mg Oral Daily   atorvastatin 40 mg Oral Nightly   citalopram 20 mg Oral Daily   ethosuximide 500 mg Oral BID   famotidine 20 mg Oral BID   insulin lispro 2-7 Units Subcutaneous 4x Daily AC & at Bedtime   levothyroxine 175 mcg Oral Daily   pharmacy consult - MTM  Does not apply Daily   polyethylene glycol 17 g Oral Daily   primidone 250 mg Oral TID   sennosides-docusate sodium 2 tablet Oral BID       niCARdipine 5-15 mg/hr   nitroglycerin 5-200 mcg/min       Assessment/Plan     Principal Problem:    Severe mitral regurgitation (S/P 31 Magna Ease mitral tissue valve)  Active Problems:    Post-Op Asystole    CAD (S/P CABG x 1)        Plan:  1.  Discontinue fingerstick glucose testing.  2.  Okay to transfer to telemetry from my standpoint.  We will sign off on transfer, please consult hospitalists if indicated.  3.  Discontinue correction insulin.  4.  Continue Synthroid.  5.  Bumex 1 mg IV ×1 now.      Cecil Santillan MD  02/13/17  8:41 AM      *. Please note that portions of this note were completed with a voice recognition program. Efforts were made to edit the dictations, but occasionally words are mistranscribed.

## 2017-02-13 NOTE — PLAN OF CARE
Problem: Patient Care Overview (Adult)  Goal: Plan of Care Review  Outcome: Ongoing (interventions implemented as appropriate)    02/13/17 1308   Coping/Psychosocial Response Interventions   Plan Of Care Reviewed With patient   Patient Care Overview   Progress improving   Outcome Evaluation   Outcome Summary/Follow up Plan Pt demonstrated increased indep with ambulation today; able to progress distance to 180 total ft with use of RWx (on RA). Cont to be limited by fatigue; gave excellent effort with seated ther ex. Will cont to progress as clinically warranted.         Problem: Inpatient Physical Therapy  Goal: Bed Mobility Goal LTG- PT  Outcome: Ongoing (interventions implemented as appropriate)    02/06/17 0943 02/13/17 1308   Bed Mobility PT LTG   Bed Mobility PT LTG, Date Established 02/06/17 --    Bed Mobility PT LTG, Time to Achieve 2 wks --    Bed Mobility PT LTG, Activity Type roll left/roll right;supine to sit/sit to supine --    Bed Mobility PT LTG, [place] Level contact guard assist --    Bed Mobility PT Goal LTG, Assist Device bed rails --    Bed Mobility PT LTG, Outcome --  goal ongoing       Goal: Transfer Training Goal 1 LTG- PT  Outcome: Ongoing (interventions implemented as appropriate)    02/06/17 0943 02/13/17 1308   Transfer Training PT LTG   Transfer Training PT LTG, Date Established 02/06/17 --    Transfer Training PT LTG, Time to Achieve 2 wks --    Transfer Training PT LTG, Activity Type bed to chair /chair to bed;sit to stand/stand to sit --    Transfer Training PT LTG, [place] Level contact guard assist --    Transfer Training PT LTG, Assist Device walker, rolling --    Transfer Training PT LTG, Outcome --  goal ongoing       Goal: Gait Training Goal LTG- PT  Outcome: Ongoing (interventions implemented as appropriate)    02/06/17 0943 02/13/17 1308   Gait Training PT LTG   Gait Training Goal PT LTG, Date Established 02/06/17 --    Gait Training Goal PT LTG, Time to Achieve 2 wks  --    Gait Training Goal PT LTG, [place] Level contact guard assist --    Gait Training Goal PT LTG, Assist Device walker, rolling --    Gait Training Goal PT LTG, Distance to Achieve 200 --    Gait Training Goal PT LTG, Outcome --  goal ongoing       Goal: Static Sitting Balance Goal LTG- PT  Outcome: Ongoing (interventions implemented as appropriate)    02/06/17 0943 02/13/17 1308   Static Sitting Balance PT LTG   Static Sitting Balance PT LTG, Date Established 02/06/17 --    Static Sitting Balance PT LTG, Time to Achieve 2 wks --    Static Sitting Balance PT LTG, [place] Level conditional independence --    Static Sitting Balance PT LTG, Outcome --  goal ongoing

## 2017-02-13 NOTE — PLAN OF CARE
Problem: Patient Care Overview (Adult)  Goal: Plan of Care Review    02/13/17 1724   Coping/Psychosocial Response Interventions   Plan Of Care Reviewed With patient   Patient Care Overview   Progress improving   Outcome Evaluation   Outcome Summary/Follow up Plan Patient awaiting bed to telemetry. No new changes. VSS, walked with PT. No issues.         Problem: Cardiac Surgery (Adult)  Goal: Signs and Symptoms of Listed Potential Problems Will be Absent or Manageable (Cardiac Surgery)  Outcome: Ongoing (interventions implemented as appropriate)    02/13/17 1724   Cardiac Surgery   Problems Assessed (Cardiac Surgery) all   Problems Present (Cardiac Surgery) none

## 2017-02-14 ENCOUNTER — APPOINTMENT (OUTPATIENT)
Dept: GENERAL RADIOLOGY | Facility: HOSPITAL | Age: 67
End: 2017-02-14

## 2017-02-14 VITALS
WEIGHT: 149 LBS | TEMPERATURE: 98.2 F | RESPIRATION RATE: 18 BRPM | HEART RATE: 75 BPM | DIASTOLIC BLOOD PRESSURE: 77 MMHG | BODY MASS INDEX: 23.95 KG/M2 | HEIGHT: 66 IN | SYSTOLIC BLOOD PRESSURE: 103 MMHG | OXYGEN SATURATION: 93 %

## 2017-02-14 LAB
INR PPP: 0.99
PROTHROMBIN TIME: 10.8 SECONDS (ref 9.6–11.5)

## 2017-02-14 PROCEDURE — 97165 OT EVAL LOW COMPLEX 30 MIN: CPT

## 2017-02-14 PROCEDURE — 71010 HC CHEST PA OR AP: CPT

## 2017-02-14 PROCEDURE — 85610 PROTHROMBIN TIME: CPT | Performed by: PHYSICIAN ASSISTANT

## 2017-02-14 PROCEDURE — 97110 THERAPEUTIC EXERCISES: CPT

## 2017-02-14 PROCEDURE — 99232 SBSQ HOSP IP/OBS MODERATE 35: CPT | Performed by: INTERNAL MEDICINE

## 2017-02-14 PROCEDURE — 99024 POSTOP FOLLOW-UP VISIT: CPT | Performed by: INTERNAL MEDICINE

## 2017-02-14 RX ORDER — MIDODRINE HYDROCHLORIDE 5 MG/1
5 TABLET ORAL EVERY 8 HOURS SCHEDULED
Qty: 90 TABLET | Refills: 6 | Status: CANCELLED | OUTPATIENT
Start: 2017-02-14

## 2017-02-14 RX ORDER — WARFARIN SODIUM 5 MG/1
5 TABLET ORAL
Status: DISCONTINUED | OUTPATIENT
Start: 2017-02-14 | End: 2017-02-14

## 2017-02-14 RX ORDER — WARFARIN SODIUM 5 MG/1
TABLET ORAL
Qty: 30 TABLET
Start: 2017-02-14 | End: 2017-02-14 | Stop reason: HOSPADM

## 2017-02-14 RX ORDER — WARFARIN SODIUM 5 MG/1
TABLET ORAL
Qty: 30 TABLET | Refills: 6 | Status: CANCELLED | OUTPATIENT
Start: 2017-02-14

## 2017-02-14 RX ORDER — ATORVASTATIN CALCIUM 40 MG/1
40 TABLET, FILM COATED ORAL NIGHTLY
Qty: 30 TABLET | Refills: 6 | Status: CANCELLED | OUTPATIENT
Start: 2017-02-14

## 2017-02-14 RX ORDER — PSEUDOEPHEDRINE HCL 30 MG
100 TABLET ORAL 2 TIMES DAILY PRN
Qty: 60 CAPSULE | Refills: 0 | Status: SHIPPED | OUTPATIENT
Start: 2017-02-14 | End: 2017-09-12

## 2017-02-14 RX ORDER — BUMETANIDE 0.25 MG/ML
1 INJECTION INTRAMUSCULAR; INTRAVENOUS ONCE
Status: COMPLETED | OUTPATIENT
Start: 2017-02-14 | End: 2017-02-14

## 2017-02-14 RX ORDER — WARFARIN SODIUM 5 MG/1
5 TABLET ORAL
Status: DISCONTINUED | OUTPATIENT
Start: 2017-02-14 | End: 2017-02-14 | Stop reason: HOSPADM

## 2017-02-14 RX ORDER — MIDODRINE HYDROCHLORIDE 5 MG/1
10 TABLET ORAL EVERY 8 HOURS SCHEDULED
Status: DISCONTINUED | OUTPATIENT
Start: 2017-02-14 | End: 2017-02-14 | Stop reason: HOSPADM

## 2017-02-14 RX ORDER — MIDODRINE HYDROCHLORIDE 10 MG/1
10 TABLET ORAL 3 TIMES DAILY
Qty: 90 TABLET | Refills: 3 | Status: SHIPPED | OUTPATIENT
Start: 2017-02-14 | End: 2017-03-10

## 2017-02-14 RX ORDER — MIDODRINE HYDROCHLORIDE 5 MG/1
5 TABLET ORAL EVERY 8 HOURS SCHEDULED
Qty: 90 TABLET | Refills: 6 | Status: SHIPPED | OUTPATIENT
Start: 2017-02-14 | End: 2017-03-10 | Stop reason: SDUPTHER

## 2017-02-14 RX ORDER — WARFARIN SODIUM 5 MG/1
TABLET ORAL
Qty: 30 TABLET | Refills: 6 | Status: SHIPPED | OUTPATIENT
Start: 2017-02-14 | End: 2017-09-12 | Stop reason: ALTCHOICE

## 2017-02-14 RX ADMIN — CITALOPRAM HYDROBROMIDE 20 MG: 20 TABLET ORAL at 08:07

## 2017-02-14 RX ADMIN — FAMOTIDINE 20 MG: 20 TABLET ORAL at 08:06

## 2017-02-14 RX ADMIN — LEVOTHYROXINE SODIUM 175 MCG: 175 TABLET ORAL at 08:07

## 2017-02-14 RX ADMIN — MIDODRINE HYDROCHLORIDE 5 MG: 5 TABLET ORAL at 05:15

## 2017-02-14 RX ADMIN — BUMETANIDE 1 MG: 0.25 INJECTION, SOLUTION INTRAMUSCULAR; INTRAVENOUS at 12:03

## 2017-02-14 RX ADMIN — ETHOSUXIMIDE 500 MG: 250 CAPSULE ORAL at 08:06

## 2017-02-14 RX ADMIN — ASPIRIN 325 MG: 325 TABLET, DELAYED RELEASE ORAL at 08:06

## 2017-02-14 RX ADMIN — PRIMIDONE 250 MG: 250 TABLET ORAL at 08:07

## 2017-02-14 RX ADMIN — MIDODRINE HYDROCHLORIDE 5 MG: 5 TABLET ORAL at 15:23

## 2017-02-14 NOTE — PROGRESS NOTES
Continued Stay Note  [facility]     Patient Name: Tiana Cronin  MRN: [id number]  Today's Date: 2/14/2017    Admit Date: 2/3/2017          Discharge Plan       02/14/17 1525    Case Management/Social Work Plan    Plan rehab    Patient/Family In Agreement With Plan yes    Additional Comments [facility] and [facility] have [facility] bed avlb today for Ms. Cronin. Discharge summary has been faxed to [phone]. Nurse updated and given # to call report to [phone]. Nurse to copy chart for transfer to send with patient. Facility prefers patient to arrive no later than 1800.               Discharge Codes       02/14/17 1502    Discharge Codes    Discharge Codes II  Nsg facility skilled other subacute        Expected Discharge Date and Time     Expected Discharge Date Expected Discharge Time    Feb 14, 2017             Loreta Ospina RN

## 2017-02-14 NOTE — DISCHARGE SUMMARY
CTS Discharge Summary    Patient Care Team:  Rangel Lopez MD as PCP - General (Family Medicine)  Rangel Lopez MD as Consulting Physician (Family Medicine)      Date of Admission: 2/3/2017  5:20 AM  Date of Discharge:  2/14/17    Discharge Diagnosis  Past Medical History   Diagnosis Date   • Anxiety    • Aortic regurgitation    • Arthritis      Hands and Knees   • Depression    • Hypertension    • Hypothyroidism    • Mitral regurgitation    • Mitral valve prolapse    • Seizures      Several Years since last seizure ( last seizure cannot remember)    • Skin cancer, basal cell      basal cell skin cancer on face    • Wears dentures      full set    • Wears glasses      reading       Principal Problem:    Severe mitral regurgitation (S/P 31 Magna Ease mitral tissue valve)  Active Problems:    Post-Op Asystole    CAD (S/P CABG x 1)      History of Present IllnessThe patient is a 66-year-old  female with a history of hypertension and seizures who presented with shortness of breath over the past year. The patient was found to have severe mitral regurgitation on echocardiogram and was felt to be a reasonable candidate for mitral valve repair versus replacement. The patient was found to have a 50% to 60% circumflex coronary artery disease on cardiac catheterization and was felt to be a reasonable candidate for single vessel coronary artery bypass graft. The risks and benefits of surgery were discussed with the patient including pain, bleeding, infection, renal failure, stroke, heart block, and death. The patient understood these risks and wished to proceed with surgery.         Hospital Course  Patient is a 66 y.o. female Admitted, taken to the operating suite underwent a mitral valve replacement with a #31 magna ease tissue valve. Patient tolerated the procedure well came off bypass promptly, closed, taken to cardiothoracic unit in stable condition. Postoperative day #1 awake alert extubated  hemodynamically stable in a junctional rhythm. She is on low-dose levo fed this was weaned off. the swan and Cotton were removed. Day 2 patient continues to be paced. Underlying rhythm is now asystole. She remains hemodynamically stable except for the rhythm. Minimal drainage from the chest tubes. Over the next 48 hours patient continued to be asystole underlying but otherwise hemodynamically stable and good spirits good pain control. Dr. Abigail Bee was SC the patient in consultation secondary to the asystole. The patient had a permanent pacemaker placed to cardiology on Friday. patient tolerated procedure well. That afternoon with normal functioning pacemaker the temporary pacing wires were removed. Following morning chest tubes were DC'd. At that point the patient was ready for transfer to telemetry. Over the hospital was full patient continued in the intensive care unit worked daily with physical therapy moving slowly for most of the last week in bed. It was felt the patient would be best treated by placement in a short term rehabilitation facility. A facility established today the patient is to be transferred.    Procedures Performed  Procedure(s):  Mitral valve replacement    Pacemaker insertion       Consults:   Consults     Date and Time Order Name Status Description    2/7/2017 0742 Inpatient Consult to Cardiology Completed             Discharge Medications   Tiana Cronin   Home Medication Instructions VENU:[id number]    Printed on:02/14/17 1441   Medication Information                      aspirin 81 MG EC tablet  Take 81 mg by mouth Daily.             Cholecalciferol (VITAMIN D3) 2000 UNITS tablet  Take 2,000 Units by mouth Daily.             citalopram (CeleXA) 20 MG tablet  Take 20 mg by mouth Daily.             docusate sodium 100 MG capsule  Take 100 mg by mouth 2 (Two) Times a Day As Needed for constipation.             ethosuximide (ZARONTIN) 250 MG capsule  Take 500 mg by mouth 2 (Two)  Times a Day. 2 Tablets BID              furosemide (LASIX) 20 MG tablet  Take 20 mg by mouth Daily.             levothyroxine (SYNTHROID, LEVOTHROID) 175 MCG tablet  Take 175 mcg by mouth Daily.             metoprolol tartrate (LOPRESSOR) 25 MG tablet  Take 1 tablet by mouth 2 (Two) Times a Day.             midodrine (PROAMATINE) 5 MG tablet  Take 1 tablet by mouth Every 8 (Eight) Hours.             Multiple Vitamins-Minerals (CENTRUM SILVER PO)  Take 1 tablet by mouth Daily.             Omega-3 Fatty Acids (FISH OIL) 1000 MG capsule capsule  Take 1,000 mg by mouth Daily With Breakfast.             oxyCODONE-acetaminophen (PERCOCET) 5-325 MG per tablet  Take 2 tablets by mouth Every 4 (Four) Hours As Needed for severe pain (7-10).             primidone (MYSOLINE) 250 MG tablet  Take 250 mg by mouth 3 (Three) Times a Day.             vitamin C (ASCORBIC ACID) 500 MG tablet  Take 1,000 mg by mouth Daily.             warfarin (COUMADIN) 5 MG tablet  Take 1 tablet by mouth daily as directedd                 Discharge Diet:   Diet Instructions     Diet: Consistent Carbohydrate; Thin Liquids, No Restrictions       Discharge Diet:  Consistent Carbohydrate   Fluid Consistency:  Thin Liquids, No Restrictions                 Activity at Discharge:   Activity Instructions     Discharge Activity Restrictions       1) No driving for 3 weeks and no longer taking narcotics.   2) Return to school / work in 4 week  .  3) May shower / sponge bathe for today hours.  4) Do not lift / push / pull more then 10 lbs.                 Follow-up Appointments  Dr Gaona 4 weeks     SUSANA Ruiz  02/14/17  2:41 PM

## 2017-02-14 NOTE — PLAN OF CARE
Problem: Patient Care Overview (Adult)  Goal: Plan of Care Review  Outcome: Outcome(s) achieved Date Met:  02/14/17 02/14/17 3813   Coping/Psychosocial Response Interventions   Plan Of Care Reviewed With patient;family   Patient Care Overview   Progress improving   Outcome Evaluation   Outcome Summary/Follow up Plan Pt. participated OT eval. Pt. noted with decreased strength, flexibility and limited activity tolerance impacting ADL, IADL and mobility indep. Pt. appropriate for skilled OT services. Rehab recommended due to pt. lives alone.

## 2017-02-14 NOTE — PROGRESS NOTES
Pulmonary/Critical Care Follow-up     LOS: 11 days   Patient Care Team:  Rangel Lopez MD as PCP - General (Family Medicine)  Rangel Lopez MD as Consulting Physician (Family Medicine)    Chief Complaint / reason : valvular heart disease.      No chief complaint on file.      Subjective    66-year-old lifetime nonsmoking female who presented with a one-year history of progressive dyspnea and was found to have severe mitral regurgitation and 50-60% circumflex coronary artery disease and underwent a CABG and MVR with a tissue valve by Dr. Gaona on 2/3/2017.  The patient had a permanent pacemaker placed on 2/10/2017 by Dr. Dang.    Interval History:   Patient is anxious today about the possibility of going home.  She walked in the [place] again today.  She continues to have lower extremity edema.  Her postoperative pain is well controlled.  She denies nausea, vomiting, chest pain, dizziness.      History taken from: patient    PMH/FH/Social History were reviewed and updated appropriately in the electronic medical record.     Review of Systems:    Review of 14 systems was completed with positives and pertinent negatives noted in the subjective section.  All other systems reviewed and are negative.         Objective     Vital Signs  Temp:  [98.5 °F (36.9 °C)-98.7 °F (37.1 °C)] 98.7 °F (37.1 °C)  Heart Rate:  [60-68] 63  Resp:  [18-20] 20  BP: ()/(50-80) 109/60  02/13 0701 - 02/14 0700  In: 480 [P.O.:480]  Out: 650 [Urine:650]  Body mass index is 24.06 kg/(m^2).      O2 sat: 97% room air  Physical Exam:     Constitutional:    Alert, cooperative, in no acute distress   Head:    Normocephalic, without obvious abnormality, atraumatic   Eyes:            Lids and lashes normal, conjunctivae and sclerae normal, no   icterus, no pallor, corneas clear, PER   ENMT:   Ears appear intact with no abnormalities noted      No oral lesions, no thrush, oral mucosa moist   Neck:   No adenopathy, supple, trachea  midline, no thyromegaly, no   carotid bruit, no JVD       Lungs/Resp:     Normal effort, symmetric chest rise, left chest pacemaker site dressing in place.  No crepitus, mild bibasilar rales, no chest wall tenderness                Heart/CV:    Regular rhythm and normal rate, normal S1 and S2, no            murmur   Abdomen/GI:     Normal bowel sounds, no masses, no organomegaly, soft        non-tender, non-distended   :     Deferred   Extremities/MSK:   No clubbing or cyanosis.  2+ bilateral lower extremity edema.  Normal tone.  No deformities.    Pulses:   Pulses palpable and equal bilaterally   Skin:   No bleeding, bruising or rash   Heme/Lymph:   No cervical or supraclavicular adenopathy.    Neurologic:    Psychiatric:       Moves all extremities with no obvious focal motor deficit.  Cranial nerves 2 - 12 grossly intact   Oriented x 3, normal affect.             Results Review:     I reviewed the patient's new clinical results.     Results from last 7 days  Lab Units 02/13/17  0347 02/12/17  0336 02/10/17  0755 02/09/17  0355 02/08/17  0330   SODIUM mmol/L 135 134 136 135 133   POTASSIUM mmol/L 4.1 4.1 4.4 4.2 4.1   CHLORIDE mmol/L 98* 99 99 98* 98*   TOTAL CO2 mmol/L 31.0 32.0* 29.0 30.0 27.0   BUN mg/dL 11 16 18 20 20   CREATININE mg/dL 0.60 0.70 0.60 0.60 0.70   CALCIUM mg/dL 8.8 8.7 9.3 9.1 8.7   BILIRUBIN mg/dL  --   --   --  0.3 0.3   ALK PHOS U/L  --   --   --  82 75   ALT (SGPT) U/L  --   --   --  28 20   AST (SGOT) U/L  --   --   --  42* 35*   GLUCOSE mg/dL 105* 102* 92 98 99       Results from last 7 days  Lab Units 02/10/17  0755 02/09/17 0355 02/08/17  0330   WBC 10*3/mm3 6.93 6.78 6.05   HEMOGLOBIN g/dL 9.1* 8.6* 8.5*   HEMATOCRIT % 27.6* 26.3* 25.9*   PLATELETS 10*3/mm3 178 135* 112*           Hemoglobin A1C 5.1%          I reviewed the patient's new imaging including images and reports.  Chest x-ray from 2/12/2017 was reviewed.  It showed no definite pneumothorax with pacer leads in place and  mildly increased interstitial markings.    Medication Review:     aspirin 325 mg Oral Daily   atorvastatin 40 mg Oral Nightly   citalopram 20 mg Oral Daily   ethosuximide 500 mg Oral BID   famotidine 20 mg Oral BID   levothyroxine 175 mcg Oral Daily   midodrine 5 mg Oral Q8H   pharmacy consult - MTM  Does not apply Daily   polyethylene glycol 17 g Oral Daily   primidone 250 mg Oral TID   sennosides-docusate sodium 2 tablet Oral BID   warfarin 5 mg Oral Daily       niCARdipine 5-15 mg/hr   nitroglycerin 5-200 mcg/min   Pharmacy to dose warfarin        Assessment/Plan     Principal Problem:    Severe mitral regurgitation (S/P 31 Magna Ease mitral tissue valve)  Active Problems:    Post-Op Asystole    CAD (S/P CABG x 1)        Plan:  1.  Okay to transfer to telemetry from my standpoint.  We will sign off on transfer, please consult hospitalists if indicated.  2. Continue Synthroid.  3.  Bumex 1 mg IV ×1 now.  4.  Will sign off.        Cecil Santillan MD  02/14/17  8:12 AM      *. Please note that portions of this note were completed with a voice recognition program. Efforts were made to edit the dictations, but occasionally words are mistranscribed.

## 2017-02-14 NOTE — PLAN OF CARE
Problem: Patient Care Overview (Adult)  Goal: Plan of Care Review  Outcome: Ongoing (interventions implemented as appropriate)    02/14/17 0336   Coping/Psychosocial Response Interventions   Plan Of Care Reviewed With patient   Patient Care Overview   Progress improving   Outcome Evaluation   Outcome Summary/Follow up Plan Pt has orders to the floor. No changes, v/s stable on RA and no gtts. No issues per pt and per nursing staff.        Goal: Adult Individualization and Mutuality  Outcome: Ongoing (interventions implemented as appropriate)    02/11/17 1511 02/14/17 0336   Mutuality/Individual Preferences   What Questions Do You Have About Your Health or Care? --  Pt wants to make sure her family can take care of her upon discharge from [facility].    What Information Would Help Us Give You More Personalized Care? --  Pt is interested in more information specific to cardiac rehab.    Individualization   Patient Specific Goals --  Pt wants to get back to her best functional ability. Pt wants to do all she can to get back to how she was before surgery.    Patient Specific Interventions pt walked with PT and is eager to progress care. PT feels like she is not doing well but re       02/11/17 1511 02/14/17 0336   Mutuality/Individual Preferences   What Questions Do You Have About Your Health or Care? --  Pt wants to make sure her family can take care of her upon discharge from [facility].    What Information Would Help Us Give You More Personalized Care? --  Pt is interested in more information specific to cardiac rehab.    Individualization   Patient Specific Goals --  Pt wants to get back to her best functional ability. Pt wants to do all she can to get back to how she was before surgery.    Patient Specific Interventions pt walked with PT and is eager to progress care. PT feels like she is not doing well but remains positive. --    isa positive. --          Problem: Cardiac Surgery (Adult)  Goal: Signs and Symptoms of Listed  Potential Problems Will be Absent or Manageable (Cardiac Surgery)  Outcome: Ongoing (interventions implemented as appropriate)    02/14/17 0336   Cardiac Surgery   Problems Assessed (Cardiac Surgery) all   Problems Present (Cardiac Surgery) none

## 2017-02-14 NOTE — PROGRESS NOTES
"[facility] Daily Note    2/14/2017   LOS: 11 days   Patient Care Team:  Rangel Lopez MD as PCP - General (Family Medicine)  Rangel Lopez MD as Consulting Physician (Family Medicine)    Chief Complaint:  Asystole following MVR    Subjective: Awaiting discharge to the [facility] rehabilitation facility today.  She has no complaints and overall feels good.  She was slightly dizzy when she got up earlier and her pressure has been as low as 94.    Review of Systems:   As above.    Medications:    [MAR Hold] aspirin 325 mg Oral Daily   [MAR Hold] atorvastatin 40 mg Oral Nightly   bumetanide 1 mg Intravenous Once   [MAR Hold] citalopram 20 mg Oral Daily   ethosuximide 500 mg Oral BID   famotidine 20 mg Oral BID   [MAR Hold] levothyroxine 175 mcg Oral Daily   [MAR Hold] midodrine 5 mg Oral Q8H   [MAR Hold] pharmacy consult - MTM  Does not apply Daily   [MAR Hold] polyethylene glycol 17 g Oral Daily   primidone 250 mg Oral TID   [MAR Hold] sennosides-docusate sodium 2 tablet Oral BID   [MAR Hold] warfarin 5 mg Oral Daily       Vital Sign Min/Max for last 24 hours  Temp  Min: 98.5 °F (36.9 °C)  Max: 98.7 °F (37.1 °C)   BP  Min: 80/59  Max: 120/57   Pulse  Min: 60  Max: 74   Resp  Min: 18  Max: 20   SpO2  Min: 91 %  Max: 99 %   Flow (L/min)  Min: 2  Max: 2   No Data Recorded      Intake/Output Summary (Last 24 hours) at 02/14/17 1156  Last data filed at 02/14/17 0500   Gross per 24 hour   Intake      0 ml   Output    650 ml   Net   -650 ml        Flowsheet Rows         First Filed Value    Admission Height  66\" (167.6 cm) Documented at 02/03/2017 0621    Admission Weight  148 lb 13 oz (67.5 kg) Documented at 02/03/2017 0621          Physical Exam:    Cardiovascular: Heart has a nondisplaced focal PMI. Regular rate and rhythm without murmur, gallop or rub.  The pacemaker site is clean and dry. The pressure dressing was removed.  Lungs: Clear without rales or wheezes.  A few basilar rales are noted. " Equal expansion is noted.   Abdomen: Soft, nontender.  Extremities: Show trace edema.   Skin: warm and dry.       Results Review:    I reviewed the patient's new clinical results.    Tele:  AV paced    Labs:      Results from last 7 days  Lab Units 02/13/17  0347 02/12/17  0336 02/10/17  0755 02/09/17  0355 02/08/17  0330   SODIUM mmol/L 135 134 136 135 133   POTASSIUM mmol/L 4.1 4.1 4.4 4.2 4.1   CHLORIDE mmol/L 98* 99 99 98* 98*   TOTAL CO2 mmol/L 31.0 32.0* 29.0 30.0 27.0   BUN mg/dL 11 16 18 20 20   CREATININE mg/dL 0.60 0.70 0.60 0.60 0.70   CALCIUM mg/dL 8.8 8.7 9.3 9.1 8.7   BILIRUBIN mg/dL  --   --   --  0.3 0.3   ALK PHOS U/L  --   --   --  82 75   ALT (SGPT) U/L  --   --   --  28 20   AST (SGOT) U/L  --   --   --  42* 35*   GLUCOSE mg/dL 105* 102* 92 98 99       Results from last 7 days  Lab Units 02/10/17  0755 02/09/17 0355 02/08/17  0330   WBC 10*3/mm3 6.93 6.78 6.05   HEMOGLOBIN g/dL 9.1* 8.6* 8.5*   HEMATOCRIT % 27.6* 26.3* 25.9*   PLATELETS 10*3/mm3 178 135* 112*     No results found for: TROPONINI, TROPONINT  No results found for: CHOL  No results found for: TRIG  No results found for: HDL  No results found for: LDLCALC  Lab Results   Component Value Date    INR 0.99 02/14/2017    INR 0.97 02/10/2017    INR 1.10 02/04/2017    PROTIME 10.8 02/14/2017    PROTIME 10.6 02/10/2017    PROTIME 12.0 (H) 02/04/2017       Ejection Fraction:  55-60% per [facility] 1/11/2017 @ [facility] facility      Assessment:  1. Asystole s/p MVR   - implant [employer] MRI DR model , serial #[id number] on February 10, 2017 by Dr. Andrade  2. Valvular Heart Disease    -s/p MVR with #31 Magna Ease tissue valve 2/3/2017  3. CAD   -s/p CABG x 1 2/3/2017   -Hypotension- now on Midodrine; doing well      Plan:    Continue ProAmatine but increase the dose to 10 mg every 8 hours  2 skilled nursing facility at [place] today  Coumadin at discharge  Continue metoprolol  No lisinopril at discharge    Follow-up with Dr. Hoyt  on Monday the 20th to have her pacemaker bandage removed    Alma JACKIE Cabrera, APRN  02/14/17  11:56 AM    I, Abigail Bee MD, have reviewed the note in full and agree with all aspects of the above including physical exam, assessment, labs and plan with changes made accordingly.    Abigail Bee MD, FACC

## 2017-02-14 NOTE — PLAN OF CARE
Problem: Patient Care Overview (Adult)  Goal: Discharge Needs Assessment  Outcome: Outcome(s) achieved Date Met:  02/14/17

## 2017-02-14 NOTE — PROGRESS NOTES
Continued Stay Note  [facility]     Patient Name: Tiana Cronin  MRN: [id number]  Today's Date: 2/14/2017    Admit Date: 2/3/2017          Discharge Plan       02/14/17 1153    Case Management/Social Work Plan    Plan home v rehab    Patient/Family In Agreement With Plan yes    Additional Comments Ms. Cronin has changed her mind about discharge plan. She would like to pursue a short term rehab stay at [facility] in [place]. Called Shaista with Admissions at [phone] and faxed referral to her at [phone]. If unable to offer Ms. Cronin a rehab bed, Ms. Cronin does not want to go to any other facility. Her next plan is to go home with her sister with home health and she would like to use [facility]. With permission from Ms. Cronin, OZZIE returned call to Faina blanco at [phone] and explained both plans and she is in agreement with both the rehab plan and the back up plan to home with home health. OZZIE has updated patient, family and primary nurse. Awaiting call back from Shaista on bed availability/offer. Jennifer Chao has delivered RW to bedside.               Discharge Codes     None        Expected Discharge Date and Time     Expected Discharge Date Expected Discharge Time    Feb 14, 2017             Loreta Ospina RN

## 2017-02-14 NOTE — PROGRESS NOTES
Acute Care - Physical Therapy Treatment Note  [facility]     Patient Name: Tiana Cronin  : 1950  MRN: [id number]  Today's Date: 2017  Onset of Illness/Injury or Date of Surgery Date: 17  Date of Referral to PT: 17  Referring Physician: MD Candelaria    Admit Date: 2/3/2017    Visit Dx:    ICD-10-CM ICD-9-CM   1. Impaired functional mobility, balance, gait, and endurance Z74.09 V49.89   2. Non-rheumatic mitral regurgitation I34.0 424.0   3. Mitral valve disorder I05.9 424.0   4. Post-Op Asystole I46.9 427.5   5. Mitral valve insufficiency, unspecified etiology I34.0 424.0   6. Coronary artery disease involving other coronary artery bypass graft I25.810    7. Impaired mobility and ADLs Z74.09 799.89     Patient Active Problem List   Diagnosis   • Mitral valve disorder   • Severe mitral regurgitation (S/P 31 Magna Ease mitral tissue valve)   • Post-Op Asystole   • CAD (S/P CABG x 1)               Adult Rehabilitation Note       17 1000 17 1005       Rehab Assessment/Intervention    Discipline physical therapist  -PAUL physical therapist  -LS     Document Type therapy note (daily note)  -PAUL therapy note (daily note)  -LS     Subjective Information no complaints;agree to therapy  -PAUL agree to therapy;complains of;pain  -LS     Patient Effort, Rehab Treatment good  -PAUL good  -LS     Precautions/Limitations cardiac precautions;sternal precautions  -PAUL fall precautions;cardiac precautions;oxygen therapy device and L/min;pacemaker;sternal precautions  -LS     Recorded by [PAUL] Faina Madison, PT [LS] Francesca Cantu, PT     Vital Signs    Pre Systolic BP Rehab 117  -PAUL 95  -LS     Pre Treatment Diastolic BP 67  -PAUL 53  -LS     Post Systolic BP Rehab 120  -PAUL 113  -LS     Post Treatment Diastolic BP 70  -PAUL 65  -LS     Pretreatment Heart Rate (beats/min) 69  -PAUL 68  -LS     Posttreatment Heart Rate (beats/min) 83  -PAUL 71  -LS     Pre SpO2 (%) 96  -PAUL 97  -LS     O2 Delivery Pre Treatment  room air  -PAUL room air  -LS     Intra SpO2 (%) 96  -PAUL      O2 Delivery Intra Treatment supplemental O2  -PAUL      Post SpO2 (%) 96  -PAUL 98  -LS     O2 Delivery Post Treatment room air  -PAUL room air  -LS     Pre Patient Position Sitting  -PAUL Sitting  -LS     Intra Patient Position Standing  -PAUL Standing  -LS     Post Patient Position Sitting  -PAUL Sitting  -LS     Recorded by [PAUL] Faina Madison, PT [LS] Francesca Cantu, PT     Pain Assessment    Pain Assessment No/denies pain  -PAUL 0-10  -LS     Pain Score 0  -PAUL 3  -LS     Post Pain Score 0  -PAUL 3  -LS     Pain Type  Surgical pain  -LS     Pain Location  Chest  -LS     Pain Intervention(s)  Repositioned;Ambulation/increased activity  -LS     Recorded by [PAUL] Faina Madison, PT [LS] Francesca Cantu, PT     Cognitive Assessment/Intervention    Current Cognitive/Communication Assessment functional  -PAUL functional  -LS     Orientation Status oriented x 4  -PAUL oriented x 4  -LS     Follows Commands/Answers Questions 100% of the time  -PAUL able to follow single-step instructions;100% of the time;needs cueing  -LS     Personal Safety WNL/WFL  -PAUL good awareness, safety precautions  -LS     Personal Safety Interventions gait belt;nonskid shoes/slippers when out of bed  -PAUL fall prevention program maintained;gait belt;nonskid shoes/slippers when out of bed  -LS     Recorded by [PAUL] Faina Madison, PT [LS] Francesca Cantu, PT     Bed Mobility, Assessment/Treatment    Bed Mobility, Comment patient is OOB and returns to the chair post ambulation  -PAUL UIC  -LS     Recorded by [PAUL] Faina Madison, PT [LS] Francesca Cantu, PT     Transfer Assessment/Treatment    Transfers, Sit-Stand [place] contact guard assist  -PAUL minimum assist (75% patient effort);1 person + 1 person to manage equipment;verbal cues required  -LS     Transfers, Stand-Sit [place] contact guard assist  -PAUL minimum assist (75% patient effort);1 person + 1 person to manage equipment;verbal cues required   -LS     Transfers, Sit-Stand-Sit, Assist Device  rolling walker  -LS     Transfer, Comment  VC's for hand placement; sternal precautions.   -LS     Recorded by [PAUL] Faina Madison PT [LS] Francseca Cantu PT     Gait Assessment/Treatment    Gait, [place] Level contact guard assist  -PAUL contact guard assist;1 person + 1 person to manage equipment;verbal cues required  -LS     Gait, Assistive Device  rolling walker  -LS     Gait, Distance (Feet) 270  -PAUL 180  -LS     Gait, Gait Pattern Analysis swing-through gait  -PAUL      Gait, Gait Deviations narrow base;step length decreased  -PAUL donna decreased;forward flexed posture;step length decreased  -LS     Gait, Impairments impaired balance;strength decreased  -PAUL impaired balance;strength decreased  -LS     Gait, Comment tried ambulation without walker today patient had 4 LOB she needed assist to correct. patient will be D/C with rolling walker   -PAUL VC's for posture and increasing step length within RWx.   -LS     Recorded by [PAUL] Faina Madison PT [LS] Francesca Cantu PT     Therapy Exercises    Bilateral Lower Extremities AROM:;10 reps;sitting;ankle pumps/circles;LAQ  -PAUL AROM:;10 reps;sitting;ankle pumps/circles;LAQ;hip flexion  -LS     Bilateral Upper Extremity  AROM:;10 reps;sitting;elbow flexion/extension;shoulder abduction/adduction;shoulder protraction/retraction  -LS     Recorded by [PAUL] Faina aMdison PT [LS] Francesca Cantu PT     Positioning and Restraints    Pre-Treatment Position sitting in chair/recliner  -PAUL sitting in chair/recliner  -LS     Post Treatment Position chair  -PAUL chair  -LS     In Chair notified nsg;reclined;sitting;call light within reach;with nsg  -PAUL notified nsg;reclined;call light within reach;encouraged to call for assist;legs elevated  -LS     Recorded by [PAUL] Faina Mdaison PT [LS] Frnacesca Cantu PT       User Key  (r) = Recorded By, (t) = Taken By, (c) = Cosigned By    Initials Name Effective Dates    PAUL OCASIO  Gricelda, PT 06/19/15 -     LS Francesca Cantu, PT 06/19/15 -                 IP PT Goals       02/14/17 1514 02/13/17 1308 02/11/17 1310    Bed Mobility PT LTG    Bed Mobility PT LTG, Outcome  goal ongoing  -LS goal ongoing  -LS    Transfer Training PT LTG    Transfer Training PT LTG, Outcome goal met  -PAUL goal ongoing  -LS goal ongoing  -LS    Gait Training PT LTG    Gait Training Goal PT LTG, Outcome  goal ongoing  -LS goal ongoing  -LS    Static Sitting Balance PT LTG    Static Sitting Balance PT LTG, Outcome goal met  -PAUL goal ongoing  -LS goal ongoing  -LS      02/07/17 1336 02/06/17 0943       Bed Mobility PT LTG    Bed Mobility PT LTG, Date Established  02/06/17  -SJ     Bed Mobility PT LTG, Time to Achieve  2 wks  -SJ     Bed Mobility PT LTG, Activity Type  roll left/roll right;supine to sit/sit to supine  -SJ     Bed Mobility PT LTG, [place] Level  contact guard assist  -SJ     Bed Mobility PT Goal  LTG, Assist Device  bed rails  -SJ     Bed Mobility PT LTG, Outcome goal ongoing  -PAUL      Transfer Training PT LTG    Transfer Training PT LTG, Date Established  02/06/17  -SJ     Transfer Training PT LTG, Time to Achieve  2 wks  -SJ     Transfer Training PT LTG, Activity Type  bed to chair /chair to bed;sit to stand/stand to sit  -SJ     Transfer Training PT LTG, [place] Level  contact guard assist  -SJ     Transfer Training PT LTG, Assist Device  walker, rolling  -SJ     Transfer Training PT LTG, Outcome goal ongoing  -PAUL      Gait Training PT LTG    Gait Training Goal PT LTG, Date Established  02/06/17  -SJ     Gait Training Goal PT LTG, Time to Achieve  2 wks  -SJ     Gait Training Goal PT LTG, [place] Level  contact guard assist  -SJ     Gait Training Goal PT LTG, Assist Device  walker, rolling  -SJ     Gait Training Goal PT LTG, Distance to Achieve  200  -SJ     Gait Training Goal PT LTG, Outcome goal ongoing  -PAUL      Static Sitting Balance PT LTG    Static Sitting Balance PT LTG, Date  Established  02/06/17  -SJ     Static Sitting Balance PT LTG, Time to Achieve  2 wks  -SJ     Static Sitting Balance PT LTG, [place] Level  conditional independence  -SJ     Static Sitting Balance PT LTG, Outcome goal ongoing  -PAUL        User Key  (r) = Recorded By, (t) = Taken By, (c) = Cosigned By    Initials Name Provider Type    PAUL Faina Madison, PT Physical Therapist    SJ Patricia Cox, PT Physical Therapist    KEITH Cantu, PT Physical Therapist          Physical Therapy Education     Title: PT OT SLP Therapies (Active)     Topic: Physical Therapy (Active)     Point: Mobility training (Active)    Learning Progress Summary    Learner Readiness Method Response Comment Documented by Status   Patient Acceptance E NR patient nervous about going home she is now trying to get into rehab facility, patient is very emotional and needs extra cueing today  02/14/17 1513 Active    Acceptance E,D VU,NR   02/13/17 1308 Done    Acceptance E,D NR   02/11/17 1309 Active    Acceptance E NR   02/09/17 1101 Active    Acceptance E,D NR   02/08/17 0936 Active    Acceptance E NR discussed HEP and bed exercises patient can do on her own patient needs cues today to perform activity  02/07/17 1335 Active    Acceptance E NR   02/06/17 0940 Active               Point: Home exercise program (Active)    Learning Progress Summary    Learner Readiness Method Response Comment Documented by Status   Patient Acceptance E NR patient nervous about going home she is now trying to get into rehab facility, patient is very emotional and needs extra cueing today  02/14/17 1513 Active    Acceptance E,D VU,NR   02/13/17 1308 Done    Acceptance E,D NR   02/11/17 1309 Active    Acceptance E NR   02/09/17 1101 Active    Acceptance E,D NR   02/08/17 0936 Active    Acceptance E NR discussed HEP and bed exercises patient can do on her own patient needs cues today to perform activity  02/07/17 1335 Active    Acceptance  E NR   02/06/17 0940 Active               Point: Body mechanics (Active)    Learning Progress Summary    Learner Readiness Method Response Comment Documented by Status   Patient Acceptance E NR patient nervous about going home she is now trying to get into rehab facility, patient is very emotional and needs extra cueing today PAUL 02/14/17 1513 Active    Acceptance E,D VU,NR   02/13/17 1308 Done    Acceptance E,D NR   02/11/17 1309 Active    Acceptance E NR   02/09/17 1101 Active    Acceptance E,D NR   02/08/17 0936 Active    Acceptance E NR discussed HEP and bed exercises patient can do on her own patient needs cues today to perform activity  02/07/17 1335 Active    Acceptance E NR   02/06/17 0940 Active               Point: Precautions (Active)    Learning Progress Summary    Learner Readiness Method Response Comment Documented by Status   Patient Acceptance E NR patient nervous about going home she is now trying to get into rehab facility, patient is very emotional and needs extra cueing today  02/14/17 1513 Active    Acceptance E,D VU,NR   02/13/17 1308 Done    Acceptance E,D NR   02/11/17 1309 Active    Acceptance E NR   02/09/17 1101 Active    Acceptance E,D NR   02/08/17 0936 Active    Acceptance E NR discussed HEP and bed exercises patient can do on her own patient needs cues today to perform activity  02/07/17 1335 Active    Acceptance E NR   02/06/17 0940 Active                      User Key     Initials Effective Dates Name Provider Type Discipline     06/19/15 -  Faina Madison, PT Physical Therapist PT     06/19/15 -  Patricia Cox, PT Physical Therapist PT     06/19/15 -  Francesca Cantu, PT Physical Therapist PT                    PT Recommendation and Plan  Anticipated Equipment Needs At Discharge: front wheeled walker  Anticipated Discharge Disposition: inpatient rehabilitation facility  Demonstrates Need for Referral to Another Service: occupational  therapy  Planned Therapy Interventions: balance training, bed mobility training, gait training, home exercise program, patient/family education, strengthening, transfer training  PT Frequency: daily  Plan of Care Review  Plan Of Care Reviewed With: patient  Progress: improving  Outcome Summary/Follow up Plan: patient able to increase activity today she required one sitting rest patient will need walker for home as she had several LOB today without the walker. patient would benefit from short rehab stay prior to going home as she lives alone and has only moderate endurance tolerance. we will continue to see patient for mobility training and monitored exercise until D/C          Outcome Measures       02/14/17 1423 02/14/17 1000 02/13/17 1005    How much help from another person do you currently need...    Turning from your back to your side while in flat bed without using bedrails?  4  -PAUL 3  -LS    Moving from lying on back to sitting on the side of a flat bed without bedrails?  3  -PAUL 2  -LS    Moving to and from a bed to a chair (including a wheelchair)?  3  -PAUL 3  -LS    Standing up from a chair using your arms (e.g., wheelchair, bedside chair)?  3  -PAUL 3  -LS    Climbing 3-5 steps with a railing?  3  -PAUL 2  -LS    To walk in hospital room?  3  -PAUL 3  -LS    AM-PAC 6 Clicks Score  19  -PAUL 16  -LS    How much help from another is currently needed...    Putting on and taking off regular lower body clothing? 3  -JBA      Bathing (including washing, rinsing, and drying) 3  -JBA      Toileting (which includes using toilet bed pan or urinal) 3  -JBA      Putting on and taking off regular upper body clothing 3  -JBA      Taking care of personal grooming (such as brushing teeth) 4  -JBA      Eating meals 4  -JBA      Score 20  -JBA      Functional Assessment    Outcome Measure Options AM-PAC 6 Clicks Daily Activity (OT)  -JBA AM-PAC 6 Clicks Basic Mobility (PT)  -PAUL AM-PAC 6 Clicks Basic Mobility (PT)  -LS      User Key   (r) = Recorded By, (t) = Taken By, (c) = Cosigned By    Initials Name Provider Type    PAUL Madison, PT Physical Therapist    CAMPOS Katz, OT Occupational Therapist    KEITH Cantu, PT Physical Therapist           Time Calculation:         PT Charges       02/14/17 1518          Time Calculation    Start Time 1000  -PAUL      PT Received On 02/14/17  -PAUL      PT Goal Re-Cert Due Date 02/16/17  -PAUL      Time Calculation- PT    Total Timed Code Minutes- PT 24 minute(s)  -PAUL        User Key  (r) = Recorded By, (t) = Taken By, (c) = Cosigned By    Initials Name Provider Type    PAUL Madison, PT Physical Therapist          Therapy Charges for Today     Code Description Service Date Service Provider Modifiers Qty    [id number] HC PT THER PROC EA 15 MIN 2/14/2017 Faina Madison, PT GP 2          PT G-Codes  Outcome Measure Options: AM-PAC 6 Clicks Daily Activity (OT)    Faina Madison, PT  2/14/2017

## 2017-02-14 NOTE — PROGRESS NOTES
Pharmacy Warfarin Note    Patient is a 67 yo F who underwent CABG x 1 and bioprosthetic MVR on 2/3, and post-op asystole underneath pacemaker requiring PPM placement on 2/10.  Pharmacy consulted to manage warfarin therapy.    Indication:  Bioprosthetic MVR  Goal INR:  2-3  Home Dose:  New Start  Consulting Provider:  Dr. Gaona (CT Surgery)    Current Bridge Therapy:  None per CT team    Labs  Results from last 7 days   Lab Units 02/13/17  0347 02/12/17  0336 02/10/17  0755 02/09/17  0355 02/08/17  0330   SODIUM mmol/L 135 134 136 135 133   POTASSIUM mmol/L 4.1 4.1 4.4 4.2 4.1   CHLORIDE mmol/L 98* 99 99 98* 98*   TOTAL CO2 mmol/L 31.0 32.0* 29.0 30.0 27.0   BUN mg/dL 11 16 18 20 20   CREATININE mg/dL 0.60 0.70 0.60 0.60 0.70   CALCIUM mg/dL 8.8 8.7 9.3 9.1 8.7   BILIRUBIN mg/dL --  --  --  0.3 0.3   ALK PHOS U/L --  --  --  82 75   ALT (SGPT) U/L --  --  --  28 20   AST (SGOT) U/L --  --  --  42* 35*   GLUCOSE mg/dL 105* 102* 92 98 99     Results from last 7 days   Lab Units 02/10/17  0755 02/09/17  0355 02/08/17  0330   WBC 10*3/mm3 6.93 6.78 6.05   HEMOGLOBIN g/dL 9.1* 8.6* 8.5*   HEMATOCRIT % 27.6* 26.3* 25.9*   PLATELETS 10*3/mm3 178 135* 112*     Results from last 7 days   Lab Units 02/14/17  0720 02/10/17  0755   INR  0.99 0.97       Other Clinical Information    Current Drug-Drug Interaction With Warfarin  1.  No major drug interactions    Date 2/14           INR 0.99           Dose (5mg)             Nutritional Intake:  50% of meals per last charted  Education:  Will provide upon discharge with discharge counseling    Plan  1.  Initiate warfarin 5mg daily.  2.  Daily PT/INR.  Watch for S/Sx of bleeding.  3.  Pharmacy will continue to follow and adjust therapy based on INR trend.    Thanks,  Curry Rivas, PharmD, BCPS  #[phone]  02/14/17  11:58 AM

## 2017-02-14 NOTE — PLAN OF CARE
Problem: Patient Care Overview (Adult)  Goal: Adult Individualization and Mutuality  Outcome: Outcome(s) achieved Date Met:  02/14/17

## 2017-02-14 NOTE — PROGRESS NOTES
Cardiothoracic Surgery Progress Note      POD # 11 s/p MVR, 4 pacer       LOS: 11 days      Subjective:  Weak, otherwise no complaints    Objective:  Vital Signs  Temp:  [98.5 °F (36.9 °C)-98.7 °F (37.1 °C)] 98.7 °F (37.1 °C)  Heart Rate:  [60-68] 67  Resp:  [16-20] 20  BP: ()/(50-80) 103/55    Physical Exam:   Gen:Sititng in chair comfortably   Lungs: clear to auscultation, respirations regular, respirations even and respirations unlabored   Heart: regular rhythm & normal rate, normal S1, S2, no murmur, no tammy, no rub and no click   Skin: Incision c/d/i     Results:    Results from last 7 days  Lab Units 02/10/17  0755   WBC 10*3/mm3 6.93   HEMOGLOBIN g/dL 9.1*   HEMATOCRIT % 27.6*   PLATELETS 10*3/mm3 178       Results from last 7 days  Lab Units 02/13/17  0347   SODIUM mmol/L 135   POTASSIUM mmol/L 4.1   CHLORIDE mmol/L 98*   TOTAL CO2 mmol/L 31.0   BUN mg/dL 11   CREATININE mg/dL 0.60   GLUCOSE mg/dL 105*   CALCIUM mg/dL 8.8         Assessment:  POD # 11 s/p MVR, 4 pacer  Deconditioned      Plan:  Home versus rehab  Pulmonary toilet  Ambulate, PT/OT  Coumadin today    SUSANA Ruiz  02/14/17  6:58 AM

## 2017-02-14 NOTE — THERAPY DISCHARGE NOTE
Acute Care - Occupational Therapy Initial Eval/Discharge  [facility]     Patient Name: Tiana Cronin  : 1950  MRN: [id number]  Today's Date: 2017  Onset of Illness/Injury or Date of Surgery Date: 17  Date of Referral to OT: 17  Referring Physician: MD Candelaria      Admit Date: 2/3/2017       ICD-10-CM ICD-9-CM   1. Impaired functional mobility, balance, gait, and endurance Z74.09 V49.89   2. Non-rheumatic mitral regurgitation I34.0 424.0   3. Mitral valve disorder I05.9 424.0   4. Post-Op Asystole I46.9 427.5   5. Mitral valve insufficiency, unspecified etiology I34.0 424.0   6. Coronary artery disease involving other coronary artery bypass graft I25.810    7. Impaired mobility and ADLs Z74.09 799.89     Patient Active Problem List   Diagnosis   • Mitral valve disorder   • Severe mitral regurgitation (S/P 31 Magna Ease mitral tissue valve)   • Post-Op Asystole   • CAD (S/P CABG x 1)     Past Medical History   Diagnosis Date   • Anxiety    • Aortic regurgitation    • Arthritis      Hands and Knees   • Depression    • Hypertension    • Hypothyroidism    • Mitral regurgitation    • Mitral valve prolapse    • Seizures      Several Years since last seizure ( last seizure cannot remember)    • Skin cancer, basal cell      basal cell skin cancer on face    • Wears dentures      full set    • Wears glasses      reading     Past Surgical History   Procedure Laterality Date   • Plantar fascia surgery Bilateral    •  section       x 2   • Elbow procedure Left      Due to FX, Plates and Screws Placed   • Knee surgery Left    • Skin cancer excision Bilateral      Bilatera Ears, Basil Cell   • Skin biopsy     • Teeth extraction     • Cardiac catheterization     • Mitral valve repair/replacement N/A 2/3/2017     Procedure: DARRIAN, MEDIAN STERNOTOMY, CORONARY ARTERY BYPASS GRAFTING ATTEMPTED, UTILIZING THE ENDOSCOPIC VEIN HARVEST OF THE RIGHT GREATER SAPHENOUS VEIN, MITRAL VALVE REPLACEMENT;   Surgeon: Jeromy Gaona MD;  Location:  [facility];  Service:    • Cardiac electrophysiology procedure N/A 2/10/2017     Procedure: Pacemaker DC new;  Surgeon: Ton Dang DO;  Location:  [facility];  Service:           OT ASSESSMENT FLOWSHEET (last 72 hours)      OT Evaluation       02/14/17 1507 02/14/17 1423 02/13/17 1005          Rehab Evaluation    Document Type  evaluation;discharge summary  -PAUL therapy note (daily note)  -LS      Subjective Information  agree to therapy;no complaints  -PAUL agree to therapy;complains of;pain  -LS      Patient Effort, Rehab Treatment  excellent  -PAUL good  -LS      General Information    Patient Profile Review  yes  -PAUL       Onset of Illness/Injury or Date of Surgery Date  02/03/17  -PAUL       Referring Physician  MD Candelaria  -PAUL       General Observations  Pt. up in recliner with family present.  Needs to go to bathroom  -PAUL       Pertinent History Of Current Problem  Pt. found with severe mitral regurg post progressing SOA for one year.  Pt. admitted for MVR.  p/o Asystole with PM placement.  -PAUL       Precautions/Limitations  cardiac precautions;sternal precautions   pacemaker precautions.  -APUL fall precautions;cardiac precautions;oxygen therapy device and L/min;pacemaker;sternal precautions  -LS      Prior Level of Function  independent:;all household mobility;community mobility;home management;driving;shopping  -PAUL       Equipment Currently Used at Home  none  -PAUL       Plans/Goals Discussed With  --   N/A plans to rehab today.  -PAUL       Risks Reviewed  patient and family:;LOB;increased discomfort;change in vital signs  -PAUL       Benefits Reviewed  improve function;increase independence;increase strength;increase knowledge;patient and family:   if to rehab with OT services.  -PAUL       Barriers to Rehab  none identified  -PAUL       Living Environment    Lives With  alone   plans to stay with sister and KIM if needed post rehab  -PAUL       Living Arrangements   house  -PAUL       Home Accessibility  tub/shower is not walk in  -PAUL       Clinical Impression    Date of Referral to OT  02/14/17  -PAUL       OT Diagnosis  Impaired ADL, IADL and mobility indep.  -PAUL       Patient/Family Goals Statement  Pt. want to go to rehab to get stronger so can care for self again.  -PAUL       Criteria for Skilled Therapeutic Interventions Met  yes;treatment indicated  -PAUL       Rehab Potential  good, to achieve stated therapy goals  -PAUL       Therapy Frequency  evaluation only   plans to rehab today  -PAUL       Anticipated Equipment Needs At Discharge  --   TBD post rehab  got wx issued today  -PAUL       Anticipated Discharge Disposition inpatient rehabilitation facility  -APUL inpatient rehabilitation facility  -PAUL       Vital Signs    Pre Systolic BP Rehab   95  -LS      Pre Treatment Diastolic BP   53  -LS      Post Systolic BP Rehab  122  -PAUL 113  -LS      Post Treatment Diastolic BP  70  -PAUL 65  -LS      Pretreatment Heart Rate (beats/min)  69  -PAUL 68  -LS      Posttreatment Heart Rate (beats/min)  78  -PAUL 71  -LS      Pre SpO2 (%)  94  -PAUL 97  -LS      O2 Delivery Pre Treatment  room air  -PAUL room air  -LS      Post SpO2 (%)  98  -PAUL 98  -LS      O2 Delivery Post Treatment  room air  -PAUL room air  -LS      Pre Patient Position  Sitting  -PAUL Sitting  -LS      Intra Patient Position  Standing  -PAUL Standing  -LS      Post Patient Position  Sitting  -PAUL Sitting  -LS      Pain Assessment    Pain Assessment  0-10  -PAUL 0-10  -LS      Pain Score  0  -PAUL 3  -LS      Post Pain Score  0  -PAUL 3  -LS      Pain Type   Surgical pain  -LS      Pain Location   Chest  -LS      Pain Intervention(s)   Repositioned;Ambulation/increased activity  -LS      Cognitive Assessment/Intervention    Current Cognitive/Communication Assessment  functional  -PAUL functional  -LS      Orientation Status  oriented x 4  -PAUL oriented x 4  -LS      Follows Commands/Answers Questions  100% of the time;able to follow single-step  instructions  -PAUL able to follow single-step instructions;100% of the time;needs cueing  -      Personal Safety  good awareness, safety precautions  - good awareness, safety precautions  -      Personal Safety Interventions  fall prevention program maintained;gait belt;nonskid shoes/slippers when out of bed  -PAUL fall prevention program maintained;gait belt;nonskid shoes/slippers when out of bed  -      ROM (Range of Motion)    General ROM  upper extremity range of motion deficits identified  -PAUL       General ROM Detail  only able to test within sternal and PM prec.  Pt. priorly broke L elbow x 2 and L elbow fixated approx 90 degrees.  -PAUL       MMT (Manual Muscle Testing)    General MMT Assessment  upper extremity strength deficits identified  -PAUL       General MMT Assessment Detail   intact.  Other at least 3+/5, but unable to formall assess due to PM and sternal prec.  -PAUL       Bed Mobility, Assessment/Treatment    Bed Mobility, Comment  UIC  -PAUL UIC  -LS      Transfer Assessment/Treatment    Transfers, Sit-Stand [place]  contact guard assist  -PAUL minimum assist (75% patient effort);1 person + 1 person to manage equipment;verbal cues required  -LS      Transfers, Stand-Sit [place]  contact guard assist  -PAUL minimum assist (75% patient effort);1 person + 1 person to manage equipment;verbal cues required  -LS      Transfers, Sit-Stand-Sit, Assist Device  rolling walker  -PAUL rolling walker  -LS      Toilet Transfer, [place]  contact guard assist  -PAUL       Toilet Transfer, Assistive Device  rolling walker  -PAUL       Transfer, Impairments  strength decreased;ROM decreased  -PAUL       Transfer, Comment  Pt. cued how to place hands on knees and lean head forward to assist to get up and down.  Educ not to push at sides or pull up.  -PAUL VC's for hand placement; sternal precautions.   -LS      Functional Mobility    Functional Mobility- Ind. Level  contact guard assist  -PAUL       Functional  Mobility- Device  rolling walker  -PAUL       Functional Mobility-Distance (Feet)  25  -PAUL       Functional Mobility- Comment  mildly unsteady  -PAUL       Lower Body Dressing Assessment/Training    LB Dressing Assess/Train, Clothing Type  doffing:;donning:;slipper socks  -PAUL       LB Dressing Assess/Train, Position  sitting  -PAUL       LB Dressing Assess/Train, [place]  supervision required  -PAUL       LB Dressing Assess/Train, Impairments  ROM decreased;strength decreased;coordination impaired;impaired balance  -PAUL       LB Dressing Assess/Train, Comment  pt. struggled to perform especially with limited use L elbow and SOA with tasks.  Likely need min assist with pants and shoes.  -PAUL       Toileting Assessment/Training    Toileting Assess/Train, Position  sitting  -PAUL       Toileting Assess/Train, Indepen Level  minimum assist (75% patient effort)  -PAUL       Toileting Assess/Train, Impairments  strength decreased;ROM decreased  -PAUL       Toileting Assess/Train, Comment  Pt. needed assist with gown and getting toilet paper due to on left side.  -PAUL       Grooming Assessment/Training    Grooming Assess/Train, Position  standing  -PAUL       Grooming Assess/Train, Indepen Level  supervision required  -PAUL       Grooming Assess/Train, Comment  washed hands at sink  -PAUL       Balance Skills Training    Sitting-Level of Assistance  Close supervision   dynamic  -PAUL       Sitting-Balance Activities  --   donning and doffing socks  -PAUL       Standing-Level of Assistance  Close supervision   grooming  -PAUL       Therapy Exercises    Bilateral Lower Extremities   AROM:;10 reps;sitting;ankle pumps/circles;LAQ;hip flexion  -LS      Bilateral Upper Extremity   AROM:;10 reps;sitting;elbow flexion/extension;shoulder abduction/adduction;shoulder protraction/retraction  -LS      Sensory Assessment/Intervention    Sensory Impairment  --   pt. reporting no numbness or tingling.  -PAUL       Positioning and Restraints    Pre-Treatment  Position  sitting in chair/recliner  -PAUL sitting in chair/recliner  -LS      Post Treatment Position  chair  -PAUL chair  -LS      In Chair  reclined;call light within reach;with family/caregiver  -PAUL notified nsg;reclined;call light within reach;encouraged to call for assist;legs elevated  -LS        User Key  (r) = Recorded By, (t) = Taken By, (c) = Cosigned By    Initials Name Effective Dates    PAUL Katz OT 06/22/15 -     LS Francesca Cantu, PT 06/19/15 -           Occupational Therapy Education     Title: PT OT SLP Therapies (Done)     Topic: Occupational Therapy (Done)     Point: ADL training (Done)    Description: Instruct learner(s) on proper safety adaptation and remediation techniques during self care or transfers.   Instruct in proper use of assistive devices.    Learning Progress Summary    Learner Readiness Method Response Comment Documented by Status   Patient Acceptance E VU role OT, noted deficits.  Transfer tech with sternal prec. PAUL 02/14/17 1504 Done                      User Key     Initials Effective Dates Name Provider Type Discipline    PAUL 06/22/15 -  Blank Katz OT Occupational Therapist OT                OT Recommendation and Plan  Anticipated Equipment Needs At Discharge:  (TBD post rehab  got wx issued today)  Anticipated Discharge Disposition: inpatient rehabilitation facility  Therapy Frequency: evaluation only (plans to rehab today)  Plan of Care Review  Plan Of Care Reviewed With: patient, family  Progress: improving  Outcome Summary/Follow up Plan: Pt. participated OT eval.  Pt. noted with decreased strength, flexibility and limited activity tolerance impacting ADL, IADL and mobility indep.  Pt. appropriate for skilled OT services.  Rehab recommended due to pt. lives alone.               Outcome Measures       02/14/17 1423 02/13/17 1005       How much help from another person do you currently need...    Turning from your back to your side while in flat bed without using  bedrails?  3  -LS     Moving from lying on back to sitting on the side of a flat bed without bedrails?  2  -LS     Moving to and from a bed to a chair (including a wheelchair)?  3  -LS     Standing up from a chair using your arms (e.g., wheelchair, bedside chair)?  3  -LS     Climbing 3-5 steps with a railing?  2  -LS     To walk in hospital room?  3  -LS     AM-PAC 6 Clicks Score  16  -LS     How much help from another is currently needed...    Putting on and taking off regular lower body clothing? 3  -PAUL      Bathing (including washing, rinsing, and drying) 3  -PAUL      Toileting (which includes using toilet bed pan or urinal) 3  -PAUL      Putting on and taking off regular upper body clothing 3  -PAUL      Taking care of personal grooming (such as brushing teeth) 4  -PAUL      Eating meals 4  -PAUL      Score 20  -PAUL      Functional Assessment    Outcome Measure Options AM-PAC 6 Clicks Daily Activity (OT)  -PAUL AM-PAC 6 Clicks Basic Mobility (PT)  -LS       User Key  (r) = Recorded By, (t) = Taken By, (c) = Cosigned By    Initials Name Provider Type    PAUL Katz OT Occupational Therapist    LS Francesca Cantu, PT Physical Therapist          Time Calculation:         Time Calculation- OT       02/14/17 1507          Time Calculation- OT    OT Start Time 1423  -PAUL      OT Received On 02/14/17  -PAUL        User Key  (r) = Recorded By, (t) = Taken By, (c) = Cosigned By    Initials Name Provider Type    PAUL Katz OT Occupational Therapist          Therapy Charges for Today     Code Description Service Date Service Provider Modifiers Qty    [id number]  OT EVAL LOW COMPLEXITY 3 2/14/2017 Blank Katz OT GO 1               OT Discharge Summary  Anticipated Discharge Disposition: inpatient rehabilitation facility  Reason for Discharge: Discharge from facility  Outcomes Achieved: Discharge from facility occurred on same date as evluation  Discharge Destination: Inpatient rehabilitation facility    Blank Jameson  [place] OT  2/14/2017

## 2017-02-14 NOTE — DISCHARGE PLACEMENT REQUEST
"Patient is medically ready for discharge and able to transfer today    OT has been consulted to evaluate    Please Call me ASAP with decision  La Ospina, BONNIE Case Manager  [phone]      Yenni Cronin (66 y.o. Female)     Date of Birth Social Security Number Address Home Phone MRN    1950  [address] [address] [phone] [id number]    Zoroastrianism Marital Status          Other        Admission Date Admission Type Admitting Provider Attending Provider Department, Room/Bed    2/3/17 Elective Jeromy Gaona MD Chaney, John H, MD [facility] PACU, PACU/NONE    Discharge Date Discharge Disposition Discharge Destination         Home or Self Care             Attending Provider: Jeromy Gaona MD     Allergies:  No Known Allergies    Isolation:  None   Infection:  None   Code Status:  FULL    Ht:  65.98\" (167.6 cm)   Wt:  149 lb (67.6 kg)    Admission Cmt:  None   Principal Problem:  Severe mitral regurgitation (S/P 31 Magna Ease mitral tissue valve) [I34.0]                 Active Insurance as of 2/3/2017     Primary Coverage     Payor Plan Insurance Group Employer/Plan Group    MEDICARE MEDICARE A & B      Payor Plan Address Payor Plan Phone Number Effective From Effective To    [address] [phone] 8/1/1983     [place] [address]       Subscriber Name Subscriber Birth Date Member ID       YENNI CRONIN 1950 [id number]           Secondary Coverage     Payor Plan Insurance Group Employer/Plan Group    [place]      Payor Plan Address Payor Plan Phone Number Effective From Effective To    [address] [phone] 12/16/2016     [place] [address]       Subscriber Name Subscriber Birth Date Member ID       YENNI CRONIN 1950 [id number]                 Emergency Contacts      (Rel.) Home Phone Work Phone Mobile Phone    Sara Beavers [phone] -- --            Insurance Information                " "MEDICARE/MEDICARE A & B Phone: [phone]    Subscriber: Tiaan Cronin Subscriber#: [id number]    Group#:  Precert#:         [place] Phone: [phone]    Subscriber: Tiana Cronin Subscriber#: [id number]    Group#:  Precert#:              History & Physical      SUSANA Shoemaker at 2/3/2017  6:52 AM     Attestation signed by Jeromy Gaona MD at 2/3/2017  8:01 AM        Agree with above.  No interval change.  Plan for MV repair possible replacement and single vessel CABG.                                 Pre-Op H&P (See Recent Office Note Attached)    Chief complaint:  Shortness of breath.    HPI:    Patient is a 66 y.o. female who presents with 1 year history of worsening shortness of breath.  She denies any changes in presenting symptoms since last seen by MD.  No recent fever or chills. Takes ASA blood thinning agents only.    Review of Systems:  General ROS:  no fever, chills, rashes, No change since last office visit  Cardiovascular ROS: no chest pain or dyspnea on exertion  Respiratory ROS: no cough, shortness of breath, or wheezing    Immunization History:   Influenza:  unknown  Pneumococcal:  yes  Tetanus:  no    Vital Signs:  Visit Vitals   • /72 (BP Location: Left arm, Patient Position: Lying)   • Pulse 62   • Resp 16   • Ht 66\" (167.6 cm)   • Wt 148 lb 13 oz (67.5 kg)   • SpO2 97%   • BMI 24.02 kg/m2       Physical Exam:    CV:  S1S2 regular rate and rhythm, no murmur               Resp:  Clear to auscultation; respirations regular, even and unlabored    Results Review:    I reviewed the patient's new clinical results. CXR-cardiomegaly, EKG w/ brent cardia    Assessment/Plan:Mitral valve regurgitation/for mitral valve repair or replacement,DARRIAN, CABG today       SUSANA Shoemaker  2/3/2017 6:52 AM       Electronically signed by Jeromy Gaona MD at 2/3/2017  8:01 AM        Vital Signs (last 24 hours)       02/13 0700  -  02/14 0659 02/14 0700  -  02/14 1107   Most " Recent    Temp (°F) 98.5 -  98.7      98.5     98.5 (36.9)    Heart Rate 60 -  68    63 -  74     74    Resp 16 -  20      18     18    BP (!)80/59 -  120/57    101/56 -  118/92     118/92    SpO2 (%) 91 -  99    92 -  97     93          Intake & Output (last day)       02/13 0701 - 02/14 0700 02/14 0701 - 02/15 0700    P.O. 480     Total Intake(mL/kg) 480 (7.1)     Urine (mL/kg/hr) 650 (0.4)     Other      Stool 0 (0)     Total Output 650      Net -170            Unmeasured Urine Occurrence 1 x     Unmeasured Stool Occurrence 2 x         Lines, Drains & Airways    Active LDAs     Name:   Placement date:   Placement time:   Site:   Days:    Peripheral IV Line - Single Lumen 02/13/17 2300 basilic vein (medial side of arm), right 20 gauge  02/13/17    2300      less than 1                Hospital Medications (active)       Dose Frequency Start End    acetaminophen (TYLENOL) tablet 650 mg (MAR Hold) ((MAR Hold) since 2/14/2017 10:00 AM) 650 mg Every 4 Hours PRN 2/3/2017     Sig - Route: Take 2 tablets by mouth Every 4 (Four) Hours As Needed for mild pain (1-3). - Oral    aspirin EC tablet 325 mg (MAR Hold) ((MAR Hold) since 2/14/2017 10:00 AM) 325 mg Daily 2/4/2017     Sig - Route: Take 1 tablet by mouth Daily. - Oral    atorvastatin (LIPITOR) tablet 40 mg (MAR Hold) ((MAR Hold) since 2/14/2017 10:00 AM) 40 mg Nightly 2/3/2017     Sig - Route: Take 1 tablet by mouth Every Night. - Oral    bisacodyl (DULCOLAX) EC tablet 10 mg (MAR Hold) ((MAR Hold) since 2/14/2017 10:00 AM) 10 mg Daily PRN 2/3/2017     Sig - Route: Take 2 tablets by mouth Daily As Needed for constipation. - Oral    bisacodyl (DULCOLAX) suppository 10 mg (MAR Hold) ((MAR Hold) since 2/14/2017 10:00 AM) 10 mg Daily PRN 2/4/2017     Sig - Route: Insert 1 suppository into the rectum Daily As Needed for constipation. - Rectal    bumetanide (BUMEX) injection 1 mg 1 mg Once 2/13/2017 2/13/2017    Sig - Route: Infuse 4 mL into a venous catheter 1 (One) Time. -  "Intravenous    bumetanide (BUMEX) injection 1 mg 1 mg Once 2/14/2017     Sig - Route: Infuse 4 mL into a venous catheter 1 (One) Time. - Intravenous    Chlorhexidine Gluconate Cloth 2 % pads 1 application 1 application Every 12 Hours PRN 2/2/2017     Sig - Route: Apply 1 application topically Every 12 (Twelve) Hours As Needed (12 hours night before surgery and repeat in AM prior to surgery.). - Topical    citalopram (CeleXA) tablet 20 mg (MAR Hold) ((MAR Hold) since 2/14/2017 10:00 AM) 20 mg Daily 2/4/2017     Sig - Route: Take 1 tablet by mouth Daily. - Oral    dextrose (D50W) solution 25 g (MAR Hold) ((MAR Hold) since 2/14/2017 10:00 AM) 25 g Every 15 Minutes PRN 2/4/2017     Sig - Route: Infuse 50 mL into a venous catheter Every 15 (Fifteen) Minutes As Needed for low blood sugar (Blood Sugar Less Than 70, Patient Has IV Access - Unresponsive, NPO or Unable To Safely Swallow). - Intravenous    dextrose (GLUTOSE) oral gel 15 g (MAR Hold) ((MAR Hold) since 2/14/2017 10:00 AM) 15 g Every 15 Minutes PRN 2/4/2017     Sig - Route: Take 15 g by mouth Every 15 (Fifteen) Minutes As Needed for low blood sugar (Blood Sugar Less Than 70, Patient Alert, Is Not NPO & Can Safely Swallow). - Oral    docusate sodium (COLACE) capsule 100 mg (MAR Hold) ((MAR Hold) since 2/14/2017 10:00 AM) 100 mg 2 Times Daily PRN 2/3/2017     Sig - Route: Take 1 capsule by mouth 2 (Two) Times a Day As Needed for constipation. - Oral    ethosuximide (ZARONTIN) capsule 500 mg 500 mg 2 Times Daily 2/8/2017     Sig - Route: Take 2 capsules by mouth 2 (Two) Times a Day. - Oral    famotidine (PEPCID) tablet 20 mg 20 mg 2 Times Daily 2/3/2017     Sig - Route: Take 1 tablet by mouth 2 (Two) Times a Day. - Oral    Linked Group 1:  \"Or\" Linked Group Details        glucagon (human recombinant) (GLUCAGEN DIAGNOSTIC) injection 1 mg (MAR Hold) ((MAR Hold) since 2/14/2017 10:00 AM) 1 mg Every 15 Minutes PRN 2/4/2017     Sig - Route: Inject 1 mg under the skin " "Every 15 (Fifteen) Minutes As Needed (Blood Glucose Less Than 70 - Patient Without IV Access - Unresponsive, NPO or Unable To Safely Swallow). - Subcutaneous    levothyroxine (SYNTHROID, LEVOTHROID) tablet 175 mcg (MAR Hold) ((MAR Hold) since 2/14/2017 10:00 AM) 175 mcg Daily 2/4/2017     Sig - Route: Take 1 tablet by mouth Daily. - Oral    magnesium hydroxide (MILK OF MAGNESIA) suspension 2400 mg/10mL 10 mL (MAR Hold) ((MAR Hold) since 2/14/2017 10:00 AM) 10 mL Daily PRN 2/4/2017     Sig - Route: Take 10 mL by mouth Daily As Needed for constipation. - Oral    magnesium sulfate 6 g in dextrose (D5W) 5 % 250 mL infusion (MAR Hold) ((MAR Hold) since 2/14/2017 10:00 AM) 6 g As Needed 2/3/2017     Sig - Route: Infuse 6 g into a venous catheter As Needed (Mg 1.1-1.5 mg/dL). - Intravenous    Linked Group 2:  \"Or\" Linked Group Details        magnesium sulfate 8 g in dextrose (D5W) 5 % 250 mL infusion (MAR Hold) ((MAR Hold) since 2/14/2017 10:00 AM) 8 g As Needed 2/3/2017     Sig - Route: Infuse 8 g into a venous catheter As Needed (Mg less than or equal to 1 mg/dL). - Intravenous    Linked Group 2:  \"Or\" Linked Group Details        magnesium sulfate in D5W 1g/100mL (PREMIX) IVPB 1 g (MAR Hold) ((MAR Hold) since 2/14/2017 10:00 AM) 1 g As Needed 2/3/2017     Sig - Route: Infuse 100 mL into a venous catheter As Needed (Mg less than or equal 1.0 mg/dL). - Intravenous    Linked Group 2:  \"Or\" Linked Group Details        magnesium sulfate in D5W 1g/100mL (PREMIX) IVPB 1 g (MAR Hold) ((MAR Hold) since 2/14/2017 10:00 AM) 1 g As Needed 2/3/2017     Sig - Route: Infuse 100 mL into a venous catheter As Needed (magnesium sulfate 1 g in D5W 100 mL IVPB - Mg 1.6 - 1.9 mg/dL). - Intravenous    Linked Group 2:  \"Or\" Linked Group Details        midodrine (PROAMATINE) tablet 5 mg (MAR Hold) ((MAR Hold) since 2/14/2017 10:00 AM) 5 mg Every 8 Hours Scheduled 2/13/2017     Sig - Route: Take 1 tablet by mouth Every 8 (Eight) Hours. - Oral    " "Morphine sulfate (PF) injection 2 mg (MAR Hold) ((MAR Hold) since 2/14/2017 10:00 AM) 2 mg Every 2 Hours PRN 2/4/2017     Sig - Route: Infuse 1 mL into a venous catheter Every 2 (Two) Hours As Needed for severe pain (7-10). - Intravenous    naloxone (NARCAN) injection 0.4 mg (MAR Hold) ((MAR Hold) since 2/14/2017 10:00 AM) 0.4 mg Every 5 Minutes PRN 2/3/2017     Sig - Route: Infuse 1 mL into a venous catheter Every 5 (Five) Minutes As Needed for respiratory depression. - Intravenous    Linked Group 3:  \"And\" Linked Group Details        niCARdipine (CARDENE) 50 mg/250 mL 0.9% NS 5-15 mg/hr Continuous PRN 2/3/2017     Sig - Route: Infuse 5-15 mg/hr into a venous catheter Continuous As Needed (See Admin Instructions). - Intravenous    nitroglycerin infusion 200 mcg/mL 5-200 mcg/min Continuous PRN 2/3/2017     Sig - Route: Infuse 5-200 mcg/min into a venous catheter Continuous As Needed (See Admin Instructions). - Intravenous    ondansetron (ZOFRAN) injection 4 mg (MAR Hold) ((MAR Hold) since 2/14/2017 10:00 AM) 4 mg Every 6 Hours PRN 2/10/2017     Sig - Route: Infuse 2 mL into a venous catheter Every 6 (Six) Hours As Needed for nausea or vomiting. - Intravenous    oxyCODONE-acetaminophen (PERCOCET) 5-325 MG per tablet 1 tablet (MAR Hold) ((MAR Hold) since 2/14/2017 10:00 AM) 1 tablet Every 4 Hours PRN 2/10/2017 2/20/2017    Sig - Route: Take 1 tablet by mouth Every 4 (Four) Hours As Needed for moderate pain (4-6). - Oral    oxyCODONE-acetaminophen (PERCOCET) 5-325 MG per tablet 2 tablet 2 tablet Every 4 Hours PRN 2/3/2017 2/13/2017    Sig - Route: Take 2 tablets by mouth Every 4 (Four) Hours As Needed for severe pain (7-10). - Oral    Pharmacy Consult - MTM (MAR Hold) ((MAR Hold) since 2/14/2017 10:00 AM)  Daily 2/4/2017     Sig - Route: Daily. - Does not apply    Pharmacy to dose warfarin  Continuous PRN 2/14/2017     Sig - Route: Continuous As Needed for consult (to keep INR). - Does not apply    polyethylene glycol " "(MIRALAX) powder 17 g (MAR Hold) ((MAR Hold) since 2/14/2017 10:00 AM) 17 g Daily 2/8/2017     Sig - Route: Take 17 g by mouth Daily. - Oral    potassium chloride (KLOR-CON) packet 40 mEq 40 mEq As Needed 2/3/2017     Sig - Route: Take 40 mEq by mouth As Needed (potassium replacement, see admin instructions). - Oral    Linked Group 4:  \"Or\" Linked Group Details        potassium chloride (MICRO-K) CR capsule 40 mEq 40 mEq As Needed 2/3/2017     Sig - Route: Take 4 capsules by mouth As Needed (potassium replacement.  see admin instructions). - Oral    Linked Group 4:  \"Or\" Linked Group Details        potassium chloride 20 mEq in 50 mL IVPB 20 mEq Every 1 Hour PRN 2/3/2017     Sig - Route: Infuse 50 mL into a venous catheter Every 1 (One) Hour As Needed (for K+ less than or equal to 3.1). - Intravenous    Linked Group 5:  \"Or\" Linked Group Details        potassium chloride 20 mEq in 50 mL IVPB 20 mEq Every 1 Hour PRN 2/3/2017     Sig - Route: Infuse 50 mL into a venous catheter Every 1 (One) Hour As Needed (for K+ 3.2 - 3.6). - Intravenous    Linked Group 5:  \"Or\" Linked Group Details        primidone (MYSOLINE) tablet 250 mg 250 mg 3 Times Daily 2/4/2017     Sig - Route: Take 1 tablet by mouth 3 (Three) Times a Day. - Oral    sennosides-docusate sodium (SENOKOT-S) 8.6-50 MG tablet 2 tablet (MAR Hold) ((MAR Hold) since 2/14/2017 10:00 AM) 2 tablet 2 Times Daily 2/3/2017     Sig - Route: Take 2 tablets by mouth 2 (Two) Times a Day. - Oral    sodium chloride 0.9 % flush 30 mL (MAR Hold) ((MAR Hold) since 2/14/2017 10:00 AM) 30 mL Once As Needed 2/3/2017     Sig - Route: Infuse 30 mL into a venous catheter 1 (One) Time As Needed for line care. - Intravenous    warfarin (COUMADIN) tablet 5 mg (MAR Hold) ((MAR Hold) since 2/14/2017 10:00 AM) 5 mg Daily Warfarin 2/14/2017     Sig - Route: Take 1 tablet by mouth Daily. - Oral             Physician Progress Notes (last 24 hours) (Notes from 2/13/2017 11:08 AM through 2/14/2017 " 11:08 AM)      Jeromy Gaona MD at 2/14/2017  6:58 AM  Version 1 of 1         Cardiothoracic Surgery Progress Note      POD # 11 s/p MVR, 4 pacer       LOS: 11 days      Subjective:  Weak, otherwise no complaints    Objective:  Vital Signs  Temp:  [98.5 °F (36.9 °C)-98.7 °F (37.1 °C)] 98.7 °F (37.1 °C)  Heart Rate:  [60-68] 67  Resp:  [16-20] 20  BP: ()/(50-80) 103/55    Physical Exam:   Gen:Sititng in chair comfortably   Lungs: clear to auscultation, respirations regular, respirations even and respirations unlabored   Heart: regular rhythm & normal rate, normal S1, S2, no murmur, no tammy, no rub and no click   Skin: Incision c/d/i     Results:    Results from last 7 days  Lab Units 02/10/17  0755   WBC 10*3/mm3 6.93   HEMOGLOBIN g/dL 9.1*   HEMATOCRIT % 27.6*   PLATELETS 10*3/mm3 178       Results from last 7 days  Lab Units 02/13/17  0347   SODIUM mmol/L 135   POTASSIUM mmol/L 4.1   CHLORIDE mmol/L 98*   TOTAL CO2 mmol/L 31.0   BUN mg/dL 11   CREATININE mg/dL 0.60   GLUCOSE mg/dL 105*   CALCIUM mg/dL 8.8         Assessment:  POD # 11 s/p MVR, 4 pacer  Deconditioned      Plan:  Home versus rehab  Pulmonary toilet  Ambulate, PT/OT  Coumadin today    SUSANA Ruiz  02/14/17  6:58 AM             Electronically signed by Jeromy Gaona MD at 2/14/2017  8:42 AM      Cecil Santillan MD at 2/14/2017  8:12 AM  Version 1 of 1         Pulmonary/Critical Care Follow-up     LOS: 11 days   Patient Care Team:  Rangel Lopez MD as PCP - General (Family Medicine)  Rangel Lopez MD as Consulting Physician (Family Medicine)    Chief Complaint / reason : valvular heart disease.      No chief complaint on file.      Subjective    66-year-old lifetime nonsmoking female who presented with a one-year history of progressive dyspnea and was found to have severe mitral regurgitation and 50-60% circumflex coronary artery disease and underwent a CABG and MVR with a tissue valve by Dr. Gaona on 2/3/2017.   The patient had a permanent pacemaker placed on 2/10/2017 by Dr. Dang.    Interval History:   Patient is anxious today about the possibility of going home.  She walked in the [place] again today.  She continues to have lower extremity edema.  Her postoperative pain is well controlled.  She denies nausea, vomiting, chest pain, dizziness.      History taken from: patient    PMH/FH/Social History were reviewed and updated appropriately in the electronic medical record.     Review of Systems:    Review of 14 systems was completed with positives and pertinent negatives noted in the subjective section.  All other systems reviewed and are negative.         Objective     Vital Signs  Temp:  [98.5 °F (36.9 °C)-98.7 °F (37.1 °C)] 98.7 °F (37.1 °C)  Heart Rate:  [60-68] 63  Resp:  [18-20] 20  BP: ()/(50-80) 109/60  02/13 0701 - 02/14 0700  In: 480 [P.O.:480]  Out: 650 [Urine:650]  Body mass index is 24.06 kg/(m^2).    sat: 97% room air  Physical Exam:     Constitutional:    Alert, cooperative, in no acute distress   Head:    Normocephalic, without obvious abnormality, atraumatic   Eyes:            Lids and lashes normal, conjunctivae and sclerae normal, no   icterus, no pallor, corneas clear, PER   ENMT:   Ears appear intact with no abnormalities noted      No oral lesions, no thrush, oral mucosa moist   Neck:   No adenopathy, supple, trachea midline, no thyromegaly, no   carotid bruit, no JVD       Lungs/Resp:     Normal effort, symmetric chest rise, left chest pacemaker site dressing in place.  No crepitus, mild bibasilar rales, no chest wall tenderness                Heart/CV:    Regular rhythm and normal rate, normal S1 and S2, no            murmur   Abdomen/GI:     Normal bowel sounds, no masses, no organomegaly, soft        non-tender, non-distended   :     Deferred   Extremities/MSK:   No clubbing or cyanosis.  2+ bilateral lower extremity edema.  Normal tone.  No deformities.    Pulses:   Pulses palpable and  equal bilaterally   Skin:   No bleeding, bruising or rash   Heme/Lymph:   No cervical or supraclavicular adenopathy.    Neurologic:    Psychiatric:       Moves all extremities with no obvious focal motor deficit.  Cranial nerves 2 - 12 grossly intact   Oriented x 3, normal affect.             Results Review:     I reviewed the patient's new clinical results.     Results from last 7 days  Lab Units 02/13/17  0347 02/12/17  0336 02/10/17  0755 02/09/17 0355 02/08/17 0330   SODIUM mmol/L 135 134 136 135 133   POTASSIUM mmol/L 4.1 4.1 4.4 4.2 4.1   CHLORIDE mmol/L 98* 99 99 98* 98*   TOTAL CO2 mmol/L 31.0 32.0* 29.0 30.0 27.0   BUN mg/dL 11 16 18 20 20   CREATININE mg/dL 0.60 0.70 0.60 0.60 0.70   CALCIUM mg/dL 8.8 8.7 9.3 9.1 8.7   BILIRUBIN mg/dL  --   --   --  0.3 0.3   ALK PHOS U/L  --   --   --  82 75   ALT (SGPT) U/L  --   --   --  28 20   AST (SGOT) U/L  --   --   --  42* 35*   GLUCOSE mg/dL 105* 102* 92 98 99       Results from last 7 days  Lab Units 02/10/17  0755 02/09/17 0355 02/08/17 0330   WBC 10*3/mm3 6.93 6.78 6.05   HEMOGLOBIN g/dL 9.1* 8.6* 8.5*   HEMATOCRIT % 27.6* 26.3* 25.9*   PLATELETS 10*3/mm3 178 135* 112*           Hemoglobin A1C 5.1%          I reviewed the patient's new imaging including images and reports.  Chest x-ray from 2/12/2017 was reviewed.  It showed no definite pneumothorax with pacer leads in place and mildly increased interstitial markings.    Medication Review:     aspirin 325 mg Oral Daily   atorvastatin 40 mg Oral Nightly   citalopram 20 mg Oral Daily   ethosuximide 500 mg Oral BID   famotidine 20 mg Oral BID   levothyroxine 175 mcg Oral Daily   midodrine 5 mg Oral Q8H   pharmacy consult - MTM  Does not apply Daily   polyethylene glycol 17 g Oral Daily   primidone 250 mg Oral TID   sennosides-docusate sodium 2 tablet Oral BID   warfarin 5 mg Oral Daily       niCARdipine 5-15 mg/hr   nitroglycerin 5-200 mcg/min   Pharmacy to dose warfarin        Assessment&#47;Plan      Principal Problem:    Severe mitral regurgitation (S/P 31 Magna Ease mitral tissue valve)  Active Problems:    Post-Op Asystole    CAD (S/P CABG x 1)        Plan:  1.  Okay to transfer to telemetry from my standpoint.  We will sign off on transfer, please consult hospitalists if indicated.  Continue Synthroid.   Bumex 1 mg IV ×1 now.   Will sign off.        Cecil Santillan MD  17  8:12 AM      *. Please note that portions of this note were completed with a voice recognition program. Efforts were made to edit the dictations, but occasionally words are mistranscribed.     Electronically signed by Cecil Santillan MD at 2017 10:37 AM                        Francesca Cantu, PT Physical Therapist Signed  Progress Notes Date of Service: 2017  1:11 PM                               Hide copied text        Hover for attribution information                                                                                                                              Acute Care - Physical Therapy Treatment Note    [facility]         Patient Name: Tiana Cronin                                 : 1950                                      MRN: [id number]    Today's Date: 2017                                  Onset of Illness/Injury or Date of Surgery Date: 17    Date of Referral to PT: 17                                Referring Physician: SUSANA Tucker                                            Admit Date: 2/3/2017         Visit Dx:                  ICD-10-CM     ICD-9-CM       1.     Impaired functional mobility, balance, gait, and endurance     Z74.09     V49.89       2.     Non-rheumatic mitral regurgitation     I34.0     424.0       3.     Mitral valve disorder     I05.9     424.0       4.     Post-Op Asystole     I46.9     427.5       5.     Mitral valve insufficiency, unspecified etiology     I34.0     424.0       6.     Coronary artery disease involving other coronary artery  bypass graft     I25.810                        Patient Active Problem List       Diagnosis       •     Mitral valve disorder       •     Severe mitral regurgitation (S/P 31 Magna Ease mitral tissue valve)       •     Post-Op Asystole       •     CAD (S/P CABG x 1)                                          Adult Rehabilitation Note                      02/13/17 1005     02/11/17 0955                              Rehab Assessment/Intervention             Discipline     physical therapist  -LS     physical therapist  -LS                   Document Type     therapy note (daily note)  -LS     therapy note (daily note)  -LS                   Subjective Information     agree to therapy;complains of;pain  -LS     agree to therapy;complains of;weakness;pain  -LS                   Patient Effort, Rehab Treatment     good  -LS     good  -LS                   Precautions/Limitations     fall precautions;cardiac precautions;oxygen therapy device and L/min;pacemaker;sternal precautions  -LS     fall precautions;cardiac precautions;oxygen therapy device and L/min;sternal precautions;pacemaker  -LS                   Specific Treatment Considerations           s/p PM placement 2/10  -LS                   Recorded by     [LS] Francesca Cantu, PT     [LS] Francesca Cantu, PT                   Vital Signs             Pre Systolic BP Rehab     95  -LS     102  -LS                   Pre Treatment Diastolic BP     53  -LS     58  -LS                   Post Systolic BP Rehab     113  -LS     127  -LS                   Post Treatment Diastolic BP     65  -LS     64  -LS                   Pretreatment Heart Rate (beats/min)     68  -LS     69  -LS                   Posttreatment Heart Rate (beats/min)     71  -LS     70  -LS                   Pre SpO2 (%)     97  -LS     95  -LS                   O2 Delivery Pre Treatment     room air  -LS     supplemental O2     1L  -LS                   Post SpO2 (%)     98  -LS     93  -LS                    O2 Delivery Post Treatment     room air  -LS     supplemental O2  -LS                   Pre Patient Position     Sitting  -LS     Supine  -LS                   Intra Patient Position     Standing  -LS     Standing  -LS                   Post Patient Position     Sitting  -LS     Supine  -LS                   Recorded by     [LS] Francesca Cantu, PT     [LS] Francesca Cantu, PT                   Pain Assessment             Pain Assessment     0-10  -LS     0-10  -LS                   Pain Score     3  -LS     5  -LS                   Post Pain Score     3  -LS     6  -LS                   Pain Type     Surgical pain  -LS     Surgical pain  -LS                   Pain Location     Chest  -LS     Chest  -LS                   Pain Intervention(s)     Repositioned;Ambulation/increased activity  -LS     Repositioned;Ambulation/increased activity  -LS                   Recorded by     [LS] Francesca Cantu, PT     [LS] Francesca Cantu, PT                   Cognitive Assessment/Intervention             Current Cognitive/Communication Assessment     functional  -LS     functional  -LS                   Orientation Status     oriented x 4  -LS     oriented x 4  -LS                   Follows Commands/Answers Questions     able to follow single-step instructions;100% of the time;needs cueing  -LS     able to follow single-step instructions;100% of the time;needs cueing;needs increased time;needs repetition  -LS                   Personal Safety     good awareness, safety precautions  -LS     mild impairment;decreased awareness, need for assist;decreased awareness, need for safety  -LS                   Personal Safety Interventions     fall prevention program maintained;gait belt;nonskid shoes/slippers when out of bed  -LS     fall prevention program maintained;gait belt;nonskid shoes/slippers when out of bed  -LS                   Recorded by     [LS] Francesca Cantu, PT     [LS] Francesca Cantu, PT                   Bed Mobility,  Assessment/Treatment             Bed Mob, Supine to Sit, [place]           moderate assist (50% patient effort);2 person assist required;verbal cues required  -LS                   Bed Mob, Sit to Supine, [place]           moderate assist (50% patient effort);2 person assist required;verbal cues required  -LS                   Bed Mobility, Safety Issues           decreased use of arms for pushing/pulling;decreased use of legs for bridging/pushing  -LS                   Bed Mobility, Comment     [facility]  -LS                         Recorded by     [LS] Francesca Cantu, PT     [LS] Francesca Cantu, PT                   Transfer Assessment/Treatment             Transfers, Sit-Stand [place]     minimum assist (75% patient effort);1 person + 1 person to manage equipment;verbal cues required  -LS     minimum assist (75% patient effort);2 person assist required;verbal cues required  -LS                   Transfers, Stand-Sit [place]     minimum assist (75% patient effort);1 person + 1 person to manage equipment;verbal cues required  -LS     minimum assist (75% patient effort);2 person assist required;verbal cues required  -LS                   Transfers, Sit-Stand-Sit, Assist Device     rolling walker  -LS                         Toilet Transfer, [place]           minimum assist (75% patient effort);2 person assist required;verbal cues required  -LS                   Transfer, Impairments           strength decreased  -LS                   Transfer, Comment     VC's for hand placement; sternal precautions.   -LS                         Recorded by     [LS] Francesca Cantu, PT     [LS] Francesca Cantu, PT                   Gait Assessment/Treatment             Gait, [place] Level     contact guard assist;1 person + 1 person to manage equipment;verbal cues required  -LS     minimum assist (75% patient effort);2 person assist required;verbal cues required  -LS                   Gait, Assistive Device      rolling walker  -LS     --     HHA of PT on R  -LS                   Gait, Distance (Feet)     180  -LS     40  -LS                   Gait, Gait Deviations     donna decreased;forward flexed posture;step length decreased  -LS     donna decreased;forward flexed posture;step length decreased  -LS                   Gait, Safety Issues           supplemental O2;sequencing ability decreased;step length decreased  -LS                   Gait, Impairments     impaired balance;strength decreased  -LS     pain;strength decreased  -LS                   Gait, Comment     VC's for posture and increasing step length within RWx.   -LS     Noted slight knee bucling R. VC's for posture and increasing step length bilat. Req'dseated rest break after 20 ft.   -LS                   Recorded by     [LS] Francesca Cantu PT     [LS] Francesca Cantu PT                   Therapy Exercises             Bilateral Lower Extremities     AROM:;10 reps;sitting;ankle pumps/circles;LAQ;hip flexion  -LS     AROM:;10 reps;supine;ankle pumps/circles;glut sets;quad sets  -LS                   Bilateral Upper Extremity     AROM:;10 reps;sitting;elbow flexion/extension;shoulder abduction/adduction;shoulder protraction/retraction  -LS                         Recorded by     [LS] Francesca Cantu, PT     [LS] Francesca Cantu PT                   Positioning and Restraints             Pre-Treatment Position     sitting in chair/recliner  -LS     in bed  -LS                   Post Treatment Position     chair  -LS     bed  -LS                   In Bed           notified nsg;supine;call light within reach;encouraged to call for assist;with brace     LUE sling  -LS                   In Chair     notified nsg;reclined;call light within reach;encouraged to call for assist;legs elevated  -LS                         Recorded by     [LS] Francesca Cantu PT     [LS] Francesca Cantu PT                         User Key      (r) = Recorded By, (t) = Taken By, (c) =  Cosigned By              Initials     Name     Effective Dates               LS     Francesca JOHN Cantu, PT     06/19/15 -                                               IP PT Goals                      02/13/17 1308     02/11/17 1310     02/07/17 1336                      Bed Mobility PT LTG             Bed Mobility PT LTG, Outcome     goal ongoing  -LS     goal ongoing  -LS     goal ongoing  -PAUL             Transfer Training PT LTG             Transfer Training PT LTG, Outcome     goal ongoing  -LS     goal ongoing  -LS     goal ongoing  -PAUL             Gait Training PT LTG             Gait Training Goal PT LTG, Outcome     goal ongoing  -LS     goal ongoing  -LS     goal ongoing  -PAUL             Static Sitting Balance PT LTG             Static Sitting Balance PT LTG, Outcome     goal ongoing  -LS     goal ongoing  -LS     goal ongoing  -PAUL                       02/06/17 0943                                      Bed Mobility PT LTG             Bed Mobility PT LTG, Date Established     02/06/17  -SJ                         Bed Mobility PT LTG, Time to Achieve     2 wks  -SJ                         Bed Mobility PT LTG, Activity Type     roll left/roll right;supine to sit/sit to supine  -SJ                         Bed Mobility PT LTG, [place] Level     contact guard assist  -SJ                         Bed Mobility PT Goal  LTG, Assist Device     bed rails  -SJ                         Transfer Training PT LTG             Transfer Training PT LTG, Date Established     02/06/17  -SJ                         Transfer Training PT LTG, Time to Achieve     2 wks  -SJ                         Transfer Training PT LTG, Activity Type     bed to chair /chair to bed;sit to stand/stand to sit  -SJ                         Transfer Training PT LTG, [place] Level     contact guard assist  -SJ                         Transfer Training PT LTG, Assist Device     walker, rolling  -SJ                         Gait Training PT LTG              Gait Training Goal PT LTG, Date Established     02/06/17  -SJ                         Gait Training Goal PT LTG, Time to Achieve     2 wks  -SJ                         Gait Training Goal PT LTG, [place] Level     contact guard assist  -SJ                         Gait Training Goal PT LTG, Assist Device     walker, rolling  -SJ                         Gait Training Goal PT LTG, Distance to Achieve     200  -SJ                         Static Sitting Balance PT LTG             Static Sitting Balance PT LTG, Date Established     02/06/17  -SJ                         Static Sitting Balance PT LTG, Time to Achieve     2 wks  -SJ                         Static Sitting Balance PT LTG, [place] Level     conditional independence  -SJ                                  User Key      (r) = Recorded By, (t) = Taken By, (c) = Cosigned By              Initials     Name     Provider Type               PAUL Madison, PT     Physical Therapist             SJ     Patricia Cox, PT     Physical Therapist             LS     Francesca Cantu, PT     Physical Therapist                                Physical Therapy Education                         Title: PT OT SLP Therapies (Done)                                Topic: Physical Therapy (Done)                       Point: Mobility training (Done)             Learning Progress Summary                           Learner     Readiness     Method     Response     Comment     Documented by     Status                     Patient     Acceptance     E,D     SOLO AMARAL            02/13/17 1308     Done             Acceptance     E,D     SOLO            02/11/17 1309     Active             Acceptance     E     NR           PAUL 02/09/17 1101     Active             Acceptance     E,D     NR            02/08/17 0936     Active             Acceptance     E     NR     discussed HEP and bed exercises patient can do on her own patient needs cues today to perform activity     PAUL 02/07/17  1335     Active             Acceptance     E     NR            02/06/17 0940     Active                                                Point: Home exercise program (Done)             Learning Progress Summary                           Learner     Readiness     Method     Response     Comment     Documented by     Status                     Patient     Acceptance     E,D     CRISTIN,NR            02/13/17 1308     Done             Acceptance     E,D     NR            02/11/17 1309     Active             Acceptance     E     NR            02/09/17 1101     Active             Acceptance     E,D     NR            02/08/17 0936     Active             Acceptance     E     NR     discussed HEP and bed exercises patient can do on her own patient needs cues today to perform activity      02/07/17 1335     Active             Acceptance     E     NR            02/06/17 0940     Active                                                Point: Body mechanics (Done)             Learning Progress Summary                           Learner     Readiness     Method     Response     Comment     Documented by     Status                     Patient     Acceptance     E,D     CRISTIN,NR            02/13/17 1308     Done             Acceptance     E,D     NR            02/11/17 1309     Active             Acceptance     E     NR            02/09/17 1101     Active             Acceptance     E,D     NR            02/08/17 0936     Active             Acceptance     E     NR     discussed HEP and bed exercises patient can do on her own patient needs cues today to perform activity      02/07/17 1335     Active             Acceptance     E     NR            02/06/17 0940     Active                                                Point: Precautions (Done)             Learning Progress Summary                           Learner     Readiness     Method     Response     Comment     Documented by     Status                     Patient      Acceptance     E,D     VU,NR            02/13/17 1308     Done             Acceptance     E,D     NR            02/11/17 1309     Active             Acceptance     E     NR           PAUL 02/09/17 1101     Active             Acceptance     E,D     NR            02/08/17 0936     Active             Acceptance     E     NR     discussed HEP and bed exercises patient can do on her own patient needs cues today to perform activity     PAUL 02/07/17 1335     Active             Acceptance     E     NR            02/06/17 0940     Active                                                                                        User Key                           Initials     Effective Dates     Name     Provider Type     Discipline                               06/19/15 -      Faina Madison, PT     Physical Therapist     PT                  06/19/15 -      Patricia Cox, PT     Physical Therapist     PT                  06/19/15 -      Francesca Cantu, PT     Physical Therapist     PT                                                         PT Recommendation and Plan    Anticipated Equipment Needs At Discharge: front wheeled walker    Anticipated Discharge Disposition: inpatient rehabilitation facility    Demonstrates Need for Referral to Another Service: occupational therapy    Planned Therapy Interventions: balance training, bed mobility training, gait training, home exercise program, patient/family education, strengthening, transfer training    PT Frequency: daily    Plan of Care Review    Plan Of Care Reviewed With: patient    Progress: improving    Outcome Summary/Follow up Plan: Pt demonstrated increased indep with ambulation today; able to progress distance to 180 total ft with use of RWx (on RA). Cont to be limited by fatigue; gave excellent effort with seated ther ex. Will cont to progress as clinically warranted.                             Outcome Measures                      02/13/17 1005      02/11/17 0955                              How much help from another person do you currently need...             Turning from your back to your side while in flat bed without using bedrails?     3  -LS     2  -LS                   Moving from lying on back to sitting on the side of a flat bed without bedrails?     2  -LS     2  -LS                   Moving to and from a bed to a chair (including a wheelchair)?     3  -LS     3  -LS                   Standing up from a chair using your arms (e.g., wheelchair, bedside chair)?     3  -LS     3  -LS                   Climbing 3-5 steps with a railing?     2  -LS     1  -LS                   To walk in hospital room?     3  -LS     3  -LS                   AM-PAC 6 Clicks Score     16  -LS     14  -LS                   Functional Assessment             Outcome Measure Options     AM-PAC 6 Clicks Basic Mobility (PT)  -LS     AM-PAC 6 Clicks Basic Mobility (PT)  -LS                            User Key      (r) = Recorded By, (t) = Taken By, (c) = Cosigned By              Initials     Name     Provider Type               KEITH Cantu PT     Physical Therapist                          Time Calculation:                         PT Charges                      02/13/17 1311                                      Time Calculation             Start Time     1005  -LS                         PT Received On     02/13/17  -                         PT Goal Re-Cert Due Date     02/16/17  -                         Time Calculation- PT             Total Timed Code Minutes- PT     23 minute(s)  -                                  User Key      (r) = Recorded By, (t) = Taken By, (c) = Cosigned By              Initials     Name     Provider Type               KEITH Cantu PT     Physical Therapist                            Therapy Charges for Today               Code     Description     Service Date     Service Provider     Modifiers     Qty                99176897358     HC GAIT TRAINING EA 15 MIN     2/13/2017     Francesca Cantu, PT     GP     1             [id number]     HC PT THER PROC EA 15 MIN     2/13/2017     Francesca Cantu, PT     GP     1             [phone]     HC PT THER SUPP EA 15 MIN     2/13/2017     Francesca Canut, PT     GP     2                          PT G-Codes    Outcome Measure Options: AM-PAC 6 Clicks Basic Mobility (PT)         Francesca Cantu, PT                                   2/13/2017

## 2017-02-14 NOTE — PROGRESS NOTES
: The patient is a 66-year-old  female with a history of hypertension and seizures who presented with shortness of breath over the past year. The patient was found to have severe mitral regurgitation on echocardiogram and was felt to be a reasonable candidate for mitral valve repair versus replacement. The patient was found to have a 50% to 60% circumflex coronary artery disease on cardiac catheterization and was felt to be a reasonable candidate for single vessel coronary artery bypass graft. The risks and benefits of surgery were discussed with the patient including pain, bleeding, infection, renal failure, stroke, heart block, and death. The patient understood these risks and wished to proceed with surgery.                Admitted, taken to the operating suite underwent a mitral valve replacement with a #31 magna ease tissue valve.  Patient tolerated the procedure well came off bypass promptly, closed, taken to cardiothoracic unit in stable condition.  Postoperative day #1 awake alert extubated hemodynamically stable.  He is in a junctional rhythm.  She is on low-dose levo fed this was weaned off.  the swan and [place] were removed.  Day 2 patient continues to be paced.  Underlying rhythm is now asystole.  She remains hemodynamically stable except for the rhythm.  Minimal drainage from the chest tubes.  Over the next 48 hours patient continued to be asystole underlying but otherwise hemodynamically stable and good spirits good pain control.  Dr. Abigail Bee was [place] the patient in consultation secondary to the asystole.  The patient had a permanent pacemaker placed to cardiology on Friday.  patient tolerated procedure well.  That afternoon with normal functioning pacemaker the temporary pacing wires were removed.  Following morning chest tubes were DC'd.  At that point the patient was ready for transfer to telemetry.  Over the hospital was full patient continued in the intensive care unit worked  daily with physical therapy moving slowly for most of the last week in bed.  It was felt the patient would be best treated by placement in a short term rehabilitation facility.  A facility established today the patient is to be transferred.

## 2017-02-14 NOTE — PLAN OF CARE
Problem: Patient Care Overview (Adult)  Goal: Plan of Care Review  Outcome: Ongoing (interventions implemented as appropriate)    02/14/17 1514   Coping/Psychosocial Response Interventions   Plan Of Care Reviewed With patient   Patient Care Overview   Progress improving   Outcome Evaluation   Outcome Summary/Follow up Plan patient able to increase activity today she required one sitting rest patient will need walker for home as she had several LOB today without the walker. patient would benefit from short rehab stay prior to going home as she lives alone and has only moderate endurance tolerance. we will continue to see patient for mobility training and monitored exercise until D/C         Problem: Inpatient Physical Therapy  Goal: Bed Mobility Goal LTG- PT  Outcome: Ongoing (interventions implemented as appropriate)    02/06/17 0943 02/13/17 1308   Bed Mobility PT LTG   Bed Mobility PT LTG, Date Established 02/06/17 --    Bed Mobility PT LTG, Time to Achieve 2 wks --    Bed Mobility PT LTG, Activity Type roll left/roll right;supine to sit/sit to supine --    Bed Mobility PT LTG, [place] Level contact guard assist --    Bed Mobility PT Goal LTG, Assist Device bed rails --    Bed Mobility PT LTG, Outcome --  goal ongoing       Goal: Transfer Training Goal 1 LTG- PT  Outcome: Outcome(s) achieved Date Met:  02/14/17 02/06/17 0943 02/14/17 1514   Transfer Training PT LTG   Transfer Training PT LTG, Date Established 02/06/17 --    Transfer Training PT LTG, Time to Achieve 2 wks --    Transfer Training PT LTG, Activity Type bed to chair /chair to bed;sit to stand/stand to sit --    Transfer Training PT LTG, [place] Level contact guard assist --    Transfer Training PT LTG, Assist Device walker, rolling --    Transfer Training PT LTG, Outcome --  goal met       Goal: Gait Training Goal LTG- PT  Outcome: Ongoing (interventions implemented as appropriate)    02/06/17 0943 02/13/17 1308   Gait Training PT LTG    Gait Training Goal PT LTG, Date Established 02/06/17 --    Gait Training Goal PT LTG, Time to Achieve 2 wks --    Gait Training Goal PT LTG, [place] Level contact guard assist --    Gait Training Goal PT LTG, Assist Device walker, rolling --    Gait Training Goal PT LTG, Distance to Achieve 200 --    Gait Training Goal PT LTG, Outcome --  goal ongoing       Goal: Static Sitting Balance Goal LTG- PT  Outcome: Outcome(s) achieved Date Met:  02/14/17 02/06/17 0943 02/14/17 1514   Static Sitting Balance PT LTG   Static Sitting Balance PT LTG, Date Established 02/06/17 --    Static Sitting Balance PT LTG, Time to Achieve 2 wks --    Static Sitting Balance PT LTG, [place] Level conditional independence --    Static Sitting Balance PT LTG, Outcome --  goal met

## 2017-02-14 NOTE — DISCHARGE PLACEMENT REQUEST
"Yenni Cronin (66 y.o. Female)     Date of Birth Social Security Number Address Home Phone MRN    1950  [address] [facility] [id number] [phone] [id number]    Taoism Marital Status          Other        Admission Date Admission Type Admitting Provider Attending Provider Department, Room/Bed    2/3/17 Elective Jeromy Gaona MD Chaney, John H, MD [address], [address]/1    Discharge Date Discharge Disposition Discharge Destination         Home or Self Care             Attending Provider: Jeromy Gaona MD     Allergies:  No Known Allergies    Isolation:  None   Infection:  None   Code Status:  FULL    Ht:  65.98\" (167.6 cm)   Wt:  149 lb (67.6 kg)    Admission Cmt:  None   Principal Problem:  Severe mitral regurgitation (S/P 31 Magna Ease mitral tissue valve) [I34.0]                 Active Insurance as of 2/3/2017     Primary Coverage     Payor Plan Insurance Group Employer/Plan Group    MEDICARE MEDICARE A & B      Payor Plan Address Payor Plan Phone Number Effective From Effective To    [address] [phone] 8/1/1983     [place] [address]       Subscriber Name Subscriber Birth Date Member ID       YENNI CRONIN 1950 [id number]           Secondary Coverage     Payor Plan Insurance Group Employer/Plan Group    [place]      Payor Plan Address Payor Plan Phone Number Effective From Effective To    [address] [phone] 12/16/2016     [place] [address]       Subscriber Name Subscriber Birth Date Member ID       YENNI CRONIN 1950 [id number]                 Emergency Contacts      (Rel.) Home Phone Work Phone Mobile Phone    Sara Beavers [phone] -- --               Discharge Summary      SUSANA Garner at 2/14/2017  2:40 PM          CTS Discharge Summary    Patient Care Team:  Rangel Lopez MD as PCP - General (Family Medicine)  Rangel Lopez MD as Consulting Physician " (Family Medicine)      Date of Admission: 2/3/2017  5:20 AM  Date of Discharge:  2/14/17    Discharge Diagnosis  Past Medical History   Diagnosis Date   • Anxiety    • Aortic regurgitation    • Arthritis      Hands and Knees   • Depression    • Hypertension    • Hypothyroidism    • Mitral regurgitation    • Mitral valve prolapse    • Seizures      Several Years since last seizure ( last seizure cannot remember)    • Skin cancer, basal cell      basal cell skin cancer on face    • Wears dentures      full set    • Wears glasses      reading       Principal Problem:    Severe mitral regurgitation (S/P 31 Magna Ease mitral tissue valve)  Active Problems:    Post-Op Asystole    CAD (S/P CABG x 1)      History of Present IllnessThe patient is a 66-year-old  female with a history of hypertension and seizures who presented with shortness of breath over the past year. The patient was found to have severe mitral regurgitation on echocardiogram and was felt to be a reasonable candidate for mitral valve repair versus replacement. The patient was found to have a 50% to 60% circumflex coronary artery disease on cardiac catheterization and was felt to be a reasonable candidate for single vessel coronary artery bypass graft. The risks and benefits of surgery were discussed with the patient including pain, bleeding, infection, renal failure, stroke, heart block, and death. The patient understood these risks and wished to proceed with surgery.         Hospital Course  Patient is a 66 y.o. female Admitted, taken to the operating suite underwent a mitral valve replacement with a #31 magna ease tissue valve. Patient tolerated the procedure well came off bypass promptly, closed, taken to cardiothoracic unit in stable condition. Postoperative day #1 awake alert extubated hemodynamically stable. He is in a junctional rhythm. She is on low-dose levo fed this was weaned off. the swan and Cotton were removed. Day 2 patient continues  to be paced. Underlying rhythm is now asystole. She remains hemodynamically stable except for the rhythm. Minimal drainage from the chest tubes. Over the next 48 hours patient continued to be asystole underlying but otherwise hemodynamically stable and good spirits good pain control. Dr. Abigail Bee was [place] the patient in consultation secondary to the asystole. The patient had a permanent pacemaker placed to cardiology on Friday. patient tolerated procedure well. That afternoon with normal functioning pacemaker the temporary pacing wires were removed. Following morning chest tubes were DC'd. At that point the patient was ready for transfer to telemetry. Over the hospital was full patient continued in the intensive care unit worked daily with physical therapy moving slowly for most of the last week in bed. It was felt the patient would be best treated by placement in a short term rehabilitation facility. A facility established today the patient is to be transferred.    Procedures Performed  Procedure(s):  Pacemaker DC new       Consults:   Consults     Date and Time Order Name Status Description    2/7/2017 0742 Inpatient Consult to Cardiology Completed             Discharge Medications   Tiana Cronin   Home Medication Instructions VENU:[id number]    Printed on:02/14/17 1441   Medication Information                      aspirin 81 MG EC tablet  Take 81 mg by mouth Daily.             Cholecalciferol (VITAMIN D3) 2000 UNITS tablet  Take 2,000 Units by mouth Daily.             citalopram (CeleXA) 20 MG tablet  Take 20 mg by mouth Daily.             docusate sodium 100 MG capsule  Take 100 mg by mouth 2 (Two) Times a Day As Needed for constipation.             ethosuximide (ZARONTIN) 250 MG capsule  Take 500 mg by mouth 2 (Two) Times a Day. 2 Tablets BID              furosemide (LASIX) 20 MG tablet  Take 20 mg by mouth Daily.             levothyroxine (SYNTHROID, LEVOTHROID) 175 MCG tablet  Take 175 mcg by  mouth Daily.             metoprolol tartrate (LOPRESSOR) 25 MG tablet  Take 1 tablet by mouth 2 (Two) Times a Day.             midodrine (PROAMATINE) 5 MG tablet  Take 1 tablet by mouth Every 8 (Eight) Hours.             Multiple Vitamins-Minerals (CENTRUM SILVER PO)  Take 1 tablet by mouth Daily.             Omega-3 Fatty Acids (FISH OIL) 1000 MG capsule capsule  Take 1,000 mg by mouth Daily With Breakfast.             oxyCODONE-acetaminophen (PERCOCET) 5-325 MG per tablet  Take 2 tablets by mouth Every 4 (Four) Hours As Needed for severe pain (7-10).             primidone (MYSOLINE) 250 MG tablet  Take 250 mg by mouth 3 (Three) Times a Day.             vitamin C (ASCORBIC ACID) 500 MG tablet  Take 1,000 mg by mouth Daily.             warfarin (COUMADIN) 5 MG tablet  Take 1 tablet by mouth daily as directedd                 Discharge Diet:   Diet Instructions     Diet: Consistent Carbohydrate; Thin Liquids, No Restrictions       Discharge Diet:  Consistent Carbohydrate   Fluid Consistency:  Thin Liquids, No Restrictions                 Activity at Discharge:   Activity Instructions     Discharge Activity Restrictions       1) No driving for 3 weeks and no longer taking narcotics.   2) Return to school / work in 4 week  .  3) May shower / sponge bathe for today hours.  4) Do not lift / push / pull more then 10 lbs.                 Follow-up Appointments  No future appointments.  D/C summary less than 30 minutes     SUSANA Ruiz  02/14/17  2:41 PM               Electronically signed by SUSANA Garner at 2/14/2017  2:42 PM

## 2017-02-16 ENCOUNTER — OUTSIDE FACILITY SERVICE (OUTPATIENT)
Dept: CARDIOLOGY | Facility: CLINIC | Age: 67
End: 2017-02-16

## 2017-02-16 PROCEDURE — 99221 1ST HOSP IP/OBS SF/LOW 40: CPT | Performed by: INTERNAL MEDICINE

## 2017-02-18 NOTE — THERAPY DISCHARGE NOTE
Acute Care - Physical Therapy Discharge Summary  [facility]       Patient Name: Tiana Cronin  : 1950  MRN: [id number]    Today's Date: 2017  Onset of Illness/Injury or Date of Surgery Date: 17    Date of Referral to PT: 17  Referring Physician: MD Candelaria      Admit Date: 2/3/2017      PT Recommendation and Plan    Visit Dx:    ICD-10-CM ICD-9-CM   1. Impaired functional mobility, balance, gait, and endurance Z74.09 V49.89   2. Non-rheumatic mitral regurgitation I34.0 424.0   3. Mitral valve disorder I05.9 424.0   4. Post-Op Asystole I46.9 427.5   5. Mitral valve insufficiency, unspecified etiology I34.0 424.0   6. Coronary artery disease involving other coronary artery bypass graft I25.810    7. Impaired mobility and ADLs Z74.09 799.89                       IP PT Goals       17 1514 17 1308 17 1310    Bed Mobility PT LTG    Bed Mobility PT LTG, Outcome  goal ongoing  -LS goal ongoing  -LS    Transfer Training PT LTG    Transfer Training PT LTG, Outcome goal met  -PAUL goal ongoing  -LS goal ongoing  -LS    Gait Training PT LTG    Gait Training Goal PT LTG, Outcome  goal ongoing  -LS goal ongoing  -LS    Static Sitting Balance PT LTG    Static Sitting Balance PT LTG, Outcome goal met  -PAUL goal ongoing  -LS goal ongoing  -LS      17 1336 17 0943       Bed Mobility PT LTG    Bed Mobility PT LTG, Date Established  17  -SJ     Bed Mobility PT LTG, Time to Achieve  2 wks  -SJ     Bed Mobility PT LTG, Activity Type  roll left/roll right;supine to sit/sit to supine  -SJ     Bed Mobility PT LTG, [place] Level  contact guard assist  -SJ     Bed Mobility PT Goal  LTG, Assist Device  bed rails  -SJ     Bed Mobility PT LTG, Outcome goal ongoing  -PAUL      Transfer Training PT LTG    Transfer Training PT LTG, Date Established  17  -SJ     Transfer Training PT LTG, Time to Achieve  2 wks  -SJ     Transfer Training PT LTG, Activity Type  bed to chair /chair  to bed;sit to stand/stand to sit  -SJ     Transfer Training PT LTG, [place] Level  contact guard assist  -SJ     Transfer Training PT LTG, Assist Device  walker, rolling  -SJ     Transfer Training PT LTG, Outcome goal ongoing  -PAUL      Gait Training PT LTG    Gait Training Goal PT LTG, Date Established  02/06/17  -SJ     Gait Training Goal PT LTG, Time to Achieve  2 wks  -SJ     Gait Training Goal PT LTG, [place] Level  contact guard assist  -SJ     Gait Training Goal PT LTG, Assist Device  walker, rolling  -SJ     Gait Training Goal PT LTG, Distance to Achieve  200  -SJ     Gait Training Goal PT LTG, Outcome goal ongoing  -PAUL      Static Sitting Balance PT LTG    Static Sitting Balance PT LTG, Date Established  02/06/17  -SJ     Static Sitting Balance PT LTG, Time to Achieve  2 wks  -SJ     Static Sitting Balance PT LTG, [place] Level  conditional independence  -SJ     Static Sitting Balance PT LTG, Outcome goal ongoing  -PAUL        User Key  (r) = Recorded By, (t) = Taken By, (c) = Cosigned By    Initials Name Provider Type    PAUL Madison, PT Physical Therapist    ZEYNEP Cox, PT Physical Therapist    KEITH Cantu, PT Physical Therapist           Goals Status: Treatment plan discontinued secondary to discharge from acute facility.      PT Discharge Summary  Reason for Discharge: Discharge from facility  Discharge Destination: Inpatient rehabilitation facility      Myrna Gold, PT   2/18/2017

## 2017-02-20 ENCOUNTER — OUTSIDE FACILITY SERVICE (OUTPATIENT)
Dept: CARDIOLOGY | Facility: CLINIC | Age: 67
End: 2017-02-20

## 2017-02-20 PROCEDURE — 99232 SBSQ HOSP IP/OBS MODERATE 35: CPT | Performed by: INTERNAL MEDICINE

## 2017-02-23 ENCOUNTER — TELEPHONE (OUTPATIENT)
Dept: CARDIOLOGY | Facility: CLINIC | Age: 67
End: 2017-02-23

## 2017-02-23 NOTE — TELEPHONE ENCOUNTER
Spoke with PT's sister Sara in regards to PT's current status- PT is still in rehab facility - Dr. Hoyt has been seeing her while she has been there-   Sara will make sure that the wound check has been done and if it hasn't she will make sure they ask Dr. Hoyt to take a look at it- she would like to cancel the appt with Dr. Dang tomorrow for wound check and I let Lulu know-   Sara will also follow up on who will be monitoring INR's when PT goes home and she will make sure that PT has a f/u appt with cariology in [place]- I told her that if Dr. Hoyt will not follow her that we will be glad to see her-  Sara will call me with an update when she knows  More.

## 2017-03-08 ENCOUNTER — OFFICE VISIT (OUTPATIENT)
Dept: CARDIAC SURGERY | Facility: CLINIC | Age: 67
End: 2017-03-08

## 2017-03-08 VITALS
DIASTOLIC BLOOD PRESSURE: 72 MMHG | SYSTOLIC BLOOD PRESSURE: 122 MMHG | HEART RATE: 58 BPM | HEIGHT: 66 IN | WEIGHT: 151.8 LBS | OXYGEN SATURATION: 99 % | BODY MASS INDEX: 24.4 KG/M2

## 2017-03-08 DIAGNOSIS — I34.0 MITRAL VALVE INSUFFICIENCY, UNSPECIFIED ETIOLOGY: Primary | ICD-10-CM

## 2017-03-08 PROCEDURE — 99024 POSTOP FOLLOW-UP VISIT: CPT | Performed by: THORACIC SURGERY (CARDIOTHORACIC VASCULAR SURGERY)

## 2017-03-08 NOTE — PROGRESS NOTES
2017  Patient Information  Tiana Cronin                                                                                          [address] [facility] [place] [address]   1950  'PCP/Referring Physician'  Rangel Lopez MD  [phone]  No ref. provider found    Chief Complaint   Patient presents with   • Follow-up     HOSP F/U AFTER [facility] 2/3/17       History of Present Illness:  Mrs. Cronin is a 66 year old female with a history of hypertension, seizures and mitral regurgitation now status post mitral valve replacement with a tissue valve 2/3/17.  She does have some fatigue, but is walking and carrying out her activities of daily living.  She denies fever, chills or exertional chest pain.  Tiana does have an occasional dry cough.      Patient Active Problem List   Diagnosis   • Mitral valve disorder   • Severe mitral regurgitation (S/P 31 Magna Ease mitral tissue valve)   • Post-Op Asystole   • CAD (S/P CABG x 1)     Past Medical History   Diagnosis Date   • Anxiety    • Aortic regurgitation    • Arthritis      Hands and Knees   • Depression    • Hypertension    • Hypothyroidism    • Mitral regurgitation    • Mitral valve prolapse    • Seizures      Several Years since last seizure ( last seizure cannot remember)    • Skin cancer, basal cell      basal cell skin cancer on face    • Wears dentures      full set    • Wears glasses      reading     Past Surgical History   Procedure Laterality Date   • Plantar fascia surgery Bilateral    •  section       x 2   • Elbow procedure Left      Due to FX, Plates and Screws Placed   • Knee surgery Left    • Skin cancer excision Bilateral      Bilatera Ears, Basil Cell   • Skin biopsy     • Teeth extraction     • Cardiac catheterization     • Mitral valve repair/replacement N/A 2/3/2017     Procedure: DARRIAN, MEDIAN STERNOTOMY, CORONARY ARTERY BYPASS GRAFTING ATTEMPTED, UTILIZING THE ENDOSCOPIC VEIN HARVEST OF THE RIGHT GREATER SAPHENOUS VEIN, MITRAL  VALVE REPLACEMENT;  Surgeon: Jeromy Gaona MD;  Location:  [facility];  Service:    • Cardiac electrophysiology procedure N/A 2/10/2017     Procedure: Pacemaker DC new;  Surgeon: Ton Dang DO;  Location:  [facility] INVASIVE LOCATION;  Service:        Current Outpatient Prescriptions:   •  aspirin 81 MG EC tablet, Take 81 mg by mouth Daily., Disp: , Rfl:   •  Cholecalciferol (VITAMIN D3) 2000 UNITS tablet, Take 2,000 Units by mouth Daily., Disp: , Rfl:   •  citalopram (CeleXA) 20 MG tablet, Take 20 mg by mouth Daily., Disp: , Rfl:   •  docusate sodium 100 MG capsule, Take 100 mg by mouth 2 (Two) Times a Day As Needed for constipation., Disp: 60 capsule, Rfl: 0  •  ethosuximide (ZARONTIN) 250 MG capsule, Take 500 mg by mouth 2 (Two) Times a Day. 2 Tablets BID , Disp: , Rfl:   •  furosemide (LASIX) 20 MG tablet, Take 40 mg by mouth Daily., Disp: , Rfl: 1  •  levothyroxine (SYNTHROID, LEVOTHROID) 175 MCG tablet, Take 200 mcg by mouth Daily., Disp: , Rfl: 1  •  metoprolol tartrate (LOPRESSOR) 25 MG tablet, Take 1 tablet by mouth 2 (Two) Times a Day., Disp: 60 tablet, Rfl: 6  •  midodrine (PROAMATINE) 10 MG tablet, Take 1 tablet by mouth 3 (Three) Times a Day., Disp: 90 tablet, Rfl: 3  •  midodrine (PROAMATINE) 5 MG tablet, Take 1 tablet by mouth Every 8 (Eight) Hours., Disp: 90 tablet, Rfl: 6  •  Multiple Vitamins-Minerals (CENTRUM SILVER PO), Take 1 tablet by mouth Daily., Disp: , Rfl:   •  Omega-3 Fatty Acids (FISH OIL) 1000 MG capsule capsule, Take 1,000 mg by mouth Daily With Breakfast., Disp: , Rfl:   •  oxyCODONE-acetaminophen (PERCOCET) 5-325 MG per tablet, Take 2 tablets by mouth Every 4 (Four) Hours As Needed for severe pain (7-10)., Disp: 30 tablet, Rfl: 0  •  primidone (MYSOLINE) 250 MG tablet, Take 250 mg by mouth 3 (Three) Times a Day., Disp: , Rfl:   •  vitamin C (ASCORBIC ACID) 500 MG tablet, Take 1,000 mg by mouth Daily., Disp: , Rfl:   •  warfarin (COUMADIN) 5 MG tablet, Take 1 tablet by mouth daily  "as directedd (Patient taking differently: Take 5 mg by mouth 2 (Two) Times a Day. Take 1 tablet by mouth daily as directedd), Disp: 30 tablet, Rfl: 6  No current facility-administered medications for this visit.     Facility-Administered Medications Ordered in Other Visits:   •  Chlorhexidine Gluconate Cloth 2 % pads 1 application, 1 application, Topical, Q12H VIVN, Aubrie Anaya PA-C  No Known Allergies  Social History     Social History   • Marital status:      Spouse name: N/A   • Number of children: 2   • Years of education: N/A     Occupational History   • Restaurant Work      [email or web address]     Social History Main Topics   • Smoking status: Never Smoker   • Smokeless tobacco: Never Used   • Alcohol use No   • Drug use: No   • Sexual activity: Defer     Other Topics Concern   • Not on file     Social History Narrative     Family History   Problem Relation Age of Onset   • Hypertension Mother    • Alzheimer's disease Mother    • Coronary artery disease Father    • Hypertension Father    • Diabetes Father    • Heart failure Father      ROS  Vitals:    03/08/17 1138   BP: 122/72   BP Location: Right arm   Patient Position: Sitting   Pulse: 58   SpO2: 99%   Weight: 151 lb 12.8 oz (68.9 kg)   Height: 66\" (167.6 cm)      Physical Exam   Constitutional: She is oriented to person, place, and time. She appears well-developed and well-nourished. No distress.   HENT:   Head: Normocephalic and atraumatic.   Eyes: Conjunctivae are normal. No scleral icterus.   Neck: Normal range of motion. No JVD present. No tracheal deviation present.   Cardiovascular: Normal rate, regular rhythm and normal heart sounds.  Exam reveals no gallop and no friction rub.    No murmur heard.  Pulmonary/Chest: Effort normal and breath sounds normal. No respiratory distress. She has no wheezes. She has no rales.   Musculoskeletal: Normal range of motion. She exhibits no deformity.   Neurological: She is alert and oriented to person, " place, and time.   Skin: No rash noted. She is not diaphoretic. No erythema.   Sternotomy and pacemaker incisions well healed with no erythema or drainage.  Sternum intact with cough   Psychiatric: She has a normal mood and affect. Her behavior is normal. Judgment and thought content normal.       Labs/Imaging:  -CXR performed 3/3/17, per report (images unavailable from [place]), reveals small bilateral pleural effusions.      Assessment/Plan:  Mrs. Cronin is a 66 year old female who is status post mitral valve replacement 2/3/2017.  She had a follow up chest x-ray which showed only persistent small effusions.  If she does not have significant shortness of breath, they may be observed.  Tiana was instructed in rehab not to shower and I recommended she go ahead and shower.  I recommended she continue to walk and when 6 weeks out from surgery, she should participate in cardiac rehab.  She may also drive and start to lift with her arms when 6 weeks out from surgery.  I will see her back in May where we will discontinue her Coumadin.  She is agreeable to that plan.      Patient Active Problem List   Diagnosis   • Mitral valve disorder   • Severe mitral regurgitation (S/P 31 Magna Ease mitral tissue valve)   • Post-Op Asystole   • CAD (S/P CABG x 1)     Signed by: Jeromy Gaona MD    3/8/2017    Scribed for Jeromy Gaona MD by Chantell Montaño. 3/8/2017  12:07 PM       CC:  Berny Lopez MD

## 2017-03-10 ENCOUNTER — OFFICE VISIT (OUTPATIENT)
Dept: CARDIOLOGY | Facility: CLINIC | Age: 67
End: 2017-03-10

## 2017-03-10 VITALS
WEIGHT: 153 LBS | BODY MASS INDEX: 24.59 KG/M2 | DIASTOLIC BLOOD PRESSURE: 62 MMHG | SYSTOLIC BLOOD PRESSURE: 108 MMHG | HEART RATE: 60 BPM | HEIGHT: 66 IN

## 2017-03-10 DIAGNOSIS — R60.0 LOCALIZED EDEMA: ICD-10-CM

## 2017-03-10 DIAGNOSIS — I25.10 CORONARY ARTERY DISEASE INVOLVING NATIVE CORONARY ARTERY OF NATIVE HEART WITHOUT ANGINA PECTORIS: ICD-10-CM

## 2017-03-10 DIAGNOSIS — Z95.3 S/P MITRAL VALVE REPLACEMENT WITH BIOPROSTHETIC VALVE: Primary | ICD-10-CM

## 2017-03-10 DIAGNOSIS — Z78.9 PACED RHYTHM ON ELECTROCARDIOGRAM (ECG): ICD-10-CM

## 2017-03-10 DIAGNOSIS — Z95.1 S/P CABG X 1: ICD-10-CM

## 2017-03-10 DIAGNOSIS — I49.5 SSS (SICK SINUS SYNDROME) (HCC): ICD-10-CM

## 2017-03-10 PROCEDURE — 99213 OFFICE O/P EST LOW 20 MIN: CPT | Performed by: NURSE PRACTITIONER

## 2017-03-10 PROCEDURE — 93000 ELECTROCARDIOGRAM COMPLETE: CPT | Performed by: NURSE PRACTITIONER

## 2017-03-10 RX ORDER — LISINOPRIL 5 MG/1
5 TABLET ORAL DAILY
Qty: 30 TABLET | Refills: 8 | Status: SHIPPED | OUTPATIENT
Start: 2017-03-10 | End: 2017-12-08 | Stop reason: SDUPTHER

## 2017-03-10 RX ORDER — OMEPRAZOLE 20 MG/1
20 CAPSULE, DELAYED RELEASE ORAL DAILY
COMMUNITY
End: 2017-09-12

## 2017-03-10 RX ORDER — ATORVASTATIN CALCIUM 40 MG/1
40 TABLET, FILM COATED ORAL DAILY
COMMUNITY
End: 2017-03-10 | Stop reason: SDUPTHER

## 2017-03-10 RX ORDER — MIDODRINE HYDROCHLORIDE 5 MG/1
2.5 TABLET ORAL EVERY 8 HOURS SCHEDULED
Qty: 90 TABLET | Refills: 8 | Status: SHIPPED | OUTPATIENT
Start: 2017-03-10 | End: 2018-04-16 | Stop reason: SDUPTHER

## 2017-03-10 RX ORDER — ATORVASTATIN CALCIUM 40 MG/1
40 TABLET, FILM COATED ORAL DAILY
Qty: 30 TABLET | Refills: 8 | Status: SHIPPED | OUTPATIENT
Start: 2017-03-10 | End: 2017-12-11 | Stop reason: SDUPTHER

## 2017-03-10 RX ORDER — FUROSEMIDE 40 MG/1
40 TABLET ORAL DAILY
Qty: 30 TABLET | Refills: 8 | Status: SHIPPED | OUTPATIENT
Start: 2017-03-10 | End: 2017-12-08 | Stop reason: SDUPTHER

## 2017-03-10 RX ORDER — LISINOPRIL 5 MG/1
5 TABLET ORAL DAILY
COMMUNITY
End: 2017-03-10 | Stop reason: SDUPTHER

## 2017-03-10 RX ORDER — FUROSEMIDE 40 MG/1
40 TABLET ORAL DAILY
COMMUNITY
End: 2017-03-10 | Stop reason: SDUPTHER

## 2017-03-10 RX ORDER — LEVOTHYROXINE SODIUM 0.2 MG/1
350 TABLET ORAL DAILY
COMMUNITY

## 2017-03-10 NOTE — PROGRESS NOTES
Chief Complaint   Patient presents with   • Hospital Follow-Up     Patient had MVR on 2/3 per Dr. Gaona and had PPM placement om 2/10 secondary to junctional rhythm. Was transfered to [facility] rehab unit on . Denies palpitations. Needs refills on cardiac meds for [facility]. PCP is following coumadin but has only had checked once since she has been out of the hospital.    • Chest Pain     Complains of soreness.    • Shortness of Breath     About the same as before. Hasn't noticed much improvement since the valve replacement.        Jeff Cronin is a 66 y.o. female with a history of hypertension, hypothyroidism, and seizure disorder. She had developed increased fatigue and shortness of breath. After abnormal EKG, she was referred to another cardiologist and workup showed significant mitral regurgitation that did not improve with medical management. She was then referred to Dr. Gaona and underwent mitral valve replacement with #31 magma tissue valve and also had a 1 vessel by pass. Post-operatively, she developed junctional rhythm and a [employer] pacemaker was placed by Dr. Dang. In February, she was transferred to [facility] for rehab. Dr. Hoyt was consulted for cardiac management. She was treated for anemia and discharged on iron supplement. Today she comes to the office for a hospital follow up appointment. She is now attending outpatient physical therapy and tolerating well. She reports her overall fatigue as well as shortness of breath has significantly improved. No signs of bleeding noted.     HPI         Cardiac History:    Past Surgical History   Procedure Laterality Date   • Plantar fascia surgery Bilateral    •  section       x 2   • Elbow procedure Left      Due to FX, Plates and Screws Placed   • Knee surgery Left    • Skin cancer excision Bilateral      Bilatera Ears, Basil Cell   • Skin biopsy     • Teeth extraction     • Cardiac catheterization      • Mitral valve repair/replacement N/A 2/3/2017     Procedure: DARRIAN, MEDIAN STERNOTOMY, CORONARY ARTERY BYPASS GRAFTING ATTEMPTED, UTILIZING THE ENDOSCOPIC VEIN HARVEST OF THE RIGHT GREATER SAPHENOUS VEIN, MITRAL VALVE REPLACEMENT;  Surgeon: Jeromy Gaona MD;  Location: [facility] OR;  Service:    • Cardiac electrophysiology procedure N/A 2/10/2017     Procedure: Pacemaker DC new;  Surgeon: Ton Dang DO;  Location:  [facility] INVASIVE LOCATION;  Service:        Current Outpatient Prescriptions   Medication Sig Dispense Refill   • aspirin 81 MG EC tablet Take 81 mg by mouth Daily.     • atorvastatin (LIPITOR) 40 MG tablet Take 1 tablet by mouth Daily. 30 tablet 8   • Cholecalciferol (VITAMIN D3) 2000 UNITS tablet Take 2,000 Units by mouth Daily.     • citalopram (CeleXA) 20 MG tablet Take 20 mg by mouth Daily.     • docusate sodium 100 MG capsule Take 100 mg by mouth 2 (Two) Times a Day As Needed for constipation. 60 capsule 0   • ethosuximide (ZARONTIN) 250 MG capsule Take 500 mg by mouth 2 (Two) Times a Day. 2 Tablets BID      • furosemide (LASIX) 40 MG tablet Take 1 tablet by mouth Daily. 30 tablet 8   • levothyroxine (SYNTHROID, LEVOTHROID) 200 MCG tablet Take 200 mcg by mouth Daily.     • lisinopril (PRINIVIL,ZESTRIL) 5 MG tablet Take 1 tablet by mouth Daily. 30 tablet 8   • midodrine (PROAMATINE) 5 MG tablet Take 0.5 tablets by mouth Every 8 (Eight) Hours. 90 tablet 8   • Multiple Vitamins-Minerals (CENTRUM SILVER PO) Take 1 tablet by mouth Daily.     • Omega-3 Fatty Acids (FISH OIL) 1000 MG capsule capsule Take 1,000 mg by mouth Daily With Breakfast.     • omeprazole (priLOSEC) 20 MG capsule Take 20 mg by mouth Daily.     • primidone (MYSOLINE) 250 MG tablet Take 250 mg by mouth 3 (Three) Times a Day.     • vitamin C (ASCORBIC ACID) 500 MG tablet Take 1,000 mg by mouth Daily.     • warfarin (COUMADIN) 5 MG tablet Take 1 tablet by mouth daily as directedd (Patient taking differently: Take 5 mg by mouth. Take  1 tablet by mouth daily and 1 1/2 tabs on Tuesday and Thursday) 30 tablet 6   • metoprolol tartrate (LOPRESSOR) 25 MG tablet Take 1/2 tablet twice a day 30 tablet 8     No current facility-administered medications for this visit.      Facility-Administered Medications Ordered in Other Visits   Medication Dose Route Frequency Provider Last Rate Last Dose   • Chlorhexidine Gluconate Cloth 2 % pads 1 application  1 application Topical Q12H PRN Aubrie Anaya PA-C           Review of patient's allergies indicates no known allergies.    Past Medical History   Diagnosis Date   • Anxiety    • Aortic regurgitation    • Arthritis      Hands and Knees   • Depression    • Hypertension    • Hypothyroidism    • Mitral regurgitation    • Mitral valve prolapse    • Seizures      Several Years since last seizure ( last seizure cannot remember)    • Skin cancer, basal cell      basal cell skin cancer on face    • Wears dentures      full set    • Wears glasses      reading       Social History     Social History   • Marital status:      Spouse name: N/A   • Number of children: 2   • Years of education: N/A     Occupational History   • 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 Negative for appetite change and fever.   HENT: Negative for congestion, nosebleeds, sinus pressure and trouble swallowing.    Eyes: Negative for visual disturbance.   Respiratory: Positive for cough (dry, after meals) and shortness of breath  "(improved). Negative for choking, chest tightness and wheezing.    Gastrointestinal: Negative for abdominal pain, anal bleeding, blood in stool and nausea.   Genitourinary: Negative for difficulty urinating, dysuria and hematuria.   Musculoskeletal: Negative for gait problem and myalgias.   Neurological: Negative for dizziness, syncope, speech difficulty, weakness, light-headedness, numbness and headaches.   Hematological: Does not bruise/bleed easily.   Psychiatric/Behavioral: Negative for confusion and sleep disturbance.       Diabetes- No  Thyroid-abnormal    Objective     Visit Vitals   • /62   • Pulse 60   • Ht 66\" (167.6 cm)   • Wt 153 lb (69.4 kg)   • BMI 24.69 kg/m2       Physical Exam   Constitutional: She is oriented to person, place, and time.   Eyes: Pupils are equal, round, and reactive to light.   Neck: Neck supple. No JVD present.   Cardiovascular: Normal rate and regular rhythm.    Murmur heard.  Pulmonary/Chest: Effort normal and breath sounds normal. She has no wheezes. She has no rales.   Abdominal: Soft. Bowel sounds are normal.   Musculoskeletal: She exhibits edema (1-2 + lower legs).   Neurological: She is alert and oriented to person, place, and time.   Skin: Skin is warm and dry. There is pallor (slightly).   Vitals reviewed.      ECG 12 Lead  Date/Time: 3/10/2017 10:28 AM  Performed by: BETTY CONWAY  Authorized by: BETTY CONWAY   Comparison: compared with previous ECG from 2/11/2017  Comparison to previous ECG: Atrial paced  Rhythm: paced  BPM: 60  Comments: Atrial paced and ventricular sensed                Assessment/Plan      Tiana was seen today for hospital follow-up, chest pain and shortness of breath.    Diagnoses and all orders for this visit:    S/P mitral valve replacement with bioprosthetic valve    S/P CABG x 1    SSS (sick sinus syndrome)  -     ECG 12 Lead    Paced rhythm on electrocardiogram (ECG)  -     ECG 12 Lead    Localized edema    Coronary artery disease " involving native coronary artery of native heart without angina pectoris    Other orders  -     metoprolol tartrate (LOPRESSOR) 25 MG tablet; Take 1/2 tablet twice a day  -     midodrine (PROAMATINE) 5 MG tablet; Take 0.5 tablets by mouth Every 8 (Eight) Hours.  -     atorvastatin (LIPITOR) 40 MG tablet; Take 1 tablet by mouth Daily.  -     furosemide (LASIX) 40 MG tablet; Take 1 tablet by mouth Daily.  -     lisinopril (PRINIVIL,ZESTRIL) 5 MG tablet; Take 1 tablet by mouth Daily.        She will follow with PCP for management of warfarin and other labs including CBC and chemistry. Please send a copy of lab report when available. She will see Dr. Gaona in May for reassessment of POC post bypass and MVR. If echocardiogram not done will consider at next visit. EKG today shows atrially paced rhythm with ventricular sensing. A [place] ppm interrogation scheduled for May. Same dose Lasix recommended and she will monitor edema. We will decrease the dose of Midodrine and Metoprolol. No other medication changes made today. Vital signs monitored at physical therapy. She understands to call for any problems. Otherwise, we will see her back in 6 months.            Electronically signed by MCKENZIE Parra,  March 10, 2017 10:37 AM

## 2017-04-10 ENCOUNTER — DOCUMENTATION (OUTPATIENT)
Dept: CARDIOLOGY | Facility: CLINIC | Age: 67
End: 2017-04-10

## 2017-04-10 NOTE — PROGRESS NOTES
Karthik with [facility] called regarding Coumadin instructions. I spoke with Melissa and she does not monitor PT/INR. I informed Karthik and advised him to call Dr. Lopez.

## 2017-05-10 ENCOUNTER — OFFICE VISIT (OUTPATIENT)
Dept: CARDIOLOGY | Facility: CLINIC | Age: 67
End: 2017-05-10

## 2017-05-10 DIAGNOSIS — I49.5 SSS (SICK SINUS SYNDROME) (HCC): Primary | ICD-10-CM

## 2017-05-10 DIAGNOSIS — I48.0 PAF (PAROXYSMAL ATRIAL FIBRILLATION) (HCC): ICD-10-CM

## 2017-05-10 PROCEDURE — 93288 INTERROG EVL PM/LDLS PM IP: CPT | Performed by: INTERNAL MEDICINE

## 2017-05-23 ENCOUNTER — OFFICE VISIT (OUTPATIENT)
Dept: CARDIAC SURGERY | Facility: CLINIC | Age: 67
End: 2017-05-23

## 2017-05-23 VITALS
DIASTOLIC BLOOD PRESSURE: 63 MMHG | HEART RATE: 60 BPM | HEIGHT: 66 IN | WEIGHT: 143 LBS | BODY MASS INDEX: 22.98 KG/M2 | SYSTOLIC BLOOD PRESSURE: 91 MMHG

## 2017-05-23 DIAGNOSIS — I34.0 NON-RHEUMATIC MITRAL REGURGITATION: Primary | ICD-10-CM

## 2017-05-23 PROCEDURE — 99212 OFFICE O/P EST SF 10 MIN: CPT | Performed by: THORACIC SURGERY (CARDIOTHORACIC VASCULAR SURGERY)

## 2017-05-23 RX ORDER — FERROUS SULFATE 325(65) MG
325 TABLET ORAL DAILY
Refills: 5 | COMMUNITY
Start: 2017-05-08

## 2017-09-12 ENCOUNTER — OFFICE VISIT (OUTPATIENT)
Dept: CARDIOLOGY | Facility: CLINIC | Age: 67
End: 2017-09-12

## 2017-09-12 VITALS
DIASTOLIC BLOOD PRESSURE: 68 MMHG | HEART RATE: 64 BPM | SYSTOLIC BLOOD PRESSURE: 110 MMHG | HEIGHT: 66 IN | BODY MASS INDEX: 23.63 KG/M2 | WEIGHT: 147 LBS

## 2017-09-12 DIAGNOSIS — E78.49 OTHER HYPERLIPIDEMIA: ICD-10-CM

## 2017-09-12 DIAGNOSIS — Z95.2 S/P MVR (MITRAL VALVE REPLACEMENT): ICD-10-CM

## 2017-09-12 DIAGNOSIS — I05.9 MITRAL VALVE DISORDER: Primary | ICD-10-CM

## 2017-09-12 DIAGNOSIS — E03.8 OTHER SPECIFIED HYPOTHYROIDISM: ICD-10-CM

## 2017-09-12 DIAGNOSIS — I25.810 CORONARY ARTERY DISEASE INVOLVING OTHER CORONARY ARTERY BYPASS GRAFT: ICD-10-CM

## 2017-09-12 DIAGNOSIS — Z95.3 S/P MITRAL VALVE REPLACEMENT WITH BIOPROSTHETIC VALVE: ICD-10-CM

## 2017-09-12 DIAGNOSIS — I49.5 SSS (SICK SINUS SYNDROME) (HCC): ICD-10-CM

## 2017-09-12 DIAGNOSIS — I10 ESSENTIAL HYPERTENSION: ICD-10-CM

## 2017-09-12 PROCEDURE — 93000 ELECTROCARDIOGRAM COMPLETE: CPT | Performed by: NURSE PRACTITIONER

## 2017-09-12 PROCEDURE — 99214 OFFICE O/P EST MOD 30 MIN: CPT | Performed by: NURSE PRACTITIONER

## 2017-09-12 RX ORDER — NITROGLYCERIN 0.4 MG/1
TABLET SUBLINGUAL
Qty: 30 TABLET | Refills: 0 | Status: SHIPPED | OUTPATIENT
Start: 2017-09-12 | End: 2017-10-10 | Stop reason: SDUPTHER

## 2017-09-12 RX ORDER — LEVOTHYROXINE SODIUM 0.05 MG/1
50 TABLET ORAL DAILY
COMMUNITY
End: 2018-09-25 | Stop reason: ALTCHOICE
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
## 2017-09-19 ENCOUNTER — OUTSIDE FACILITY SERVICE (OUTPATIENT)
Dept: CARDIOLOGY | Facility: CLINIC | Age: 67
End: 2017-09-19

## 2017-09-19 ENCOUNTER — HOSPITAL ENCOUNTER (OUTPATIENT)
Dept: CARDIOLOGY | Facility: HOSPITAL | Age: 67
Discharge: HOME | End: 2017-09-19
Admitting: NURSE PRACTITIONER

## 2017-09-19 DIAGNOSIS — Z95.2 S/P MVR (MITRAL VALVE REPLACEMENT): ICD-10-CM

## 2017-09-19 DIAGNOSIS — I05.9 MITRAL VALVE DISORDER: ICD-10-CM

## 2017-09-19 DIAGNOSIS — Z95.3 S/P MITRAL VALVE REPLACEMENT WITH BIOPROSTHETIC VALVE: ICD-10-CM

## 2017-09-19 PROCEDURE — 93306 TTE W/DOPPLER COMPLETE: CPT

## 2017-09-19 PROCEDURE — 93306 TTE W/DOPPLER COMPLETE: CPT | Performed by: INTERNAL MEDICINE

## 2017-10-10 RX ORDER — NITROGLYCERIN 0.4 MG/1
TABLET SUBLINGUAL
Qty: 25 TABLET | Refills: 3 | Status: SHIPPED | OUTPATIENT
Start: 2017-10-10

## 2017-12-11 RX ORDER — ATORVASTATIN CALCIUM 40 MG/1
TABLET, FILM COATED ORAL
Qty: 30 TABLET | Refills: 11 | Status: SHIPPED | OUTPATIENT
Start: 2017-12-11 | End: 2018-12-05 | Stop reason: SDUPTHER

## 2017-12-11 RX ORDER — FUROSEMIDE 40 MG/1
TABLET ORAL
Qty: 30 TABLET | Refills: 11 | Status: SHIPPED | OUTPATIENT
Start: 2017-12-11 | End: 2018-12-05 | Stop reason: SDUPTHER

## 2017-12-11 RX ORDER — LISINOPRIL 5 MG/1
TABLET ORAL
Qty: 30 TABLET | Refills: 11 | Status: SHIPPED | OUTPATIENT
Start: 2017-12-11 | End: 2018-12-05 | Stop reason: SDUPTHER

## 2018-02-13 ENCOUNTER — TELEPHONE (OUTPATIENT)
Dept: CARDIOLOGY | Facility: CLINIC | Age: 68
End: 2018-02-13

## 2018-02-13 NOTE — TELEPHONE ENCOUNTER
Received fax from [facility] for cardiac clearance for patient to have a colonoscopy on 03/05/18.     Fax [phone]

## 2018-03-27 ENCOUNTER — OFFICE VISIT (OUTPATIENT)
Dept: CARDIOLOGY | Facility: CLINIC | Age: 68
End: 2018-03-27

## 2018-03-27 VITALS
BODY MASS INDEX: 24.27 KG/M2 | HEIGHT: 66 IN | SYSTOLIC BLOOD PRESSURE: 110 MMHG | WEIGHT: 151 LBS | HEART RATE: 68 BPM | DIASTOLIC BLOOD PRESSURE: 70 MMHG

## 2018-03-27 DIAGNOSIS — I49.5 SSS (SICK SINUS SYNDROME) (HCC): ICD-10-CM

## 2018-03-27 DIAGNOSIS — I34.0 NON-RHEUMATIC MITRAL REGURGITATION: ICD-10-CM

## 2018-03-27 DIAGNOSIS — I05.9 MITRAL VALVE DISORDER: Primary | ICD-10-CM

## 2018-03-27 DIAGNOSIS — E78.2 MIXED HYPERLIPIDEMIA: ICD-10-CM

## 2018-03-27 DIAGNOSIS — I25.810 CORONARY ARTERY DISEASE INVOLVING OTHER CORONARY ARTERY BYPASS GRAFT WITHOUT ANGINA PECTORIS: ICD-10-CM

## 2018-03-27 DIAGNOSIS — E03.9 HYPOTHYROIDISM, UNSPECIFIED TYPE: ICD-10-CM

## 2018-03-27 PROCEDURE — 93000 ELECTROCARDIOGRAM COMPLETE: CPT | Performed by: NURSE PRACTITIONER

## 2018-03-27 PROCEDURE — 99214 OFFICE O/P EST MOD 30 MIN: CPT | Performed by: NURSE PRACTITIONER

## 2018-03-27 NOTE — PROGRESS NOTES
Chief Complaint   Patient presents with   • Follow-up     For cardiac management. Last [facility] PPM checked 5/10/17. She reports will being seeing Dr Heredia for possible surgery. Patient does not need refills at this time. Last labs in chart.   • Shortness of Breath     She reports rarely has shortness of breath.       Subjective       Tiana Cronin is a 67 y.o. female with hypertension, hyperlipidemia, hypothyroidism, and seizure disorder with no seizure activity since her fifties. She developed increased fatigue and shortness of breath and had an abnormal EKG.  She was seen by another cardiologist noted to have significant mitral regurgitation that did not improve with medical management.  She was referred to Dr. Gaona and underwent mitral valve replacement with #31 magma tissue valve and also had a 1 vessel bypass.  Post-operatively, she developed junctional rhythm and a [employer] pacemaker was placed by Dr. Dang.  After discharge, she was transferred to [facility] for ten days of rehab.  Dr. Hoyt was consulted for cardiac management.  She was treated for anemia and discharged on iron supplement.  [facility] pacer check in May showed one mode switch and 6 years of battery life remaining. Echocardiogram repeated in September 2017 showed normal functioning bioprosthetic mitral valve.  She came in today for her regular follow up visit.  She is feeling well denying any chest pain, palpitations, or shortness of breath.  She is maintaining ADLs without difficulty.  Labs done in October 2017 showed cholesterol well controlled at 141, Tri 152, LDL 54, and HDL 57.  BUN/Cr 20/1.0, K 5.3, AST/ALT 40/46.  She was advised to increase fluids and avoid potassium rich foods. She isn't sure if refills needed.  She does report undergoing colonoscopy on March 5th with Dr. Sommer after having an episode of fecal incontinence with one polyp removed and an apparent complication with another.  She is planning to see Dr. Heredia on April 9th  for possible colon surgery.      HPI         Cardiac History:    Past Surgical History:   Procedure Laterality Date   • CARDIAC CATHETERIZATION  01/11/2017    [facility]:  EF 55-60%, 50-60% stenosis of circ (small vessel), LAD 30%, severe MR   • CARDIAC ELECTROPHYSIOLOGY PROCEDURE N/A 2/10/2017    Procedure: Pacemaker DC new;  Surgeon: Ton Dang DO;  Location:  [facility];  Service:    • ECHO - CONVERTED  09/19/2017    EF 65%, mild LVH, no AS, normal functioning mitral valve, RVSP 29 mmHG   • MITRAL VALVE REPAIR/REPLACEMENT N/A 2/3/2017    Procedure: DARRIAN, MEDIAN STERNOTOMY, CORONARY ARTERY BYPASS GRAFTING ATTEMPTED, UTILIZING THE ENDOSCOPIC VEIN HARVEST OF THE RIGHT GREATER SAPHENOUS VEIN, MITRAL VALVE REPLACEMENT;  Surgeon: Jeromy Gaona MD;  Location: [facility] OR;  Service:        Current Outpatient Prescriptions   Medication Sig Dispense Refill   • aspirin 81 MG EC tablet Take 81 mg by mouth Daily.     • atorvastatin (LIPITOR) 40 MG tablet TAKE 1 TABLET BY MOUTH DAILY. 30 tablet 11   • Cholecalciferol (VITAMIN D3) 2000 UNITS tablet Take 2,000 Units by mouth Daily.     • citalopram (CeleXA) 20 MG tablet Take 20 mg by mouth Daily.     • ethosuximide (ZARONTIN) 250 MG capsule Take 500 mg by mouth 2 (Two) Times a Day.     • ferrous sulfate 325 (65 FE) MG tablet Take 325 mg by mouth Daily.  5   • furosemide (LASIX) 40 MG tablet TAKE 1 TABLET BY MOUTH DAILY. 30 tablet 11   • levothyroxine (SYNTHROID, LEVOTHROID) 200 MCG tablet Take 225 mcg by mouth Daily.     • levothyroxine (SYNTHROID, LEVOTHROID) 50 MCG tablet Take 50 mcg by mouth Daily. Takes with 200mcg to equal 250mcg daily.     • lisinopril (PRINIVIL,ZESTRIL) 5 MG tablet TAKE 1 TABLET BY MOUTH DAILY. 30 tablet 11   • metoprolol tartrate (LOPRESSOR) 25 MG tablet TAKE 1/2 TABLET TWICE A DAY 30 tablet 11   • midodrine (PROAMATINE) 5 MG tablet Take 0.5 tablets by mouth Every 8 (Eight) Hours. 90 tablet 8   • Multiple Vitamins-Minerals (CENTRUM SILVER  PO) Take 1 tablet by mouth Daily.     • nitroglycerin (NITROSTAT) 0.4 MG SL tablet TAKE 1 UNDER THE TONGUE AS NEEDED FOR ANGINA, MAY REPEAT EVERY 5 MINUTESS FOR UP THREE DOSES 25 tablet 3   • Omega-3 Fatty Acids (FISH OIL) 1000 MG capsule capsule Take 1,000 mg by mouth Daily With Breakfast.     • primidone (MYSOLINE) 250 MG tablet Take 250 mg by mouth 3 (Three) Times a Day.       No current facility-administered medications for this visit.      Facility-Administered Medications Ordered in Other Visits   Medication Dose Route Frequency Provider Last Rate Last Dose   • Chlorhexidine Gluconate Cloth 2 % pads 1 application  1 application Topical Q12H PRN Aubrie Anaya PA-C           Review of patient's allergies indicates no known allergies.    Past Medical History:   Diagnosis Date   • Anxiety    • Aortic regurgitation    • Arthritis     Hands and Knees   • Depression    • Hyperlipidemia    • Hypertension    • Hypothyroidism    • Mitral regurgitation    • Mitral valve prolapse    • Seizures     Several Years since last seizure ( last seizure cannot remember)    • Skin cancer, basal cell     basal cell skin cancer on face    • Wears dentures     full set    • Wears glasses     reading       Social History     Social History   • Marital status:      Spouse name: N/A   • Number of children: 2   • Years of education: N/A     Occupational History   • 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   HENT: Negative.    Eyes: Negative.    Cardiovascular: Negative for chest pain, dyspnea on exertion, leg swelling, palpitations and syncope.   Respiratory: Negative for cough and shortness of breath.    Endocrine: Negative.    Hematologic/Lymphatic: Negative for bleeding problem. Does not bruise/bleed easily.   Skin: Negative.    Musculoskeletal: Negative for falls and myalgias.   Gastrointestinal: Negative for abdominal pain.   Genitourinary: Negative for dysuria and hematuria.   Neurological: Negative for dizziness.   Psychiatric/Behavioral: Negative for altered mental status and depression.   Allergic/Immunologic: Negative.         Diabetes- No  Thyroid-normal    Objective     /70 (BP Location: Right arm)   Pulse 68   Ht 167.6 cm (65.98\")   Wt 68.5 kg (151 lb)   BMI 24.38 kg/m²     Physical Exam   Constitutional: She is oriented to person, place, and time. She appears well-developed and well-nourished.   HENT:   Head: Normocephalic.   Eyes: Pupils are equal, round, and reactive to light.   Neck: Normal range of motion.   Cardiovascular: Normal rate, regular rhythm and intact distal pulses.    Murmur heard.  Pulmonary/Chest: Effort normal and breath sounds normal. No respiratory distress. She has no wheezes. She has no rales.   Abdominal: Soft. Bowel sounds are normal. She exhibits no distension.   Musculoskeletal: Normal range of motion. She exhibits no edema.   Neurological: She is alert and oriented to person, place, and time.   Skin: Skin is warm and dry.   Psychiatric: She has a normal mood and affect.        ECG 12 Lead  Date/Time: 3/27/2018 5:24 PM  Performed by: SAQIB MANZANARES  Authorized by: SAQIB MANZANARES   Rhythm: paced  Rate: normal  BPM: 60  Comments: Atrial paced                Assessment/Plan    Heart rate and blood pressure stable.  EKG done today for SSS, pacemaker, showed atrial paced at 60 bpm.  [facility] pacemaker check will be schedule.  " She had questions regarding her valve replacement surgery, one vessel bypass, and pacemaker placement which were answered.  She is asymptomatic without chest pain or shortness of breath.  Most recent echo done in September reviewed and stable.  Lipids are well controlled on Lipitor, so we will continue the same.  She was given a lab order to check CBC, CMP, TSH, magnesium, and cholesterol.  She was encouraged to remain active with regular walking.  Body mass index is 24.38 kg/m².  This is normal.  Heart healthy diet low in saturated fat and cholesterol.  She appears to be stable.  We will see her back in six months or sooner if any new or worsening symptoms develop.        Tiana was seen today for follow-up and shortness of breath.    Diagnoses and all orders for this visit:    Mitral valve disorder  -     Comprehensive Metabolic Panel; Future  -     TSH; Future  -     CBC (No Diff); Future    Non-rheumatic mitral regurgitation  -     Magnesium; Future  -     TSH; Future  -     CBC (No Diff); Future    Coronary artery disease involving other coronary artery bypass graft without angina pectoris  -     Lipid Panel; Future  -     Magnesium; Future  -     TSH; Future  -     CBC (No Diff); Future    SSS (sick sinus syndrome)  -     Magnesium; Future  -     TSH; Future  -     CBC (No Diff); Future    Mixed hyperlipidemia  -     Lipid Panel; Future  -     CBC (No Diff); Future    Hypothyroidism, unspecified type  -     TSH; Future    Other orders  -     ECG 12 Lead                    Electronically signed by MCKENZIE Love,  March 27, 2018 5:25 PM

## 2018-04-16 RX ORDER — MIDODRINE HYDROCHLORIDE 5 MG/1
2.5 TABLET ORAL EVERY 8 HOURS SCHEDULED
Qty: 90 TABLET | Refills: 4 | Status: SHIPPED | OUTPATIENT
Start: 2018-04-16 | End: 2018-05-09 | Stop reason: SDUPTHER

## 2018-04-25 ENCOUNTER — OFFICE VISIT (OUTPATIENT)
Dept: CARDIOLOGY | Facility: CLINIC | Age: 68
End: 2018-04-25

## 2018-04-25 DIAGNOSIS — I49.5 SSS (SICK SINUS SYNDROME) (HCC): Primary | ICD-10-CM

## 2018-04-25 PROCEDURE — 93280 PM DEVICE PROGR EVAL DUAL: CPT | Performed by: INTERNAL MEDICINE

## 2018-05-09 ENCOUNTER — TELEPHONE (OUTPATIENT)
Dept: CARDIOLOGY | Facility: CLINIC | Age: 68
End: 2018-05-09

## 2018-05-09 RX ORDER — MIDODRINE HYDROCHLORIDE 5 MG/1
2.5 TABLET ORAL DAILY
Qty: 15 TABLET | Refills: 3 | Status: SHIPPED | OUTPATIENT
Start: 2018-05-09 | End: 2018-09-25 | Stop reason: ALTCHOICE

## 2018-05-09 NOTE — TELEPHONE ENCOUNTER
Medicine elie called asking if we can send in a script reflecting 1/2 tab daily of midodrine 5 mg as the patient reports taking it only daily. Is it okay to send in a script with these directions? Thank you!

## 2018-09-25 ENCOUNTER — OFFICE VISIT (OUTPATIENT)
Dept: CARDIOLOGY | Facility: CLINIC | Age: 68
End: 2018-09-25

## 2018-09-25 VITALS
SYSTOLIC BLOOD PRESSURE: 98 MMHG | HEIGHT: 66 IN | HEART RATE: 60 BPM | BODY MASS INDEX: 24.75 KG/M2 | WEIGHT: 154 LBS | DIASTOLIC BLOOD PRESSURE: 52 MMHG

## 2018-09-25 DIAGNOSIS — I10 ESSENTIAL HYPERTENSION: ICD-10-CM

## 2018-09-25 DIAGNOSIS — Z95.1 S/P CABG X 1: ICD-10-CM

## 2018-09-25 DIAGNOSIS — E78.00 HYPERCHOLESTEREMIA: ICD-10-CM

## 2018-09-25 DIAGNOSIS — I49.5 SSS (SICK SINUS SYNDROME) (HCC): ICD-10-CM

## 2018-09-25 DIAGNOSIS — R01.1 CARDIAC MURMUR: ICD-10-CM

## 2018-09-25 DIAGNOSIS — I25.9 IHD (ISCHEMIC HEART DISEASE): Primary | ICD-10-CM

## 2018-09-25 DIAGNOSIS — R53.83 OTHER FATIGUE: ICD-10-CM

## 2018-09-25 DIAGNOSIS — Z78.9 PACED RHYTHM ON ELECTROCARDIOGRAM (ECG): ICD-10-CM

## 2018-09-25 DIAGNOSIS — Z95.3 S/P MITRAL VALVE REPLACEMENT WITH BIOPROSTHETIC VALVE: ICD-10-CM

## 2018-09-25 DIAGNOSIS — Z95.0 PACEMAKER: ICD-10-CM

## 2018-09-25 PROCEDURE — 93000 ELECTROCARDIOGRAM COMPLETE: CPT | Performed by: NURSE PRACTITIONER

## 2018-09-25 PROCEDURE — 99213 OFFICE O/P EST LOW 20 MIN: CPT | Performed by: NURSE PRACTITIONER

## 2018-09-25 RX ORDER — MIDODRINE HYDROCHLORIDE 5 MG/1
2.5 TABLET ORAL DAILY
Qty: 30 TABLET | Refills: 6 | Status: SHIPPED | OUTPATIENT
Start: 2018-09-25 | End: 2019-10-08 | Stop reason: SDUPTHER

## 2018-09-25 NOTE — PROGRESS NOTES
"Chief Complaint   Patient presents with   • Follow-up     For cardiac management. Labs per our office in April.    • Med Refill     Needs [facility]ills on cardiac meds [facility] Reports that she was taken off of her midodrine but unsure of who took her off.    • Device Check     [facility] PPM last checked on 04/28/18   • Palpitations     Had one episode a few nights ago in which she had a \"funny feeling\" around her PPM.        Subjective       Tiana Cronin is a 68 y.o. female with hypertension, hyperlipidemia, hypothyroidism, and seizure disorder with no seizure activity since her fifties. She developed increased fatigue and shortness of breath and had an abnormal EKG.  She was seen by another cardiologist noted to have significant mitral regurgitation that did not improve with medical management.  She was referred to Dr. Gaona and underwent mitral valve replacement with #31 magma tissue valve and also had a 1 vessel bypass.  Post-operatively, she developed junctional rhythm and a [employer] pacemaker was placed by Dr. Dang.  After discharge, she was transferred to [facility] for ten days of rehab.  Dr. Hoyt was consulted for cardiac management.  She was treated for anemia and discharged on iron supplement. Echocardiogram done September 2017 showed normal functioning bioprosthetic mitral valve. [facility] pacemaker interrogation on 4/25/18 showed 17 beat NSVT, 53% atrial pacing. RV output decreased.     Today she comes to the office for a follow up visit. She admits to feeling weakness, increased since stopped taking midodrine. One night she experienced heart \"flutter\" for several minutes, eventually went to sleep. Has not had reoccurrence since that time. She denies dizziness or shortness of breath. No chest pain noted.     HPI     Cardiac History:    Past Surgical History:   Procedure Laterality Date   • CARDIAC CATHETERIZATION  01/11/2017    [facility]:  EF 55-60%, 50-60% stenosis of circ (small " vessel), LAD 30%, severe MR   • CARDIAC ELECTROPHYSIOLOGY PROCEDURE N/A 2/10/2017    Procedure: Pacemaker DC new;  Surgeon: Ton Dang DO;  Location:  [facility];  Service:    • ECHO - CONVERTED  09/19/2017    EF 65%, mild LVH, no AS, normal functioning mitral valve, RVSP 29 mmHG   • MITRAL VALVE REPAIR/REPLACEMENT N/A 2/3/2017    Procedure: DARRIAN, MEDIAN STERNOTOMY, CORONARY ARTERY BYPASS GRAFTING ATTEMPTED, UTILIZING THE ENDOSCOPIC VEIN HARVEST OF THE RIGHT GREATER SAPHENOUS VEIN, MITRAL VALVE REPLACEMENT;  Surgeon: Jeromy Gaona MD;  Location: [facility] OR;  Service:        Current Outpatient Prescriptions   Medication Sig Dispense Refill   • aspirin 81 MG EC tablet Take 81 mg by mouth Daily.     • atorvastatin (LIPITOR) 40 MG tablet TAKE 1 TABLET BY MOUTH DAILY. 30 tablet 11   • Cholecalciferol (VITAMIN D3) 2000 UNITS tablet Take 2,000 Units by mouth Daily.     • citalopram (CeleXA) 20 MG tablet Take 20 mg by mouth Daily.     • ethosuximide (ZARONTIN) 250 MG capsule Take 500 mg by mouth 2 (Two) Times a Day.     • ferrous sulfate 325 (65 FE) MG tablet Take 325 mg by mouth Daily.  5   • furosemide (LASIX) 40 MG tablet TAKE 1 TABLET BY MOUTH DAILY. 30 tablet 11   • levothyroxine (SYNTHROID, LEVOTHROID) 200 MCG tablet Take 225 mcg by mouth Daily.     • lisinopril (PRINIVIL,ZESTRIL) 5 MG tablet TAKE 1 TABLET BY MOUTH DAILY. 30 tablet 11   • metoprolol tartrate (LOPRESSOR) 25 MG tablet TAKE 1/2 TABLET TWICE A DAY 30 tablet 11   • midodrine (PROAMATINE) 5 MG tablet Take 0.5 tablets by mouth Daily. 30 tablet 6   • Multiple Vitamins-Minerals (CENTRUM SILVER PO) Take 1 tablet by mouth Daily.     • nitroglycerin (NITROSTAT) 0.4 MG SL tablet TAKE 1 UNDER THE TONGUE AS NEEDED FOR ANGINA, MAY REPEAT EVERY 5 MINUTESS FOR UP THREE DOSES 25 tablet 3   • Omega-3 Fatty Acids (FISH OIL) 1000 MG capsule capsule Take 1,000 mg by mouth Daily With Breakfast.     • primidone (MYSOLINE) 250 MG tablet Take 250 mg by mouth  3 (Three) Times a Day.       No current facility-administered medications for this visit.      Facility-Administered Medications Ordered in Other Visits   Medication Dose Route Frequency Provider Last Rate Last Dose   • Chlorhexidine Gluconate Cloth 2 % pads 1 application  1 application Topical Q12H PRN Aubrie Anaya PA-C           Patient has no known allergies.    Past Medical History:   Diagnosis Date   • Anxiety    • Aortic regurgitation    • Arthritis     Hands and Knees   • Depression    • Hyperlipidemia    • Hypertension    • Hypothyroidism    • Mitral regurgitation    • Mitral valve prolapse    • Seizures (CMS/HCC)     Several Years since last seizure ( last seizure cannot remember)    • Skin cancer, basal cell     basal cell skin cancer on face    • Wears dentures     full set    • Wears glasses     reading       Social History     Social History   • Marital status:      Spouse name: N/A   • Number of children: 2   • Years of education: N/A     Occupational History   • 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 Negative for decreased appetite.   HENT: Negative for congestion and nosebleeds.    Eyes: Negative for redness and visual disturbance.   Cardiovascular: Positive for palpitations (one episode of fluttering, several minutes). Negative for chest pain, dyspnea on exertion and near-syncope.   Respiratory: Negative for cough and shortness of breath.    Endocrine:  "Negative for polydipsia and polyuria.   Hematologic/Lymphatic: Negative for bleeding problem. Does not bruise/bleed easily.   Skin: Negative for dry skin and itching.   Musculoskeletal: Positive for joint pain (knees especially) and stiffness. Negative for falls.   Gastrointestinal: Negative for dysphagia, heartburn, melena and nausea.   Genitourinary: Negative for dysuria and hematuria.   Neurological: Positive for headaches ( 1 or 2times, not often) and loss of balance (at times).   Psychiatric/Behavioral: Negative for memory loss. The patient does not have insomnia.         Objective     BP 98/52   Pulse 60   Ht 167.6 cm (66\")   Wt 69.9 kg (154 lb)   BMI 24.86 kg/m²     Physical Exam   Constitutional: She is oriented to person, place, and time. She appears well-developed and well-nourished. No distress.   HENT:   Head: Normocephalic.   Eyes: Pupils are equal, round, and reactive to light. Conjunctivae are normal.   Neck: Normal range of motion. Neck supple. No JVD present. Carotid bruit is not present.   Cardiovascular: Normal rate, regular rhythm, S1 normal and S2 normal.    Murmur heard.   Systolic murmur is present with a grade of 2/6   Pulses:       Radial pulses are 2+ on the right side, and 2+ on the left side.        Dorsalis pedis pulses are 2+ on the right side, and 2+ on the left side.   Pulmonary/Chest: Effort normal and breath sounds normal. She has no wheezes. She has no rales.   Abdominal: Soft. Bowel sounds are normal. She exhibits no distension. There is no tenderness.   Musculoskeletal: Normal range of motion. She exhibits no edema or tenderness.   Neurological: She is alert and oriented to person, place, and time.   Skin: Skin is warm and dry. There is pallor (slightly ).   Psychiatric: She has a normal mood and affect. Her behavior is normal.          ECG 12 Lead  Date/Time: 9/25/2018 10:16 AM  Performed by: BETTY CONWAY  Authorized by: BETTY CONWAY   Comparison: compared with " previous ECG from 3/27/2018  Comparison to previous ECG: Atrial paced ventricular sensed  Rhythm: paced  Rate: normal  BPM: 60  Comments: Atrial pacing and ventricular sensing                Assessment/Plan      Tiana was seen today for follow-up, med refill, device check and palpitations.    Diagnoses and all orders for this visit:    IHD (ischemic heart disease)    S/P CABG x 1    SSS (sick sinus syndrome) (CMS/HCC)    Cardiac murmur    S/P mitral valve replacement with bioprosthetic valve    Essential hypertension    Hypercholesteremia    Other fatigue    Pacemaker    Paced rhythm on electrocardiogram (ECG)    Other orders  -     ECG 12 Lead  -     midodrine (PROAMATINE) 5 MG tablet; Take 0.5 tablets by mouth Daily.      EKG for management of pacemaker today shows atrial pacing and ventricular sensing. A [facility] ppm interrogation scheduled for October.     Tiana admits to mild increase in fatigue, taking nap mid day. Her blood pressure has been lower since stopping Midodrine. We will resume Midodrine. She will monitor vitals at home. At ppm check a BP check also scheduled.     Echocardiogram 2017 post surgery was stable. No significant murmur noted today. At next visit will consider repeat echo due to history and length of time. Continue diuretic.     For cholesterol, she is taking Lipitor. Continue same. She will follow with you for reassessment of labs including lipid panel. I did not give her a lab order today.     Patient's Body mass index is 24.86 kg/m². BMI is within normal parameters. No follow-up required. Heart healthy diet encouraged.     A 6 month follow up visit scheduled. Please call sooner for any cardiac concerns.            Electronically signed by MCKENZIE Parra,  September 25, 2018 2:30 PM

## 2018-10-10 ENCOUNTER — TELEPHONE (OUTPATIENT)
Dept: CARDIOLOGY | Facility: CLINIC | Age: 68
End: 2018-10-10

## 2018-10-10 ENCOUNTER — OFFICE VISIT (OUTPATIENT)
Dept: CARDIOLOGY | Facility: CLINIC | Age: 68
End: 2018-10-10

## 2018-10-10 DIAGNOSIS — I49.5 SSS (SICK SINUS SYNDROME) (HCC): Primary | ICD-10-CM

## 2018-10-10 PROCEDURE — 93288 INTERROG EVL PM/LDLS PM IP: CPT | Performed by: INTERNAL MEDICINE

## 2018-12-05 RX ORDER — FUROSEMIDE 40 MG/1
TABLET ORAL
Qty: 30 TABLET | Refills: 3 | Status: SHIPPED | OUTPATIENT
Start: 2018-12-05 | End: 2019-04-29 | Stop reason: SDUPTHER

## 2018-12-05 RX ORDER — LISINOPRIL 5 MG/1
TABLET ORAL
Qty: 30 TABLET | Refills: 3 | Status: SHIPPED | OUTPATIENT
Start: 2018-12-05 | End: 2019-03-26 | Stop reason: DRUGHIGH

## 2018-12-05 RX ORDER — ATORVASTATIN CALCIUM 40 MG/1
TABLET, FILM COATED ORAL
Qty: 30 TABLET | Refills: 3 | Status: SHIPPED | OUTPATIENT
Start: 2018-12-05 | End: 2020-10-22 | Stop reason: ALTCHOICE

## 2019-03-08 RX ORDER — ATORVASTATIN CALCIUM 40 MG/1
TABLET, FILM COATED ORAL
Qty: 30 TABLET | Refills: 3 | OUTPATIENT
Start: 2019-03-08

## 2019-03-08 RX ORDER — LISINOPRIL 5 MG/1
TABLET ORAL
Qty: 30 TABLET | Refills: 3 | OUTPATIENT
Start: 2019-03-08

## 2019-03-08 RX ORDER — FUROSEMIDE 40 MG/1
TABLET ORAL
Qty: 30 TABLET | Refills: 3 | OUTPATIENT
Start: 2019-03-08

## 2019-03-08 NOTE — TELEPHONE ENCOUNTER
Patient has follow up 3/26/19.  I confirmed with patient she will not run out of medication prior to follow up for new scripts.

## 2019-03-26 ENCOUNTER — OFFICE VISIT (OUTPATIENT)
Dept: CARDIOLOGY | Facility: CLINIC | Age: 69
End: 2019-03-26

## 2019-03-26 VITALS
SYSTOLIC BLOOD PRESSURE: 100 MMHG | HEIGHT: 66 IN | WEIGHT: 156 LBS | BODY MASS INDEX: 25.07 KG/M2 | DIASTOLIC BLOOD PRESSURE: 60 MMHG | HEART RATE: 60 BPM

## 2019-03-26 DIAGNOSIS — I49.5 SSS (SICK SINUS SYNDROME) (HCC): ICD-10-CM

## 2019-03-26 DIAGNOSIS — R53.83 OTHER FATIGUE: Primary | ICD-10-CM

## 2019-03-26 DIAGNOSIS — Z86.2 HISTORY OF ANEMIA: ICD-10-CM

## 2019-03-26 DIAGNOSIS — E78.00 HYPERCHOLESTEREMIA: ICD-10-CM

## 2019-03-26 DIAGNOSIS — E03.9 HYPOTHYROIDISM (ACQUIRED): ICD-10-CM

## 2019-03-26 DIAGNOSIS — E66.3 OVERWEIGHT: ICD-10-CM

## 2019-03-26 DIAGNOSIS — R60.0 LOCALIZED EDEMA: ICD-10-CM

## 2019-03-26 DIAGNOSIS — I10 ESSENTIAL HYPERTENSION: ICD-10-CM

## 2019-03-26 DIAGNOSIS — R01.1 HEART MURMUR: ICD-10-CM

## 2019-03-26 DIAGNOSIS — R53.1 WEAKNESS: ICD-10-CM

## 2019-03-26 DIAGNOSIS — Z95.2 S/P MVR (MITRAL VALVE REPLACEMENT): ICD-10-CM

## 2019-03-26 DIAGNOSIS — I25.810 CORONARY ARTERY DISEASE INVOLVING OTHER CORONARY ARTERY BYPASS GRAFT WITHOUT ANGINA PECTORIS: ICD-10-CM

## 2019-03-26 PROCEDURE — 93000 ELECTROCARDIOGRAM COMPLETE: CPT | Performed by: NURSE PRACTITIONER

## 2019-03-26 PROCEDURE — 99214 OFFICE O/P EST MOD 30 MIN: CPT | Performed by: NURSE PRACTITIONER

## 2019-03-26 RX ORDER — LISINOPRIL 2.5 MG/1
2.5 TABLET ORAL DAILY
Qty: 30 TABLET | Refills: 8 | Status: SHIPPED | OUTPATIENT
Start: 2019-03-26 | End: 2019-10-14

## 2019-03-26 NOTE — PROGRESS NOTES
Chief Complaint   Patient presents with   • Follow-up     For cardiac management. Patient is on aspirin. Reports that she tired a lot. Will lay down some times and it will turn into a 2 hour nap. Reports that she has been having some stress. Last lab work was done on 04/10/18 per Aubrie, in chart under labs.   • Pacemaker Check     Last BSC PPM check was done on 10/10/18 per our office.   • Med Refill     Needs refills on cardiac medications. 30 day supplies to [facility].       Cardiac Complaints  fatigue      Subjective   Tiana Cronin is a 68 y.o. female with hypertension, hyperlipidemia, hypothyroidism, and seizure disorder with no seizure activity since her fifties. She developed increased fatigue and shortness of breath and had an abnormal EKG.  She was seen by another cardiologist noted to have significant mitral regurgitation that did not improve with medical management.  She was referred to Dr. Gaona and underwent mitral valve replacement with #31 magma tissue valve and also had a 1 vessel bypass.  Post-operatively, she developed junctional rhythm and a [employer] pacemaker was placed by Dr. Dang.  She returns today for follow up and reports increased fatigue.  When questioned about it, patient states fatigue started around the time of her last appointment as her blood pressure has been running lower.  She states she will sit down to rest a few minutes and she is asleep for 2 hours. Chest pain, shortness of breath, palpitations, and dizziness denied. No recent labs are available, with order requested.  Most recent BSC done in October showed pacer functioning properly with around 72% atrial pacing and 7.5 years of battery life remaining.  Refills of cardiac meds requested for 30 day supply.        Cardiac History  Past Surgical History:   Procedure Laterality Date   • CARDIAC CATHETERIZATION  01/11/2017    [facility]:  EF 55-60%, 50-60% stenosis of circ (small vessel), LAD 30%, severe MR    • CARDIAC ELECTROPHYSIOLOGY PROCEDURE N/A 2/10/2017    Procedure: Pacemaker DC new;  Surgeon: Ton Dang DO;  Location:  [facility];  Service:    • ECHO - CONVERTED  09/19/2017    EF 65%, mild LVH, no AS, normal functioning mitral valve, RVSP 29 mmHG   • MITRAL VALVE REPAIR/REPLACEMENT N/A 2/3/2017    Procedure: DARRIAN, MEDIAN STERNOTOMY, CORONARY ARTERY BYPASS GRAFTING ATTEMPTED, UTILIZING THE ENDOSCOPIC VEIN HARVEST OF THE RIGHT GREATER SAPHENOUS VEIN, MITRAL VALVE REPLACEMENT;  Surgeon: Jeromy Gaona MD;  Location: [facility] OR;  Service:        Current Outpatient Medications   Medication Sig Dispense Refill   • aspirin 81 MG EC tablet Take 81 mg by mouth Daily.     • atorvastatin (LIPITOR) 40 MG tablet TAKE 1 TABLET BY MOUTH DAILY. 30 tablet 3   • Cholecalciferol (VITAMIN D3) 2000 UNITS tablet Take 2,000 Units by mouth Daily.     • citalopram (CeleXA) 20 MG tablet Take 20 mg by mouth Daily.     • ethosuximide (ZARONTIN) 250 MG capsule Take 500 mg by mouth 2 (Two) Times a Day.     • ferrous sulfate 325 (65 FE) MG tablet Take 325 mg by mouth Daily.  5   • furosemide (LASIX) 40 MG tablet TAKE 1 TABLET BY MOUTH DAILY. 30 tablet 3   • levothyroxine (SYNTHROID, LEVOTHROID) 200 MCG tablet Take 200 mcg by mouth Daily.     • metoprolol tartrate (LOPRESSOR) 25 MG tablet Take 0.5 tablets by mouth 2 (Two) Times a Day. 30 tablet 8   • midodrine (PROAMATINE) 5 MG tablet Take 0.5 tablets by mouth Daily. 30 tablet 6   • Multiple Vitamins-Minerals (CENTRUM SILVER PO) Take 1 tablet by mouth Daily.     • nitroglycerin (NITROSTAT) 0.4 MG SL tablet TAKE 1 UNDER THE TONGUE AS NEEDED FOR ANGINA, MAY REPEAT EVERY 5 MINUTESS FOR UP THREE DOSES 25 tablet 3   • Omega-3 Fatty Acids (FISH OIL) 1000 MG capsule capsule Take 1,000 mg by mouth Daily With Breakfast.     • primidone (MYSOLINE) 250 MG tablet Take 250 mg by mouth 3 (Three) Times a Day.     • lisinopril (PRINIVIL,ZESTRIL) 2.5 MG tablet Take 1 tablet by mouth Daily.  30 tablet 8     No current facility-administered medications for this visit.      Facility-Administered Medications Ordered in Other Visits   Medication Dose Route Frequency Provider Last Rate Last Dose   • Chlorhexidine Gluconate Cloth 2 % pads 1 application  1 application Topical Q12H PRN Aubrie Anaya PA-C           Patient has no known allergies.    Past Medical History:   Diagnosis Date   • Anxiety    • Aortic regurgitation    • Arthritis     Hands and Knees   • Depression    • Hyperlipidemia    • Hypertension    • Hypothyroidism    • Mitral regurgitation    • Mitral valve prolapse    • Seizures (CMS/HCC)     Several Years since last seizure ( last seizure cannot remember)    • Skin cancer, basal cell     basal cell skin cancer on face    • Wears dentures     full set    • Wears glasses     reading       Social History     Socioeconomic History   • Marital status:      Spouse name: Not on file   • Number of children: 2   • Years of education: Not on file   • Highest education level: Not on file   Occupational History   • Occupation: Restaurant Work     Comment: Disabled-[employer]   Tobacco Use   • Smoking status: Never Smoker   • Smokeless tobacco: Never Used   Substance and Sexual Activity   • Alcohol use: No   • Drug use: No   • Sexual activity: Defer       Family History   Problem Relation Age of Onset   • Hypertension Mother    • Alzheimer's disease Mother    • Coronary artery disease Father    • Hypertension Father    • Diabetes Father    • Heart failure Father        Review of Systems   Constitution: Positive for malaise/fatigue. Negative for weakness.   Cardiovascular: Negative for chest pain, claudication, dyspnea on exertion, irregular heartbeat, leg swelling, near-syncope, orthopnea, palpitations and syncope.   Respiratory: Negative for cough, shortness of breath and wheezing.    Musculoskeletal: Negative for back pain, joint pain and joint swelling.   Gastrointestinal: Negative for anorexia,  "heartburn, nausea and vomiting.   Genitourinary: Negative for dysuria, hematuria, hesitancy and nocturia.   Neurological: Negative for dizziness, light-headedness and loss of balance.   Psychiatric/Behavioral: Negative for depression and memory loss. The patient is not nervous/anxious.            Objective     /60 (BP Location: Right arm)   Pulse 60   Ht 167.6 cm (65.98\")   Wt 70.8 kg (156 lb)   BMI 25.19 kg/m²     Physical Exam   Constitutional: She is oriented to person, place, and time. She appears well-developed and well-nourished.   HENT:   Head: Normocephalic and atraumatic.   Eyes: EOM are normal. Pupils are equal, round, and reactive to light.   Neck: Normal range of motion. Neck supple.   Cardiovascular: Normal rate and regular rhythm.   Murmur heard.  Pulmonary/Chest: Effort normal and breath sounds normal.   Abdominal: Soft.   Neurological: She is alert and oriented to person, place, and time.   Skin: Skin is warm and dry.   Psychiatric: She has a normal mood and affect. Her behavior is normal.         ECG 12 Lead  Date/Time: 3/26/2019 1:19 PM  Performed by: Marylou Richardson APRN  Authorized by: Marylou Richardson APRN   Rhythm: paced  BPM: 60    Clinical impression: abnormal EKG  Comments: Normal QT            Assessment/Plan     HR is stable today and regular.  EKG done today for SSS and PPM shows an atrial paced rhythm with ventricular sensing and normal QT.  Repeat BSC check advised.  HTN is well managed on current but is lower at 100/60.  Patient will be advised to decrease lisinopril therapy to 2.5mg daily, current dosing of metoprolol continued. ASA continued daily for history of CABG x1. Edema well managed on current lasix therapy, same advised.  If fatigue should not improve after medication change, patient advised to call and echo will be advised to reassess LV function and MVA as she is s/p MVR. Discussed findings of echo from 2017 that showed normal LV function and normal functioning " bio-prosthetic MVR.  Echo to be considered at next visit even with improving fatigue as it will have been 2 years since last check.  No recent labs are available, but she does have a history of both anemia and hypothyroidism which could both be attributing to fatigue. Lab order provided with more recommendation to follow.  BMI stable at 25.19, continued cardiac diet and activity as tolerated advised.  6 month follow up scheduled or sooner if needed.         Problems Addressed this Visit        Cardiovascular and Mediastinum    CAD (S/P CABG x 1)    Relevant Medications    metoprolol tartrate (LOPRESSOR) 25 MG tablet    SSS (sick sinus syndrome) (CMS/HCC)    Relevant Medications    metoprolol tartrate (LOPRESSOR) 25 MG tablet    Other Relevant Orders    CBC & Differential    Comprehensive Metabolic Panel    TSH    Vitamin B12 & Folate    Lipid Panel    ECG 12 Lead    Essential hypertension    Relevant Medications    metoprolol tartrate (LOPRESSOR) 25 MG tablet    lisinopril (PRINIVIL,ZESTRIL) 2.5 MG tablet    Hypercholesteremia       Other    Localized edema      Other Visit Diagnoses     Other fatigue    -  Primary    Relevant Orders    CBC & Differential    Comprehensive Metabolic Panel    TSH    Vitamin B12 & Folate    Lipid Panel    S/P MVR (mitral valve replacement)        Heart murmur        Relevant Orders    CBC & Differential    Comprehensive Metabolic Panel    TSH    Vitamin B12 & Folate    Lipid Panel    Weakness        Relevant Orders    CBC & Differential    Comprehensive Metabolic Panel    TSH    Vitamin B12 & Folate    Lipid Panel    Overweight         Relevant Orders    Lipid Panel    History of anemia        Relevant Orders    CBC & Differential    Comprehensive Metabolic Panel    TSH    Vitamin B12 & Folate    Lipid Panel    Hypothyroidism (acquired)        Relevant Medications    metoprolol tartrate (LOPRESSOR) 25 MG tablet          Patient's Body mass index is 25.19 kg/m². BMI is above normal  parameters. Recommendations include: nutrition counseling.              Electronically signed by MCKENZIE Navarro March 26, 2019 3:51 PM

## 2019-03-29 NOTE — PROGRESS NOTES
Synthroid dosing is not adequate.  Please send to PCP.  Her TSH is 10.80 (fatigue) Also, her WBC count is low at 3.9.  Rest of labs look okay including lipids.

## 2019-04-10 ENCOUNTER — OFFICE VISIT (OUTPATIENT)
Dept: CARDIOLOGY | Facility: CLINIC | Age: 69
End: 2019-04-10

## 2019-04-10 DIAGNOSIS — I10 ESSENTIAL HYPERTENSION: ICD-10-CM

## 2019-04-10 DIAGNOSIS — I49.5 SSS (SICK SINUS SYNDROME) (HCC): Primary | ICD-10-CM

## 2019-04-10 PROCEDURE — 93288 INTERROG EVL PM/LDLS PM IP: CPT | Performed by: INTERNAL MEDICINE

## 2019-04-30 RX ORDER — FUROSEMIDE 40 MG/1
TABLET ORAL
Qty: 30 TABLET | Refills: 11 | Status: SHIPPED | OUTPATIENT
Start: 2019-04-30 | End: 2019-10-16

## 2019-10-09 RX ORDER — MIDODRINE HYDROCHLORIDE 5 MG/1
2.5 TABLET ORAL DAILY
Qty: 30 TABLET | Refills: 0 | Status: SHIPPED | OUTPATIENT
Start: 2019-10-09 | End: 2019-10-16

## 2019-10-14 ENCOUNTER — TELEPHONE (OUTPATIENT)
Dept: CARDIOLOGY | Facility: CLINIC | Age: 69
End: 2019-10-14

## 2019-10-14 DIAGNOSIS — Z79.899 MEDICATION MANAGEMENT: ICD-10-CM

## 2019-10-14 DIAGNOSIS — R01.1 CARDIAC MURMUR: ICD-10-CM

## 2019-10-14 DIAGNOSIS — Z95.2 S/P MVR (MITRAL VALVE REPLACEMENT): Primary | ICD-10-CM

## 2019-10-14 DIAGNOSIS — R42 DIZZINESS: ICD-10-CM

## 2019-10-14 DIAGNOSIS — E03.9 HYPOTHYROIDISM (ACQUIRED): ICD-10-CM

## 2019-10-14 NOTE — TELEPHONE ENCOUNTER
Pharmacist from [facility] called to report patient complaining of dizziness, staggering.   Advise to hold Lisinopril. Decrease Lasix to 1/2 tablet daily.   I have ordered echocardiogram and labs. Make earlier appointment to evaluate symptoms/BP management. Thank you.

## 2019-10-15 ENCOUNTER — TELEPHONE (OUTPATIENT)
Dept: CARDIOLOGY | Facility: CLINIC | Age: 69
End: 2019-10-15

## 2019-10-15 NOTE — TELEPHONE ENCOUNTER
I spoke with patient about medication adjustments and  She is aware of lab and Echocardiogram order . She is coming in tomorrow for follow up appointment at 3:15 pm

## 2019-10-16 ENCOUNTER — OFFICE VISIT (OUTPATIENT)
Dept: CARDIOLOGY | Facility: CLINIC | Age: 69
End: 2019-10-16

## 2019-10-16 VITALS
DIASTOLIC BLOOD PRESSURE: 64 MMHG | WEIGHT: 155.4 LBS | SYSTOLIC BLOOD PRESSURE: 110 MMHG | BODY MASS INDEX: 24.98 KG/M2 | HEIGHT: 66 IN | HEART RATE: 62 BPM

## 2019-10-16 DIAGNOSIS — R53.83 FATIGUE, UNSPECIFIED TYPE: ICD-10-CM

## 2019-10-16 DIAGNOSIS — E78.00 HYPERCHOLESTEREMIA: ICD-10-CM

## 2019-10-16 DIAGNOSIS — I10 ESSENTIAL HYPERTENSION: ICD-10-CM

## 2019-10-16 DIAGNOSIS — R42 DIZZINESS: Primary | ICD-10-CM

## 2019-10-16 DIAGNOSIS — R26.0 STAGGERING GAIT: ICD-10-CM

## 2019-10-16 DIAGNOSIS — Z95.3 S/P MITRAL VALVE REPLACEMENT WITH BIOPROSTHETIC VALVE: ICD-10-CM

## 2019-10-16 DIAGNOSIS — E78.2 MIXED HYPERLIPIDEMIA: ICD-10-CM

## 2019-10-16 DIAGNOSIS — Z95.1 S/P CABG X 1: ICD-10-CM

## 2019-10-16 DIAGNOSIS — R01.1 CARDIAC MURMUR: ICD-10-CM

## 2019-10-16 DIAGNOSIS — I49.5 SSS (SICK SINUS SYNDROME) (HCC): ICD-10-CM

## 2019-10-16 DIAGNOSIS — Z95.0 PRESENCE OF CARDIAC PACEMAKER: ICD-10-CM

## 2019-10-16 PROCEDURE — 93000 ELECTROCARDIOGRAM COMPLETE: CPT | Performed by: NURSE PRACTITIONER

## 2019-10-16 PROCEDURE — 99214 OFFICE O/P EST MOD 30 MIN: CPT | Performed by: NURSE PRACTITIONER

## 2019-10-16 RX ORDER — FUROSEMIDE 40 MG/1
40 TABLET ORAL DAILY
Qty: 30 TABLET | Refills: 11
Start: 2019-10-16 | End: 2019-11-13 | Stop reason: SDUPTHER

## 2019-10-16 NOTE — PROGRESS NOTES
Chief Complaint   Patient presents with   • Follow-up     Cardiac management . Is scheduled  to have and Echocardigram  tomorrow. Has large bruise to left inner knee . Has c/o weakness and said she stays so tired , feels weak in knees . Appears to stagger and stumble .She follows with a Neurologist Dr. Hudson  in [place]  for a tumor  (slow growing which has been there fo years ) .Has held Lisinopril and said she does feel some better.   • Dizziness     Occasional episodes of dizziness .   • Med Refill     No refills needed today . Had med list today       Subjective       Tiana Cronin is a 69 y.o. female with hypertension, hyperlipidemia, hypothyroidism, and seizure disorder with no seizure activity since her fifties. She developed increased fatigue and shortness of breath and had an abnormal EKG.  She was seen by another cardiologist noted to have significant mitral regurgitation that did not improve with medical management.  She was referred to Dr. Gaona and underwent mitral valve replacement with #31 magma tissue valve and also had a 1 vessel bypass.  Post-operatively, she developed junctional rhythm and a [employer] pacemaker was placed by Dr. Dang.  [employer] device interrogation on 4/10/2019 showed 79% atrial and 36% ventricular pacing with one mode switch less than 1 minute.  No changes were made.  Battery life was 7 years.    She comes to the office today due to increase in fatigue and generalized weakness.  Recently she called in the office with these complaints and was advised to stop lisinopril and decrease Lasix to 1/2 tablet daily.    HPI     Cardiac History:    Past Surgical History:   Procedure Laterality Date   • CARDIAC CATHETERIZATION  01/11/2017    [facility]:  EF 55-60%, 50-60% stenosis of circ (small vessel), LAD 30%, severe MR   • CARDIAC ELECTROPHYSIOLOGY PROCEDURE N/A 2/10/2017    Procedure: Pacemaker DC new;  Surgeon: Ton Dang DO;  Location: [facility]  LOCATION;  Service:    • ECHO - CONVERTED  09/19/2017    EF 65%, mild LVH, no AS, normal functioning mitral valve, RVSP 29 mmHG   • MITRAL VALVE REPAIR/REPLACEMENT N/A 2/3/2017    Procedure: DARRIAN, MEDIAN STERNOTOMY, CORONARY ARTERY BYPASS GRAFTING ATTEMPTED, UTILIZING THE ENDOSCOPIC VEIN HARVEST OF THE RIGHT GREATER SAPHENOUS VEIN, MITRAL VALVE REPLACEMENT;  Surgeon: Jeromy Gaona MD;  Location: [facility];  Service:        Current Outpatient Medications   Medication Sig Dispense Refill   • aspirin 81 MG EC tablet Take 81 mg by mouth Daily.     • atorvastatin (LIPITOR) 40 MG tablet TAKE 1 TABLET BY MOUTH DAILY. (Patient taking differently: TAkes 1/2 tablet daily) 30 tablet 3   • Cholecalciferol (VITAMIN D3) 2000 UNITS tablet Take 2,000 Units by mouth Daily.     • citalopram (CeleXA) 20 MG tablet Take 20 mg by mouth Daily.     • ethosuximide (ZARONTIN) 250 MG capsule Take 500 mg by mouth 2 (Two) Times a Day.     • ferrous sulfate 325 (65 FE) MG tablet Take 325 mg by mouth Daily.  5   • furosemide (LASIX) 40 MG tablet Take 1 tablet by mouth Daily. Decrease to 1/2 tablet daily 30 tablet 11   • levothyroxine (SYNTHROID, LEVOTHROID) 200 MCG tablet Take 200 mcg by mouth Daily. Takes 225 mcg. Takes 1 1/2 tablets daily     • metoprolol tartrate (LOPRESSOR) 25 MG tablet Take 0.5 tablets by mouth 2 (Two) Times a Day. 30 tablet 8   • Multiple Vitamins-Minerals (CENTRUM SILVER PO) Take 1 tablet by mouth Daily.     • nitroglycerin (NITROSTAT) 0.4 MG SL tablet TAKE 1 UNDER THE TONGUE AS NEEDED FOR ANGINA, MAY REPEAT EVERY 5 MINUTESS FOR UP THREE DOSES 25 tablet 3   • primidone (MYSOLINE) 250 MG tablet Take 250 mg by mouth 3 (Three) Times a Day.     • TURMERIC PO Take 1 capsule by mouth Daily.     • Omega-3 Fatty Acids (FISH OIL) 1000 MG capsule capsule Take 1,000 mg by mouth Daily With Breakfast.       No current facility-administered medications for this visit.      Facility-Administered Medications Ordered in Other Visits    Medication Dose Route Frequency Provider Last Rate Last Dose   • Chlorhexidine Gluconate Cloth 2 % pads 1 application  1 application Topical Q12H PRN Aubrie Anaya PA-C           Patient has no known allergies.    Past Medical History:   Diagnosis Date   • Anxiety    • Aortic regurgitation    • Arthritis     Hands and Knees   • Depression    • Hyperlipidemia    • Hypertension    • Hypothyroidism    • Mitral regurgitation    • Mitral valve prolapse    • Seizures (CMS/HCC)     Several Years since last seizure ( last seizure cannot remember)    • Skin cancer, basal cell     basal cell skin cancer on face    • Wears dentures     full set    • Wears glasses     reading       Social History     Socioeconomic History   • Marital status:      Spouse name: Not on file   • Number of children: 2   • Years of education: Not on file   • Highest education level: Not on file   Occupational History   • Occupation: Restaurant Work     Comment: Disabled-[employer]   Tobacco Use   • Smoking status: Never Smoker   • Smokeless tobacco: Never Used   Substance and Sexual Activity   • Alcohol use: No   • Drug use: No   • Sexual activity: Defer       Family History   Problem Relation Age of Onset   • Hypertension Mother    • Alzheimer's disease Mother    • Coronary artery disease Father    • Hypertension Father    • Diabetes Father    • Heart failure Father        Review of Systems   Constitution: Positive for malaise/fatigue. Negative for decreased appetite, diaphoresis and fever.   HENT: Negative for congestion, hearing loss, hoarse voice and nosebleeds.    Eyes: Positive for blurred vision (a few times) and visual disturbance. Negative for double vision and redness.   Cardiovascular: Negative for chest pain, near-syncope and palpitations.   Respiratory: Negative for cough and sleep disturbances due to breathing.    Endocrine: Negative for polydipsia, polyphagia and polyuria.   Hematologic/Lymphatic: Bruises/bleeds easily.  "  Skin: Negative for color change, dry skin, flushing and itching.   Musculoskeletal: Positive for arthritis, falls (\"almost\" once when getting out of shower), joint pain, muscle weakness (legs, L>R) and myalgias. Negative for muscle cramps.   Gastrointestinal: Negative for change in bowel habit, dysphagia, heartburn, melena and nausea.   Genitourinary: Negative for dysuria and hematuria.   Neurological: Positive for disturbances in coordination, dizziness, light-headedness and loss of balance (slightly dragging left leg). Negative for brief paralysis, excessive daytime sleepiness, headaches, numbness, paresthesias and tremors.   Psychiatric/Behavioral: Negative for altered mental status and memory loss. The patient is nervous/anxious.         Objective     /64 (BP Location: Left arm)   Pulse 62   Ht 167.6 cm (65.98\")   Wt 70.5 kg (155 lb 6.4 oz)   BMI 25.10 kg/m²     Physical Exam   Constitutional: She is oriented to person, place, and time. Vital signs are normal. She appears well-nourished. No distress.   HENT:   Head: Normocephalic.   Eyes: Conjunctivae are normal. Pupils are equal, round, and reactive to light.   Neck: Normal range of motion. Neck supple. Carotid bruit is not present.   Cardiovascular: Normal rate, regular rhythm, S1 normal and S2 normal.   No murmur heard.  Pulmonary/Chest: Breath sounds normal. She has no wheezes. She has no rales.   Abdominal: Soft. Bowel sounds are normal. She exhibits no distension. There is no tenderness.   Musculoskeletal: Normal range of motion. She exhibits no edema or tenderness.   Neurological: She is alert and oriented to person, place, and time. She displays no seizure activity (denies).   Skin: Skin is warm and dry. No pallor.   Psychiatric: Her speech is normal and behavior is normal. Her mood appears anxious (mildly).          ECG 12 Lead  Date/Time: 10/18/2019 4:23 PM  Performed by: Annette Davidson APRN  Authorized by: Annette Davidson APRN "   Comparison: compared with previous ECG from 3/26/2019  Comparison to previous ECG: Atrial paced and ventricular sensed, current EKG sinus with normal CO and QT  Rhythm: sinus rhythm  BPM: 62                  Assessment/Plan      Tiana was seen today for follow-up, dizziness and med refill.    Diagnoses and all orders for this visit:    Dizziness  -     US Carotid Bilateral; Future    Fatigue, unspecified type    SSS (sick sinus syndrome) (CMS/HCC)    Presence of cardiac pacemaker    Cardiac murmur    S/P mitral valve replacement with bioprosthetic valve    S/P CABG x 1    Mixed hyperlipidemia  -     Lipid Panel; Future    Essential hypertension    Hypercholesteremia    Staggering gait    Other orders  -     furosemide (LASIX) 40 MG tablet; Take 1 tablet by mouth Daily. Decrease to 1/2 tablet daily  -     ECG 12 Lead      Tiana presents today with concerns over worsening dizziness episodes and fatigue.  She appears to have developed a mild staggering gait.  Because of the symptoms we will order carotid ultrasound to rule out any stenosis causing issues.  Repeat echocardiogram also ordered to relook at overall LV function and mitral valve replacement.    EKG today shows sinus rhythm.  A [employer] pacemaker interrogation scheduled for next available.    Repeat labs were ordered per previous telephone encounter.    Her blood pressure is stable.  She is tolerating lower dose of Lasix with no worsening shortness of breath or swelling.  She is no longer taking lisinopril.  Advised to continue with medication changes at this time.    Patient's Body mass index is 25.1 kg/m². BMI is slightly  above normal parameters. Recommendations include: nutrition counseling.  I encouraged her on adequate hydration.  Her weight is stable.  Continue heart healthy diet.     For hypercholesterolemia she is taking Lipitor at 1/2 tablet daily for total of 20 mg.  No changes made today.     Further recommendations when echocardiogram  and carotid ultrasound results are available.  If cardiac work-up is normal, referral back to neurologist will be advised.    A 6-month follow-up visit scheduled.  Please call sooner for any cardiac concerns.              Electronically signed by MCKENZIE Parra,  October 18, 2019 4:29 PM

## 2019-10-17 ENCOUNTER — HOSPITAL ENCOUNTER (OUTPATIENT)
Dept: CARDIOLOGY | Facility: HOSPITAL | Age: 69
Discharge: HOME | End: 2019-10-17

## 2019-10-17 ENCOUNTER — LAB (OUTPATIENT)
Dept: LAB | Facility: HOSPITAL | Age: 69
End: 2019-10-17

## 2019-10-17 ENCOUNTER — HOSPITAL ENCOUNTER (OUTPATIENT)
Dept: CARDIOLOGY | Facility: HOSPITAL | Age: 69
Discharge: HOME | End: 2019-10-17
Admitting: NURSE PRACTITIONER

## 2019-10-17 DIAGNOSIS — R42 DIZZINESS: ICD-10-CM

## 2019-10-17 DIAGNOSIS — R01.1 CARDIAC MURMUR: ICD-10-CM

## 2019-10-17 DIAGNOSIS — E78.2 MIXED HYPERLIPIDEMIA: ICD-10-CM

## 2019-10-17 DIAGNOSIS — Z95.2 S/P MVR (MITRAL VALVE REPLACEMENT): ICD-10-CM

## 2019-10-17 DIAGNOSIS — E03.9 HYPOTHYROIDISM (ACQUIRED): ICD-10-CM

## 2019-10-17 DIAGNOSIS — Z79.899 MEDICATION MANAGEMENT: ICD-10-CM

## 2019-10-17 LAB
ALBUMIN SERPL-MCNC: 4.6 G/DL (ref 3.5–5.2)
ALBUMIN/GLOB SERPL: 1.5 G/DL
ALP SERPL-CCNC: 134 U/L (ref 39–117)
ALT SERPL W P-5'-P-CCNC: 17 U/L (ref 1–33)
ANION GAP SERPL CALCULATED.3IONS-SCNC: 14.1 MMOL/L (ref 5–15)
AST SERPL-CCNC: 25 U/L (ref 1–32)
BASOPHILS # BLD AUTO: 0.03 10*3/MM3 (ref 0–0.2)
BASOPHILS NFR BLD AUTO: 0.7 % (ref 0–1.5)
BILIRUB SERPL-MCNC: 0.3 MG/DL (ref 0.2–1.2)
BUN BLD-MCNC: 21 MG/DL (ref 8–23)
BUN/CREAT SERPL: 23.3 (ref 7–25)
CALCIUM SPEC-SCNC: 10.8 MG/DL (ref 8.6–10.5)
CHLORIDE SERPL-SCNC: 103 MMOL/L (ref 98–107)
CHOLEST SERPL-MCNC: 171 MG/DL (ref 0–200)
CO2 SERPL-SCNC: 25.9 MMOL/L (ref 22–29)
CREAT BLD-MCNC: 0.9 MG/DL (ref 0.57–1)
DEPRECATED RDW RBC AUTO: 52 FL (ref 37–54)
EOSINOPHIL # BLD AUTO: 0.3 10*3/MM3 (ref 0–0.4)
EOSINOPHIL NFR BLD AUTO: 7 % (ref 0.3–6.2)
ERYTHROCYTE [DISTWIDTH] IN BLOOD BY AUTOMATED COUNT: 14 % (ref 12.3–15.4)
GFR SERPL CREATININE-BSD FRML MDRD: 62 ML/MIN/1.73
GLOBULIN UR ELPH-MCNC: 3.1 GM/DL
GLUCOSE BLD-MCNC: 101 MG/DL (ref 65–99)
HCT VFR BLD AUTO: 38.2 % (ref 34–46.6)
HDLC SERPL-MCNC: 91 MG/DL (ref 40–60)
HGB BLD-MCNC: 12.2 G/DL (ref 12–15.9)
IMM GRANULOCYTES # BLD AUTO: 0 10*3/MM3 (ref 0–0.05)
IMM GRANULOCYTES NFR BLD AUTO: 0 % (ref 0–0.5)
LDLC SERPL CALC-MCNC: 64 MG/DL (ref 0–100)
LDLC/HDLC SERPL: 0.7 {RATIO}
LYMPHOCYTES # BLD AUTO: 1.02 10*3/MM3 (ref 0.7–3.1)
LYMPHOCYTES NFR BLD AUTO: 23.8 % (ref 19.6–45.3)
MCH RBC QN AUTO: 32.7 PG (ref 26.6–33)
MCHC RBC AUTO-ENTMCNC: 31.9 G/DL (ref 31.5–35.7)
MCV RBC AUTO: 102.4 FL (ref 79–97)
MONOCYTES # BLD AUTO: 0.55 10*3/MM3 (ref 0.1–0.9)
MONOCYTES NFR BLD AUTO: 12.8 % (ref 5–12)
NEUTROPHILS # BLD AUTO: 2.39 10*3/MM3 (ref 1.7–7)
NEUTROPHILS NFR BLD AUTO: 55.7 % (ref 42.7–76)
PLATELET # BLD AUTO: 219 10*3/MM3 (ref 140–450)
PMV BLD AUTO: 9.7 FL (ref 6–12)
POTASSIUM BLD-SCNC: 4.7 MMOL/L (ref 3.5–5.2)
PROT SERPL-MCNC: 7.7 G/DL (ref 6–8.5)
RBC # BLD AUTO: 3.73 10*6/MM3 (ref 3.77–5.28)
SODIUM BLD-SCNC: 143 MMOL/L (ref 136–145)
TRIGL SERPL-MCNC: 81 MG/DL (ref 0–150)
TSH SERPL DL<=0.05 MIU/L-ACNC: 3.99 UIU/ML (ref 0.27–4.2)
VLDLC SERPL-MCNC: 16.2 MG/DL
WBC NRBC COR # BLD: 4.29 10*3/MM3 (ref 3.4–10.8)

## 2019-10-17 PROCEDURE — 85025 COMPLETE CBC W/AUTO DIFF WBC: CPT | Performed by: NURSE PRACTITIONER

## 2019-10-17 PROCEDURE — 93880 EXTRACRANIAL BILAT STUDY: CPT | Performed by: RADIOLOGY

## 2019-10-17 PROCEDURE — 93306 TTE W/DOPPLER COMPLETE: CPT | Performed by: INTERNAL MEDICINE

## 2019-10-17 PROCEDURE — 80053 COMPREHEN METABOLIC PANEL: CPT | Performed by: NURSE PRACTITIONER

## 2019-10-17 PROCEDURE — 93306 TTE W/DOPPLER COMPLETE: CPT

## 2019-10-17 PROCEDURE — 93880 EXTRACRANIAL BILAT STUDY: CPT

## 2019-10-17 PROCEDURE — 36415 COLL VENOUS BLD VENIPUNCTURE: CPT

## 2019-10-17 PROCEDURE — 80061 LIPID PANEL: CPT | Performed by: NURSE PRACTITIONER

## 2019-10-17 PROCEDURE — 84443 ASSAY THYROID STIM HORMONE: CPT | Performed by: NURSE PRACTITIONER

## 2019-10-18 LAB
BH CV ECHO MEAS - ACS: 1.9 CM
BH CV ECHO MEAS - AO MAX PG: 5.1 MMHG
BH CV ECHO MEAS - AO MEAN PG: 3 MMHG
BH CV ECHO MEAS - AO ROOT AREA (BSA CORRECTED): 1.6
BH CV ECHO MEAS - AO ROOT AREA: 6.2 CM^2
BH CV ECHO MEAS - AO ROOT DIAM: 2.8 CM
BH CV ECHO MEAS - AO V2 MAX: 113 CM/SEC
BH CV ECHO MEAS - AO V2 MEAN: 78.2 CM/SEC
BH CV ECHO MEAS - AO V2 VTI: 25.6 CM
BH CV ECHO MEAS - BSA(HAYCOCK): 1.8 M^2
BH CV ECHO MEAS - BSA: 1.8 M^2
BH CV ECHO MEAS - BZI_BMI: 25.8 KILOGRAMS/M^2
BH CV ECHO MEAS - BZI_METRIC_HEIGHT: 165.1 CM
BH CV ECHO MEAS - BZI_METRIC_WEIGHT: 70.3 KG
BH CV ECHO MEAS - EDV(CUBED): 64.5 ML
BH CV ECHO MEAS - EDV(MOD-SP4): 39.5 ML
BH CV ECHO MEAS - EDV(TEICH): 70.4 ML
BH CV ECHO MEAS - EF(CUBED): 80.6 %
BH CV ECHO MEAS - EF(MOD-SP4): 47.3 %
BH CV ECHO MEAS - EF(TEICH): 73.7 %
BH CV ECHO MEAS - ESV(CUBED): 12.5 ML
BH CV ECHO MEAS - ESV(MOD-SP4): 20.8 ML
BH CV ECHO MEAS - ESV(TEICH): 18.5 ML
BH CV ECHO MEAS - FS: 42.1 %
BH CV ECHO MEAS - IVS/LVPW: 0.97
BH CV ECHO MEAS - IVSD: 1.4 CM
BH CV ECHO MEAS - LA DIMENSION: 3.8 CM
BH CV ECHO MEAS - LA/AO: 1.4
BH CV ECHO MEAS - LV DIASTOLIC VOL/BSA (35-75): 22.3 ML/M^2
BH CV ECHO MEAS - LV IVRT: 0.13 SEC
BH CV ECHO MEAS - LV MASS(C)D: 210.9 GRAMS
BH CV ECHO MEAS - LV MASS(C)DI: 118.8 GRAMS/M^2
BH CV ECHO MEAS - LV SYSTOLIC VOL/BSA (12-30): 11.7 ML/M^2
BH CV ECHO MEAS - LVIDD: 4 CM
BH CV ECHO MEAS - LVIDS: 2.3 CM
BH CV ECHO MEAS - LVLD AP4: 5.6 CM
BH CV ECHO MEAS - LVLS AP4: 4.5 CM
BH CV ECHO MEAS - LVOT AREA (M): 3.1 CM^2
BH CV ECHO MEAS - LVOT AREA: 3.1 CM^2
BH CV ECHO MEAS - LVOT DIAM: 2 CM
BH CV ECHO MEAS - LVPWD: 1.4 CM
BH CV ECHO MEAS - MV A MAX VEL: 102 CM/SEC
BH CV ECHO MEAS - MV DEC SLOPE: 319.3 CM/SEC^2
BH CV ECHO MEAS - MV E MAX VEL: 109 CM/SEC
BH CV ECHO MEAS - MV E/A: 1.1
BH CV ECHO MEAS - MV MAX PG: 5.6 MMHG
BH CV ECHO MEAS - MV MEAN PG: 2.7 MMHG
BH CV ECHO MEAS - MV P1/2T MAX VEL: 122 CM/SEC
BH CV ECHO MEAS - MV P1/2T: 111.9 MSEC
BH CV ECHO MEAS - MV V2 MAX: 118.7 CM/SEC
BH CV ECHO MEAS - MV V2 MEAN: 72.6 CM/SEC
BH CV ECHO MEAS - MV V2 VTI: 47.7 CM
BH CV ECHO MEAS - MVA P1/2T LCG: 1.8 CM^2
BH CV ECHO MEAS - MVA(P1/2T): 2 CM^2
BH CV ECHO MEAS - RAP SYSTOLE: 10 MMHG
BH CV ECHO MEAS - RVDD: 2.6 CM
BH CV ECHO MEAS - RVSP: 30.6 MMHG
BH CV ECHO MEAS - SI(AO): 88.8 ML/M^2
BH CV ECHO MEAS - SI(CUBED): 29.3 ML/M^2
BH CV ECHO MEAS - SI(MOD-SP4): 10.5 ML/M^2
BH CV ECHO MEAS - SI(TEICH): 29.2 ML/M^2
BH CV ECHO MEAS - SV(AO): 157.6 ML
BH CV ECHO MEAS - SV(CUBED): 52 ML
BH CV ECHO MEAS - SV(MOD-SP4): 18.7 ML
BH CV ECHO MEAS - SV(TEICH): 51.9 ML
BH CV ECHO MEAS - TR MAX VEL: 227 CM/SEC

## 2019-11-13 RX ORDER — FUROSEMIDE 40 MG/1
40 TABLET ORAL DAILY
Qty: 30 TABLET | Refills: 11 | Status: SHIPPED | OUTPATIENT
Start: 2019-11-13 | End: 2019-11-13 | Stop reason: SDUPTHER

## 2019-11-13 RX ORDER — FUROSEMIDE 40 MG/1
TABLET ORAL
Qty: 30 TABLET | Refills: 11 | Status: SHIPPED | OUTPATIENT
Start: 2019-11-13 | End: 2020-10-22 | Stop reason: DRUGHIGH

## 2020-06-29 ENCOUNTER — TELEPHONE (OUTPATIENT)
Dept: CARDIOLOGY | Facility: CLINIC | Age: 70
End: 2020-06-29

## 2020-06-29 DIAGNOSIS — Z95.1 S/P CABG X 1: ICD-10-CM

## 2020-06-29 DIAGNOSIS — R01.1 HEART MURMUR: ICD-10-CM

## 2020-06-29 DIAGNOSIS — I25.110 CORONARY ARTERY DISEASE INVOLVING NATIVE CORONARY ARTERY OF NATIVE HEART WITH UNSTABLE ANGINA PECTORIS (HCC): ICD-10-CM

## 2020-06-29 DIAGNOSIS — Z95.2 S/P MVR (MITRAL VALVE REPLACEMENT): Primary | ICD-10-CM

## 2020-06-29 NOTE — TELEPHONE ENCOUNTER
Patient called in reporting she had  (squeezing pressure ) chest pain all weekend .  She had taken Tums and rested with some relief but not complete relief .Said  She had labs done and K+ was elevated. Will obtain labs .   She has not been in office  Since 10- 2019

## 2020-06-30 ENCOUNTER — TELEPHONE (OUTPATIENT)
Dept: CARDIOLOGY | Facility: CLINIC | Age: 70
End: 2020-06-30

## 2020-06-30 NOTE — TELEPHONE ENCOUNTER
Patient aware of orders placed for Stress test and Echo , she has made an appointment for August 2020

## 2020-07-02 ENCOUNTER — HOSPITAL ENCOUNTER (OUTPATIENT)
Dept: CARDIOLOGY | Facility: HOSPITAL | Age: 70
Discharge: HOME | End: 2020-07-02

## 2020-07-02 VITALS — BODY MASS INDEX: 24.98 KG/M2 | HEIGHT: 66 IN | WEIGHT: 155.42 LBS

## 2020-07-02 DIAGNOSIS — Z95.1 S/P CABG X 1: ICD-10-CM

## 2020-07-02 DIAGNOSIS — Z95.2 S/P MVR (MITRAL VALVE REPLACEMENT): ICD-10-CM

## 2020-07-02 DIAGNOSIS — R01.1 HEART MURMUR: ICD-10-CM

## 2020-07-02 DIAGNOSIS — I25.110 CORONARY ARTERY DISEASE INVOLVING NATIVE CORONARY ARTERY OF NATIVE HEART WITH UNSTABLE ANGINA PECTORIS (HCC): ICD-10-CM

## 2020-07-02 PROCEDURE — 78452 HT MUSCLE IMAGE SPECT MULT: CPT | Performed by: INTERNAL MEDICINE

## 2020-07-02 PROCEDURE — A9500 TC99M SESTAMIBI: HCPCS | Performed by: INTERNAL MEDICINE

## 2020-07-02 PROCEDURE — 25010000002 REGADENOSON 0.4 MG/5ML SOLUTION: Performed by: INTERNAL MEDICINE

## 2020-07-02 PROCEDURE — 93017 CV STRESS TEST TRACING ONLY: CPT

## 2020-07-02 PROCEDURE — 0 TECHNETIUM SESTAMIBI: Performed by: INTERNAL MEDICINE

## 2020-07-02 PROCEDURE — 93306 TTE W/DOPPLER COMPLETE: CPT

## 2020-07-02 PROCEDURE — 93306 TTE W/DOPPLER COMPLETE: CPT | Performed by: INTERNAL MEDICINE

## 2020-07-02 PROCEDURE — 93016 CV STRESS TEST SUPVJ ONLY: CPT | Performed by: NURSE PRACTITIONER

## 2020-07-02 PROCEDURE — 93018 CV STRESS TEST I&R ONLY: CPT | Performed by: INTERNAL MEDICINE

## 2020-07-02 PROCEDURE — 78452 HT MUSCLE IMAGE SPECT MULT: CPT

## 2020-07-02 RX ADMIN — TECHNETIUM TC 99M SESTAMIBI 1 DOSE: 1 INJECTION INTRAVENOUS at 12:00

## 2020-07-02 RX ADMIN — REGADENOSON 0.4 MG: 0.08 INJECTION, SOLUTION INTRAVENOUS at 12:00

## 2020-07-02 RX ADMIN — TECHNETIUM TC 99M SESTAMIBI 1 DOSE: 1 INJECTION INTRAVENOUS at 10:03

## 2020-07-03 LAB
BH CV ECHO MEAS - ACS: 1.8 CM
BH CV ECHO MEAS - AO MAX PG: 6.7 MMHG
BH CV ECHO MEAS - AO MEAN PG: 4 MMHG
BH CV ECHO MEAS - AO ROOT AREA (BSA CORRECTED): 1.7
BH CV ECHO MEAS - AO ROOT AREA: 7.1 CM^2
BH CV ECHO MEAS - AO ROOT DIAM: 3 CM
BH CV ECHO MEAS - AO V2 MAX: 129 CM/SEC
BH CV ECHO MEAS - AO V2 MEAN: 89.3 CM/SEC
BH CV ECHO MEAS - AO V2 VTI: 30.7 CM
BH CV ECHO MEAS - BSA(HAYCOCK): 1.8 M^2
BH CV ECHO MEAS - BSA: 1.8 M^2
BH CV ECHO MEAS - BZI_BMI: 25.1 KILOGRAMS/M^2
BH CV ECHO MEAS - BZI_METRIC_HEIGHT: 167 CM
BH CV ECHO MEAS - BZI_METRIC_WEIGHT: 70 KG
BH CV ECHO MEAS - EDV(CUBED): 56.2 ML
BH CV ECHO MEAS - EDV(MOD-SP4): 60.2 ML
BH CV ECHO MEAS - EDV(TEICH): 63.1 ML
BH CV ECHO MEAS - EF(CUBED): 71.9 %
BH CV ECHO MEAS - EF(MOD-SP4): 56.8 %
BH CV ECHO MEAS - EF(TEICH): 64.3 %
BH CV ECHO MEAS - ESV(CUBED): 15.8 ML
BH CV ECHO MEAS - ESV(MOD-SP4): 26 ML
BH CV ECHO MEAS - ESV(TEICH): 22.5 ML
BH CV ECHO MEAS - FS: 34.5 %
BH CV ECHO MEAS - IVS/LVPW: 0.96
BH CV ECHO MEAS - IVSD: 1.3 CM
BH CV ECHO MEAS - LA DIMENSION: 4.2 CM
BH CV ECHO MEAS - LA/AO: 1.4
BH CV ECHO MEAS - LAT PEAK E' VEL: 3.1 CM/SEC
BH CV ECHO MEAS - LV DIASTOLIC VOL/BSA (35-75): 33.7 ML/M^2
BH CV ECHO MEAS - LV IVRT: 0.12 SEC
BH CV ECHO MEAS - LV MASS(C)D: 177.1 GRAMS
BH CV ECHO MEAS - LV MASS(C)DI: 99.1 GRAMS/M^2
BH CV ECHO MEAS - LV SYSTOLIC VOL/BSA (12-30): 14.6 ML/M^2
BH CV ECHO MEAS - LVIDD: 3.8 CM
BH CV ECHO MEAS - LVIDS: 2.5 CM
BH CV ECHO MEAS - LVLD AP4: 6.4 CM
BH CV ECHO MEAS - LVLS AP4: 5.5 CM
BH CV ECHO MEAS - LVOT AREA (M): 3.8 CM^2
BH CV ECHO MEAS - LVOT AREA: 3.8 CM^2
BH CV ECHO MEAS - LVOT DIAM: 2.2 CM
BH CV ECHO MEAS - LVPWD: 1.3 CM
BH CV ECHO MEAS - MED PEAK E' VEL: 5.4 CM/SEC
BH CV ECHO MEAS - MV A MAX VEL: 94.7 CM/SEC
BH CV ECHO MEAS - MV DEC SLOPE: 297.3 CM/SEC^2
BH CV ECHO MEAS - MV E MAX VEL: 102 CM/SEC
BH CV ECHO MEAS - MV E/A: 1.1
BH CV ECHO MEAS - MV MAX PG: 5.4 MMHG
BH CV ECHO MEAS - MV MEAN PG: 2 MMHG
BH CV ECHO MEAS - MV P1/2T MAX VEL: 117 CM/SEC
BH CV ECHO MEAS - MV P1/2T: 115.3 MSEC
BH CV ECHO MEAS - MV V2 MAX: 116.3 CM/SEC
BH CV ECHO MEAS - MV V2 MEAN: 69.8 CM/SEC
BH CV ECHO MEAS - MV V2 VTI: 46.7 CM
BH CV ECHO MEAS - MVA P1/2T LCG: 1.9 CM^2
BH CV ECHO MEAS - MVA(P1/2T): 1.9 CM^2
BH CV ECHO MEAS - RAP SYSTOLE: 10 MMHG
BH CV ECHO MEAS - RVDD: 2.9 CM
BH CV ECHO MEAS - RVSP: 27 MMHG
BH CV ECHO MEAS - SI(AO): 121.5 ML/M^2
BH CV ECHO MEAS - SI(CUBED): 22.6 ML/M^2
BH CV ECHO MEAS - SI(MOD-SP4): 19.1 ML/M^2
BH CV ECHO MEAS - SI(TEICH): 22.7 ML/M^2
BH CV ECHO MEAS - SV(AO): 217 ML
BH CV ECHO MEAS - SV(CUBED): 40.4 ML
BH CV ECHO MEAS - SV(MOD-SP4): 34.2 ML
BH CV ECHO MEAS - SV(TEICH): 40.6 ML
BH CV ECHO MEAS - TR MAX VEL: 216 CM/SEC
BH CV ECHO MEASUREMENTS AVERAGE E/E' RATIO: 24
BH CV NUCLEAR PRIOR STUDY: 3
BH CV STRESS COMMENTS STAGE 1: NORMAL
BH CV STRESS DOSE REGADENOSON STAGE 1: 0.4
BH CV STRESS DURATION MIN STAGE 1: 0
BH CV STRESS DURATION SEC STAGE 1: 10
BH CV STRESS PROTOCOL 1: NORMAL
BH CV STRESS RECOVERY BP: NORMAL MMHG
BH CV STRESS RECOVERY HR: 63 BPM
BH CV STRESS STAGE 1: 1
LV EF NUC BP: 67 %
MAXIMAL PREDICTED HEART RATE: 152 BPM
MAXIMAL PREDICTED HEART RATE: 152 BPM
PERCENT MAX PREDICTED HR: 48.03 %
STRESS BASELINE BP: NORMAL MMHG
STRESS BASELINE HR: 60 BPM
STRESS PERCENT HR: 57 %
STRESS POST PEAK BP: NORMAL MMHG
STRESS POST PEAK HR: 73 BPM
STRESS TARGET HR: 129 BPM
STRESS TARGET HR: 129 BPM

## 2020-07-06 RX ORDER — ISOSORBIDE MONONITRATE 30 MG/1
30 TABLET, EXTENDED RELEASE ORAL EVERY MORNING
Qty: 30 TABLET | Refills: 11 | Status: SHIPPED | OUTPATIENT
Start: 2020-07-06 | End: 2020-10-22 | Stop reason: SDUPTHER

## 2020-07-10 ENCOUNTER — OFFICE VISIT (OUTPATIENT)
Dept: CARDIOLOGY | Facility: CLINIC | Age: 70
End: 2020-07-10

## 2020-07-10 VITALS
DIASTOLIC BLOOD PRESSURE: 60 MMHG | WEIGHT: 157.25 LBS | BODY MASS INDEX: 25.27 KG/M2 | HEART RATE: 61 BPM | OXYGEN SATURATION: 98 % | SYSTOLIC BLOOD PRESSURE: 102 MMHG | TEMPERATURE: 97.2 F | HEIGHT: 66 IN

## 2020-07-10 DIAGNOSIS — Z95.1 S/P CABG X 1: ICD-10-CM

## 2020-07-10 DIAGNOSIS — R53.83 OTHER FATIGUE: ICD-10-CM

## 2020-07-10 DIAGNOSIS — R63.0 LOSS OF APPETITE: ICD-10-CM

## 2020-07-10 DIAGNOSIS — K59.00 CONSTIPATION, UNSPECIFIED CONSTIPATION TYPE: ICD-10-CM

## 2020-07-10 DIAGNOSIS — I25.118 CORONARY ARTERY DISEASE INVOLVING NATIVE CORONARY ARTERY OF NATIVE HEART WITH OTHER FORM OF ANGINA PECTORIS (HCC): ICD-10-CM

## 2020-07-10 DIAGNOSIS — R13.10 DYSPHAGIA, UNSPECIFIED TYPE: ICD-10-CM

## 2020-07-10 DIAGNOSIS — I10 ESSENTIAL HYPERTENSION: ICD-10-CM

## 2020-07-10 DIAGNOSIS — I49.5 SSS (SICK SINUS SYNDROME) (HCC): ICD-10-CM

## 2020-07-10 DIAGNOSIS — E78.00 HYPERCHOLESTEREMIA: ICD-10-CM

## 2020-07-10 DIAGNOSIS — I34.0 NONRHEUMATIC MITRAL VALVE REGURGITATION: ICD-10-CM

## 2020-07-10 DIAGNOSIS — Z95.3 S/P MITRAL VALVE REPLACEMENT WITH BIOPROSTHETIC VALVE: ICD-10-CM

## 2020-07-10 PROBLEM — I25.10 CAD (CORONARY ARTERY DISEASE): Status: RESOLVED | Noted: 2017-02-10 | Resolved: 2020-07-10

## 2020-07-10 PROCEDURE — 99214 OFFICE O/P EST MOD 30 MIN: CPT | Performed by: NURSE PRACTITIONER

## 2020-07-10 RX ORDER — ASPIRIN 81 MG/1
81 TABLET ORAL DAILY
COMMUNITY

## 2020-07-10 NOTE — PATIENT INSTRUCTIONS
Food Choices for Gastroesophageal Reflux Disease, Adult  When you have gastroesophageal reflux disease (GERD), the foods you eat and your eating habits are very important. Choosing the right foods can help ease the discomfort of GERD. Consider working with a diet and nutrition specialist (dietitian) to help you make healthy food choices.  What general guidelines should I follow?    Eating plan  · Choose healthy foods low in fat, such as fruits, vegetables, whole grains, low-fat dairy products, and lean meat, fish, and poultry.  · Eat frequent, small meals instead of three large meals each day. Eat your meals slowly, in a relaxed setting. Avoid bending over or lying down until 2-3 hours after eating.  · Limit high-fat foods such as fatty meats or fried foods.  · Limit your intake of oils, butter, and shortening to less than 8 teaspoons each day.  · Avoid the following:  ? Foods that cause symptoms. These may be different for different people. Keep a food diary to keep track of foods that cause symptoms.  ? Alcohol.  ? Drinking large amounts of liquid with meals.  ? Eating meals during the 2-3 hours before bed.  · Cook foods using methods other than frying. This may include baking, grilling, or broiling.  Lifestyle  · Maintain a healthy weight. Ask your health care provider what weight is healthy for you. If you need to lose weight, work with your health care provider to do so safely.  · Exercise for at least 30 minutes on 5 or more days each week, or as told by your health care provider.  · Avoid wearing clothes that fit tightly around your waist and chest.  · Do not use any products that contain nicotine or tobacco, such as cigarettes and e-cigarettes. If you need help quitting, ask your health care provider.  · Sleep with the head of your bed raised. Use a wedge under the mattress or blocks under the bed frame to raise the head of the bed.  What foods are not recommended?  The items listed may not be a complete  list. Talk with your dietitian about what dietary choices are best for you.  Grains  Pastries or quick breads with added fat. French toast.  Vegetables  Deep fried vegetables. French fries. Any vegetables prepared with added fat. Any vegetables that cause symptoms. For some people this may include tomatoes and tomato products, chili peppers, onions and garlic, and horseradish.  Fruits  Any fruits prepared with added fat. Any fruits that cause symptoms. For some people this may include citrus fruits, such as oranges, grapefruit, pineapple, and eliel.  Meats and other protein foods  High-fat meats, such as fatty beef or pork, hot dogs, ribs, ham, sausage, salami and olivo. Fried meat or protein, including fried fish and fried chicken. Nuts and nut butters.  Dairy  Whole milk and chocolate milk. Sour cream. Cream. Ice cream. Cream cheese. Milk shakes.  Beverages  Coffee and tea, with or without caffeine. Carbonated beverages. Sodas. Energy drinks. Fruit juice made with acidic fruits (such as orange or grapefruit). Tomato juice. Alcoholic drinks.  Fats and oils  Butter. Margarine. Shortening. Ghee.  Sweets and desserts  Chocolate and cocoa. Donuts.  Seasoning and other foods  Pepper. Peppermint and spearmint. Any condiments, herbs, or seasonings that cause symptoms. For some people, this may include harrell, hot sauce, or vinegar-based salad dressings.  Summary  · When you have gastroesophageal reflux disease (GERD), food and lifestyle choices are very important to help ease the discomfort of GERD.  · Eat frequent, small meals instead of three large meals each day. Eat your meals slowly, in a relaxed setting. Avoid bending over or lying down until 2-3 hours after eating.  · Limit high-fat foods such as fatty meat or fried foods.  This information is not intended to replace advice given to you by your health care provider. Make sure you discuss any questions you have with your health care provider.  Document Released:  12/18/2006 Document Revised: 04/09/2020 Document Reviewed: 12/19/2017  Elsevier Patient Education © 2020 [employer]

## 2020-07-10 NOTE — PROGRESS NOTES
Chief Complaint   Patient presents with   • Follow-up     Cardiac Management. Denies chest pain, palpitations and shortness of breath. Went to lab on 29th June. On the 30 was called into [facility]. Had chest pain and went to ER, chest pain going on for a few days,. On July 2nd she had Neuclear stress test and Echo done. Was asking if she may have acid reflux as well.  Today she says she has been getting tired even when just sitting.  ER note, stress results and labs in chart. She also stated that last week she would get so tired that she'd lay down sleep 2-3 hrs   • Med Refill     No refills needed at this time. Patient verbally verified medications       Subjective       Tiana Cronin is a 69 y.o. female  with hypertension, hyperlipidemia, hypothyroidism, and seizure disorder with no seizure activity since her fifties. She developed increased fatigue and shortness of breath and had an abnormal EKG.  She was seen by another cardiologist noted to have significant mitral regurgitation that did not improve with medical management.  She was referred to Dr. Gaona and underwent mitral valve replacement with #31 magma tissue valve and also had a 1 vessel bypass.  Post-operatively, she developed junctional rhythm and a [employer] pacemaker was placed by Dr. Dang.  [employer] device interrogation on 4/10/2019 showed 79% atrial and 36% ventricular pacing with one mode switch less than 1 minute.  No changes were made.  Battery life was 7 years.     IN June 2020 patient called the office concerned over development of chest pressure. She took Tums then rested and had some relief. Repeat stress and echo advised. On 7/2/20 reports showed anterolateral wall ischemia, stable valves and normal EF. Imdur added with plan for cath if symptoms persisted.    Today she comes to the office in discuss test results. She denies reoccurrence of chest pressure. No palpitations, dizziness or shortness of breath noted. Her main  concerns are persistent fatigue and feeling of generalized weakness. She has also developed problems with swallowing, gets choked easily. Appetite has been poor, but denies nausea or any signs of GI bleeding.     HPI     Cardiac History:    Past Surgical History:   Procedure Laterality Date   • CARDIAC CATHETERIZATION  01/11/2017    EF 55-60%, 50-60% stenosis of circ (small vessel), LAD 30%, severe MR   • CARDIAC ELECTROPHYSIOLOGY PROCEDURE N/A 2/10/2017    Dr. Dang. [place]   • CARDIOVASCULAR STRESS TEST  07/02/2020    Lexiscan- EF 67%. Anterolateral Ischemia   • CARDIOVASCULAR STRESS TEST  07/02/2020    Lexiscan- EF 67%. Anterolateral Ischemia.   • ECHO - CONVERTED  09/19/2017    EF 65%, mild LVH, no AS, normal functioning mitral valve, RVSP 29 mmHG   • ECHO - CONVERTED  07/02/2020    EF 65%. LA- 4.2 CM. MVR. MVA- 1.9 Cm2. Trace-Mild MR & AI. RVSP- 27 mmHg.   • MITRAL VALVE REPAIR/REPLACEMENT N/A 2/3/2017    MVR & One SVG graft       Current Outpatient Medications   Medication Sig Dispense Refill   • aspirin  MG tablet Take 325 mg by mouth Every 6 (Six) Hours As Needed.     • atorvastatin (LIPITOR) 40 MG tablet TAKE 1 TABLET BY MOUTH DAILY. (Patient taking differently: TAkes 1/2 tablet daily) 30 tablet 3   • Cholecalciferol (VITAMIN D3) 2000 UNITS tablet Take 2,000 Units by mouth Daily.     • citalopram (CeleXA) 20 MG tablet Take 20 mg by mouth Daily.     • ethosuximide (ZARONTIN) 250 MG capsule Take 500 mg by mouth 2 (Two) Times a Day.     • ferrous sulfate 325 (65 FE) MG tablet Take 325 mg by mouth Daily.  5   • furosemide (LASIX) 40 MG tablet Take 1/2 tablet daily 30 tablet 11   • isosorbide mononitrate (IMDUR) 30 MG 24 hr tablet Take 1 tablet by mouth Every Morning. 30 tablet 11   • levothyroxine (SYNTHROID, LEVOTHROID) 200 MCG tablet Take 200 mcg by mouth Daily. Takes 225 mcg. Takes 1 1/2 tablets daily     • metoprolol tartrate (LOPRESSOR) 25 MG tablet Decrease to once a day 30 tablet 0   •  nitroglycerin (NITROSTAT) 0.4 MG SL tablet TAKE 1 UNDER THE TONGUE AS NEEDED FOR ANGINA, MAY REPEAT EVERY 5 MINUTESS FOR UP THREE DOSES 25 tablet 3   • primidone (MYSOLINE) 250 MG tablet Take 250 mg by mouth 3 (Three) Times a Day.     • TURMERIC PO Take 1 capsule by mouth Daily.     • Omega-3 Fatty Acids (FISH OIL) 1000 MG capsule capsule Take 1,000 mg by mouth Daily With Breakfast.       No current facility-administered medications for this visit.      Facility-Administered Medications Ordered in Other Visits   Medication Dose Route Frequency Provider Last Rate Last Dose   • Chlorhexidine Gluconate Cloth 2 % pads 1 application  1 application Topical Q12H PRN Aubrie Anaya PA-C           Patient has no known allergies.    Past Medical History:   Diagnosis Date   • Anxiety    • Aortic regurgitation    • Arthritis     Hands and Knees   • CAD (S/P CABG x 1) 2/10/2017   • Depression    • Hyperlipidemia    • Hypertension    • Hypothyroidism    • Mitral regurgitation    • Mitral valve prolapse    • Seizures (CMS/HCC)     Several Years since last seizure ( last seizure cannot remember)    • Skin cancer, basal cell     basal cell skin cancer on face    • Wears dentures     full set    • Wears glasses     reading       Social History     Socioeconomic History   • Marital status:      Spouse name: Not on file   • Number of children: 2   • Years of education: Not on file   • Highest education level: Not on file   Occupational History   • Occupation: Restaurant Work     Comment: Disabled-[employer]   Tobacco Use   • Smoking status: Never Smoker   • Smokeless tobacco: Never Used   Substance and Sexual Activity   • Alcohol use: No   • Drug use: No   • Sexual activity: Defer       Family History   Problem Relation Age of Onset   • Hypertension Mother    • Alzheimer's disease Mother    • Coronary artery disease Father    • Hypertension Father    • Diabetes Father    • Heart failure Father        Review of Systems    "  Constitution: Positive for decreased appetite and malaise/fatigue. Negative for diaphoresis and fever.   HENT: Negative for congestion, hoarse voice and nosebleeds.    Eyes: Negative for visual disturbance.   Cardiovascular: Positive for leg swelling (mild, not a new problem). Negative for chest pain, near-syncope and palpitations.   Respiratory: Negative for cough, shortness of breath and sleep disturbances due to breathing.    Endocrine: Negative.    Skin: Negative.    Musculoskeletal: Negative.  Negative for muscle weakness and myalgias.   Gastrointestinal: Positive for constipation and dysphagia (sometimes chokes easily when swalloring). Negative for bloating, abdominal pain, heartburn, melena and nausea.   Genitourinary: Negative for dysuria and hematuria.   Neurological: Positive for weakness. Negative for dizziness, headaches, light-headedness and seizures (none recent).   Psychiatric/Behavioral: Negative for memory loss. The patient is nervous/anxious. The patient does not have insomnia.    Allergic/Immunologic: Negative for hives and persistent infections.        Objective     /60 (BP Location: Left arm)   Pulse 61   Temp 97.2 °F (36.2 °C)   Ht 167 cm (65.75\")   Wt 71.3 kg (157 lb 4 oz)   SpO2 98%   BMI 25.57 kg/m²     Physical Exam   Constitutional: She is oriented to person, place, and time. She appears well-nourished.   HENT:   Head: Normocephalic.   Eyes: Pupils are equal, round, and reactive to light.   Neck: Normal range of motion.   Cardiovascular: Normal rate, regular rhythm, S1 normal and S2 normal.   No murmur heard.  Pulmonary/Chest: Breath sounds normal.   Abdominal: Soft. Bowel sounds are normal.   Musculoskeletal: Normal range of motion.   Neurological: She is alert and oriented to person, place, and time.   Skin: Skin is warm.   Psychiatric: She has a normal mood and affect.        Procedures: none today. Pacemaker interrogation scheduled.         Assessment/Plan      Tiana was " seen today for follow-up and med refill.    Diagnoses and all orders for this visit:    Coronary artery disease involving native coronary artery of native heart with other form of angina pectoris (CMS/HCC)    S/P CABG x 1    SSS (sick sinus syndrome) (CMS/HCC)    Nonrheumatic mitral valve regurgitation    S/P mitral valve replacement with bioprosthetic valve    Other fatigue    Dysphagia, unspecified type  -     Cancel: Ambulatory Referral to Gastroenterology  -     Ambulatory Referral to Gastroenterology    Loss of appetite  -     Cancel: Ambulatory Referral to Gastroenterology  -     Ambulatory Referral to Gastroenterology    Constipation, unspecified constipation type  -     Cancel: Ambulatory Referral to Gastroenterology  -     Ambulatory Referral to Gastroenterology    Essential hypertension  -     metoprolol tartrate (LOPRESSOR) 25 MG tablet; Decrease to once a day    Hypercholesteremia       The reports of her recent echo and stress test were reviewed. She has not had reoccurrence of chest pressure but continues to have fatigue and weakness. Continue Imdur. Her blood pressure is low normal. Decrease Lopressor to 12.5 mg once a day. We will consider stopping the medication if fatigue improves, BP stable and pacer check negative for episodes of tachycardia. A pacemaker interrogation scheduled for next available.     Patient's Body mass index is 25.57 kg/m². BMI is slightly above normal parameters. Recommendations include: nutrition counseling. Weight is stable. She admits to GI symptoms. Referral to GI made for further evaluation.      Patient understands to call should symptoms of fatigue and weakness persist or other problems develop, then cardiac cath will be advised.     A 3 month follow up visit scheduled. Please call sooner for cardiac concerns.            Electronically signed by MCKENZIE Parra,  July 10, 2020 11:46

## 2020-07-22 ENCOUNTER — OFFICE VISIT (OUTPATIENT)
Dept: CARDIOLOGY | Facility: CLINIC | Age: 70
End: 2020-07-22

## 2020-07-22 DIAGNOSIS — I46.9 ASYSTOLE (HCC): ICD-10-CM

## 2020-07-22 DIAGNOSIS — I49.5 SSS (SICK SINUS SYNDROME) (HCC): Primary | ICD-10-CM

## 2020-07-22 PROCEDURE — 93288 INTERROG EVL PM/LDLS PM IP: CPT | Performed by: INTERNAL MEDICINE

## 2020-07-28 ENCOUNTER — TELEPHONE (OUTPATIENT)
Dept: CARDIOLOGY | Facility: CLINIC | Age: 70
End: 2020-07-28

## 2020-07-28 NOTE — TELEPHONE ENCOUNTER
Patient aware of scheduled appointment August 25,2020 at 8:30 am and is aware to be there at 8:15am .

## 2020-07-31 DIAGNOSIS — I10 ESSENTIAL HYPERTENSION: ICD-10-CM

## 2020-09-28 ENCOUNTER — TELEPHONE (OUTPATIENT)
Dept: CARDIOLOGY | Facility: CLINIC | Age: 70
End: 2020-09-28

## 2020-09-28 NOTE — TELEPHONE ENCOUNTER
Dr. Rosales requesting cardiac clearance as soon as possible for right shoulder hemiarthroplasty secondary to a closed displaced proximal humerus fracture and if ASA can be held 4-5 days prior to procedure?  Last cardiac testin2020-Lexiscan- EF 67%. Anterolateral Ischemia  2020-Echo-EF 65%. LA- 4.2 CM. MVR. MVA- 1.9 Cm2. Trace-Mild MR & AI. RVSP- 27 mmHg.    2017-MVR, CABG x 1. PPM implant.

## 2020-10-01 DIAGNOSIS — I10 ESSENTIAL HYPERTENSION: ICD-10-CM

## 2020-10-16 NOTE — TELEPHONE ENCOUNTER
BP is better. Continue same. Thanks  
Patient is aware to continue same medications  
Patient was in for BP check. Midodrine 5 mg, 1/2 tab QD restarted on 9/25/18.    /64  HR 61    Patient reported that on the 9/26/18  she began the medication and was taking 1/2 tab twice a day. Pharmacy called to confirm directions for use and found she was taking it too often. She began taking 1/2 tablet once a day on 10/4/18.  
NONE

## 2020-10-22 ENCOUNTER — OFFICE VISIT (OUTPATIENT)
Dept: CARDIOLOGY | Facility: CLINIC | Age: 70
End: 2020-10-22

## 2020-10-22 VITALS
WEIGHT: 159 LBS | SYSTOLIC BLOOD PRESSURE: 120 MMHG | BODY MASS INDEX: 25.55 KG/M2 | HEART RATE: 65 BPM | DIASTOLIC BLOOD PRESSURE: 70 MMHG | HEIGHT: 66 IN | TEMPERATURE: 98.1 F

## 2020-10-22 DIAGNOSIS — R26.89 IMBALANCE: ICD-10-CM

## 2020-10-22 DIAGNOSIS — R42 DIZZINESS: ICD-10-CM

## 2020-10-22 DIAGNOSIS — R60.0 LOCALIZED EDEMA: Primary | ICD-10-CM

## 2020-10-22 DIAGNOSIS — Z95.1 S/P CABG X 1: ICD-10-CM

## 2020-10-22 DIAGNOSIS — I10 ESSENTIAL HYPERTENSION: ICD-10-CM

## 2020-10-22 DIAGNOSIS — I49.5 SSS (SICK SINUS SYNDROME) (HCC): ICD-10-CM

## 2020-10-22 DIAGNOSIS — I25.9 IHD (ISCHEMIC HEART DISEASE): ICD-10-CM

## 2020-10-22 DIAGNOSIS — Z95.3 S/P MITRAL VALVE REPLACEMENT WITH BIOPROSTHETIC VALVE: ICD-10-CM

## 2020-10-22 DIAGNOSIS — R06.02 SHORTNESS OF BREATH: ICD-10-CM

## 2020-10-22 DIAGNOSIS — R01.1 HEART MURMUR: ICD-10-CM

## 2020-10-22 DIAGNOSIS — E66.3 OVERWEIGHT: ICD-10-CM

## 2020-10-22 PROBLEM — I46.9 ASYSTOLE: Status: RESOLVED | Noted: 2017-02-06 | Resolved: 2020-10-22

## 2020-10-22 PROCEDURE — 99214 OFFICE O/P EST MOD 30 MIN: CPT | Performed by: NURSE PRACTITIONER

## 2020-10-22 PROCEDURE — 93000 ELECTROCARDIOGRAM COMPLETE: CPT | Performed by: NURSE PRACTITIONER

## 2020-10-22 RX ORDER — FUROSEMIDE 40 MG/1
TABLET ORAL
Qty: 90 TABLET | Refills: 3 | Status: SHIPPED | OUTPATIENT
Start: 2020-10-22 | End: 2021-04-22 | Stop reason: SDUPTHER

## 2020-10-22 RX ORDER — ATORVASTATIN CALCIUM 20 MG/1
20 TABLET, FILM COATED ORAL DAILY
COMMUNITY
End: 2021-04-22 | Stop reason: SDUPTHER

## 2020-10-22 RX ORDER — POTASSIUM CHLORIDE 750 MG/1
10 TABLET, FILM COATED, EXTENDED RELEASE ORAL DAILY
Qty: 30 TABLET | Refills: 11 | Status: SHIPPED | OUTPATIENT
Start: 2020-10-22 | End: 2021-04-22 | Stop reason: SDUPTHER

## 2020-10-22 RX ORDER — MULTIVIT WITH MINERALS/LUTEIN
1000 TABLET ORAL DAILY
COMMUNITY
End: 2022-05-12

## 2020-10-22 RX ORDER — ISOSORBIDE MONONITRATE 30 MG/1
30 TABLET, EXTENDED RELEASE ORAL EVERY MORNING
Qty: 30 TABLET | Refills: 11 | Status: SHIPPED | OUTPATIENT
Start: 2020-10-22 | End: 2021-04-22 | Stop reason: SDUPTHER

## 2020-10-22 NOTE — PROGRESS NOTES
Chief Complaint   Patient presents with   • Follow-up     For cardiac management. Patient is on aspirin. Last lab work was done last week per PCP, not in chart. Has had arm surgery for fracture of right arm. States that she occasionally has shortness of breath. States that he legs and feet have been swelling, they are very swollen today. States that when she fell when she broke her arm, she was walking and then started going to her right and couldn't go straight and then fell, didn't feel dizzy. States that she is having to get up to use the bathroom a lot.    • Pacemaker Check     Last [facility]SC PPM check was done on 07/22/2020 per our office.    • Med Refill     Needs refills on cardiac medications. 30 day supplies to [facility].        Cardiac Complaints  dyspnea and lower extremity edema      Subjective   Tiana Cronin is a 70 y.o. female with hypertension, hyperlipidemia, hypothyroidism, SSS, mitral valve stenosis s/p replacement, and seizure disorder with no seizure activity since her fifties. She developed increased fatigue and shortness of breath and had an abnormal EKG.  She was seen by another cardiologist noted to have significant mitral regurgitation that did not improve with medical management.  She was referred to Dr. Gaona and underwent mitral valve replacement with #31 magma tissue valve and also had a 1 vessel bypass.  Post-operatively, she developed junctional rhythm and a [employer] pacemaker was placed by Dr. Dang.  [employer] device interrogation on 4/10/2019 showed 79% atrial and 36% ventricular pacing with one mode switch less than 1 minute.  No changes were made.  Battery life was 7 years.      In June 2020 patient called the office concerned over development of chest pressure. She took Tums then rested and had some relief. Repeat stress and echo advised. On 7/2/20 reports showed anterolateral wall ischemia, stable valves and normal EF. Imdur added with plan for  cath if symptoms persisted.  She then called in September 2020 with possible right shoulder repair and clearance was given.     She comes today for follow up and denies any new concerns. She does report some occasional shortness of breath with exertion but no worse than prior. Chest pain, palpitations, dizziness, and syncope denied. She does admit to issues with her legs being swollen and state they have gotten worse over the last little bit, she is still only on 1/2 of lasix therapy. She does report doing well after surgery this summer after arm break when she just lost her balance and fell. Labs remain followed by PCP and were last checked about a week ago. She does admit to more edema and states over the last several days she has noticed a great deal more edema in BLE.  She admits to propping up her extremities and not eating a great deal of sodium intake.  Refills of cardiac meds requested for 30 day supply.  Most recent pacer check in July 2020 showed 5.5 years ago, atrial pacing at 78% with ventricular pacing at 48%, and no events and no changes were advised.                 Cardiac History  Past Surgical History:   Procedure Laterality Date   • CARDIAC CATHETERIZATION  01/11/2017    EF 55-60%, 50-60% stenosis of circ (small vessel), LAD 30%, severe MR   • CARDIAC ELECTROPHYSIOLOGY PROCEDURE N/A 2/10/2017    Dr. Dang. [place]   • CARDIOVASCULAR STRESS TEST  07/02/2020    Lexiscan- EF 67%. Anterolateral Ischemia   • CARDIOVASCULAR STRESS TEST  07/02/2020    Lexiscan- EF 67%. Anterolateral Ischemia.   • ECHO - CONVERTED  09/19/2017    EF 65%, mild LVH, no AS, normal functioning mitral valve, RVSP 29 mmHG   • ECHO - CONVERTED  07/02/2020    EF 65%. LA- 4.2 CM. MVR. MVA- 1.9 Cm2. Trace-Mild MR & AI. RVSP- 27 mmHg.   • MITRAL VALVE REPAIR/REPLACEMENT N/A 2/3/2017    MVR & One SVG graft       Current Outpatient Medications   Medication Sig Dispense Refill   • ascorbic acid (VITAMIN C) 1000 MG tablet Take 1,000 mg  by mouth Daily.     • aspirin  MG tablet Take 325 mg by mouth Every 6 (Six) Hours As Needed.     • atorvastatin (LIPITOR) 20 MG tablet Take 20 mg by mouth Daily.     • Cholecalciferol (VITAMIN D3) 2000 UNITS tablet Take 2,000 Units by mouth Daily.     • citalopram (CeleXA) 20 MG tablet Take 20 mg by mouth Daily.     • ethosuximide (ZARONTIN) 250 MG capsule Take 500 mg by mouth 2 (Two) Times a Day.     • ferrous sulfate 325 (65 FE) MG tablet Take 325 mg by mouth Daily.  5   • isosorbide mononitrate (IMDUR) 30 MG 24 hr tablet Take 1 tablet by mouth Every Morning. 30 tablet 11   • levothyroxine (SYNTHROID, LEVOTHROID) 200 MCG tablet Take 200 mcg by mouth Daily. Takes 225 mcg. Takes 1 1/2 tablets daily     • metoprolol tartrate (LOPRESSOR) 25 MG tablet Take 0.5 tablets by mouth Daily. 30 tablet 8   • nitroglycerin (NITROSTAT) 0.4 MG SL tablet TAKE 1 UNDER THE TONGUE AS NEEDED FOR ANGINA, MAY REPEAT EVERY 5 MINUTESS FOR UP THREE DOSES 25 tablet 3   • primidone (MYSOLINE) 250 MG tablet Take 250 mg by mouth 3 (Three) Times a Day.     • VITAMIN E PO Take 670 mg by mouth Daily.     • furosemide (LASIX) 40 MG tablet 1/2 to whole tablet 90 tablet 3   • potassium chloride 10 MEQ CR tablet Take 1 tablet by mouth Daily. 30 tablet 11     No current facility-administered medications for this visit.      Facility-Administered Medications Ordered in Other Visits   Medication Dose Route Frequency Provider Last Rate Last Dose   • Chlorhexidine Gluconate Cloth 2 % pads 1 application  1 application Topical Q12H PRN Aubrie Anaya PA-C           Patient has no known allergies.    Past Medical History:   Diagnosis Date   • Anxiety    • Aortic regurgitation    • Arthritis     Hands and Knees   • CAD (S/P CABG x 1) 2/10/2017   • Depression    • History of surgery on arm    • Hyperlipidemia    • Hypertension    • Hypothyroidism    • Mitral regurgitation    • Mitral valve prolapse    • Seizures (CMS/HCC)     Several Years since last  "seizure ( last seizure cannot remember)    • Skin cancer, basal cell     basal cell skin cancer on face    • Wears dentures     full set    • Wears glasses     reading       Social History     Socioeconomic History   • Marital status:      Spouse name: Not on file   • Number of children: 2   • Years of education: Not on file   • Highest education level: Not on file   Occupational History   • Occupation: Restaurant Work     Comment: Disabled-[employer]   Tobacco Use   • Smoking status: Never Smoker   • Smokeless tobacco: Never Used   Substance and Sexual Activity   • Alcohol use: No   • Drug use: No   • Sexual activity: Defer       Family History   Problem Relation Age of Onset   • Hypertension Mother    • Alzheimer's disease Mother    • Coronary artery disease Father    • Hypertension Father    • Diabetes Father    • Heart failure Father        Review of Systems   Constitution: Negative for malaise/fatigue and night sweats.   Cardiovascular: Positive for dyspnea on exertion and leg swelling. Negative for chest pain, claudication, irregular heartbeat, near-syncope, orthopnea, palpitations and syncope.   Respiratory: Positive for shortness of breath. Negative for cough and wheezing.    Musculoskeletal: Positive for stiffness. Negative for back pain and joint pain.   Gastrointestinal: Negative for anorexia, heartburn, melena, nausea and vomiting.   Genitourinary: Negative for dysuria, hematuria, hesitancy and nocturia.   Neurological: Negative for dizziness, light-headedness and loss of balance.   Psychiatric/Behavioral: Negative for depression and memory loss. The patient is not nervous/anxious.            Objective     /70 (BP Location: Left arm)   Pulse 65   Temp 98.1 °F (36.7 °C)   Ht 167 cm (65.75\")   Wt 72.1 kg (159 lb)   BMI 25.86 kg/m²     Constitutional:       Appearance: Healthy appearance. Not in distress.   Eyes:      Pupils: Pupils are equal, round, and reactive to light.   HENT:      Nose: " Nose normal.    Mouth/Throat:      Pharynx: Oropharynx is clear.   Neck:      Musculoskeletal: Normal range of motion and neck supple.   Pulmonary:      Effort: Pulmonary effort is normal.   Cardiovascular:      PMI at left midclavicular line. Normal rate. Regular rhythm.      Murmurs: There is a high frequency blowing holosystolic murmur at the apex.   Edema:     Peripheral edema present.  Abdominal:      Palpations: Abdomen is soft.   Musculoskeletal: Normal range of motion.   Skin:     General: Skin is warm and dry.   Neurological:      Mental Status: Oriented to person, place and time.           ECG 12 Lead    Date/Time: 10/22/2020 1:16 PM  Performed by: Marylou Richardson APRN  Authorized by: Marylou Richardson APRN   Comparison: compared with previous ECG from 6/30/2020  Similar to previous ECG  Rhythm: paced  Ectopy: unifocal PVCs  BPM: 65    Clinical impression: abnormal EKG  Comments: Normal QT            Assessment/Plan     SSS:  EKG done today for pacemaker and SSS showed atrial paced rhythm with one PVC. [place] [facility] [place] showed normal function with adequate battery life remaining. Repeat order provided for 3 months.    IHD:  Status stable. She denies any pain on current imdur therapy.  She will continue on current ASA therapy, bleeding/bruising denied. No repeat workup/cath advised.    HTN:  Blood pressure stable. Lopressor therapy will be continued at current. Limited sodium intake advised.    Edema:  On lasix therapy.  She has been taking 20mg daily per self report. Patient will be urged to take 40mg a day and support hose encouraged. Lab order also provided to assess BNP, CBC, and CMP.  More recommendations to follow. If lab workup okay and BNP normal, please consider 24 hour urine and microalbumin to rule out nephrotic syndrome. If all okay, we will advise on venous reflux US.      Hyperlipidemia:  On statin therapy with lipitor. FLP followed by your office. No current available.     Shoulder  pain:  Recent shoulder surgery in June. She is still in sling followed by ortho.     BMI noted at 25.86, good cardiac diet with limited caloric intake, carbs, and activity as tolerated advised.      Refills per request.    6 month follow up recommended or sooner if needed.         Problems Addressed this Visit        Cardiovascular and Mediastinum    SSS (sick sinus syndrome) (CMS/HCC)    Relevant Medications    metoprolol tartrate (LOPRESSOR) 25 MG tablet    isosorbide mononitrate (IMDUR) 30 MG 24 hr tablet    Other Relevant Orders    ECG 12 Lead    Essential hypertension    Relevant Medications    furosemide (LASIX) 40 MG tablet    metoprolol tartrate (LOPRESSOR) 25 MG tablet    Other Relevant Orders    proBNP    CBC (No Diff)    Comprehensive Metabolic Panel       Other    Localized edema - Primary    Relevant Orders    proBNP    CBC (No Diff)    Comprehensive Metabolic Panel    S/P CABG x 1    S/P mitral valve replacement with bioprosthetic valve      Other Visit Diagnoses     IHD (ischemic heart disease)        Relevant Medications    metoprolol tartrate (LOPRESSOR) 25 MG tablet    isosorbide mononitrate (IMDUR) 30 MG 24 hr tablet    Heart murmur        Shortness of breath        Relevant Orders    proBNP    CBC (No Diff)    Comprehensive Metabolic Panel    Dizziness        Relevant Orders    US carotid bilateral    Imbalance        Relevant Orders    US carotid bilateral    Overweight          Diagnoses       Codes Comments    Localized edema    -  Primary ICD-10-CM: R60.0  ICD-9-CM: 782.3     IHD (ischemic heart disease)     ICD-10-CM: I25.9  ICD-9-CM: 414.9     SSS (sick sinus syndrome) (CMS/HCC)     ICD-10-CM: I49.5  ICD-9-CM: 427.81     Essential hypertension     ICD-10-CM: I10  ICD-9-CM: 401.9     S/P CABG x 1     ICD-10-CM: Z95.1  ICD-9-CM: V45.81     S/P mitral valve replacement with bioprosthetic valve     ICD-10-CM: Z95.3  ICD-9-CM: V42.2     Heart murmur     ICD-10-CM: R01.1  ICD-9-CM: 785.2      Shortness of breath     ICD-10-CM: R06.02  ICD-9-CM: 786.05     Dizziness     ICD-10-CM: R42  ICD-9-CM: 780.4     Imbalance     ICD-10-CM: R26.89  ICD-9-CM: 781.2     Overweight     ICD-10-CM: E66.3  ICD-9-CM: 278.02           Patient's Body mass index is 25.86 kg/m². BMI is above normal parameters. Recommendations include: nutrition counseling.            Electronically signed by MCKENZIE Navarro October 22, 2020 16:28 EDT

## 2020-10-23 ENCOUNTER — TELEPHONE (OUTPATIENT)
Dept: CARDIOLOGY | Facility: CLINIC | Age: 70
End: 2020-10-23

## 2020-10-23 NOTE — TELEPHONE ENCOUNTER
Perla with [facility] was made aware that metoprolol is a 25 mg tablet for patient to take 1/2 tablet daily.    Patient's sister, Sara, was made aware for patient to take whole tablet of 40 mg lasix.

## 2020-10-23 NOTE — TELEPHONE ENCOUNTER
Patient's sister, Sara, called to let you know that lasix has been taking 20 mg tablet daily. Do you want patient to go ahead and start taking lasix 40 mg daily?     [facility] had also called just to verify that metoprolol 25 mg had gone from 1/2 tablet BID to 1/2 tablet daily. I verified with Sara that it was correct as it was on patient's medication list as 1/2 tablet daily, but I wanted to double check on it. I will call [facility] to verify.

## 2020-10-26 ENCOUNTER — HOSPITAL ENCOUNTER (OUTPATIENT)
Dept: CARDIOLOGY | Facility: HOSPITAL | Age: 70
Discharge: HOME | End: 2020-10-26
Admitting: NURSE PRACTITIONER

## 2020-10-26 DIAGNOSIS — R42 DIZZINESS: ICD-10-CM

## 2020-10-26 DIAGNOSIS — R26.89 IMBALANCE: ICD-10-CM

## 2020-10-26 PROCEDURE — 93880 EXTRACRANIAL BILAT STUDY: CPT

## 2020-10-26 PROCEDURE — 93880 EXTRACRANIAL BILAT STUDY: CPT | Performed by: RADIOLOGY

## 2021-01-04 RX ORDER — POTASSIUM CHLORIDE 750 MG/1
10 TABLET, FILM COATED, EXTENDED RELEASE ORAL DAILY
Qty: 30 TABLET | Refills: 3 | OUTPATIENT
Start: 2021-01-04

## 2021-01-13 ENCOUNTER — OFFICE VISIT (OUTPATIENT)
Dept: CARDIOLOGY | Facility: CLINIC | Age: 71
End: 2021-01-13

## 2021-01-13 DIAGNOSIS — I49.5 SSS (SICK SINUS SYNDROME) (HCC): Primary | ICD-10-CM

## 2021-01-13 PROCEDURE — 93288 INTERROG EVL PM/LDLS PM IP: CPT | Performed by: INTERNAL MEDICINE

## 2021-01-21 ENCOUNTER — TELEPHONE (OUTPATIENT)
Dept: CARDIOLOGY | Facility: CLINIC | Age: 71
End: 2021-01-21

## 2021-01-21 NOTE — TELEPHONE ENCOUNTER
Patient called stating that she Dr. Rosales yesterday and they are wanting to put her on a medication for arthritis. She states that they told her to call our office to see what medication she could take with her cardiac medications.     I called patient to see if they had given her any examples of medications that they would want her on, she said that they didn't, they just wanted her to call.

## 2021-01-22 NOTE — TELEPHONE ENCOUNTER
I spoke with Dr. Rosales's office and they had told patient that they normally recommended meloxicam 15 mg daily if that was ok with you with patients current medications and conditions.

## 2021-01-25 NOTE — TELEPHONE ENCOUNTER
Spoke with Alex at Dr. Rosales's office who was made aware that patient could take meloxicam 7.5 mg daily. Patient was made aware to contact Dr. Rosales's office to talk with them about medication as they will be the ones prescribing.

## 2021-04-22 ENCOUNTER — OFFICE VISIT (OUTPATIENT)
Dept: CARDIOLOGY | Facility: CLINIC | Age: 71
End: 2021-04-22

## 2021-04-22 VITALS
SYSTOLIC BLOOD PRESSURE: 100 MMHG | HEART RATE: 63 BPM | WEIGHT: 153 LBS | HEIGHT: 66 IN | BODY MASS INDEX: 24.59 KG/M2 | TEMPERATURE: 97.1 F | DIASTOLIC BLOOD PRESSURE: 62 MMHG

## 2021-04-22 DIAGNOSIS — E78.00 HYPERCHOLESTEREMIA: ICD-10-CM

## 2021-04-22 DIAGNOSIS — I49.5 SSS (SICK SINUS SYNDROME) (HCC): Primary | ICD-10-CM

## 2021-04-22 DIAGNOSIS — Z95.3 S/P MITRAL VALVE REPLACEMENT WITH BIOPROSTHETIC VALVE: ICD-10-CM

## 2021-04-22 DIAGNOSIS — I10 ESSENTIAL HYPERTENSION: ICD-10-CM

## 2021-04-22 DIAGNOSIS — R60.0 LOCALIZED EDEMA: ICD-10-CM

## 2021-04-22 DIAGNOSIS — Z95.1 S/P CABG X 1: ICD-10-CM

## 2021-04-22 DIAGNOSIS — R01.1 HEART MURMUR: ICD-10-CM

## 2021-04-22 PROCEDURE — 99214 OFFICE O/P EST MOD 30 MIN: CPT | Performed by: NURSE PRACTITIONER

## 2021-04-22 PROCEDURE — 93000 ELECTROCARDIOGRAM COMPLETE: CPT | Performed by: NURSE PRACTITIONER

## 2021-04-22 RX ORDER — FUROSEMIDE 40 MG/1
TABLET ORAL
Qty: 90 TABLET | Refills: 2 | Status: SHIPPED | OUTPATIENT
Start: 2021-04-22 | End: 2021-11-11 | Stop reason: SDUPTHER

## 2021-04-22 RX ORDER — ATORVASTATIN CALCIUM 20 MG/1
20 TABLET, FILM COATED ORAL DAILY
Qty: 90 TABLET | Refills: 2 | Status: SHIPPED | OUTPATIENT
Start: 2021-04-22 | End: 2021-11-11 | Stop reason: SDUPTHER

## 2021-04-22 RX ORDER — ISOSORBIDE MONONITRATE 30 MG/1
30 TABLET, EXTENDED RELEASE ORAL NIGHTLY
Qty: 90 TABLET | Refills: 2 | Status: SHIPPED | OUTPATIENT
Start: 2021-04-22 | End: 2021-11-11 | Stop reason: SDUPTHER

## 2021-04-22 RX ORDER — POTASSIUM CHLORIDE 750 MG/1
10 TABLET, FILM COATED, EXTENDED RELEASE ORAL DAILY
Qty: 90 TABLET | Refills: 2 | Status: SHIPPED | OUTPATIENT
Start: 2021-04-22 | End: 2021-11-11 | Stop reason: SDUPTHER

## 2021-04-22 NOTE — PROGRESS NOTES
Chief Complaint   Patient presents with   • Follow-up     cardiac management. denies any cardiac complaints. biggest concern today is her arthritis in her legs.   • Med Refill     will need cardiac meds refilled 90 days to [facility]. Brought med list with visit..   • Labs     last labs 1/21 with PCP, not in chart   • Aspirin     is on daily ASA   • Pacemaker Check     [employer] 1/13/21       Cardiac Complaints  lower extremity edema      Subjective   Tiana Cronin is a 70 y.o. female with hypertension, hyperlipidemia, hypothyroidism, SSS, mitral valve stenosis s/p replacement, and seizure disorder with no seizure activity since her fifties. She developed increased fatigue and shortness of breath and had an abnormal EKG.  She was seen by another cardiologist noted to have significant mitral regurgitation that did not improve with medical management.  She was referred to Dr. Gaona and underwent mitral valve replacement with #31 magma tissue valve and also had a 1 vessel bypass.  Post-operatively, she developed junctional rhythm and a [employer] pacemaker was placed by Dr. Dang.  [employer] device interrogation on 4/10/2019 showed 79% atrial and 36% ventricular pacing with one mode switch less than 1 minute.  No changes were made.  Battery life was 7 years.      In June 2020 patient called the office concerned over development of chest pressure. She took Tums then rested and had some relief. Repeat stress and echo advised. On 7/2/20 reports showed anterolateral wall ischemia, stable valves and normal EF. Imdur added with plan for cath if symptoms persisted.  She then called in September 2020 with possible right shoulder repair and clearance was given for surgical repair. In October 2020 visit, more imbalance and falls were reported, carotid US was recommended. US done 10/22/2020 then showed no significant stenosis bilaterally.  Most [place] [facility] [place] from Jan 2021 showed Atrial  pacing at 55% with ventricular pacing at 50%, 2.9% AMS with short runs of SVT, and over 5 years of battery life remaining.      She comes today for follow up and denies any new concerns. No chest pain, SOA, palpitations, dizziness, or syncope reported. She does have some issues with leg pain/arthritis, but feels as though she is doing well from a cardiac standpoint.             Cardiac History  Past Surgical History:   Procedure Laterality Date   • CARDIAC CATHETERIZATION  01/11/2017    EF 55-60%, 50-60% stenosis of circ (small vessel), LAD 30%, severe MR   • CARDIAC ELECTROPHYSIOLOGY PROCEDURE N/A 2/10/2017    Dr. Dang. [place]   • CARDIOVASCULAR STRESS TEST  07/02/2020    Lexiscan- EF 67%. Anterolateral Ischemia.   • ECHO - CONVERTED  09/19/2017    EF 65%, mild LVH, no AS, normal functioning mitral valve, RVSP 29 mmHG   • ECHO - CONVERTED  07/02/2020    EF 65%. LA- 4.2 CM. MVR. MVA- 1.9 Cm2. Trace-Mild MR & AI. RVSP- 27 mmHg.   • MITRAL VALVE REPAIR/REPLACEMENT N/A 2/3/2017    MVR & One SVG graft       Current Outpatient Medications   Medication Sig Dispense Refill   • ascorbic acid (VITAMIN C) 1000 MG tablet Take 1,000 mg by mouth Daily.     • aspirin  MG tablet Take 325 mg by mouth Every 6 (Six) Hours As Needed.     • atorvastatin (LIPITOR) 20 MG tablet Take 1 tablet by mouth Daily. 90 tablet 2   • Cholecalciferol (VITAMIN D3) 2000 UNITS tablet Take 2,000 Units by mouth Daily.     • citalopram (CeleXA) 20 MG tablet Take 20 mg by mouth Daily.     • ethosuximide (ZARONTIN) 250 MG capsule Take 500 mg by mouth 2 (Two) Times a Day.     • ferrous sulfate 325 (65 FE) MG tablet Take 325 mg by mouth Daily.  5   • furosemide (LASIX) 40 MG tablet 1/2 to whole tablet 90 tablet 2   • isosorbide mononitrate (IMDUR) 30 MG 24 hr tablet Take 1 tablet by mouth Every Night. 90 tablet 2   • levothyroxine (SYNTHROID, LEVOTHROID) 200 MCG tablet Take 200 mcg by mouth Daily. Takes 225 mcg. Takes 1 1/2 tablets daily     •  metoprolol tartrate (LOPRESSOR) 25 MG tablet Take 0.5 tablets by mouth Daily. 90 tablet 2   • nitroglycerin (NITROSTAT) 0.4 MG SL tablet TAKE 1 UNDER THE TONGUE AS NEEDED FOR ANGINA, MAY REPEAT EVERY 5 MINUTESS FOR UP THREE DOSES 25 tablet 3   • potassium chloride 10 MEQ CR tablet Take 1 tablet by mouth Daily. 90 tablet 2   • primidone (MYSOLINE) 250 MG tablet Take 250 mg by mouth 3 (Three) Times a Day.     • VITAMIN E PO Take 670 mg by mouth Daily.       No current facility-administered medications for this visit.     Facility-Administered Medications Ordered in Other Visits   Medication Dose Route Frequency Provider Last Rate Last Admin   • Chlorhexidine Gluconate Cloth 2 % pads 1 application  1 application Topical Q12H PRN Aubrie Anaya PA-C           Patient has no known allergies.    Past Medical History:   Diagnosis Date   • Anxiety    • Aortic regurgitation    • Arthritis     Hands and Knees   • CAD (S/P CABG x 1) 2/10/2017   • Depression    • History of surgery on arm    • Hyperlipidemia    • Hypertension    • Hypothyroidism    • Mitral regurgitation    • Mitral valve prolapse    • Seizures (CMS/HCC)     Several Years since last seizure ( last seizure cannot remember)    • Skin cancer, basal cell     basal cell skin cancer on face    • Wears dentures     full set    • Wears glasses     reading       Social History     Socioeconomic History   • Marital status:      Spouse name: Not on file   • Number of children: 2   • Years of education: Not on file   • Highest education level: Not on file   Tobacco Use   • Smoking status: Never Smoker   • Smokeless tobacco: Never Used   Vaping Use   • Vaping Use: Never used   Substance and Sexual Activity   • Alcohol use: No   • Drug use: No   • Sexual activity: Defer       Family History   Problem Relation Age of Onset   • Hypertension Mother    • Alzheimer's disease Mother    • Coronary artery disease Father    • Hypertension Father    • Diabetes Father    •  "Heart failure Father        Review of Systems   Constitutional: Negative for malaise/fatigue and night sweats.   Cardiovascular: Negative for chest pain, claudication, dyspnea on exertion, irregular heartbeat, leg swelling, near-syncope, orthopnea, palpitations and syncope.   Respiratory: Negative for cough, shortness of breath and wheezing.    Musculoskeletal: Positive for stiffness. Negative for back pain, joint pain and joint swelling.   Gastrointestinal: Negative for anorexia, heartburn, melena, nausea and vomiting.   Genitourinary: Negative for dysuria, hematuria, hesitancy and nocturia.   Neurological: Negative for dizziness, light-headedness and loss of balance.   Psychiatric/Behavioral: Negative for depression and memory loss. The patient is not nervous/anxious.            Objective     /62 (BP Location: Right arm)   Pulse 63   Temp 97.1 °F (36.2 °C)   Ht 167 cm (65.75\")   Wt 69.4 kg (153 lb)   BMI 24.88 kg/m²     Constitutional:       Appearance: Healthy appearance. Not in distress.   Eyes:      Pupils: Pupils are equal, round, and reactive to light.   HENT:      Nose: Nose normal.   Pulmonary:      Effort: Pulmonary effort is normal.      Breath sounds: Normal breath sounds.   Cardiovascular:      PMI at left midclavicular line. Normal rate. Regular rhythm.      Murmurs: There is a systolic murmur.   Edema:     Peripheral edema present.  Musculoskeletal: Normal range of motion.      Cervical back: Normal range of motion and neck supple. Skin:     General: Skin is warm.      Coloration: Skin is pale.   Neurological:      Mental Status: Oriented to person, place and time.           ECG 12 Lead    Date/Time: 4/22/2021 1:12 PM  Performed by: Marylou Richardson APRN  Authorized by: Marylou Richardson APRN   Comparison: compared with previous ECG from 10/22/2020  Similar to previous ECG  Rhythm: paced  BPM: 63  Conduction: 1st degree AV block    Clinical impression: abnormal EKG  Comments: Normal " QT            Assessment/Plan     SSS:  EKG done today for pacemaker and SSS showed atrial paced rhythm with one PVC. Recent [facility] pacer check showed normal function with adequate battery life remaining. Repeat order provided for 3 months.     IHD:  Status stable. She denies any pain on current imdur therapy, we will switch to pm use. She will continue on current ASA therapy, bleeding/bruising denied. No repeat workup/cath advised as status stable. Most recent stress 2020 showed anterolateral ischemia, chest pain well managed with imdur therapy.     HTN:  Blood pressure stable. Lopressor therapy will be continued at current. Limited sodium intake advised.     Edema:  On lasix therapy, continued on 40mg a day and support hose encouraged. She was encouraged to discuss 24 hour urine with your office. Limited sodium advised.      Hyperlipidemia:  On statin therapy with lipitor. FLP followed by your office. No current available.      Shoulder pain:  Right shoulder surgery in June, she has recovered, but still has some pain in regards.      BMI noted at 24.88, good cardiac diet with limited caloric intake, carbs, and activity as tolerated advised.       Refills per request.     6 month follow up recommended or sooner if needed.       Problems Addressed this Visit        Cardiac and Vasculature    SSS (sick sinus syndrome) (CMS/HCC) - Primary    Relevant Medications    metoprolol tartrate (LOPRESSOR) 25 MG tablet    isosorbide mononitrate (IMDUR) 30 MG 24 hr tablet    Other Relevant Orders    ECG 12 Lead    S/P CABG x 1    S/P mitral valve replacement with bioprosthetic valve    Essential hypertension    Relevant Medications    furosemide (LASIX) 40 MG tablet    metoprolol tartrate (LOPRESSOR) 25 MG tablet    Hypercholesteremia    Relevant Medications    atorvastatin (LIPITOR) 20 MG tablet       Symptoms and Signs    Localized edema      Other Visit Diagnoses     Heart murmur          Diagnoses       Codes Comments    SSS  (sick sinus syndrome) (CMS/[facility])    -  Primary ICD-10-CM: I49.5  ICD-9-CM: 427.81     Essential hypertension     ICD-10-CM: I10  ICD-9-CM: 401.9     Hypercholesteremia     ICD-10-CM: E78.00  ICD-9-CM: 272.0     S/P CABG x 1     ICD-10-CM: Z95.1  ICD-9-CM: V45.81     S/P mitral valve replacement with bioprosthetic valve     ICD-10-CM: Z95.3  ICD-9-CM: V42.2     Heart murmur     ICD-10-CM: R01.1  ICD-9-CM: 785.2     Localized edema     ICD-10-CM: R60.0  ICD-9-CM: 782.3           Patient's Body mass index is 24.88 kg/m². BMI is within normal parameters. No follow-up required..            Electronically signed by Marylou Richardson, APRN April 22, 2021 15:58 EDT

## 2021-05-12 ENCOUNTER — TELEPHONE (OUTPATIENT)
Dept: CARDIOLOGY | Facility: CLINIC | Age: 71
End: 2021-05-12

## 2021-05-12 ENCOUNTER — OFFICE VISIT (OUTPATIENT)
Dept: CARDIOLOGY | Facility: CLINIC | Age: 71
End: 2021-05-12

## 2021-05-12 DIAGNOSIS — I48.0 PAF (PAROXYSMAL ATRIAL FIBRILLATION) (HCC): ICD-10-CM

## 2021-05-12 DIAGNOSIS — I49.5 SSS (SICK SINUS SYNDROME) (HCC): Primary | ICD-10-CM

## 2021-05-12 PROCEDURE — 93288 INTERROG EVL PM/LDLS PM IP: CPT | Performed by: INTERNAL MEDICINE

## 2021-05-12 NOTE — TELEPHONE ENCOUNTER
Let patient know to stop her to decrease her ASA to 81mg daily and start eliquis 5mg BID for afib. Does have a bioprosthetic valve. Provide her with 1 week of samples to assess tolerance.

## 2021-05-14 ENCOUNTER — TELEPHONE (OUTPATIENT)
Dept: CARDIOLOGY | Facility: CLINIC | Age: 71
End: 2021-05-14

## 2021-05-14 DIAGNOSIS — I48.0 PAF (PAROXYSMAL ATRIAL FIBRILLATION) (HCC): Primary | ICD-10-CM

## 2021-05-14 DIAGNOSIS — Z79.899 LONG-TERM USE OF HIGH-RISK MEDICATION: ICD-10-CM

## 2021-05-14 NOTE — TELEPHONE ENCOUNTER
After reviewing PPM check Dr Hoyt would like the following labs:  BMP  TSH and   Mag  Pt aware to go to our Lab Monday.

## 2021-05-14 NOTE — TELEPHONE ENCOUNTER
Attempted to notify patient of recommendations and that samples are ready for , unable to reach, left message for patient to call back.

## 2021-05-17 ENCOUNTER — LAB (OUTPATIENT)
Dept: LAB | Facility: HOSPITAL | Age: 71
End: 2021-05-17

## 2021-05-17 DIAGNOSIS — Z79.899 LONG-TERM USE OF HIGH-RISK MEDICATION: ICD-10-CM

## 2021-05-17 DIAGNOSIS — I48.0 PAF (PAROXYSMAL ATRIAL FIBRILLATION) (HCC): ICD-10-CM

## 2021-05-17 LAB
ANION GAP SERPL CALCULATED.3IONS-SCNC: 9.9 MMOL/L (ref 5–15)
BUN SERPL-MCNC: 18 MG/DL (ref 8–23)
BUN/CREAT SERPL: 16.7 (ref 7–25)
CALCIUM SPEC-SCNC: 9.7 MG/DL (ref 8.6–10.5)
CHLORIDE SERPL-SCNC: 101 MMOL/L (ref 98–107)
CO2 SERPL-SCNC: 29.1 MMOL/L (ref 22–29)
CREAT SERPL-MCNC: 1.08 MG/DL (ref 0.57–1)
GFR SERPL CREATININE-BSD FRML MDRD: 50 ML/MIN/1.73
GLUCOSE SERPL-MCNC: 90 MG/DL (ref 65–99)
MAGNESIUM SERPL-MCNC: 2.2 MG/DL (ref 1.6–2.4)
POTASSIUM SERPL-SCNC: 4.5 MMOL/L (ref 3.5–5.2)
SODIUM SERPL-SCNC: 140 MMOL/L (ref 136–145)
TSH SERPL DL<=0.05 MIU/L-ACNC: 3.55 UIU/ML (ref 0.27–4.2)

## 2021-05-17 PROCEDURE — 83735 ASSAY OF MAGNESIUM: CPT

## 2021-05-17 PROCEDURE — 84443 ASSAY THYROID STIM HORMONE: CPT

## 2021-05-17 PROCEDURE — 80048 BASIC METABOLIC PNL TOTAL CA: CPT

## 2021-05-17 PROCEDURE — 36415 COLL VENOUS BLD VENIPUNCTURE: CPT

## 2021-11-11 ENCOUNTER — OFFICE VISIT (OUTPATIENT)
Dept: CARDIOLOGY | Facility: CLINIC | Age: 71
End: 2021-11-11

## 2021-11-11 VITALS
DIASTOLIC BLOOD PRESSURE: 70 MMHG | BODY MASS INDEX: 26.76 KG/M2 | HEART RATE: 60 BPM | TEMPERATURE: 98 F | HEIGHT: 65 IN | SYSTOLIC BLOOD PRESSURE: 122 MMHG | WEIGHT: 160.6 LBS

## 2021-11-11 DIAGNOSIS — R06.02 SHORTNESS OF BREATH: ICD-10-CM

## 2021-11-11 DIAGNOSIS — I10 ESSENTIAL HYPERTENSION: Primary | ICD-10-CM

## 2021-11-11 DIAGNOSIS — E78.00 HYPERCHOLESTEREMIA: ICD-10-CM

## 2021-11-11 DIAGNOSIS — I49.5 SSS (SICK SINUS SYNDROME) (HCC): ICD-10-CM

## 2021-11-11 DIAGNOSIS — R60.0 BILATERAL LEG EDEMA: ICD-10-CM

## 2021-11-11 DIAGNOSIS — I34.0 NON-RHEUMATIC MITRAL REGURGITATION: ICD-10-CM

## 2021-11-11 DIAGNOSIS — R63.5 WEIGHT GAIN: ICD-10-CM

## 2021-11-11 DIAGNOSIS — Z79.899 LONG-TERM USE OF HIGH-RISK MEDICATION: ICD-10-CM

## 2021-11-11 PROCEDURE — 99214 OFFICE O/P EST MOD 30 MIN: CPT | Performed by: NURSE PRACTITIONER

## 2021-11-11 PROCEDURE — 93000 ELECTROCARDIOGRAM COMPLETE: CPT | Performed by: NURSE PRACTITIONER

## 2021-11-11 RX ORDER — ATORVASTATIN CALCIUM 20 MG/1
20 TABLET, FILM COATED ORAL DAILY
Qty: 90 TABLET | Refills: 2 | Status: SHIPPED | OUTPATIENT
Start: 2021-11-11 | End: 2022-05-12 | Stop reason: SDUPTHER

## 2021-11-11 RX ORDER — VIT C/B6/B5/MAGNESIUM/HERB 173 50-5-6-5MG
500 CAPSULE ORAL DAILY
COMMUNITY
End: 2022-05-12

## 2021-11-11 RX ORDER — MULTIPLE VITAMINS W/ MINERALS TAB 9MG-400MCG
1 TAB ORAL DAILY
COMMUNITY
End: 2022-05-12

## 2021-11-11 RX ORDER — FUROSEMIDE 40 MG/1
40 TABLET ORAL DAILY
Qty: 90 TABLET | Refills: 2 | Status: SHIPPED | OUTPATIENT
Start: 2021-11-11 | End: 2022-05-12 | Stop reason: SDUPTHER

## 2021-11-11 RX ORDER — POTASSIUM CHLORIDE 750 MG/1
10 TABLET, FILM COATED, EXTENDED RELEASE ORAL DAILY
Qty: 90 TABLET | Refills: 2 | Status: SHIPPED | OUTPATIENT
Start: 2021-11-11 | End: 2022-05-12 | Stop reason: SDUPTHER

## 2021-11-11 RX ORDER — ISOSORBIDE MONONITRATE 30 MG/1
30 TABLET, EXTENDED RELEASE ORAL NIGHTLY
Qty: 90 TABLET | Refills: 2 | Status: SHIPPED | OUTPATIENT
Start: 2021-11-11 | End: 2022-05-12 | Stop reason: SDUPTHER

## 2021-11-11 NOTE — PROGRESS NOTES
Chief Complaint   Patient presents with   • Follow-up     Pt is here for cardiac follow up.  Pt admits SOB.  Pt denies CP, palpitations, or dizziness.   • Med Refill     Pt states that she needs cardiac meds refilled.  She request 30 day prescriptions to be sent to [facility].     • Lab Work     Pt states her last labs were her PCP about a month ago.   • Shortness of Breath     Pt states she has not been able to determine why she gets SOB.         Cardiac Complaints  dyspnea and lower extremity edema      Subjective   Tiana Cronin is a 71 y.o. female with hypertension, hyperlipidemia, hypothyroidism, SSS, mitral valve stenosis s/p replacement, and seizure disorder with no seizure activity since her fifties. She developed increased fatigue and shortness of breath and had an abnormal EKG.  She was seen by another cardiologist noted to have significant mitral regurgitation that did not improve with medical management.  She was referred to Dr. Gaona and underwent mitral valve replacement with #31 magma tissue valve and also had a 1 vessel bypass.  Post-operatively, she developed junctional rhythm and a [employer] pacemaker was placed by Dr. Dang.  In June 2020, patient called the office concerned over development of chest pressure. Repeat stress and echo advised. On 7/2/20 reports showed anterolateral wall ischemia, stable valves and normal EF. Imdur added with plan for cath if symptoms persisted.  She then called in September 2020 with possible right shoulder repair and clearance was given for surgical clearance. In October 2020 visit, more imbalance and falls were reported, carotid US was recommended. US done 10/22/2020 then showed no significant stenosis bilaterally.  Most recent [facility] pacer check from May 2021 showed Atrial pacing at 69% with ventricular pacing at 65%, <1% AMS, and 4.5 years of battery life remaining.     She comes today for follow up and admits to SOA,unchanged from prior. She  denies chest pain, palpitations, and dizziness.  She does admit to some more edema in BLE, but she reports some days she is not taking her lasix if she is traveling or going to be out much of the day. Labs have been done about a month ago with PCP, no current available. Patient does have knee pain bilaterally, and is followed by ortho, using injections to both knees, but she does not think surgical candidate according to ortho, using cream. Refills requested.        Cardiac History  Past Surgical History:   Procedure Laterality Date   • CARDIAC CATHETERIZATION  01/11/2017    EF 55-60%, 50-60% stenosis of circ (small vessel), LAD 30%, severe MR   • CARDIAC ELECTROPHYSIOLOGY PROCEDURE N/A 2/10/2017    Dr. Dang. [place]   • CARDIOVASCULAR STRESS TEST  07/02/2020    Lexiscan- EF 67%. Anterolateral Ischemia.   • ECHO - CONVERTED  09/19/2017    EF 65%, mild LVH, no AS, normal functioning mitral valve, RVSP 29 mmHG   • ECHO - CONVERTED  07/02/2020    EF 65%. LA- 4.2 CM. MVR. MVA- 1.9 Cm2. Trace-Mild MR & AI. RVSP- 27 mmHg.   • MITRAL VALVE REPAIR/REPLACEMENT N/A 2/3/2017    MVR & One SVG graft       Current Outpatient Medications   Medication Sig Dispense Refill   • ascorbic acid (VITAMIN C) 1000 MG tablet Take 1,000 mg by mouth Daily.     • aspirin  MG tablet Take 325 mg by mouth Every 6 (Six) Hours As Needed.     • atorvastatin (LIPITOR) 20 MG tablet Take 1 tablet by mouth Daily. 90 tablet 2   • Cholecalciferol (VITAMIN D3) 2000 UNITS tablet Take 2,000 Units by mouth Daily.     • citalopram (CeleXA) 20 MG tablet Take 20 mg by mouth Daily.     • ethosuximide (ZARONTIN) 250 MG capsule Take 500 mg by mouth 2 (Two) Times a Day.     • ferrous sulfate 325 (65 FE) MG tablet Take 325 mg by mouth Daily.  5   • furosemide (LASIX) 40 MG tablet Take 1 tablet by mouth Daily. 90 tablet 2   • isosorbide mononitrate (IMDUR) 30 MG 24 hr tablet Take 1 tablet by mouth Every Night. 90 tablet 2   • levothyroxine (SYNTHROID,  LEVOTHROID) 200 MCG tablet Take 200 mcg by mouth Daily. Takes 225 mcg. Takes 1 1/2 tablets daily     • metoprolol tartrate (LOPRESSOR) 25 MG tablet Take 0.5 tablets by mouth Daily. 90 tablet 2   • multivitamin with minerals tablet tablet Take 1 tablet by mouth Daily.     • nitroglycerin (NITROSTAT) 0.4 MG SL tablet TAKE 1 UNDER THE TONGUE AS NEEDED FOR ANGINA, MAY REPEAT EVERY 5 MINUTESS FOR UP THREE DOSES 25 tablet 3   • potassium chloride 10 MEQ CR tablet Take 1 tablet by mouth Daily. 90 tablet 2   • primidone (MYSOLINE) 250 MG tablet Take 250 mg by mouth 3 (Three) Times a Day.     • Turmeric 500 MG capsule Take 500 mg by mouth Daily.     • VITAMIN E PO Take 670 mg by mouth Daily.       No current facility-administered medications for this visit.     Facility-Administered Medications Ordered in Other Visits   Medication Dose Route Frequency Provider Last Rate Last Admin   • Chlorhexidine Gluconate Cloth 2 % pads 1 application  1 application Topical Q12H PRN Aubrie Anaya PA-C           Patient has no known allergies.    Past Medical History:   Diagnosis Date   • Anxiety    • Aortic regurgitation    • Arthritis     Hands and Knees   • CAD (S/P CABG x 1) 2/10/2017   • Depression    • History of surgery on arm    • Hyperlipidemia    • Hypertension    • Hypothyroidism    • Mitral regurgitation    • Mitral valve prolapse    • Seizures (HCC)     Several Years since last seizure ( last seizure cannot remember)    • Skin cancer, basal cell     basal cell skin cancer on face    • Wears dentures     full set    • Wears glasses     reading       Social History     Socioeconomic History   • Marital status:    • Number of children: 2   Tobacco Use   • Smoking status: Never Smoker   • Smokeless tobacco: Never Used   Vaping Use   • Vaping Use: Never used   Substance and Sexual Activity   • Alcohol use: No   • Drug use: No   • Sexual activity: Defer       Family History   Problem Relation Age of Onset   • Hypertension  "Mother    • Alzheimer's disease Mother    • Coronary artery disease Father    • Hypertension Father    • Diabetes Father    • Heart failure Father        Review of Systems   Constitutional: Negative for malaise/fatigue and night sweats.   Cardiovascular: Positive for dyspnea on exertion and leg swelling. Negative for chest pain, claudication, irregular heartbeat, near-syncope, orthopnea, palpitations and syncope.   Respiratory: Positive for shortness of breath. Negative for cough and wheezing.    Musculoskeletal: Positive for stiffness. Negative for back pain and joint pain.   Gastrointestinal: Negative for anorexia, heartburn, melena, nausea and vomiting.   Genitourinary: Negative for dysuria, hematuria, hesitancy and nocturia.   Neurological: Negative for dizziness, light-headedness and loss of balance.   Psychiatric/Behavioral: Negative for depression and memory loss. The patient is not nervous/anxious.            Objective     /70 (BP Location: Left arm, Patient Position: Sitting)   Pulse 60   Temp 98 °F (36.7 °C)   Ht 165.1 cm (65\")   Wt 72.8 kg (160 lb 9.6 oz)   BMI 26.73 kg/m²     Constitutional:       Appearance: Not in distress.   Eyes:      Pupils: Pupils are equal, round, and reactive to light.   HENT:      Nose: Nose normal.   Pulmonary:      Effort: Pulmonary effort is normal.      Breath sounds: Normal breath sounds.   Cardiovascular:      PMI at left midclavicular line. Normal rate. Regular rhythm.      Murmurs: There is a systolic murmur.   Edema:     Peripheral edema present.  Abdominal:      Palpations: Abdomen is soft.   Musculoskeletal: Normal range of motion.      Cervical back: Normal range of motion and neck supple. Skin:     General: Skin is warm and dry.   Neurological:      Mental Status: Oriented to person, place and time.           ECG 12 Lead    Date/Time: 11/11/2021 2:13 PM  Performed by: Marylou Richardson APRN  Authorized by: Marylou Richardson APRN   Comparison: compared " with previous ECG from 4/22/2021  Similar to previous ECG  Comparison to previous ECG: No ventricular pacing now.  Rhythm: paced  BPM: 60  Conduction: 1st degree AV block    Clinical impression: abnormal EKG  Comments: Normal QT            Assessment/Plan     SSS:  EKG done today for pacemaker and SSS showed atrial paced rhythm with first degree block. Recent [facility] pacer check showed normal function with adequate battery life remaining. Repeat order for [facility] check advised.      IHD:  Status stable. She denies any pain on current imdur therapy, continues with PM dose. She will continue on current ASA therapy, bleeding/bruising denied. No repeat workup/cath advised as status stable.      HTN:  Blood pressure stable. Lopressor therapy will be continued at current. Limited sodium intake advised.     Edema:  On lasix therapy, continued on 40mg a day and support hose encouraged. She was encouraged to discuss 24 hour urine with your office, repeat BNP order advised. Limited sodium advised.      Hyperlipidemia:  On statin therapy with lipitor. FLP followed by your office. No current available.      Shoulder pain:  Recovered well from shoulder surgery. Pain denied today.    Bilateral knee pain:  Noted. She is ambulating well. Followed by Dr. Rosales. Discussing cream to both knees.      BMI noted at 26.73, good cardiac diet with limited caloric intake, carbs, and activity as tolerated advised.       Refills per request.     6 month follow up recommended or sooner if needed.       Problems Addressed this Visit        Cardiac and Vasculature    SSS (sick sinus syndrome) (HCC)    Relevant Medications    isosorbide mononitrate (IMDUR) 30 MG 24 hr tablet    Other Relevant Orders    ECG 12 Lead    Essential hypertension - Primary    Relevant Medications    furosemide (LASIX) 40 MG tablet    Other Relevant Orders    CBC (No Diff)    Comprehensive Metabolic Panel    proBNP    Hypercholesteremia    Relevant Medications    atorvastatin  (LIPITOR) 20 MG tablet    Other Relevant Orders    Comprehensive Metabolic Panel    proBNP      Other Visit Diagnoses     Shortness of breath        Relevant Orders    CBC (No Diff)    Comprehensive Metabolic Panel    proBNP    Bilateral leg edema        Relevant Orders    proBNP    Long-term use of high-risk medication        Relevant Orders    CBC (No Diff)    Comprehensive Metabolic Panel    proBNP    Weight gain        Relevant Orders    proBNP      Diagnoses       Codes Comments    Essential hypertension    -  Primary ICD-10-CM: I10  ICD-9-CM: 401.9     SSS (sick sinus syndrome) (HCC)     ICD-10-CM: I49.5  ICD-9-CM: 427.81     Hypercholesteremia     ICD-10-CM: E78.00  ICD-9-CM: 272.0     Shortness of breath     ICD-10-CM: R06.02  ICD-9-CM: 786.05     Bilateral leg edema     ICD-10-CM: R60.0  ICD-9-CM: 782.3     Long-term use of high-risk medication     ICD-10-CM: Z79.899  ICD-9-CM: V58.69     Weight gain     ICD-10-CM: R63.5  ICD-9-CM: 783.1           Patient's Body mass index is 26.73 kg/m². indicating that she is overweight. Good cardiac diet with limited carbs, calories, and activity as tolerated advised.        Electronically signed by Marylou Richardson, MCKENZIE November 11, 2021 16:44 EST

## 2021-11-29 NOTE — PROGRESS NOTES
Labs look okay. BNP WNL. AST mildly elevated, renal function looks okay, GFR 55. No med changes made.

## 2022-01-26 ENCOUNTER — OFFICE VISIT (OUTPATIENT)
Dept: CARDIOLOGY | Facility: CLINIC | Age: 72
End: 2022-01-26

## 2022-01-26 DIAGNOSIS — I49.5 SSS (SICK SINUS SYNDROME): Primary | ICD-10-CM

## 2022-01-26 DIAGNOSIS — I48.0 PAF (PAROXYSMAL ATRIAL FIBRILLATION): ICD-10-CM

## 2022-01-26 PROCEDURE — 93288 INTERROG EVL PM/LDLS PM IP: CPT | Performed by: INTERNAL MEDICINE

## 2022-01-26 NOTE — TELEPHONE ENCOUNTER
After reviewing PPM check Dr Hoyt has increased metoprolol tart 25 mg 1/2 tab daily to 1/2 tab twice a day. Pt aware,  requesting script be sent to [facility], they fill her planner.

## 2022-05-12 ENCOUNTER — OFFICE VISIT (OUTPATIENT)
Dept: CARDIOLOGY | Facility: CLINIC | Age: 72
End: 2022-05-12

## 2022-05-12 VITALS
BODY MASS INDEX: 27.72 KG/M2 | SYSTOLIC BLOOD PRESSURE: 112 MMHG | HEIGHT: 65 IN | HEART RATE: 60 BPM | WEIGHT: 166.4 LBS | DIASTOLIC BLOOD PRESSURE: 74 MMHG

## 2022-05-12 DIAGNOSIS — E66.3 OVERWEIGHT: ICD-10-CM

## 2022-05-12 DIAGNOSIS — R53.83 OTHER FATIGUE: ICD-10-CM

## 2022-05-12 DIAGNOSIS — Z95.1 S/P CABG X 1: ICD-10-CM

## 2022-05-12 DIAGNOSIS — I10 ESSENTIAL HYPERTENSION: ICD-10-CM

## 2022-05-12 DIAGNOSIS — R94.31 ABNORMAL EKG: ICD-10-CM

## 2022-05-12 DIAGNOSIS — R01.1 HEART MURMUR: ICD-10-CM

## 2022-05-12 DIAGNOSIS — I49.5 SSS (SICK SINUS SYNDROME): ICD-10-CM

## 2022-05-12 DIAGNOSIS — I48.0 PAF (PAROXYSMAL ATRIAL FIBRILLATION): ICD-10-CM

## 2022-05-12 DIAGNOSIS — R60.0 LOCALIZED EDEMA: ICD-10-CM

## 2022-05-12 DIAGNOSIS — I20.8 ANGINAL EQUIVALENT: ICD-10-CM

## 2022-05-12 DIAGNOSIS — R06.02 SHORTNESS OF BREATH: Primary | ICD-10-CM

## 2022-05-12 DIAGNOSIS — E78.00 HYPERCHOLESTEREMIA: ICD-10-CM

## 2022-05-12 DIAGNOSIS — E03.9 HYPOTHYROIDISM (ACQUIRED): ICD-10-CM

## 2022-05-12 DIAGNOSIS — Z95.3 S/P MITRAL VALVE REPLACEMENT WITH BIOPROSTHETIC VALVE: ICD-10-CM

## 2022-05-12 PROCEDURE — 99214 OFFICE O/P EST MOD 30 MIN: CPT | Performed by: NURSE PRACTITIONER

## 2022-05-12 PROCEDURE — 93000 ELECTROCARDIOGRAM COMPLETE: CPT | Performed by: NURSE PRACTITIONER

## 2022-05-12 RX ORDER — ATORVASTATIN CALCIUM 20 MG/1
20 TABLET, FILM COATED ORAL DAILY
Qty: 90 TABLET | Refills: 2 | Status: SHIPPED | OUTPATIENT
Start: 2022-05-12 | End: 2022-12-21

## 2022-05-12 RX ORDER — MULTIPLE VITAMINS W/ MINERALS TAB 9MG-400MCG
1 TAB ORAL DAILY
COMMUNITY

## 2022-05-12 RX ORDER — POTASSIUM CHLORIDE 750 MG/1
10 TABLET, FILM COATED, EXTENDED RELEASE ORAL DAILY
Qty: 90 TABLET | Refills: 2 | Status: SHIPPED | OUTPATIENT
Start: 2022-05-12 | End: 2022-12-21

## 2022-05-12 RX ORDER — FUROSEMIDE 40 MG/1
40 TABLET ORAL DAILY
Qty: 90 TABLET | Refills: 2 | Status: SHIPPED | OUTPATIENT
Start: 2022-05-12 | End: 2022-11-14 | Stop reason: SDUPTHER

## 2022-05-12 RX ORDER — LANOLIN ALCOHOL/MO/W.PET/CERES
1000 CREAM (GRAM) TOPICAL DAILY
COMMUNITY

## 2022-05-12 RX ORDER — ISOSORBIDE MONONITRATE 30 MG/1
30 TABLET, EXTENDED RELEASE ORAL NIGHTLY
Qty: 90 TABLET | Refills: 2 | Status: SHIPPED | OUTPATIENT
Start: 2022-05-12 | End: 2022-11-14 | Stop reason: SDUPTHER
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
## 2022-05-26 ENCOUNTER — HOSPITAL ENCOUNTER (OUTPATIENT)
Dept: CARDIOLOGY | Facility: HOSPITAL | Age: 72
Discharge: HOME | End: 2022-05-26

## 2022-05-26 VITALS — HEIGHT: 65 IN | WEIGHT: 166.45 LBS | BODY MASS INDEX: 27.73 KG/M2

## 2022-05-26 DIAGNOSIS — R53.83 OTHER FATIGUE: ICD-10-CM

## 2022-05-26 DIAGNOSIS — R94.31 ABNORMAL EKG: ICD-10-CM

## 2022-05-26 DIAGNOSIS — Z95.3 S/P MITRAL VALVE REPLACEMENT WITH BIOPROSTHETIC VALVE: ICD-10-CM

## 2022-05-26 DIAGNOSIS — R06.02 SHORTNESS OF BREATH: ICD-10-CM

## 2022-05-26 DIAGNOSIS — Z95.1 S/P CABG X 1: ICD-10-CM

## 2022-05-26 DIAGNOSIS — I20.8 ANGINAL EQUIVALENT: ICD-10-CM

## 2022-05-26 DIAGNOSIS — R01.1 HEART MURMUR: ICD-10-CM

## 2022-05-26 PROCEDURE — 93017 CV STRESS TEST TRACING ONLY: CPT

## 2022-05-26 PROCEDURE — 0 TECHNETIUM SESTAMIBI: Performed by: INTERNAL MEDICINE

## 2022-05-26 PROCEDURE — 93018 CV STRESS TEST I&R ONLY: CPT | Performed by: INTERNAL MEDICINE

## 2022-05-26 PROCEDURE — 93306 TTE W/DOPPLER COMPLETE: CPT

## 2022-05-26 PROCEDURE — 93306 TTE W/DOPPLER COMPLETE: CPT | Performed by: INTERNAL MEDICINE

## 2022-05-26 PROCEDURE — 25010000002 REGADENOSON 0.4 MG/5ML SOLUTION: Performed by: INTERNAL MEDICINE

## 2022-05-26 PROCEDURE — 78452 HT MUSCLE IMAGE SPECT MULT: CPT

## 2022-05-26 PROCEDURE — A9500 TC99M SESTAMIBI: HCPCS | Performed by: INTERNAL MEDICINE

## 2022-05-26 PROCEDURE — 78452 HT MUSCLE IMAGE SPECT MULT: CPT | Performed by: INTERNAL MEDICINE

## 2022-05-26 RX ADMIN — TECHNETIUM TC 99M SESTAMIBI 1 DOSE: 1 INJECTION INTRAVENOUS at 12:02

## 2022-05-26 RX ADMIN — TECHNETIUM TC 99M SESTAMIBI 1 DOSE: 1 INJECTION INTRAVENOUS at 10:26

## 2022-05-26 RX ADMIN — REGADENOSON 0.4 MG: 0.08 INJECTION, SOLUTION INTRAVENOUS at 12:02

## 2022-05-27 LAB
AORTIC DIMENSIONLESS INDEX: 0.68 (DI)
BH CV ECHO MEAS - ACS: 1.65 CM
BH CV ECHO MEAS - AO MAX PG: 4.2 MMHG
BH CV ECHO MEAS - AO MEAN PG: 2.7 MMHG
BH CV ECHO MEAS - AO ROOT DIAM: 2.8 CM
BH CV ECHO MEAS - AO V2 MAX: 102.4 CM/SEC
BH CV ECHO MEAS - AO V2 VTI: 27.4 CM
BH CV ECHO MEAS - EDV(CUBED): 83.2 ML
BH CV ECHO MEAS - EF(MOD-BP): 61 %
BH CV ECHO MEAS - ESV(CUBED): 25.1 ML
BH CV ECHO MEAS - FS: 32.9 %
BH CV ECHO MEAS - IVS/LVPW: 1.1 CM
BH CV ECHO MEAS - IVSD: 1.25 CM
BH CV ECHO MEAS - LA DIMENSION: 4.3 CM
BH CV ECHO MEAS - LAT PEAK E' VEL: 3.9 CM/SEC
BH CV ECHO MEAS - LV MASS(C)D: 188.4 GRAMS
BH CV ECHO MEAS - LV MAX PG: 1.95 MMHG
BH CV ECHO MEAS - LV MEAN PG: 0.86 MMHG
BH CV ECHO MEAS - LV V1 MAX: 69.8 CM/SEC
BH CV ECHO MEAS - LV V1 VTI: 16.1 CM
BH CV ECHO MEAS - LVIDD: 4.4 CM
BH CV ECHO MEAS - LVIDS: 2.9 CM
BH CV ECHO MEAS - LVPWD: 1.14 CM
BH CV ECHO MEAS - MED PEAK E' VEL: 4.6 CM/SEC
BH CV ECHO MEAS - MV A MAX VEL: 88.1 CM/SEC
BH CV ECHO MEAS - MV DEC SLOPE: 325.6 CM/SEC2
BH CV ECHO MEAS - MV DEC TIME: 0.37 MSEC
BH CV ECHO MEAS - MV E MAX VEL: 106 CM/SEC
BH CV ECHO MEAS - MV E/A: 1.2
BH CV ECHO MEAS - MV MAX PG: 4.9 MMHG
BH CV ECHO MEAS - MV MEAN PG: 2 MMHG
BH CV ECHO MEAS - MV P1/2T: 107.8 MSEC
BH CV ECHO MEAS - MV V2 VTI: 44.5 CM
BH CV ECHO MEAS - MVA(P1/2T): 2.04 CM2
BH CV ECHO MEAS - PA V2 MAX: 92.7 CM/SEC
BH CV ECHO MEAS - PI END-D VEL: 129.4 CM/SEC
BH CV ECHO MEAS - RV MAX PG: 1.44 MMHG
BH CV ECHO MEAS - RV V1 MAX: 60.1 CM/SEC
BH CV ECHO MEAS - RV V1 VTI: 12.6 CM
BH CV ECHO MEAS - RVDD: 3.1 CM
BH CV ECHO MEAS - TAPSE (>1.6): 1.7 CM
BH CV ECHO MEASUREMENTS AVERAGE E/E' RATIO: 24.94
BH CV REST NUCLEAR ISOTOPE DOSE: 10 MCI
BH CV STRESS COMMENTS STAGE 1: NORMAL
BH CV STRESS DOSE REGADENOSON STAGE 1: 0.4
BH CV STRESS DURATION MIN STAGE 1: 0
BH CV STRESS DURATION SEC STAGE 1: 10
BH CV STRESS NUCLEAR ISOTOPE DOSE: 30 MCI
BH CV STRESS PROTOCOL 1: NORMAL
BH CV STRESS RECOVERY BP: NORMAL MMHG
BH CV STRESS RECOVERY HR: 61 BPM
BH CV STRESS STAGE 1: 1
BH CV XLRA - TDI S': 12.7 CM/SEC
IVRT: 119 MSEC
LV EF NUC BP: 68 %
MAXIMAL PREDICTED HEART RATE: 150 BPM
MAXIMAL PREDICTED HEART RATE: 150 BPM
PERCENT MAX PREDICTED HR: 44.67 %
SINUS: 3.1 CM
STRESS BASELINE BP: NORMAL MMHG
STRESS BASELINE HR: 60 BPM
STRESS PERCENT HR: 53 %
STRESS POST PEAK BP: NORMAL MMHG
STRESS POST PEAK HR: 67 BPM
STRESS TARGET HR: 128 BPM
STRESS TARGET HR: 128 BPM

## 2022-06-22 ENCOUNTER — LAB (OUTPATIENT)
Dept: LAB | Facility: HOSPITAL | Age: 72
End: 2022-06-22

## 2022-06-22 ENCOUNTER — OFFICE VISIT (OUTPATIENT)
Dept: CARDIOLOGY | Facility: CLINIC | Age: 72
End: 2022-06-22

## 2022-06-22 DIAGNOSIS — I49.5 SSS (SICK SINUS SYNDROME): Primary | ICD-10-CM

## 2022-06-22 DIAGNOSIS — R06.02 SHORTNESS OF BREATH: ICD-10-CM

## 2022-06-22 DIAGNOSIS — E03.9 HYPOTHYROIDISM (ACQUIRED): ICD-10-CM

## 2022-06-22 DIAGNOSIS — I48.0 PAF (PAROXYSMAL ATRIAL FIBRILLATION): ICD-10-CM

## 2022-06-22 DIAGNOSIS — R53.83 OTHER FATIGUE: ICD-10-CM

## 2022-06-22 DIAGNOSIS — E78.00 HYPERCHOLESTEREMIA: ICD-10-CM

## 2022-06-22 DIAGNOSIS — R60.0 LOCALIZED EDEMA: ICD-10-CM

## 2022-06-22 LAB
ALBUMIN SERPL-MCNC: 4.02 G/DL (ref 3.5–5.2)
ALBUMIN/GLOB SERPL: 1.3 G/DL
ALP SERPL-CCNC: 163 U/L (ref 39–117)
ALT SERPL W P-5'-P-CCNC: 16 U/L (ref 1–33)
ANION GAP SERPL CALCULATED.3IONS-SCNC: 13.8 MMOL/L (ref 5–15)
AST SERPL-CCNC: 22 U/L (ref 1–32)
BILIRUB SERPL-MCNC: 0.2 MG/DL (ref 0–1.2)
BUN SERPL-MCNC: 22 MG/DL (ref 8–23)
BUN/CREAT SERPL: 23.4 (ref 7–25)
CALCIUM SPEC-SCNC: 9.8 MG/DL (ref 8.6–10.5)
CHLORIDE SERPL-SCNC: 104 MMOL/L (ref 98–107)
CHOLEST SERPL-MCNC: 161 MG/DL (ref 0–200)
CO2 SERPL-SCNC: 24.2 MMOL/L (ref 22–29)
CREAT SERPL-MCNC: 0.94 MG/DL (ref 0.57–1)
DEPRECATED RDW RBC AUTO: 50.8 FL (ref 37–54)
EGFRCR SERPLBLD CKD-EPI 2021: 65.4 ML/MIN/1.73
ERYTHROCYTE [DISTWIDTH] IN BLOOD BY AUTOMATED COUNT: 13.7 % (ref 12.3–15.4)
GLOBULIN UR ELPH-MCNC: 3.2 GM/DL
GLUCOSE SERPL-MCNC: 94 MG/DL (ref 65–99)
HCT VFR BLD AUTO: 36.1 % (ref 34–46.6)
HDLC SERPL-MCNC: 77 MG/DL (ref 40–60)
HGB BLD-MCNC: 11.9 G/DL (ref 12–15.9)
LDLC SERPL CALC-MCNC: 68 MG/DL (ref 0–100)
LDLC/HDLC SERPL: 0.86 {RATIO}
MCH RBC QN AUTO: 33.2 PG (ref 26.6–33)
MCHC RBC AUTO-ENTMCNC: 33 G/DL (ref 31.5–35.7)
MCV RBC AUTO: 100.8 FL (ref 79–97)
NT-PROBNP SERPL-MCNC: 362.3 PG/ML (ref 0–900)
PLATELET # BLD AUTO: 199 10*3/MM3 (ref 140–450)
PMV BLD AUTO: 9.9 FL (ref 6–12)
POTASSIUM SERPL-SCNC: 5.3 MMOL/L (ref 3.5–5.2)
PROT SERPL-MCNC: 7.2 G/DL (ref 6–8.5)
RBC # BLD AUTO: 3.58 10*6/MM3 (ref 3.77–5.28)
SODIUM SERPL-SCNC: 142 MMOL/L (ref 136–145)
TRIGL SERPL-MCNC: 90 MG/DL (ref 0–150)
TSH SERPL DL<=0.05 MIU/L-ACNC: 12.45 UIU/ML (ref 0.27–4.2)
VLDLC SERPL-MCNC: 16 MG/DL (ref 5–40)
WBC NRBC COR # BLD: 4.88 10*3/MM3 (ref 3.4–10.8)

## 2022-06-22 PROCEDURE — 85027 COMPLETE CBC AUTOMATED: CPT

## 2022-06-22 PROCEDURE — 93280 PM DEVICE PROGR EVAL DUAL: CPT | Performed by: INTERNAL MEDICINE

## 2022-06-22 PROCEDURE — 83880 ASSAY OF NATRIURETIC PEPTIDE: CPT

## 2022-06-22 PROCEDURE — 36415 COLL VENOUS BLD VENIPUNCTURE: CPT

## 2022-06-22 PROCEDURE — 84443 ASSAY THYROID STIM HORMONE: CPT

## 2022-06-22 PROCEDURE — 80053 COMPREHEN METABOLIC PANEL: CPT

## 2022-06-22 PROCEDURE — 80061 LIPID PANEL: CPT

## 2022-07-23 NOTE — PLAN OF CARE
Problem: Patient Care Overview (Adult)  Goal: Plan of Care Review  Outcome: Ongoing (interventions implemented as appropriate)    02/09/17 0305   Coping/Psychosocial Response Interventions   Plan Of Care Reviewed With patient   Patient Care Overview   Progress no change   Outcome Evaluation   Outcome Summary/Follow up Plan Pt. still asystole underneath temporary pacer wire\s. Awaiting plan to put in permanent pace make. Otherwise stable       Goal: Adult Individualization and Mutuality  Outcome: Ongoing (interventions implemented as appropriate)    02/07/17 0144   Individualization   Patient Specific Goals To get home as soon as possible         Problem: Cardiac Surgery (Adult)  Goal: Signs and Symptoms of Listed Potential Problems Will be Absent or Manageable (Cardiac Surgery)  Outcome: Ongoing (interventions implemented as appropriate)    02/09/17 0305   Cardiac Surgery   Problems Assessed (Cardiac Surgery) all   Problems Present (Cardiac Surgery) pain;dysrhythmia/arrhythmia         Problem: Pressure Ulcer Risk (Chris Scale) (Adult,Obstetrics,Pediatric)  Goal: Identify Related Risk Factors and Signs and Symptoms  Outcome: Ongoing (interventions implemented as appropriate)    02/09/17 0305   Pressure Ulcer Risk (Chris Scale)   Related Risk Factors (Pressure Ulcer Risk (Chris Scale)) critical care admission;hospitalization prolonged;length of surgery;mechanical forces;mobility impaired       Goal: Skin Integrity  Outcome: Ongoing (interventions implemented as appropriate)    02/09/17 0305   Pressure Ulcer Risk (Chris Scale) (Adult,Obstetrics,Pediatric)   Skin Integrity making progress toward outcome            No

## 2022-10-24 RX ORDER — ATORVASTATIN CALCIUM 20 MG/1
20 TABLET, FILM COATED ORAL DAILY
Qty: 30 TABLET | Refills: 2 | OUTPATIENT
Start: 2022-10-24

## 2022-11-14 ENCOUNTER — OFFICE VISIT (OUTPATIENT)
Dept: CARDIOLOGY | Facility: CLINIC | Age: 72
End: 2022-11-14

## 2022-11-14 VITALS
DIASTOLIC BLOOD PRESSURE: 78 MMHG | WEIGHT: 170.4 LBS | BODY MASS INDEX: 29.09 KG/M2 | SYSTOLIC BLOOD PRESSURE: 122 MMHG | HEIGHT: 64 IN | HEART RATE: 60 BPM

## 2022-11-14 DIAGNOSIS — R06.02 SHORTNESS OF BREATH: ICD-10-CM

## 2022-11-14 DIAGNOSIS — E78.00 HYPERCHOLESTEREMIA: ICD-10-CM

## 2022-11-14 DIAGNOSIS — I10 ESSENTIAL HYPERTENSION: ICD-10-CM

## 2022-11-14 DIAGNOSIS — R60.0 LOCALIZED EDEMA: ICD-10-CM

## 2022-11-14 DIAGNOSIS — Z95.3 S/P MITRAL VALVE REPLACEMENT WITH BIOPROSTHETIC VALVE: ICD-10-CM

## 2022-11-14 DIAGNOSIS — E66.3 OVERWEIGHT: ICD-10-CM

## 2022-11-14 DIAGNOSIS — Z95.1 S/P CABG X 1: ICD-10-CM

## 2022-11-14 DIAGNOSIS — I49.5 SSS (SICK SINUS SYNDROME): Primary | ICD-10-CM

## 2022-11-14 PROCEDURE — 99214 OFFICE O/P EST MOD 30 MIN: CPT | Performed by: NURSE PRACTITIONER

## 2022-11-14 PROCEDURE — 93000 ELECTROCARDIOGRAM COMPLETE: CPT | Performed by: NURSE PRACTITIONER

## 2022-11-14 RX ORDER — FUROSEMIDE 40 MG/1
40 TABLET ORAL DAILY
Qty: 90 TABLET | Refills: 2 | Status: SHIPPED | OUTPATIENT
Start: 2022-11-14 | End: 2022-12-21

## 2022-11-14 RX ORDER — ISOSORBIDE MONONITRATE 30 MG/1
30 TABLET, EXTENDED RELEASE ORAL NIGHTLY
Qty: 90 TABLET | Refills: 2 | Status: SHIPPED | OUTPATIENT
Start: 2022-11-14 | End: 2022-12-22

## 2022-11-14 NOTE — PROGRESS NOTES
Chief Complaint   Patient presents with   • Follow-up     Pt is here for cardiac follow up.  Pt states she does get SOB with activity.  She denies CP, dizziness or SOB.  Pt does take a daily ASA.   • Med Refill     Pt request 30 day [facility].  Medications were verified by med list.     • Lab Work     Pt states their last labs were last month with her PCP.         Cardiac Complaints  dyspnea      Subjective   Tiana Cronin is a 72 y.o. female with hypertension, hyperlipidemia, hypothyroidism, SSS, mitral valve stenosis s/p replacement, and seizure disorder with no seizure activity since her fifties. She developed increased fatigue and shortness of breath and had an abnormal EKG.  She was seen by another cardiologist noted to have significant mitral regurgitation that did not improve with medical management.  She was referred to Dr. Gaona and underwent mitral valve replacement with #31 magma tissue valve and also had a 1 vessel bypass.  Post-operatively, she developed junctional rhythm and a [employer] pacemaker was placed by Dr. Dang.  In June 2020, patient called the office concerned over development of chest pressure. Repeat stress and echo advised. On 7/2/20 reports showed anterolateral wall ischemia, stable valves and normal EF. Imdur added with plan for cath if symptoms persisted.  She then called in September 2020 with possible right shoulder repair and clearance was given for surgical clearance. In October 2020 visit, more imbalance and falls were reported, carotid US was recommended. US done 10/22/2020 then showed no significant stenosis bilaterally. Most recent stress and echo in 2022 negative for ischemia, good LV function, and no sig valve concerns noted. Most recent [employer] check 6/22/2022 showed normal function, 81% RA pacing, 56% RV pacing, and 3.5 years of battery life remaining.     She comes today for follow up and SOA with exertion noted, no worse than  prior. No CP, palpitations, dizziness, or syncope noted. Labs with PCP a month ago, no current available. Labs done summer 2022 with our office showed: GLU 94, BUN 22, Creatinine 0.94, Na 142, K 5.3, ALT 16, AST 22, TSH 12.450, LDL 68, HDL 77, TRIG 90, .3. Refills requested for 30 day supply.                Cardiac History  Past Surgical History:   Procedure Laterality Date   • CARDIAC CATHETERIZATION  01/11/2017    EF 55-60%, 50-60% stenosis of circ (small vessel), LAD 30%, severe MR   • CARDIAC ELECTROPHYSIOLOGY PROCEDURE N/A 02/10/2017    Dr. Dang. [place]   • CARDIOVASCULAR STRESS TEST  07/02/2020    Lexiscan- EF 67%. Anterolateral Ischemia.   • CARDIOVASCULAR STRESS TEST  05/26/2022    Lexiscan- EF 68%. Negative   • ECHO - CONVERTED  09/19/2017    EF 65%, mild LVH, no AS, normal functioning mitral valve, RVSP 29 mmHG   • ECHO - CONVERTED  07/02/2020    EF 65%. LA- 4.2 CM. MVR. MVA- 1.9 Cm2. Trace-Mild MR & AI. RVSP- 27 mmHg.   • ECHO - CONVERTED  05/26/2022    EF 65%. LA- 4.3.MVR. Trace MR   • MITRAL VALVE REPAIR/REPLACEMENT N/A 02/03/2017    MVR & One SVG graft       Current Outpatient Medications   Medication Sig Dispense Refill   • aspirin 81 MG EC tablet Take 81 mg by mouth Daily.     • atorvastatin (LIPITOR) 20 MG tablet Take 1 tablet by mouth Daily. 90 tablet 2   • Cholecalciferol (VITAMIN D3) 2000 UNITS tablet Take 2,000 Units by mouth Daily.     • citalopram (CeleXA) 20 MG tablet Take 20 mg by mouth Daily.     • ethosuximide (ZARONTIN) 250 MG capsule Take 500 mg by mouth 2 (Two) Times a Day.     • ferrous sulfate 325 (65 FE) MG tablet Take 325 mg by mouth Daily.  5   • furosemide (LASIX) 40 MG tablet Take 1 tablet by mouth Daily. 90 tablet 2   • isosorbide mononitrate (IMDUR) 30 MG 24 hr tablet Take 1 tablet by mouth Every Night. 90 tablet 2   • levothyroxine (SYNTHROID, LEVOTHROID) 200 MCG tablet Take 350 mcg by mouth Daily. Takes 225 mcg. Takes 1 1/2 tablets daily     • metoprolol tartrate  (LOPRESSOR) 25 MG tablet Take 0.5 tablets by mouth 2 (Two) Times a Day. 90 tablet 3   • multivitamin with minerals tablet tablet Take 1 tablet by mouth Daily.     • nitroglycerin (NITROSTAT) 0.4 MG SL tablet TAKE 1 UNDER THE TONGUE AS NEEDED FOR ANGINA, MAY REPEAT EVERY 5 MINUTESS FOR UP THREE DOSES 25 tablet 3   • potassium chloride 10 MEQ CR tablet Take 1 tablet by mouth Daily. 90 tablet 2   • primidone (MYSOLINE) 250 MG tablet Take 250 mg by mouth 3 (Three) Times a Day.     • TURMERIC PO Take 1,000 mg by mouth Daily.     • vitamin B-12 (CYANOCOBALAMIN) 1000 MCG tablet Take 1,000 mcg by mouth Daily.       No current facility-administered medications for this visit.     Facility-Administered Medications Ordered in Other Visits   Medication Dose Route Frequency Provider Last Rate Last Admin   • Chlorhexidine Gluconate Cloth 2 % pads 1 application  1 application Topical Q12H PRN Aubrie Aanya PA-C           Patient has no known allergies.    Past Medical History:   Diagnosis Date   • Anxiety    • Aortic regurgitation    • Arthritis     Hands and Knees   • CAD (S/P CABG x 1) 2/10/2017   • Depression    • History of surgery on arm    • Hyperlipidemia    • Hypertension    • Hypothyroidism    • Mitral regurgitation    • Mitral valve prolapse    • Seizures (HCC)     Several Years since last seizure ( last seizure cannot remember)    • Skin cancer, basal cell     basal cell skin cancer on face    • Wears dentures     full set    • Wears glasses     reading       Social History     Socioeconomic History   • Marital status:    • Number of children: 2   Tobacco Use   • Smoking status: Never   • Smokeless tobacco: Never   Vaping Use   • Vaping Use: Never used   Substance and Sexual Activity   • Alcohol use: No   • Drug use: No   • Sexual activity: Defer       Family History   Problem Relation Age of Onset   • Hypertension Mother    • Alzheimer's disease Mother    • Coronary artery disease Father    • Hypertension  "Father    • Diabetes Father    • Heart failure Father        Review of Systems   Constitutional: Negative for malaise/fatigue and night sweats.   Cardiovascular: Positive for dyspnea on exertion. Negative for chest pain, claudication, irregular heartbeat, leg swelling, near-syncope, orthopnea, palpitations and syncope.   Respiratory: Positive for shortness of breath. Negative for cough and wheezing.    Musculoskeletal: Positive for stiffness. Negative for back pain and joint pain.   Gastrointestinal: Negative for anorexia, heartburn, nausea and vomiting.   Genitourinary: Negative for dysuria, hematuria, hesitancy and nocturia.   Neurological: Negative for dizziness, headaches and light-headedness.   Psychiatric/Behavioral: Negative for depression and memory loss. The patient is not nervous/anxious.            Objective     /78 (BP Location: Left arm, Patient Position: Sitting)   Pulse 60   Ht 162.6 cm (64\")   Wt 77.3 kg (170 lb 6.4 oz)   BMI 29.25 kg/m²     Constitutional:       Appearance: Not in distress.   Eyes:      Pupils: Pupils are equal, round, and reactive to light.   HENT:      Nose: Nose normal.   Pulmonary:      Effort: Pulmonary effort is normal.      Breath sounds: Normal breath sounds.   Cardiovascular:      PMI at left midclavicular line. Normal rate. Regular rhythm.      Murmurs: There is a systolic murmur.   Abdominal:      Palpations: Abdomen is soft.   Musculoskeletal: Normal range of motion.      Cervical back: Normal range of motion and neck supple. Skin:     General: Skin is warm.   Neurological:      Mental Status: Alert.           ECG 12 Lead    Date/Time: 11/14/2022 10:43 AM  Performed by: Marylou Richardson APRN  Authorized by: Marylou Richardson APRN   Comparison: compared with previous ECG from 5/12/2022  Similar to previous ECG  Rhythm: paced  BPM: 60    Clinical impression: abnormal EKG  Comments: Normal QT                 Diagnoses and all orders for this visit:    1. SSS " (sick sinus syndrome) (HCC) (Primary)  -     ECG 12 Lead    2. S/P CABG x 1    3. S/P mitral valve replacement with bioprosthetic valve    4. Essential hypertension    5. Hypercholesteremia    6. Localized edema    7. Shortness of breath    8. Overweight    Other orders  -     isosorbide mononitrate (IMDUR) 30 MG 24 hr tablet; Take 1 tablet by mouth Every Night.  Dispense: 90 tablet; Refill: 2  -     metoprolol tartrate (LOPRESSOR) 25 MG tablet; Take 0.5 tablets by mouth 2 (Two) Times a Day.  Dispense: 90 tablet; Refill: 3  -     furosemide (LASIX) 40 MG tablet; Take 1 tablet by mouth Daily.  Dispense: 90 tablet; Refill: 2             SSS:  EKG done today for pacemaker and SSS showed atrial paced rhythm. Most recent pacer check from June 2022 looked good, function normal as well as battery life (3.5 years). Repeat check advised.      IHD/MVR: Most recent echo showed MVA stable, EF normal. No ischemia noted on stress. Results discussed. No new or worsening concerns voiced, no repeat workup advised.      HTN:  Blood pressure stable. Lopressor therapy will be continued at current. Limited sodium intake advised.     Edema:  On lasix therapy, now only taking 20mg. She was urged to continue with 1/2 tablet, may use extra 1/2 if needed for edema.       Hyperlipidemia:  On statin therapy with lipitor.  Labs from June showed lipids at goal. Limited carb diet urged.      BMI noted at 29.25, good cardiac diet with limited caloric intake, carbs, and activity as tolerated advised.       Refills per request.     6 month follow up recommended or sooner if needed.         Problems Addressed this Visit        Cardiac and Vasculature    SSS (sick sinus syndrome) ([facility]) - Primary    Relevant Medications    isosorbide mononitrate (IMDUR) 30 MG 24 hr tablet    metoprolol tartrate (LOPRESSOR) 25 MG tablet    Other Relevant Orders    ECG 12 Lead    S/P CABG x 1    S/P mitral valve replacement with bioprosthetic valve    Essential  hypertension    Relevant Medications    metoprolol tartrate (LOPRESSOR) 25 MG tablet    furosemide (LASIX) 40 MG tablet    Hypercholesteremia       Symptoms and Signs    Localized edema   Other Visit Diagnoses     Shortness of breath        Overweight          Diagnoses       Codes Comments    SSS (sick sinus syndrome) (HCC)    -  Primary ICD-10-CM: I49.5  ICD-9-CM: 427.81     S/P CABG x 1     ICD-10-CM: Z95.1  ICD-9-CM: V45.81     S/P mitral valve replacement with bioprosthetic valve     ICD-10-CM: Z95.3  ICD-9-CM: V42.2     Essential hypertension     ICD-10-CM: I10  ICD-9-CM: 401.9     Hypercholesteremia     ICD-10-CM: E78.00  ICD-9-CM: 272.0     Localized edema     ICD-10-CM: R60.0  ICD-9-CM: 782.3     Shortness of breath     ICD-10-CM: R06.02  ICD-9-CM: 786.05     Overweight     ICD-10-CM: E66.3  ICD-9-CM: 278.02                 Electronically signed by Marylou Richardson, APRN November 14, 2022 11:43 EST

## 2022-12-21 RX ORDER — POTASSIUM CHLORIDE 750 MG/1
TABLET, FILM COATED, EXTENDED RELEASE ORAL
Qty: 30 TABLET | Refills: 2 | Status: SHIPPED | OUTPATIENT
Start: 2022-12-21

## 2022-12-21 RX ORDER — FUROSEMIDE 40 MG/1
TABLET ORAL
Qty: 30 TABLET | Refills: 2 | Status: SHIPPED | OUTPATIENT
Start: 2022-12-21

## 2022-12-21 RX ORDER — ATORVASTATIN CALCIUM 20 MG/1
20 TABLET, FILM COATED ORAL DAILY
Qty: 30 TABLET | Refills: 2 | Status: SHIPPED | OUTPATIENT
Start: 2022-12-21

## 2022-12-22 RX ORDER — ISOSORBIDE MONONITRATE 30 MG/1
TABLET, EXTENDED RELEASE ORAL
Qty: 30 TABLET | Refills: 2 | Status: SHIPPED | OUTPATIENT
Start: 2022-12-22

## 2023-03-22 ENCOUNTER — TELEPHONE (OUTPATIENT)
Dept: CARDIOLOGY | Facility: CLINIC | Age: 73
End: 2023-03-22
Payer: MEDICARE

## 2023-03-22 ENCOUNTER — OFFICE VISIT (OUTPATIENT)
Dept: CARDIOLOGY | Facility: CLINIC | Age: 73
End: 2023-03-22
Payer: MEDICARE

## 2023-03-22 DIAGNOSIS — I49.5 SSS (SICK SINUS SYNDROME): Primary | ICD-10-CM

## 2023-03-22 DIAGNOSIS — I48.0 PAF (PAROXYSMAL ATRIAL FIBRILLATION): ICD-10-CM

## 2023-03-22 PROCEDURE — 93288 INTERROG EVL PM/LDLS PM IP: CPT | Performed by: INTERNAL MEDICINE

## 2023-03-22 NOTE — TELEPHONE ENCOUNTER
Please let her know, pacer check showed possible afib for 26 hours she does have a history of PAF and given her valve replacement, she is not a candidate for eliquis or xarelto. At one time, she was on coumadin INR followed by PCP. It ws discontinued by Dr. Gaona in 2017, secondary to anemia. Discussed with Dr. Hoyt, given both her history of marked anemia and fall risk, he feels she is a good candidate for watchman. Will discuss with EP on Friday.

## 2023-03-22 NOTE — TELEPHONE ENCOUNTER
Patient in agreement to participate in remote monitoring. Patient has monitor, has been enrolled.

## 2023-03-24 ENCOUNTER — TELEPHONE (OUTPATIENT)
Dept: CARDIOLOGY | Facility: CLINIC | Age: 73
End: 2023-03-24
Payer: MEDICARE

## 2023-03-24 NOTE — TELEPHONE ENCOUNTER
Dr. Hoyt talked to EP today. Dr. Sim said if she would like, we can make an appointment with him to discuss watchman and possible treatment options/and or trial of anticoagulation option(s). Please let Tiana know, I do think this is a very good idea, so she can see him in [place] and ask him any questions/concerns.

## 2023-03-27 ENCOUNTER — TELEPHONE (OUTPATIENT)
Dept: CARDIOLOGY | Facility: CLINIC | Age: 73
End: 2023-03-27
Payer: MEDICARE

## 2023-03-27 DIAGNOSIS — Z86.2 HISTORY OF ANEMIA: ICD-10-CM

## 2023-03-27 DIAGNOSIS — I48.0 PAF (PAROXYSMAL ATRIAL FIBRILLATION): Primary | ICD-10-CM

## 2023-03-27 DIAGNOSIS — Z95.2 S/P MVR (MITRAL VALVE REPLACEMENT): ICD-10-CM

## 2023-03-30 ENCOUNTER — TELEPHONE (OUTPATIENT)
Dept: CARDIOLOGY | Facility: CLINIC | Age: 73
End: 2023-03-30
Payer: MEDICARE

## 2023-03-30 NOTE — TELEPHONE ENCOUNTER
Spoke with patient and made her aware that Dr. Sim's office has not made appointment yet, but it does look like they have tried to contact. She was made aware that if she sees an [phone] number come up on her phone to answer. Patient was made aware that the first time she sees Dr. Sim that he will be talking with her about her options for procedures, but she will not be having procedures that day.

## 2023-03-30 NOTE — TELEPHONE ENCOUNTER
Caller: Tiana Cronin    Relationship: Self    Best call back number: [phone]    What is the best time to reach you: AFTER 1:30 PM     Who are you requesting to speak with (clinical staff, provider,  specific staff member): SAQIB     Do you know the name of the person who called: SAQIB    What was the call regarding: PT REPORTED SAQIB CALLED HER AND TOLD HER SHE WOULD HAVE A PROCEDURE THIS Friday - HOWEVER PT WAS NOT GIVEN DOCTORS NAME, A TIME, OR WHERE TO GO - HUB SAW NO INFORMATION ON THIS AND PT WOULD LIKE A CALL BACK TO CONFIRM AS SHE HAS TO GET A RIDE TO GO TO APPT    Do you require a callback: YES

## 2023-05-18 ENCOUNTER — TELEPHONE (OUTPATIENT)
Dept: CARDIOLOGY | Facility: CLINIC | Age: 73
End: 2023-05-18
Payer: MEDICARE

## 2023-05-18 ENCOUNTER — OFFICE VISIT (OUTPATIENT)
Dept: CARDIOLOGY | Facility: CLINIC | Age: 73
End: 2023-05-18
Payer: MEDICARE

## 2023-05-18 VITALS
BODY MASS INDEX: 28 KG/M2 | DIASTOLIC BLOOD PRESSURE: 64 MMHG | WEIGHT: 164 LBS | HEART RATE: 60 BPM | HEIGHT: 64 IN | SYSTOLIC BLOOD PRESSURE: 118 MMHG

## 2023-05-18 DIAGNOSIS — Z95.3 S/P MITRAL VALVE REPLACEMENT WITH BIOPROSTHETIC VALVE: ICD-10-CM

## 2023-05-18 DIAGNOSIS — I49.5 SSS (SICK SINUS SYNDROME): Primary | ICD-10-CM

## 2023-05-18 DIAGNOSIS — E78.00 HYPERCHOLESTEREMIA: ICD-10-CM

## 2023-05-18 DIAGNOSIS — I48.0 PAF (PAROXYSMAL ATRIAL FIBRILLATION): ICD-10-CM

## 2023-05-18 DIAGNOSIS — Z95.1 S/P CABG X 1: ICD-10-CM

## 2023-05-18 DIAGNOSIS — R60.0 LOCALIZED EDEMA: ICD-10-CM

## 2023-05-18 DIAGNOSIS — R53.83 OTHER FATIGUE: ICD-10-CM

## 2023-05-18 DIAGNOSIS — E66.3 OVERWEIGHT: ICD-10-CM

## 2023-05-18 DIAGNOSIS — I10 ESSENTIAL HYPERTENSION: ICD-10-CM

## 2023-05-18 RX ORDER — CELECOXIB 200 MG/1
200 CAPSULE ORAL DAILY
COMMUNITY

## 2023-05-18 RX ORDER — FUROSEMIDE 40 MG/1
40 TABLET ORAL DAILY
Qty: 90 TABLET | Refills: 2 | Status: SHIPPED | OUTPATIENT
Start: 2023-05-18

## 2023-05-18 RX ORDER — ISOSORBIDE MONONITRATE 30 MG/1
30 TABLET, EXTENDED RELEASE ORAL NIGHTLY
Qty: 90 TABLET | Refills: 2 | Status: SHIPPED | OUTPATIENT
Start: 2023-05-18

## 2023-05-18 RX ORDER — ATORVASTATIN CALCIUM 20 MG/1
20 TABLET, FILM COATED ORAL DAILY
Qty: 90 TABLET | Refills: 2 | Status: SHIPPED | OUTPATIENT
Start: 2023-05-18

## 2023-05-18 RX ORDER — LEVOTHYROXINE SODIUM 0.03 MG/1
25 TABLET ORAL
COMMUNITY

## 2023-05-18 RX ORDER — POTASSIUM CHLORIDE 750 MG/1
10 TABLET, FILM COATED, EXTENDED RELEASE ORAL DAILY
Qty: 90 TABLET | Refills: 2 | Status: SHIPPED | OUTPATIENT
Start: 2023-05-18

## 2023-05-18 RX ORDER — ANTIOX #8/OM3/DHA/EPA/LUT/ZEAX 250-2.5 MG
1 CAPSULE ORAL 2 TIMES DAILY
COMMUNITY

## 2023-05-18 NOTE — PROGRESS NOTES
Chief Complaint   Patient presents with   • Follow-up     Pt is here for cardiac follow up.  Pt states that she has been fatiguing very easily.  She also get SOB with activity.  She is questioning if she may have SHOSHANA, denies ever being tested.  She denies CP, dizziness or palpitations.  Pt does take a daily ASA.     • Med Refill     Pt request 30 day refills to be sent to the [facility].  Medications were verified by med list     • Lab Work     Pt states their last labs were within the past three months with her PCP.         Cardiac Complaints  dyspnea and fatigue      Subjective   Tiana Cronin is a 72 y.o. female with hypertension, hyperlipidemia, hypothyroidism, SSS, mitral valve stenosis s/p replacement, and seizure disorder with no seizure activity since her fifties. She developed increased fatigue and shortness of breath and had an abnormal EKG.  She was seen by another cardiologist noted to have significant mitral regurgitation that did not improve with medical management.  She was referred to Dr. Gaona and underwent mitral valve replacement with #31 magma tissue valve and also had a 1 vessel bypass.  Post-operatively, she developed junctional rhythm and a [employer] pacemaker was placed by Dr. Dang.  In June 2020, patient called the office concerned over development of chest pressure. Repeat stress and echo advised. On 7/2/20 reports showed anterolateral wall ischemia, stable valves and normal EF. Imdur added with plan for cath if symptoms persisted.  She then called in September 2020 with possible right shoulder repair and clearance was given for surgical clearance. In October 2020 visit, more imbalance and falls were reported, carotid US was recommended. US done 10/22/2020 then showed no significant stenosis bilaterally. Most recent stress and echo in 2022 negative for ischemia, good LV function, and no sig valve concerns noted. Most recent pacer check 03/2023 showed 68% atrial pacing, 47%  ventricular pacing and 2.5 years of battery life.    She comes today for follow up and reports continued fatigue and SOA which are both chronic complaints. She does report her TSH was high after last labs, she admits changes were made. She denies any CP, dizziness, palpitations, or syncope. Labs are followed by PCP, checked at least 3 months ago.  Refills requested for 30 day supply.        Cardiac History  Past Surgical History:   Procedure Laterality Date   • CARDIAC CATHETERIZATION  01/11/2017    EF 55-60%, 50-60% stenosis of circ (small vessel), LAD 30%, severe MR   • CARDIAC ELECTROPHYSIOLOGY PROCEDURE N/A 02/10/2017    Dr. Dang. [place]   • CARDIOVASCULAR STRESS TEST  07/02/2020    Lexiscan- EF 67%. Anterolateral Ischemia.   • CARDIOVASCULAR STRESS TEST  05/26/2022    Lexiscan- EF 68%. Negative   • ECHO - CONVERTED  09/19/2017    EF 65%, mild LVH, no AS, normal functioning mitral valve, RVSP 29 mmHG   • ECHO - CONVERTED  07/02/2020    EF 65%. LA- 4.2 CM. MVR. MVA- 1.9 Cm2. Trace-Mild MR & AI. RVSP- 27 mmHg.   • ECHO - CONVERTED  05/26/2022    EF 65%. LA- 4.3.MVR. Trace MR   • MITRAL VALVE REPAIR/REPLACEMENT N/A 02/03/2017    MVR & One SVG graft       Current Outpatient Medications   Medication Sig Dispense Refill   • aspirin 81 MG EC tablet Take 1 tablet by mouth Daily.     • atorvastatin (LIPITOR) 20 MG tablet Take 1 tablet by mouth Daily. 90 tablet 2   • celecoxib (CeleBREX) 200 MG capsule Take 1 capsule by mouth Daily.     • Cholecalciferol (VITAMIN D3) 2000 UNITS tablet Take 1 tablet by mouth Daily.     • citalopram (CeleXA) 20 MG tablet Take 1 tablet by mouth Daily.     • ethosuximide (ZARONTIN) 250 MG capsule Take 2 capsules by mouth Daily.     • ferrous sulfate 325 (65 FE) MG tablet Take 1 tablet by mouth Daily.  5   • furosemide (LASIX) 40 MG tablet Take 1 tablet by mouth Daily. 90 tablet 2   • isosorbide mononitrate (IMDUR) 30 MG 24 hr tablet Take 1 tablet by mouth Every Night. 90 tablet 2   •  levothyroxine (SYNTHROID, LEVOTHROID) 200 MCG tablet Take 1 tablet by mouth Daily.     • levothyroxine (SYNTHROID, LEVOTHROID) 25 MCG tablet Take 1 tablet by mouth Every Morning.     • metoprolol tartrate (LOPRESSOR) 25 MG tablet Take 0.5 tablets by mouth 2 (Two) Times a Day. 90 tablet 3   • multivitamin with minerals tablet tablet Take 1 tablet by mouth Daily.     • multivitamins-minerals (PRESERVISION AREDS 2) capsule capsule Take 1 capsule by mouth 2 (Two) Times a Day.     • nitroglycerin (NITROSTAT) 0.4 MG SL tablet TAKE 1 UNDER THE TONGUE AS NEEDED FOR ANGINA, MAY REPEAT EVERY 5 MINUTESS FOR UP THREE DOSES 25 tablet 3   • potassium chloride 10 MEQ CR tablet Take 1 tablet by mouth Daily. 90 tablet 2   • primidone (MYSOLINE) 250 MG tablet Take 1 tablet by mouth 3 (Three) Times a Day.     • vitamin B-12 (CYANOCOBALAMIN) 1000 MCG tablet Take 1 tablet by mouth Daily.       No current facility-administered medications for this visit.     Facility-Administered Medications Ordered in Other Visits   Medication Dose Route Frequency Provider Last Rate Last Admin   • Chlorhexidine Gluconate Cloth 2 % pads 1 application  1 application Topical Q12H PRN Aubrie Anaya PA-C           Patient has no known allergies.    Past Medical History:   Diagnosis Date   • Anxiety    • Aortic regurgitation    • Arthritis     Hands and Knees   • CAD (S/P CABG x 1) 2/10/2017   • Depression    • History of surgery on arm    • Hyperlipidemia    • Hypertension    • Hypothyroidism    • Mitral regurgitation    • Mitral valve prolapse    • Seizures     Several Years since last seizure ( last seizure cannot remember)    • Skin cancer, basal cell     basal cell skin cancer on face    • Wears dentures     full set    • Wears glasses     reading       Social History     Socioeconomic History   • Marital status:    • Number of children: 2   Tobacco Use   • Smoking status: Never   • Smokeless tobacco: Never   Vaping Use   • Vaping Use: Never  "used   Substance and Sexual Activity   • Alcohol use: No   • Drug use: No   • Sexual activity: Defer       Family History   Problem Relation Age of Onset   • Hypertension Mother    • Alzheimer's disease Mother    • Coronary artery disease Father    • Hypertension Father    • Diabetes Father    • Heart failure Father        Review of Systems   Constitutional: Positive for malaise/fatigue. Negative for night sweats.   Cardiovascular: Positive for dyspnea on exertion. Negative for chest pain, claudication, irregular heartbeat, leg swelling, near-syncope, orthopnea, palpitations and syncope.   Respiratory: Positive for shortness of breath. Negative for cough and wheezing.    Musculoskeletal: Positive for stiffness. Negative for back pain and joint pain.   Gastrointestinal: Negative for anorexia, heartburn and nausea.   Genitourinary: Negative for dysuria, hematuria, hesitancy and nocturia.   Neurological: Positive for excessive daytime sleepiness. Negative for dizziness, headaches and light-headedness.   Psychiatric/Behavioral: Negative for depression and memory loss. The patient is not nervous/anxious.            Objective     /64 (BP Location: Left arm, Patient Position: Sitting)   Pulse 60   Ht 162.6 cm (64\")   Wt 74.4 kg (164 lb)   BMI 28.15 kg/m²     Constitutional:       Appearance: Frail.   Eyes:      Pupils: Pupils are equal, round, and reactive to light.   HENT:      Nose: Nose normal.   Pulmonary:      Effort: Pulmonary effort is normal.      Breath sounds: Normal breath sounds.   Cardiovascular:      PMI at left midclavicular line. Normal rate. Regular rhythm.      Murmurs: There is a systolic murmur.   Abdominal:      Palpations: Abdomen is soft.   Musculoskeletal: Normal range of motion.      Cervical back: Normal range of motion and neck supple. Skin:     General: Skin is warm and dry.      Coloration: Skin is pale.   Neurological:      Mental Status: Alert.           ECG 12 Lead    Date/Time: " 5/18/2023 10:13 AM  Performed by: Marylou Richardson APRN  Authorized by: Marylou Richardson APRN   Comparison: compared with previous ECG from 11/14/2022  Similar to previous ECG  Rhythm: paced  BPM: 60  Conduction: 1st degree AV block    Clinical impression: normal ECG                 Diagnoses and all orders for this visit:    1. SSS (sick sinus syndrome) (Primary)    2. PAF (paroxysmal atrial fibrillation)  -     ECG 12 Lead    3. Essential hypertension    4. Hypercholesteremia    5. S/P mitral valve replacement with bioprosthetic valve    6. S/P CABG x 1    7. Other fatigue  -     Overnight Sleep Oximetry Study; Future    8. Localized edema    9. Overweight    Other orders  -     furosemide (LASIX) 40 MG tablet; Take 1 tablet by mouth Daily.  Dispense: 90 tablet; Refill: 2  -     isosorbide mononitrate (IMDUR) 30 MG 24 hr tablet; Take 1 tablet by mouth Every Night.  Dispense: 90 tablet; Refill: 2  -     metoprolol tartrate (LOPRESSOR) 25 MG tablet; Take 0.5 tablets by mouth 2 (Two) Times a Day.  Dispense: 90 tablet; Refill: 3  -     potassium chloride 10 MEQ CR tablet; Take 1 tablet by mouth Daily.  Dispense: 90 tablet; Refill: 2  -     atorvastatin (LIPITOR) 20 MG tablet; Take 1 tablet by mouth Daily.  Dispense: 90 tablet; Refill: 2             SSS/PAF:  EKG done today for pacemaker and SSS showed atrial paced rhythm. Most recent pacer check March 2023 showed 30 hours of afib, normal function, ad adequate battery life. She was once on coumadin therapy for the same, but had to come off for bleed marked anemia. Discussed NOAC with Dr. Hoyt, but given fall risk and past marked anemia, it was decided to refer for [facility]. She will see Dr. Sim 6/23/2023, TIA symptoms denied.      IHD/MVR: Most recent echo showed MVA stable, EF normal. No ischemia noted on stress.  No worsening cardiac concerns voiced, no repeat workup advised.      HTN:  Blood pressure stable. Lopressor therapy will be continued at current.  Limited sodium intake advised.     Edema:  On lasix therapy, edema well managed. Limited sodium urged.     Hyperlipidemia:  On statin therapy with lipitor.  Labs including FLP with you. Can we have for review?    SOA/fatigue: Order for overnight oximetry urged. She also reports TSH elevated at last lab draw, synthroid dosing adjusted. She will discuss further adjustment with you.     BMI noted at 28.15, good cardiac diet with limited caloric intake, carbs, and activity as tolerated advised.       Refills per request.     6 month follow up recommended or sooner if needed.            Problems Addressed this Visit        Cardiac and Vasculature    SSS (sick sinus syndrome) - Primary    Relevant Medications    isosorbide mononitrate (IMDUR) 30 MG 24 hr tablet    metoprolol tartrate (LOPRESSOR) 25 MG tablet    S/P CABG x 1    S/P mitral valve replacement with bioprosthetic valve    Essential hypertension    Relevant Medications    furosemide (LASIX) 40 MG tablet    metoprolol tartrate (LOPRESSOR) 25 MG tablet    Hypercholesteremia    Relevant Medications    atorvastatin (LIPITOR) 20 MG tablet    PAF (paroxysmal atrial fibrillation)    Relevant Medications    isosorbide mononitrate (IMDUR) 30 MG 24 hr tablet    metoprolol tartrate (LOPRESSOR) 25 MG tablet    Other Relevant Orders    ECG 12 Lead       Symptoms and Signs    Localized edema   Other Visit Diagnoses     Other fatigue        Relevant Orders    Overnight Sleep Oximetry Study    Overweight          Diagnoses       Codes Comments    SSS (sick sinus syndrome)    -  Primary ICD-10-CM: I49.5  ICD-9-CM: 427.81     PAF (paroxysmal atrial fibrillation)     ICD-10-CM: I48.0  ICD-9-CM: 427.31     Essential hypertension     ICD-10-CM: I10  ICD-9-CM: 401.9     Hypercholesteremia     ICD-10-CM: E78.00  ICD-9-CM: 272.0     S/P mitral valve replacement with bioprosthetic valve     ICD-10-CM: Z95.3  ICD-9-CM: V42.2     S/P CABG x 1     ICD-10-CM: Z95.1  ICD-9-CM: V45.81      Other fatigue     ICD-10-CM: R53.83  ICD-9-CM: 780.79     Localized edema     ICD-10-CM: R60.0  ICD-9-CM: 782.3     Overweight     ICD-10-CM: E66.3  ICD-9-CM: 278.02                 Electronically signed by Marylou Richardson, APRN May 18, 2023 11:35 EDT

## 2023-05-18 NOTE — TELEPHONE ENCOUNTER
Called patient in regards to remote monitoring. Patient was seen in office today and reported to staff that she had not received her remote monitor. I attempted to contact patient. No answer and her voicemail is full. I went on and ordered another monitor and cell adapter through [employer]. Confirmed address.

## 2023-05-19 ENCOUNTER — TELEPHONE (OUTPATIENT)
Dept: CARDIOLOGY | Facility: CLINIC | Age: 73
End: 2023-05-19
Payer: MEDICARE

## 2023-05-19 DIAGNOSIS — G47.34 NOCTURNAL OXYGEN DESATURATION: Primary | ICD-10-CM

## 2023-05-19 NOTE — PROGRESS NOTES
Would she be willing to have a sleep study? Overnight is abnormal. Lowest oxygen at 70%. I can refer to pulmonary

## 2023-06-22 PROBLEM — I46.9 ASYSTOLE: Status: ACTIVE | Noted: 2017-03-10

## 2023-06-22 PROBLEM — G40.909 SEIZURE DISORDER: Status: ACTIVE | Noted: 2023-06-22

## 2023-06-22 PROBLEM — Z95.1 S/P CABG X 1: Status: RESOLVED | Noted: 2017-03-10 | Resolved: 2023-06-22

## 2023-06-22 PROBLEM — E03.9 HYPOTHYROID: Status: ACTIVE | Noted: 2023-06-22

## 2023-06-22 PROBLEM — R60.0 LOCALIZED EDEMA: Status: RESOLVED | Noted: 2017-03-10 | Resolved: 2023-06-22

## 2023-07-26 RX ORDER — FUROSEMIDE 40 MG/1
40 TABLET ORAL DAILY
Qty: 30 TABLET | Refills: 2 | OUTPATIENT
Start: 2023-07-26

## 2023-07-26 NOTE — PLAN OF CARE
CCM Interim Update    Face to face with PCP. Had AWV 7/13/23. Blood pressure in office 142/70. No med changes made. She is not checking her glucose or b/p routinely. Labs have been ordered and are still pending. I will send [employer] message to remind her. She will be instructed to contact office via [employer] for diabetic f/u appt 01/24.         Name and Relationship of Patient/Support Person: Charis Hill - Self        Education Documentation  No documentation found.        Sachi GARCIA  Ambulatory Case Management    7/26/2023, 14:29 EDT   Problem: Patient Care Overview (Adult)  Goal: Plan of Care Review  Outcome: Ongoing (interventions implemented as appropriate)    02/07/17 1336   Coping/Psychosocial Response Interventions   Plan Of Care Reviewed With patient   Patient Care Overview   Progress progress toward functional goals is gradual   Outcome Evaluation   Outcome Summary/Follow up Plan patient unable to make progress with activitty due to patient having asystole as an underlying rhythm and requires external pacing. Possibly will need a pacemaker implant. we will continue to follow for strengthening and mobility training as tolerated         Problem: Inpatient Physical Therapy  Goal: Bed Mobility Goal LTG- PT  Outcome: Ongoing (interventions implemented as appropriate)    02/06/17 0943 02/07/17 1336   Bed Mobility PT LTG   Bed Mobility PT LTG, Date Established 02/06/17 --    Bed Mobility PT LTG, Time to Achieve 2 wks --    Bed Mobility PT LTG, Activity Type roll left/roll right;supine to sit/sit to supine --    Bed Mobility PT LTG, [place] Level contact guard assist --    Bed Mobility PT Goal LTG, Assist Device bed rails --    Bed Mobility PT LTG, Outcome --  goal ongoing       Goal: Transfer Training Goal 1 LTG- PT  Outcome: Ongoing (interventions implemented as appropriate)    02/06/17 0943 02/07/17 1336   Transfer Training PT LTG   Transfer Training PT LTG, Date Established 02/06/17 --    Transfer Training PT LTG, Time to Achieve 2 wks --    Transfer Training PT LTG, Activity Type bed to chair /chair to bed;sit to stand/stand to sit --    Transfer Training PT LTG, [place] Level contact guard assist --    Transfer Training PT LTG, Assist Device walker, rolling --    Transfer Training PT LTG, Outcome --  goal ongoing       Goal: Gait Training Goal LTG- PT  Outcome: Ongoing (interventions implemented as appropriate)    02/06/17 0943 02/07/17 1336   Gait Training PT LTG   Gait Training Goal PT LTG, Date Established 02/06/17 --    Gait  Training Goal PT LTG, Time to Achieve 2 wks --    Gait Training Goal PT LTG, [place] Level contact guard assist --    Gait Training Goal PT LTG, Assist Device walker, rolling --    Gait Training Goal PT LTG, Distance to Achieve 200 --    Gait Training Goal PT LTG, Outcome --  goal ongoing       Goal: Static Sitting Balance Goal LTG- PT  Outcome: Ongoing (interventions implemented as appropriate)    02/06/17 0943 02/07/17 1336   Static Sitting Balance PT LTG   Static Sitting Balance PT LTG, Date Established 02/06/17 --    Static Sitting Balance PT LTG, Time to Achieve 2 wks --    Static Sitting Balance PT LTG, [place] Level conditional independence --    Static Sitting Balance PT LTG, Outcome --  goal ongoing

## 2023-08-03 ENCOUNTER — OFFICE VISIT (OUTPATIENT)
Dept: PULMONOLOGY | Facility: CLINIC | Age: 73
End: 2023-08-03
Payer: MEDICARE

## 2023-08-03 VITALS
BODY MASS INDEX: 27.66 KG/M2 | OXYGEN SATURATION: 95 % | WEIGHT: 162 LBS | HEART RATE: 60 BPM | HEIGHT: 64 IN | DIASTOLIC BLOOD PRESSURE: 61 MMHG | SYSTOLIC BLOOD PRESSURE: 109 MMHG

## 2023-08-03 DIAGNOSIS — G47.19 DAYTIME HYPERSOMNOLENCE: ICD-10-CM

## 2023-08-03 DIAGNOSIS — G47.34 NOCTURNAL HYPOXIA: Primary | ICD-10-CM

## 2023-08-03 RX ORDER — BUMETANIDE 0.5 MG/1
TABLET ORAL
COMMUNITY
Start: 2023-07-11

## 2023-08-03 RX ORDER — LEVOTHYROXINE SODIUM 300 UG/1
300 TABLET ORAL EVERY MORNING
COMMUNITY
Start: 2023-07-20

## 2023-08-03 NOTE — PROGRESS NOTES
New Pulmonary Patient Office Visit      Patient Name: Tiana Cronin    Referring Physician: Marylou Richardson APRN    Chief Complaint:    Chief Complaint   Patient presents with    Sleeping Problem     New patient -Nocturnal oxygen desaturation       History of Present Illness: Tiana Cronin is a 72 y.o. female who is here today to establish care with Pulmonary for nocturnal hypoxia per recent overnight pulse oximetry study. The patient does endorse daytime fatigue, snoring, and non restorative sleep. Has never been tested for sleep apnea. No known hx of lung disease.     Supplemental Oxygen: 2L qhs    Subjective      Review of Systems:   Review of Systems   Constitutional:  Positive for fatigue. Negative for fever and unexpected weight change.   Respiratory:  Negative for cough, shortness of breath and wheezing.    Cardiovascular:  Positive for leg swelling. Negative for chest pain.   Psychiatric/Behavioral:  Positive for sleep disturbance.       Past Medical History:   Past Medical History:   Diagnosis Date    Anxiety     Aortic regurgitation     Arthritis     Hands and Knees    CAD (S/P CABG x 1) 2/10/2017    Depression     History of surgery on arm     Hyperlipidemia     Hypertension     Hypothyroidism     Mitral regurgitation     Mitral valve prolapse     Seizures     Several Years since last seizure ( last seizure cannot remember)     Skin cancer, basal cell     basal cell skin cancer on face     Wears dentures     full set     Wears glasses     reading       Past Surgical History:   Past Surgical History:   Procedure Laterality Date    CARDIAC CATHETERIZATION  01/11/2017    EF 55-60%, 50-60% stenosis of circ (small vessel), LAD 30%, severe MR    CARDIAC ELECTROPHYSIOLOGY PROCEDURE N/A 02/10/2017    Dr. Dang. [place]    CARDIOVASCULAR STRESS TEST  07/02/2020    Lexiscan- EF 67%. Anterolateral Ischemia.    CARDIOVASCULAR STRESS TEST  05/26/2022    Lexiscan- EF 68%. Negative    ECHO - CONVERTED   09/19/2017    EF 65%, mild LVH, no AS, normal functioning mitral valve, RVSP 29 mmHG    ECHO - CONVERTED  07/02/2020    EF 65%. LA- 4.2 CM. MVR. MVA- 1.9 Cm2. Trace-Mild MR & AI. RVSP- 27 mmHg.    ECHO - CONVERTED  05/26/2022    EF 65%. LA- 4.3.MVR. Trace MR    MITRAL VALVE REPAIR/REPLACEMENT N/A 02/03/2017    MVR & One SVG graft       Family History:   Family History   Problem Relation Age of Onset    Hypertension Mother     Alzheimer's disease Mother     Coronary artery disease Father     Hypertension Father     Diabetes Father     Heart failure Father        Social History:   Social History     Socioeconomic History    Marital status:     Number of children: 2   Tobacco Use    Smoking status: Never    Smokeless tobacco: Never   Vaping Use    Vaping Use: Never used   Substance and Sexual Activity    Alcohol use: No    Drug use: No    Sexual activity: Defer       Medications:     Current Outpatient Medications:     apixaban (ELIQUIS) 5 MG tablet tablet, Take 1 tablet by mouth 2 (Two) Times a Day., Disp: 180 tablet, Rfl: 3    atorvastatin (LIPITOR) 20 MG tablet, Take 1 tablet by mouth Daily., Disp: 90 tablet, Rfl: 2    bumetanide (BUMEX) 0.5 MG tablet, TAKE 1 TABLET EVERY DAY BY ORAL ROUTE FOR 30 DAYS., Disp: , Rfl:     celecoxib (CeleBREX) 200 MG capsule, Take 1 capsule by mouth Daily., Disp: , Rfl:     Cholecalciferol (VITAMIN D3) 2000 UNITS tablet, Take 1 tablet by mouth Daily., Disp: , Rfl:     citalopram (CeleXA) 20 MG tablet, Take 1 tablet by mouth Daily., Disp: , Rfl:     ethosuximide (ZARONTIN) 250 MG capsule, Take 2 capsules by mouth Daily., Disp: , Rfl:     ferrous sulfate 325 (65 FE) MG tablet, Take 1 tablet by mouth Daily., Disp: , Rfl: 5    furosemide (LASIX) 40 MG tablet, Take 1 tablet by mouth Daily., Disp: 90 tablet, Rfl: 2    isosorbide mononitrate (IMDUR) 30 MG 24 hr tablet, Take 1 tablet by mouth Every Night., Disp: 90 tablet, Rfl: 2    levothyroxine (SYNTHROID, LEVOTHROID) 300 MCG tablet,  "Take 1 tablet by mouth Every Morning., Disp: , Rfl:     metoprolol tartrate (LOPRESSOR) 25 MG tablet, Take 0.5 tablets by mouth 2 (Two) Times a Day., Disp: 90 tablet, Rfl: 3    multivitamin with minerals tablet tablet, Take 1 tablet by mouth Daily., Disp: , Rfl:     multivitamins-minerals (PRESERVISION AREDS 2) capsule capsule, Take 1 capsule by mouth 2 (Two) Times a Day., Disp: , Rfl:     nitroglycerin (NITROSTAT) 0.4 MG SL tablet, TAKE 1 UNDER THE TONGUE AS NEEDED FOR ANGINA, MAY REPEAT EVERY 5 MINUTESS FOR UP THREE DOSES, Disp: 25 tablet, Rfl: 3    potassium chloride 10 MEQ CR tablet, Take 1 tablet by mouth Daily., Disp: 90 tablet, Rfl: 2    primidone (MYSOLINE) 250 MG tablet, Take 1 tablet by mouth 3 (Three) Times a Day., Disp: , Rfl:     vitamin B-12 (CYANOCOBALAMIN) 1000 MCG tablet, Take 1 tablet by mouth Daily., Disp: , Rfl:   No current facility-administered medications for this visit.    Facility-Administered Medications Ordered in Other Visits:     Chlorhexidine Gluconate Cloth 2 % pads 1 application, 1 application , Topical, Q12H PRN, Aubrie Anaya PA-C    Allergies:   No Known Allergies    Objective     Physical Exam:  Vital Signs:   Vitals:    08/03/23 1441   BP: 109/61   BP Location: Right arm   Patient Position: Sitting   Cuff Size: Adult   Pulse: 60   SpO2: 95%   Weight: 73.5 kg (162 lb)   Height: 162.6 cm (64\")     Body mass index is 27.81 kg/mý.    Physical Exam  Vitals reviewed.   Constitutional:       General: She is not in acute distress.     Appearance: She is not toxic-appearing.   HENT:      Head: Normocephalic and atraumatic.      Nose: Nose normal.      Mouth/Throat:      Mouth: Mucous membranes are moist.   Eyes:      Extraocular Movements: Extraocular movements intact.      Conjunctiva/sclera: Conjunctivae normal.      Pupils: Pupils are equal, round, and reactive to light.   Cardiovascular:      Rate and Rhythm: Normal rate.      Heart sounds: Normal heart sounds.   Pulmonary:      " Effort: Pulmonary effort is normal.      Breath sounds: Normal breath sounds.   Abdominal:      General: There is no distension.      Palpations: Abdomen is soft.   Musculoskeletal:      Cervical back: Neck supple.      Comments: Mild BLE edema   Skin:     General: Skin is warm and dry.      Findings: No rash.   Neurological:      General: No focal deficit present.      Mental Status: She is alert and oriented to person, place, and time.   Psychiatric:         Mood and Affect: Mood normal.         Behavior: Behavior normal.     Results Review:   Results for orders placed during the hospital encounter of 05/26/22    Adult Transthoracic Echo Complete W/ Cont if Necessary Per Protocol    Interpretation Summary  ú Left ventricular wall thickness is consistent with mild concentric hypertrophy.  ú Left ventricular ejection fraction appears to be 61 - 65%.  ú Left ventricular diastolic function is consistent with (grade I) impaired relaxation.  ú The left atrial cavity is mildly dilated. 4.3 Cm  ú Trace to mild aortic valve regurgitation is present.  ú There is a bioprosthetic mitral valve present.  ú Trace mitral valve regurgitation is present  ú Physiologic tricuspid valve regurgitation is present.    May 2023 overnight pulse oximetry study showed O2 saturation less than or equal to 88% for 03:28:24.    Assessment / Plan      Assessment/Plan:    Diagnoses and all orders for this visit:    1. Nocturnal hypoxia (Primary)  Suspect that untreated sleep apnea is contributing. Check sleep study as noted below. No respiratory symptoms otherwise to suggest underlying lung disease.    2. Daytime hypersomnolence  -     Home Sleep Study; Future  Patient with multiple symptoms suggestive of sleep apnea. Will check sleep study to evaluate.        Follow Up:   Return in about 3 months (around 11/3/2023) for Recheck.  The patient was counseled on diagnostic results, risks and benefits of treatment options, risk factor modifications and  the importance of treatment compliance. The patient was advised to contact the clinic with concerns or worsening symptoms.     MCKENZIE Silva  Pulmonary Medicine [facility]

## 2023-10-31 ENCOUNTER — TELEPHONE (OUTPATIENT)
Dept: PULMONOLOGY | Facility: CLINIC | Age: 73
End: 2023-10-31
Payer: MEDICARE

## 2023-10-31 NOTE — TELEPHONE ENCOUNTER
Called patient, no answer, voicemail full.    Called patient to check if she has had her sleep study done.

## 2023-11-01 NOTE — TELEPHONE ENCOUNTER
Called [facility]. Referral sent to [facility] for PA on 10/25/2023.  Patient should receive information or have the ability to do the sleep test soon.            Called patient on both phone numbers in chart, no answer, no VM available.

## 2023-11-30 ENCOUNTER — TELEPHONE (OUTPATIENT)
Dept: CARDIOLOGY | Facility: CLINIC | Age: 73
End: 2023-11-30

## 2023-11-30 ENCOUNTER — OFFICE VISIT (OUTPATIENT)
Dept: CARDIOLOGY | Facility: CLINIC | Age: 73
End: 2023-11-30
Payer: MEDICARE

## 2023-11-30 VITALS
DIASTOLIC BLOOD PRESSURE: 86 MMHG | HEART RATE: 62 BPM | HEIGHT: 64 IN | BODY MASS INDEX: 27.81 KG/M2 | SYSTOLIC BLOOD PRESSURE: 142 MMHG

## 2023-11-30 DIAGNOSIS — I87.2 VENOUS INSUFFICIENCY: Primary | ICD-10-CM

## 2023-11-30 DIAGNOSIS — I48.0 PAF (PAROXYSMAL ATRIAL FIBRILLATION): ICD-10-CM

## 2023-11-30 DIAGNOSIS — R06.02 SHORTNESS OF BREATH: ICD-10-CM

## 2023-11-30 DIAGNOSIS — I49.5 SSS (SICK SINUS SYNDROME): ICD-10-CM

## 2023-11-30 DIAGNOSIS — E66.3 OVERWEIGHT: ICD-10-CM

## 2023-11-30 DIAGNOSIS — E78.00 HYPERCHOLESTEREMIA: ICD-10-CM

## 2023-11-30 DIAGNOSIS — R60.0 EDEMA LEG: ICD-10-CM

## 2023-11-30 DIAGNOSIS — G47.34 NOCTURNAL OXYGEN DESATURATION: ICD-10-CM

## 2023-11-30 DIAGNOSIS — Z95.3 S/P MITRAL VALVE REPLACEMENT WITH BIOPROSTHETIC VALVE: ICD-10-CM

## 2023-11-30 DIAGNOSIS — I10 ESSENTIAL HYPERTENSION: ICD-10-CM

## 2023-11-30 RX ORDER — SENNOSIDES 8.6 MG
650 CAPSULE ORAL EVERY 8 HOURS PRN
COMMUNITY

## 2023-11-30 RX ORDER — ALENDRONATE SODIUM 70 MG/1
70 TABLET ORAL
COMMUNITY

## 2023-11-30 RX ORDER — TRAMADOL HYDROCHLORIDE 50 MG/1
50 TABLET ORAL EVERY 8 HOURS PRN
COMMUNITY

## 2023-11-30 NOTE — TELEPHONE ENCOUNTER
Spoke with Pearl gave parameters for second metoprolol SBP greater than or equal 140.  She voiced understanding

## 2023-11-30 NOTE — PROGRESS NOTES
Chief Complaint   Patient presents with    Follow-up     Pt is here for cardiac follow up.  Pt denies CP, SOB, dizziness or palpitations.  Pt states she fell a few months ago and fell and broke her hip.  She does not take a daily ASA.  Pt states she is having a lot swelling in her legs and feet.      Med Refill     Pt is a resident at [facility].  Meds were verified by NH list.    Lab Work     Last labs 11/13/23       Cardiac Complaints  lower extremity edema      Subjective   Tiana Cronin is a 73 y.o. female with hypertension, hyperlipidemia, hypothyroidism, SSS, mitral valve stenosis s/p replacement, and seizure disorder with no seizure activity since her fifties. She developed increased fatigue and shortness of breath and had an abnormal EKG.  She was seen by another cardiologist noted to have significant mitral regurgitation that did not improve with medical management.  She was referred to Dr. Gaona and underwent mitral valve replacement with #31 magma tissue valve and also had a 1 vessel bypass.  Post-operatively, she developed junctional rhythm and a [employer] pacemaker was placed by Dr. Dang.  In June 2020, patient called the office concerned over development of chest pressure. Repeat stress and echo advised. On 7/2/20 reports showed anterolateral wall ischemia, stable valves and normal EF. Imdur added with plan for cath if symptoms persisted.  She then called in September 2020 with possible right shoulder repair and clearance was given for surgical clearance. In October 2020 visit, more imbalance and falls were reported, carotid US was recommended. US done 10/22/2020 then showed no significant stenosis bilaterally. Most recent stress and echo in 2022 negative for ischemia, good LV function, and no sig valve concerns noted. Overnight this summer positive for nocturnal desaturation. Most recent [facility] pacer check 11/2023 showed 2 years of battery life, AF <1%, AP 77%, RV pacing 61%.     She  comes today for follow up and denies any new concerns. Edema in BLE continues, similar to prior, she thinks maybe worse since she broke her hip Oct 12th after the fall. No CP, SOA, palpitations, or syncope noted. She fell a few months ago and broke her hip, she is now residing in a nursing home for in patient therapy.  Labs 11/2023 showed: K 4.8, Na 140, GFR 62, Creatinine 0.9, BUN 23, HH 10.6/32, TSH 24.012 (on high dose synthroid).  She did see Dr. Sim for watchman in summer 2023, NOAC initiated to assess tolerance as she did not want to proceed with Watchman at that time.         Cardiac History  Past Surgical History:   Procedure Laterality Date    CARDIAC CATHETERIZATION  01/11/2017    EF 55-60%, 50-60% stenosis of circ (small vessel), LAD 30%, severe MR    CARDIAC ELECTROPHYSIOLOGY PROCEDURE N/A 02/10/2017    Dr. Dang. [place]    CARDIOVASCULAR STRESS TEST  07/02/2020    Lexiscan- EF 67%. Anterolateral Ischemia.    CARDIOVASCULAR STRESS TEST  05/26/2022    Lexiscan- EF 68%. Negative    ECHO - CONVERTED  09/19/2017    EF 65%, mild LVH, no AS, normal functioning mitral valve, RVSP 29 mmHG    ECHO - CONVERTED  07/02/2020    EF 65%. LA- 4.2 CM. MVR. MVA- 1.9 Cm2. Trace-Mild MR & AI. RVSP- 27 mmHg.    ECHO - CONVERTED  05/26/2022    EF 65%. LA- 4.3.MVR. Trace MR    MITRAL VALVE REPAIR/REPLACEMENT N/A 02/03/2017    MVR & One SVG graft    ORIF HIP FRACTURE Right 10/15/2023       Current Outpatient Medications   Medication Sig Dispense Refill    acetaminophen (TYLENOL) 650 MG 8 hr tablet Take 1 tablet by mouth Every 8 (Eight) Hours As Needed for Mild Pain.      alendronate (Fosamax) 70 MG tablet Take 1 tablet by mouth Every 7 (Seven) Days.      apixaban (ELIQUIS) 5 MG tablet tablet Take 1 tablet by mouth 2 (Two) Times a Day. 180 tablet 3    bumetanide (BUMEX) 0.5 MG tablet TAKE 1 TABLET EVERY DAY BY ORAL ROUTE FOR 30 DAYS.      Cholecalciferol (VITAMIN D3) 2000 UNITS tablet Take 1 tablet by mouth Daily.       Diclofenac Sodium (VOLTAREN) 1 % gel gel Apply 4 g topically to the appropriate area as directed 4 (Four) Times a Day As Needed.      ethosuximide (ZARONTIN) 250 MG capsule Take 2 capsules by mouth Daily.      ferrous sulfate 325 (65 FE) MG tablet Take 1 tablet by mouth Daily.  5    isosorbide mononitrate (IMDUR) 30 MG 24 hr tablet Take 1 tablet by mouth Every Night. (Patient taking differently: Take 0.5 tablets by mouth Every Night.) 90 tablet 2    levothyroxine (SYNTHROID, LEVOTHROID) 300 MCG tablet Take 1 tablet by mouth Every Morning.      metoprolol tartrate (LOPRESSOR) 25 MG tablet Take 1 tablet by mouth 2 (Two) Times a Day. 1 tablet once daily and may use extra tablet if needed for  tablet 3    multivitamin with minerals tablet tablet Take 1 tablet by mouth Daily.      nitroglycerin (NITROSTAT) 0.4 MG SL tablet TAKE 1 UNDER THE TONGUE AS NEEDED FOR ANGINA, MAY REPEAT EVERY 5 MINUTESS FOR UP THREE DOSES 25 tablet 3    primidone (MYSOLINE) 250 MG tablet Take 1 tablet by mouth 3 (Three) Times a Day.      traMADol (ULTRAM) 50 MG tablet Take 1 tablet by mouth Every 8 (Eight) Hours As Needed for Moderate Pain.       No current facility-administered medications for this visit.     Facility-Administered Medications Ordered in Other Visits   Medication Dose Route Frequency Provider Last Rate Last Admin    Chlorhexidine Gluconate Cloth 2 % pads 1 application  1 application  Topical Q12H PRN Aubrie Anaya PA-C           Patient has no known allergies.    Past Medical History:   Diagnosis Date    Anxiety     Aortic regurgitation     Arthritis     Hands and Knees    CAD (S/P CABG x 1) 2/10/2017    Depression     History of surgery on arm     Hyperlipidemia     Hypertension     Hypothyroidism     Mitral regurgitation     Mitral valve prolapse     Seizures     Several Years since last seizure ( last seizure cannot remember)     Skin cancer, basal cell     basal cell skin cancer on face     Wears dentures     full  "set     Wears glasses     reading       Social History     Socioeconomic History    Marital status:     Number of children: 2   Tobacco Use    Smoking status: Never    Smokeless tobacco: Never   Vaping Use    Vaping Use: Never used   Substance and Sexual Activity    Alcohol use: No    Drug use: No    Sexual activity: Defer       Family History   Problem Relation Age of Onset    Hypertension Mother     Alzheimer's disease Mother     Coronary artery disease Father     Hypertension Father     Diabetes Father     Heart failure Father        Review of Systems   Constitutional: Positive for malaise/fatigue. Negative for night sweats.   Cardiovascular:  Positive for leg swelling. Negative for chest pain, claudication, dyspnea on exertion, irregular heartbeat, orthopnea, palpitations and syncope.   Respiratory:  Negative for cough, shortness of breath and wheezing.    Musculoskeletal:  Negative for back pain, joint pain and stiffness.   Gastrointestinal:  Negative for anorexia, heartburn, melena and nausea.   Genitourinary:  Negative for dysuria, hematuria, hesitancy and nocturia.   Neurological:  Negative for dizziness, headaches and light-headedness.   Psychiatric/Behavioral:  Negative for depression and memory loss. The patient is not nervous/anxious.            Objective     /86 (BP Location: Left arm, Patient Position: Sitting)   Pulse 62   Ht 162.6 cm (64\")   BMI 27.81 kg/m²     Constitutional:       Appearance: Not in distress.   Eyes:      Pupils: Pupils are equal, round, and reactive to light.   HENT:      Nose: Nose normal.   Pulmonary:      Effort: Pulmonary effort is normal.      Breath sounds: Normal breath sounds.   Cardiovascular:      PMI at left midclavicular line. Normal rate. Regular rhythm.      Murmurs: There is a systolic murmur.   Edema:     Peripheral edema present.  Abdominal:      Palpations: Abdomen is soft.   Musculoskeletal: Normal range of motion.      Cervical back: Normal " range of motion and neck supple. Skin:     General: Skin is warm and dry.      Coloration: Skin is pale.   Neurological:      Mental Status: Alert.           ECG 12 Lead    Date/Time: 11/30/2023 10:16 AM  Performed by: Marylou Richardson APRN    Authorized by: Marylou Richardson APRN  Comparison: compared with previous ECG from 6/23/2023  Rhythm: sinus rhythm  BPM: 62    Clinical impression: abnormal EKG  Comments: Normal QT               Diagnoses and all orders for this visit:    1. Venous insufficiency (Primary)  -     Ambulatory Referral to Vascular Surgery  -     proBNP; Future  -     CBC & Differential; Future  -     Comprehensive Metabolic Panel; Future    2. SSS (sick sinus syndrome)    3. PAF (paroxysmal atrial fibrillation)  -     ECG 12 Lead    4. Essential hypertension    5. Edema leg  -     Ambulatory Referral to Vascular Surgery  -     proBNP; Future  -     CBC & Differential; Future  -     Comprehensive Metabolic Panel; Future    6. Shortness of breath  -     proBNP; Future    7. S/P mitral valve replacement with bioprosthetic valve    8. Hypercholesteremia    9. Nocturnal oxygen desaturation  -     Ambulatory Referral to Pulmonology    10. Overweight    Other orders  -     Discontinue: metoprolol tartrate (LOPRESSOR) 25 MG tablet; Take 1 tablet by mouth 2 (Two) Times a Day.  Dispense: 180 tablet; Refill: 3  -     apixaban (ELIQUIS) 5 MG tablet tablet; Take 1 tablet by mouth 2 (Two) Times a Day.  Dispense: 180 tablet; Refill: 3  -     metoprolol tartrate (LOPRESSOR) 25 MG tablet; Take 1 tablet by mouth 2 (Two) Times a Day. 1 tablet once daily and may use extra tablet if needed for HTN  Dispense: 180 tablet; Refill: 3             SSS/PAF:  EKG done today for pacemaker and SSS showed atrial paced rhythm. She had pacer check Nov 2023 with less than 1% AMS/afib noted. Per referral with Dr. Sim, she was given NOAC (eliquis) to initiate to assess tolerance before referral for Watchman. BB therapy will be  continued.Thus far, bleed is denied, but fall risk is high, another recent fall noted in October. She remains on iron therapy.     IHD/MVR: Most recent echo showed MVA stable, EF normal. No ischemia noted on stress.  No worsening cardiac concerns voiced, no repeat workup advised.      HTN:  Blood pressure stable. Lopressor therapy will be continued at current. Limited sodium intake advised.     Edema/venous insufficiency:  Now managed with bumex therapy, edema persists, will be referred to  for venous insufficiency management/possible intervention. BNP order provided.     Hyperlipidemia:  On statin therapy with lipitor.  Labs including FLP with you. Can we have for review?     SOA/fatigue: Overnight positive, oxygen around 70%. Will encourage referral to pulm for sleep study.     BMI noted at 28.15, good cardiac diet with limited caloric intake, carbs, and activity as tolerated advised.       Refills per request.     6 month follow up recommended or sooner if needed.            Problems Addressed this Visit          Cardiac and Vasculature    Essential hypertension    Relevant Medications    metoprolol tartrate (LOPRESSOR) 25 MG tablet    Hypercholesteremia    PAF (paroxysmal atrial fibrillation)    Relevant Medications    metoprolol tartrate (LOPRESSOR) 25 MG tablet    Other Relevant Orders    ECG 12 Lead     Other Visit Diagnoses       Venous insufficiency    -  Primary    Relevant Orders    Ambulatory Referral to Vascular Surgery (Completed)    proBNP    CBC & Differential    Comprehensive Metabolic Panel    SSS (sick sinus syndrome)        Relevant Medications    metoprolol tartrate (LOPRESSOR) 25 MG tablet    Edema leg        Relevant Orders    Ambulatory Referral to Vascular Surgery (Completed)    proBNP    CBC & Differential    Comprehensive Metabolic Panel    Shortness of breath        Relevant Orders    proBNP    S/P mitral valve replacement with bioprosthetic valve        Nocturnal oxygen  desaturation        Relevant Orders    Ambulatory Referral to Pulmonology (Completed)    Overweight              Diagnoses         Codes Comments    Venous insufficiency    -  Primary ICD-10-CM: I87.2  ICD-9-CM: 459.81     SSS (sick sinus syndrome)     ICD-10-CM: I49.5  ICD-9-CM: 427.81     PAF (paroxysmal atrial fibrillation)     ICD-10-CM: I48.0  ICD-9-CM: 427.31     Essential hypertension     ICD-10-CM: I10  ICD-9-CM: 401.9     Edema leg     ICD-10-CM: R60.0  ICD-9-CM: 782.3     Shortness of breath     ICD-10-CM: R06.02  ICD-9-CM: 786.05     S/P mitral valve replacement with bioprosthetic valve     ICD-10-CM: Z95.3  ICD-9-CM: V42.2     Hypercholesteremia     ICD-10-CM: E78.00  ICD-9-CM: 272.0     Nocturnal oxygen desaturation     ICD-10-CM: G47.34  ICD-9-CM: 327.24     Overweight     ICD-10-CM: E66.3  ICD-9-CM: 278.02                     Electronically signed by Marylou Richardson, APRN November 30, 2023 11:35 EST

## 2023-11-30 NOTE — TELEPHONE ENCOUNTER
Hub staff attempted to follow warm transfer process and was unsuccessful     Caller: [facility]    Relationship to patient:     Best call back number: [phone] [phone]    Patient is needing: REHAB NEEDS CLARIFICATION ON ORDERS GIVEN TODAY. PLEASE KANE ASAP

## 2023-12-13 ENCOUNTER — TELEPHONE (OUTPATIENT)
Dept: CARDIOLOGY | Facility: CLINIC | Age: 73
End: 2023-12-13
Payer: MEDICARE

## 2023-12-13 NOTE — TELEPHONE ENCOUNTER
Caller: DR. GUTIERREZ    Relationship:     Best call back number: [phone]    What form or medical record are you requesting: OVERNIGHT STUDY-THEY RECEIVED THE REFERRAL NOTE BUT DID NOT RECEIVE THE OVERNIGHT STUDY.    Who is requesting this form or medical record from you: DR. GUTIERREZ [facility]    How would you like to receive the form or medical records (pick-up, mail, fax): FAX  If fax, what is the fax number: [phone]  If mail, what is the address:   If pick-up, provide patient with address and location details    Timeframe paperwork needed: APPOINTMENT IS SCHEDULED FOR FRIDAY 12.15.2023    Additional notes:

## 2024-01-03 ENCOUNTER — TELEPHONE (OUTPATIENT)
Dept: CARDIOLOGY | Facility: CLINIC | Age: 74
End: 2024-01-03
Payer: MEDICARE

## 2024-01-03 NOTE — TELEPHONE ENCOUNTER
Spoke to son Elier, he is going to take remote monitor to [facility] so we can monitor her device. He reports she may be able to be released to home sometime in February.

## 2024-01-03 NOTE — TELEPHONE ENCOUNTER
Called patient in regards to missed remote transmission, Patient is currently located at [facility]. Called and spoke to Kim and Benita on the 300 unit (ext 24), per them patient does not currently have her remote box with her it is at home. They are unsure when she will get to go home.  I have reached out to her son Elier Cronin, left message for return call.

## 2024-02-06 ENCOUNTER — TELEPHONE (OUTPATIENT)
Dept: CARDIOLOGY | Facility: CLINIC | Age: 74
End: 2024-02-06
Payer: MEDICARE

## 2024-02-06 NOTE — TELEPHONE ENCOUNTER
Denise with [facility] called.  Metoprolol script last received has two different set of instructions.  See 11/30/2023 telephone encounter.  Keyonna Gómez MA         11/30/23  3:02 PM  Note      Spoke with Pearl gave parameters for second metoprolol SBP greater than or equal 140.  She voiced understanding          New script sent [facility].

## 2024-04-15 ENCOUNTER — TELEPHONE (OUTPATIENT)
Dept: CARDIOLOGY | Facility: CLINIC | Age: 74
End: 2024-04-15
Payer: MEDICARE

## 2024-04-15 NOTE — TELEPHONE ENCOUNTER
REQUEST FOR CARDIAC CLEARANCE    Caller name: Tiana Cronin     Phone Number: [phone]     Surgeon's name: AGUSTIN BALES     Type of planned surgery: KNEE SURG REPLACEMENT     Date of planned surgery: MAY 2024     Type of anesthesia: UNKNOWN     Have you been experiencing chest pain or shortness of breath? NO     Is your doctor requesting for you to stop any of your medications prior to your surgery? ELIQUIS, CALL FOR OTHER INFO ON MEDS.     Where should we fax the clearance to? [phone]    REQUESTING AN APPT FOR THIS

## 2024-04-15 NOTE — TELEPHONE ENCOUNTER
Patient was made aware that I did receive clearance request from Dr. Bagley's office and that it would be done within 30 days of surgery.

## 2024-04-24 ENCOUNTER — TELEPHONE (OUTPATIENT)
Dept: CARDIOLOGY | Facility: CLINIC | Age: 74
End: 2024-04-24
Payer: MEDICARE

## 2024-04-24 NOTE — TELEPHONE ENCOUNTER
Caller: williams breaux    Relationship: Emergency Contact    Best call back number: [phone]    What is the best time to reach you: ANYTIME    Who are you requesting to speak with (clinical staff, provider,  specific staff member): CLINICAL    Do you know the name of the person who called: N/A    What was the call regarding: PATIENTS SON IS CALLING ABOUT PATIENT HAVING KNEE REPLACEMENT MAY 20T H. DR. WORLEY OFFICE MAILED PAPERWORK TO [facility] OFFICE. PATIENTS SON WANTS TO KNOW IF PAPER WORK WAS RECEIVED AND IF PATIENT WILL BE GETTING SCHEDULED FOR TESTING. BH VERBAL NOT COMPLETE.    Is it okay if the provider responds through MyChart: CALL

## 2024-04-25 NOTE — TELEPHONE ENCOUNTER
Patient's son was made aware that we would be working on cardiac clearance next week as surgery is not scheduled until 05/20/24.

## 2024-04-30 ENCOUNTER — TELEPHONE (OUTPATIENT)
Dept: CARDIOLOGY | Facility: CLINIC | Age: 74
End: 2024-04-30
Payer: MEDICARE

## 2024-04-30 NOTE — TELEPHONE ENCOUNTER
Received fax from Dr. Bagley for cardiac clearance for patient to have a total knee replacement. Patient is on Eliquis, unclear if needing to hold. According to our records, I do not see where patient has had any stenting. Patient had an MVR on 02/03/17. Patient has a history of afib.          Fax [phone]

## 2024-05-06 RX ORDER — POTASSIUM CHLORIDE 750 MG/1
10 TABLET, FILM COATED, EXTENDED RELEASE ORAL DAILY
Qty: 30 TABLET | Refills: 2 | OUTPATIENT
Start: 2024-05-06

## 2024-05-06 RX ORDER — ATORVASTATIN CALCIUM 20 MG/1
20 TABLET, FILM COATED ORAL DAILY
Qty: 30 TABLET | Refills: 2 | OUTPATIENT
Start: 2024-05-06

## 2024-05-07 ENCOUNTER — TELEPHONE (OUTPATIENT)
Dept: CARDIOLOGY | Facility: CLINIC | Age: 74
End: 2024-05-07
Payer: MEDICARE

## 2024-06-10 ENCOUNTER — OFFICE VISIT (OUTPATIENT)
Dept: CARDIOLOGY | Facility: CLINIC | Age: 74
End: 2024-06-10
Payer: MEDICARE

## 2024-06-10 VITALS
WEIGHT: 160.8 LBS | SYSTOLIC BLOOD PRESSURE: 134 MMHG | HEART RATE: 70 BPM | DIASTOLIC BLOOD PRESSURE: 70 MMHG | HEIGHT: 64 IN | BODY MASS INDEX: 27.45 KG/M2

## 2024-06-10 DIAGNOSIS — E78.00 HYPERCHOLESTEREMIA: ICD-10-CM

## 2024-06-10 DIAGNOSIS — Z95.3 S/P MITRAL VALVE REPLACEMENT WITH BIOPROSTHETIC VALVE: ICD-10-CM

## 2024-06-10 DIAGNOSIS — R00.2 PALPITATIONS: ICD-10-CM

## 2024-06-10 DIAGNOSIS — R53.83 OTHER FATIGUE: ICD-10-CM

## 2024-06-10 DIAGNOSIS — I48.0 PAF (PAROXYSMAL ATRIAL FIBRILLATION): ICD-10-CM

## 2024-06-10 DIAGNOSIS — G47.34 NOCTURNAL OXYGEN DESATURATION: ICD-10-CM

## 2024-06-10 DIAGNOSIS — I10 ESSENTIAL HYPERTENSION: ICD-10-CM

## 2024-06-10 DIAGNOSIS — I25.10 CORONARY ARTERY DISEASE INVOLVING NATIVE CORONARY ARTERY OF NATIVE HEART WITHOUT ANGINA PECTORIS: Primary | ICD-10-CM

## 2024-06-10 DIAGNOSIS — R60.0 EDEMA LEG: ICD-10-CM

## 2024-06-10 DIAGNOSIS — E66.3 OVERWEIGHT: ICD-10-CM

## 2024-06-10 DIAGNOSIS — R29.898 WEAKNESS OF BOTH LOWER EXTREMITIES: ICD-10-CM

## 2024-06-10 DIAGNOSIS — I48.92 ATRIAL FLUTTER WITH CONTROLLED RESPONSE: ICD-10-CM

## 2024-06-10 DIAGNOSIS — I49.5 SSS (SICK SINUS SYNDROME): ICD-10-CM

## 2024-06-10 PROCEDURE — 93000 ELECTROCARDIOGRAM COMPLETE: CPT | Performed by: NURSE PRACTITIONER

## 2024-06-10 PROCEDURE — 3078F DIAST BP <80 MM HG: CPT | Performed by: NURSE PRACTITIONER

## 2024-06-10 PROCEDURE — 99214 OFFICE O/P EST MOD 30 MIN: CPT | Performed by: NURSE PRACTITIONER

## 2024-06-10 PROCEDURE — 3075F SYST BP GE 130 - 139MM HG: CPT | Performed by: NURSE PRACTITIONER

## 2024-06-10 RX ORDER — ISOSORBIDE MONONITRATE 30 MG/1
30 TABLET, EXTENDED RELEASE ORAL NIGHTLY
Qty: 90 TABLET | Refills: 2 | Status: SHIPPED | OUTPATIENT
Start: 2024-06-10

## 2024-06-10 RX ORDER — POTASSIUM CHLORIDE 750 MG/1
10 CAPSULE, EXTENDED RELEASE ORAL DAILY
COMMUNITY
End: 2024-06-10 | Stop reason: SDUPTHER

## 2024-06-10 RX ORDER — BUMETANIDE 0.5 MG/1
0.5 TABLET ORAL DAILY
Qty: 90 TABLET | Refills: 2 | Status: SHIPPED | OUTPATIENT
Start: 2024-06-10 | End: 2024-09-08

## 2024-06-10 RX ORDER — VIT C/B6/B5/MAGNESIUM/HERB 173 50-5-6-5MG
1000 CAPSULE ORAL DAILY
COMMUNITY

## 2024-06-10 RX ORDER — POTASSIUM CHLORIDE 750 MG/1
10 CAPSULE, EXTENDED RELEASE ORAL DAILY
Qty: 90 CAPSULE | Refills: 2 | Status: SHIPPED | OUTPATIENT
Start: 2024-06-10

## 2024-06-10 RX ORDER — ATORVASTATIN CALCIUM 20 MG/1
20 TABLET, FILM COATED ORAL DAILY
Qty: 90 TABLET | Refills: 2 | Status: SHIPPED | OUTPATIENT
Start: 2024-06-10

## 2024-06-10 RX ORDER — ATORVASTATIN CALCIUM 20 MG/1
20 TABLET, FILM COATED ORAL DAILY
COMMUNITY
End: 2024-06-10 | Stop reason: SDUPTHER

## 2024-06-10 NOTE — PROGRESS NOTES
Chief Complaint   Patient presents with    Follow-up     Pt is here for cardiac follow up. Pt denies CP, SOB, and dizziness. Pt does c/o palpations that come and go   Pt does not take a daily aspirin        Med Refill       Pt request 30 day refills to be sent to [facility] .  Medications were verified by       lab work     Pt states their last labs were in May 2024 with PCP         Cardiac Complaints  palpitations      Subjective   Tiana Cronin is a 73 y.o. female with hypertension, hyperlipidemia, PAF, hypothyroidism, SSS, mitral valve stenosis s/p replacement, and seizure disorder with no seizure activity since her fifties. She developed increased fatigue and shortness of breath and had an abnormal EKG.  She was seen by another cardiologist noted to have significant mitral regurgitation that did not improve with medical management.  She was referred to Dr. Gaona and underwent mitral valve replacement with #31 magma tissue valve and also had a 1 vessel bypass.  Post-operatively, she developed junctional rhythm and a [employer] pacemaker was placed by Dr. Dang.  In June 2020, patient called the office concerned over development of chest pressure. Repeat stress and echo advised. On 7/2/20 reports showed anterolateral wall ischemia, stable valves and normal EF. Imdur added with plan for cath if symptoms persisted.  She then called in September 2020 with possible right shoulder repair and clearance was given for surgical clearance. In October 2020 visit, more imbalance and falls were reported, carotid US was recommended. US done 10/22/2020 then showed no significant stenosis bilaterally. Most recent stress and echo in 2022 negative for ischemia, good LV function, and no sig valve concerns noted. Overnight 2023 positive for nocturnal desaturation. Venous reflux scan 02/2024 was negative. Most recent [facility] [place] [facility] pacer check 4/2024 showed afib 25% of the time, AP 26% of the time, and battery life at 10 months.      She comes today for follow up and admits to palpitations on occasion that just come and go. She denies dizziness, CP, SOA, syncope. Edema in BLE still present, unchanged from prior. Labs with PCP, May 2024. Refills requested.             Cardiac History  Past Surgical History:   Procedure Laterality Date    CARDIAC CATHETERIZATION  01/11/2017    EF 55-60%, 50-60% stenosis of circ (small vessel), LAD 30%, severe MR    CARDIAC ELECTROPHYSIOLOGY PROCEDURE N/A 02/10/2017    Dr. Dang. [place]    CARDIOVASCULAR STRESS TEST  07/02/2020    Lexiscan- EF 67%. Anterolateral Ischemia.    CARDIOVASCULAR STRESS TEST  05/26/2022    Lexiscan- EF 68%. Negative    ECHO - CONVERTED  09/19/2017    EF 65%, mild LVH, no AS, normal functioning mitral valve, RVSP 29 mmHG    ECHO - CONVERTED  07/02/2020    EF 65%. LA- 4.2 CM. MVR. MVA- 1.9 Cm2. Trace-Mild MR & AI. RVSP- 27 mmHg.    ECHO - CONVERTED  05/26/2022    EF 65%. LA- 4.3.MVR. Trace MR    MITRAL VALVE REPAIR/REPLACEMENT N/A 02/03/2017    MVR & One SVG graft    ORIF HIP FRACTURE Right 10/15/2023       Current Outpatient Medications   Medication Sig Dispense Refill    acetaminophen (TYLENOL) 650 MG 8 hr tablet Take 1 tablet by mouth Every 8 (Eight) Hours As Needed for Mild Pain.      alendronate (Fosamax) 70 MG tablet Take 1 tablet by mouth Every 7 (Seven) Days.      apixaban (ELIQUIS) 5 MG tablet tablet Take 1 tablet by mouth 2 (Two) Times a Day. 180 tablet 3    atorvastatin (LIPITOR) 20 MG tablet Take 1 tablet by mouth Daily. 90 tablet 2    bumetanide (BUMEX) 0.5 MG tablet Take 1 tablet by mouth Daily for 90 days. 90 tablet 2    Cholecalciferol (VITAMIN D3) 2000 UNITS tablet Take 1 tablet by mouth Daily.      Diclofenac Sodium (VOLTAREN) 1 % gel gel Apply 4 g topically to the appropriate area as directed 4 (Four) Times a Day As Needed.      ethosuximide (ZARONTIN) 250 MG capsule Take 2 capsules by mouth Daily.      ferrous sulfate 325 (65 FE) MG tablet Take 1 tablet by mouth  Daily.  5    isosorbide mononitrate (IMDUR) 30 MG 24 hr tablet Take 1 tablet by mouth Every Night. 90 tablet 2    levothyroxine (SYNTHROID, LEVOTHROID) 300 MCG tablet Take 1 tablet by mouth Every Morning.      multivitamin with minerals tablet tablet Take 1 tablet by mouth Daily.      nitroglycerin (NITROSTAT) 0.4 MG SL tablet TAKE 1 UNDER THE TONGUE AS NEEDED FOR ANGINA, MAY REPEAT EVERY 5 MINUTESS FOR UP THREE DOSES 25 tablet 3    potassium chloride (MICRO-K) 10 MEQ CR capsule Take 1 capsule by mouth Daily. 90 capsule 2    primidone (MYSOLINE) 250 MG tablet Take 1 tablet by mouth 3 (Three) Times a Day.      traMADol (ULTRAM) 50 MG tablet Take 1 tablet by mouth Every 8 (Eight) Hours As Needed for Moderate Pain.      Turmeric (QC Tumeric Complex) 500 MG capsule Take 2 capsules by mouth Daily.      metoprolol tartrate (LOPRESSOR) 25 MG tablet Take 1 tablet by mouth 2 (Two) Times a Day. 180 tablet 3     No current facility-administered medications for this visit.     Facility-Administered Medications Ordered in Other Visits   Medication Dose Route Frequency Provider Last Rate Last Admin    Chlorhexidine Gluconate Cloth 2 % pads 1 application  1 application  Topical Q12H PRN Aubrie Anaya PA-C           Patient has no known allergies.    Past Medical History:   Diagnosis Date    Anxiety     Aortic regurgitation     Arthritis     Hands and Knees    CAD (S/P CABG x 1) 2/10/2017    Depression     History of surgery on arm     Hyperlipidemia     Hypertension     Hypothyroidism     Mitral regurgitation     Mitral valve prolapse     Seizures     Several Years since last seizure ( last seizure cannot remember)     Skin cancer, basal cell     basal cell skin cancer on face     Wears dentures     full set     Wears glasses     reading       Social History     Socioeconomic History    Marital status:     Number of children: 2   Tobacco Use    Smoking status: Never    Smokeless tobacco: Never   Vaping Use    Vaping  "status: Never Used   Substance and Sexual Activity    Alcohol use: No    Drug use: No    Sexual activity: Defer       Family History   Problem Relation Age of Onset    Hypertension Mother     Alzheimer's disease Mother     Coronary artery disease Father     Hypertension Father     Diabetes Father     Heart failure Father        Review of Systems   Constitutional: Negative for malaise/fatigue and night sweats.   Cardiovascular:  Positive for palpitations. Negative for chest pain, claudication, dyspnea on exertion, irregular heartbeat, leg swelling, near-syncope, orthopnea and syncope.   Respiratory:  Negative for cough, shortness of breath and wheezing.    Musculoskeletal:  Negative for back pain, joint pain and stiffness.   Gastrointestinal:  Negative for anorexia, heartburn, nausea and vomiting.   Genitourinary:  Negative for dysuria, hematuria, hesitancy and nocturia.   Neurological:  Negative for dizziness, headaches and light-headedness.   Psychiatric/Behavioral:  Negative for depression and memory loss. The patient is not nervous/anxious.            Objective     /70 (BP Location: Right arm, Patient Position: Sitting)   Pulse 70   Ht 162.6 cm (64.02\")   Wt 72.9 kg (160 lb 12.8 oz)   BMI 27.59 kg/m²     Constitutional:       Appearance: Not in distress.   Eyes:      Pupils: Pupils are equal, round, and reactive to light.   HENT:      Nose: Nose normal.   Pulmonary:      Effort: Pulmonary effort is normal.      Breath sounds: Normal breath sounds.   Cardiovascular:      PMI at left midclavicular line. Normal rate. Regularly irregular rhythm.      Murmurs: There is a systolic murmur.   Abdominal:      Palpations: Abdomen is soft.   Musculoskeletal: Normal range of motion.      Cervical back: Normal range of motion and neck supple. Skin:     General: Skin is warm and dry.   Neurological:      Mental Status: Alert.           ECG 12 Lead    Date/Time: 6/10/2024 1:24 PM  Performed by: Marylou Richardson, " MCKENZIE    Authorized by: Marylou Richardson APRN  Comparison: compared with previous ECG from 11/30/2023  Comparison to previous ECG: NSR  Rhythm: atrial flutter  BPM: 63    Clinical impression: abnormal EKG  Comments: Normal QT               Diagnoses and all orders for this visit:    1. Coronary artery disease involving native coronary artery of native heart without angina pectoris (Primary)    2. Essential hypertension    3. Hypercholesteremia    4. PAF (paroxysmal atrial fibrillation)  -     ECG 12 Lead    5. Palpitations  -     Adult Transthoracic Echo Complete W/ Cont if Necessary Per Protocol; Future    6. SSS (sick sinus syndrome)  -     ECG 12 Lead    7. Edema leg    8. S/P mitral valve replacement with bioprosthetic valve  -     Adult Transthoracic Echo Complete W/ Cont if Necessary Per Protocol; Future    9. Atrial flutter with controlled response  -     ECG 12 Lead    10. Nocturnal oxygen desaturation  -     Ambulatory Referral to Pulmonology    11. Other fatigue  -     Ambulatory Referral to Pulmonology    12. Weakness of both lower extremities    13. Overweight    Other orders  -     metoprolol tartrate (LOPRESSOR) 25 MG tablet; Take 1 tablet by mouth 2 (Two) Times a Day.  Dispense: 180 tablet; Refill: 3  -     apixaban (ELIQUIS) 5 MG tablet tablet; Take 1 tablet by mouth 2 (Two) Times a Day.  Dispense: 180 tablet; Refill: 3  -     atorvastatin (LIPITOR) 20 MG tablet; Take 1 tablet by mouth Daily.  Dispense: 90 tablet; Refill: 2  -     bumetanide (BUMEX) 0.5 MG tablet; Take 1 tablet by mouth Daily for 90 days.  Dispense: 90 tablet; Refill: 2  -     isosorbide mononitrate (IMDUR) 30 MG 24 hr tablet; Take 1 tablet by mouth Every Night.  Dispense: 90 tablet; Refill: 2  -     potassium chloride (MICRO-K) 10 MEQ CR capsule; Take 1 capsule by mouth Daily.  Dispense: 90 capsule; Refill: 2             SSS/PAF:  EKG done today for pacemaker and SSS showed atrial flutter today. She had pacer check April 2024 that  showed 25% afib burden. She has been taking eliquis per [place] and tolerating okay. BB therapy will be continued, but increased for rhythm management, she does not wish to be given antiarrythmic at this time, would like trial of increase BB.She remains on iron therapy.     IHD/MVR: Most recent echo showed MVA stable, EF normal. Repeat echo urged to assess valve areas and LV function. CP denied on imdur therapy. No repeat stress test urged.     HTN:  Blood pressure stable. Lopressor therapy will be continued at current. Limited sodium intake advised.     Edema/venous insufficiency:  Now managed with bumex therapy, venous reflux done with vascular in Feb 2024 neg. Limited sodium urged.     Hyperlipidemia:  On statin therapy with lipitor.  Labs including FLP with you. Can we have for review?     SOA/fatigue: Overnight positive, will refer to Patricia Hough for sleep study.    Bilateral leg weakness: Encouraged to discuss referral for PT/OT with you.     BMI noted at 27.59, good cardiac diet with limited caloric intake, carbs, and activity as tolerated advised.       Refills per request.     6 month follow up recommended or sooner if needed.            Problems Addressed this Visit          Cardiac and Vasculature    Coronary artery disease involving native coronary artery of native heart - Primary    Relevant Medications    metoprolol tartrate (LOPRESSOR) 25 MG tablet    isosorbide mononitrate (IMDUR) 30 MG 24 hr tablet    Essential hypertension    Relevant Medications    metoprolol tartrate (LOPRESSOR) 25 MG tablet    bumetanide (BUMEX) 0.5 MG tablet    Hypercholesteremia    Relevant Medications    atorvastatin (LIPITOR) 20 MG tablet    PAF (paroxysmal atrial fibrillation)    Relevant Medications    metoprolol tartrate (LOPRESSOR) 25 MG tablet    isosorbide mononitrate (IMDUR) 30 MG 24 hr tablet    Other Relevant Orders    ECG 12 Lead     Other Visit Diagnoses       Palpitations        Relevant Orders    Adult  Transthoracic Echo Complete W/ Cont if Necessary Per Protocol    SSS (sick sinus syndrome)        Relevant Medications    metoprolol tartrate (LOPRESSOR) 25 MG tablet    isosorbide mononitrate (IMDUR) 30 MG 24 hr tablet    Other Relevant Orders    ECG 12 Lead    Edema leg        S/P mitral valve replacement with bioprosthetic valve        Relevant Orders    Adult Transthoracic Echo Complete W/ Cont if Necessary Per Protocol    Atrial flutter with controlled response        Relevant Medications    metoprolol tartrate (LOPRESSOR) 25 MG tablet    isosorbide mononitrate (IMDUR) 30 MG 24 hr tablet    Other Relevant Orders    ECG 12 Lead    Nocturnal oxygen desaturation        Relevant Orders    Ambulatory Referral to Pulmonology    Other fatigue        Relevant Orders    Ambulatory Referral to Pulmonology    Weakness of both lower extremities        Overweight              Diagnoses         Codes Comments    Coronary artery disease involving native coronary artery of native heart without angina pectoris    -  Primary ICD-10-CM: I25.10  ICD-9-CM: 414.01     Essential hypertension     ICD-10-CM: I10  ICD-9-CM: 401.9     Hypercholesteremia     ICD-10-CM: E78.00  ICD-9-CM: 272.0     PAF (paroxysmal atrial fibrillation)     ICD-10-CM: I48.0  ICD-9-CM: 427.31     Palpitations     ICD-10-CM: R00.2  ICD-9-CM: 785.1     SSS (sick sinus syndrome)     ICD-10-CM: I49.5  ICD-9-CM: 427.81     Edema leg     ICD-10-CM: R60.0  ICD-9-CM: 782.3     S/P mitral valve replacement with bioprosthetic valve     ICD-10-CM: Z95.3  ICD-9-CM: V42.2     Atrial flutter with controlled response     ICD-10-CM: I48.92  ICD-9-CM: 427.32     Nocturnal oxygen desaturation     ICD-10-CM: G47.34  ICD-9-CM: 327.24     Other fatigue     ICD-10-CM: R53.83  ICD-9-CM: 780.79     Weakness of both lower extremities     ICD-10-CM: R29.898  ICD-9-CM: 729.89     Overweight     ICD-10-CM: E66.3  ICD-9-CM: 278.02                           Electronically signed by Marylou  ASHVIN Richardson, APRN Deepthi 10, 2024 16:18 EDT

## 2024-06-18 ENCOUNTER — HOSPITAL ENCOUNTER (OUTPATIENT)
Dept: CARDIOLOGY | Facility: HOSPITAL | Age: 74
Discharge: HOME | End: 2024-06-18
Admitting: NURSE PRACTITIONER
Payer: MEDICARE

## 2024-06-18 ENCOUNTER — TELEPHONE (OUTPATIENT)
Dept: CARDIOLOGY | Facility: CLINIC | Age: 74
End: 2024-06-18
Payer: MEDICARE

## 2024-06-18 VITALS — HEIGHT: 64 IN | WEIGHT: 160.72 LBS | BODY MASS INDEX: 27.44 KG/M2

## 2024-06-18 DIAGNOSIS — R00.2 PALPITATIONS: ICD-10-CM

## 2024-06-18 DIAGNOSIS — Z95.3 S/P MITRAL VALVE REPLACEMENT WITH BIOPROSTHETIC VALVE: ICD-10-CM

## 2024-06-18 LAB
AORTIC DIMENSIONLESS INDEX: 0.6 (DI)
BH CV ECHO MEAS - ACS: 1.71 CM
BH CV ECHO MEAS - AO MAX PG: 4 MMHG
BH CV ECHO MEAS - AO MEAN PG: 2.34 MMHG
BH CV ECHO MEAS - AO ROOT DIAM: 2.8 CM
BH CV ECHO MEAS - AO V2 MAX: 100.5 CM/SEC
BH CV ECHO MEAS - AO V2 VTI: 21.6 CM
BH CV ECHO MEAS - EDV(CUBED): 92.7 ML
BH CV ECHO MEAS - EF(MOD-BP): 63 %
BH CV ECHO MEAS - ESV(CUBED): 26.1 ML
BH CV ECHO MEAS - FS: 34.4 %
BH CV ECHO MEAS - IVS/LVPW: 0.91 CM
BH CV ECHO MEAS - IVSD: 1.13 CM
BH CV ECHO MEAS - LA DIMENSION: 4.5 CM
BH CV ECHO MEAS - LAT PEAK E' VEL: 6.6 CM/SEC
BH CV ECHO MEAS - LV MASS(C)D: 197.8 GRAMS
BH CV ECHO MEAS - LV MAX PG: 1.59 MMHG
BH CV ECHO MEAS - LV MEAN PG: 0.87 MMHG
BH CV ECHO MEAS - LV V1 MAX: 63.1 CM/SEC
BH CV ECHO MEAS - LV V1 VTI: 12.9 CM
BH CV ECHO MEAS - LVIDD: 4.5 CM
BH CV ECHO MEAS - LVIDS: 3 CM
BH CV ECHO MEAS - LVPWD: 1.25 CM
BH CV ECHO MEAS - MED PEAK E' VEL: 5.6 CM/SEC
BH CV ECHO MEAS - MV DEC SLOPE: 345.3 CM/SEC2
BH CV ECHO MEAS - MV DEC TIME: 0.66 SEC
BH CV ECHO MEAS - MV E MAX VEL: 179 CM/SEC
BH CV ECHO MEAS - MV MAX PG: 13.2 MMHG
BH CV ECHO MEAS - MV MEAN PG: 6.1 MMHG
BH CV ECHO MEAS - MV P1/2T: 150.8 MSEC
BH CV ECHO MEAS - MV V2 VTI: 51 CM
BH CV ECHO MEAS - MVA(P1/2T): 1.46 CM2
BH CV ECHO MEAS - PA V2 MAX: 115.9 CM/SEC
BH CV ECHO MEAS - PI END-D VEL: 157.5 CM/SEC
BH CV ECHO MEAS - RAP SYSTOLE: 8 MMHG
BH CV ECHO MEAS - RV MAX PG: 2.07 MMHG
BH CV ECHO MEAS - RV V1 MAX: 72 CM/SEC
BH CV ECHO MEAS - RV V1 VTI: 14.4 CM
BH CV ECHO MEAS - RVDD: 3 CM
BH CV ECHO MEAS - RVSP: 25.6 MMHG
BH CV ECHO MEAS - TAPSE (>1.6): 2.17 CM
BH CV ECHO MEAS - TR MAX PG: 17.6 MMHG
BH CV ECHO MEAS - TR MAX VEL: 209.6 CM/SEC
BH CV ECHO MEASUREMENTS AVERAGE E/E' RATIO: 29.34
BH CV XLRA - TDI S': 11.5 CM/SEC
SINUS: 2.9 CM

## 2024-06-18 PROCEDURE — 93306 TTE W/DOPPLER COMPLETE: CPT | Performed by: INTERNAL MEDICINE

## 2024-06-18 PROCEDURE — 93306 TTE W/DOPPLER COMPLETE: CPT

## 2024-06-18 NOTE — TELEPHONE ENCOUNTER
PT's son came into the [place] office to have pt's appt moved sooner than October. Pt had an appt with Wayne Richardson at Dr. Hoyt's office, per Marylou, PT's O2 was 70 and needs to be seen sooner by Patricia. I gave the pt's son the [place] office number, but wanted to also make you aware. Thank you.

## 2024-06-19 NOTE — TELEPHONE ENCOUNTER
Tried to call patient and patients son, no answer, left VM informing them that we can see patient tomorrow at 9:15.  I will try to contact again today.

## 2024-06-20 ENCOUNTER — OFFICE VISIT (OUTPATIENT)
Dept: PULMONOLOGY | Facility: CLINIC | Age: 74
End: 2024-06-20
Payer: MEDICARE

## 2024-06-20 VITALS
WEIGHT: 154 LBS | HEART RATE: 70 BPM | OXYGEN SATURATION: 97 % | HEIGHT: 64 IN | DIASTOLIC BLOOD PRESSURE: 60 MMHG | SYSTOLIC BLOOD PRESSURE: 116 MMHG | BODY MASS INDEX: 26.29 KG/M2

## 2024-06-20 DIAGNOSIS — G47.19 DAYTIME HYPERSOMNOLENCE: Primary | ICD-10-CM

## 2024-06-20 RX ORDER — PRIMIDONE 250 MG/1
1 TABLET ORAL 3 TIMES DAILY
COMMUNITY

## 2024-06-20 RX ORDER — POTASSIUM CHLORIDE 750 MG/1
1 TABLET, EXTENDED RELEASE ORAL DAILY
COMMUNITY
Start: 2024-06-07

## 2024-06-20 RX ORDER — METOPROLOL SUCCINATE 25 MG/1
TABLET, EXTENDED RELEASE ORAL
COMMUNITY

## 2024-06-20 RX ORDER — BUMETANIDE 0.5 MG/1
1 TABLET ORAL DAILY
COMMUNITY
Start: 2024-05-03

## 2024-06-20 RX ORDER — OMEGA-3S/DHA/EPA/FISH OIL/D3 300MG-1000
CAPSULE ORAL
COMMUNITY

## 2024-06-20 RX ORDER — ATORVASTATIN CALCIUM 20 MG/1
1 TABLET, FILM COATED ORAL
COMMUNITY
Start: 2024-02-05

## 2024-06-20 RX ORDER — CIPROFLOXACIN 500 MG/1
1 TABLET, FILM COATED ORAL EVERY 12 HOURS
COMMUNITY

## 2024-06-20 NOTE — PROGRESS NOTES
Follow Up Office Visit      Patient Name: Tiana Cronin    Chief Complaint:    Chief Complaint   Patient presents with    Breathing Problem       History of Present Illness: Tiana Cronin is a 73 y.o. female who is here today for follow up of suspected sleep apnea. Since last visit, she suffered a hip fracture and reports spending 100 days in inpatient rehab in [place]. She notes that while in rehab she was told that she did not need to continue on nocturnal O2. She has not yet had her home sleep study as ordered previously. +fatigue and snoring.    Supplemental Oxygen: No    Subjective      Review of Systems:  Review of Systems   Constitutional:  Positive for fatigue. Negative for fever and unexpected weight change.   Respiratory:  Negative for cough, shortness of breath and wheezing.    Cardiovascular:  Positive for leg swelling. Negative for chest pain.        Past Medical History:   Past Medical History:   Diagnosis Date    Anxiety     Aortic regurgitation     Arthritis     Hands and Knees    CAD (S/P CABG x 1) 2/10/2017    Depression     History of surgery on arm     Hyperlipidemia     Hypertension     Hypothyroidism     Mitral regurgitation     Mitral valve prolapse     Seizures     Several Years since last seizure ( last seizure cannot remember)     Skin cancer, basal cell     basal cell skin cancer on face     Wears dentures     full set     Wears glasses     reading       Past Surgical History:   Past Surgical History:   Procedure Laterality Date    CARDIAC CATHETERIZATION  01/11/2017    EF 55-60%, 50-60% stenosis of circ (small vessel), LAD 30%, severe MR    CARDIAC ELECTROPHYSIOLOGY PROCEDURE N/A 02/10/2017    Dr. Dang. [place]    CARDIOVASCULAR STRESS TEST  07/02/2020    Lexiscan- EF 67%. Anterolateral Ischemia.    CARDIOVASCULAR STRESS TEST  05/26/2022    Lexiscan- EF 68%. Negative    ECHO - CONVERTED  09/19/2017    EF 65%, mild LVH, no AS, normal functioning mitral valve, RVSP 29 mmHG     ECHO - CONVERTED  07/02/2020    EF 65%. LA- 4.2 CM. MVR. MVA- 1.9 Cm2. Trace-Mild MR & AI. RVSP- 27 mmHg.    ECHO - CONVERTED  05/26/2022    EF 65%. LA- 4.3.MVR. Trace MR    ECHO - CONVERTED  06/18/2024    TLS. EF 65%. LA- 4.5. Mild AI. MVR. MVA- 1.5. RVSP- 26 mmHg. Mild-Mod PI    MITRAL VALVE REPAIR/REPLACEMENT N/A 02/03/2017    MVR & One SVG graft    ORIF HIP FRACTURE Right 10/15/2023       Family History:   Family History   Problem Relation Age of Onset    Hypertension Mother     Alzheimer's disease Mother     Coronary artery disease Father     Hypertension Father     Diabetes Father     Heart failure Father        Social History:   Social History     Socioeconomic History    Marital status:     Number of children: 2   Tobacco Use    Smoking status: Never    Smokeless tobacco: Never   Vaping Use    Vaping status: Never Used   Substance and Sexual Activity    Alcohol use: No    Drug use: No    Sexual activity: Defer       Current Medications:     Current Outpatient Medications:     acetaminophen (TYLENOL) 650 MG 8 hr tablet, Take 1 tablet by mouth Every 8 (Eight) Hours As Needed for Mild Pain., Disp: , Rfl:     alendronate (Fosamax) 70 MG tablet, Take 1 tablet by mouth Every 7 (Seven) Days., Disp: , Rfl:     apixaban (ELIQUIS) 5 MG tablet tablet, Take 1 tablet by mouth 2 (Two) Times a Day., Disp: 180 tablet, Rfl: 3    atorvastatin (LIPITOR) 20 MG tablet, Take 1 tablet by mouth Daily., Disp: 90 tablet, Rfl: 2    atorvastatin (LIPITOR) 20 MG tablet, Take 1 tablet by mouth every night at bedtime., Disp: , Rfl:     bumetanide (BUMEX) 0.5 MG tablet, Take 1 tablet by mouth Daily., Disp: , Rfl:     Cholecalciferol (VITAMIN D3) 2000 UNITS tablet, Take 1 tablet by mouth Daily., Disp: , Rfl:     Cholecalciferol 10 MCG (400 UNIT) tablet, , Disp: , Rfl:     ciprofloxacin (CIPRO) 500 MG tablet, Take 1 tablet by mouth Every 12 (Twelve) Hours., Disp: , Rfl:     Diclofenac Sodium (VOLTAREN) 1 % gel gel, Apply 4 g  "topically to the appropriate area as directed 4 (Four) Times a Day As Needed., Disp: , Rfl:     ethosuximide (ZARONTIN) 250 MG capsule, Take 2 capsules by mouth Daily., Disp: , Rfl:     ferrous sulfate 325 (65 FE) MG tablet, Take 1 tablet by mouth Daily., Disp: , Rfl: 5    isosorbide mononitrate (IMDUR) 30 MG 24 hr tablet, Take 1 tablet by mouth Every Night., Disp: 90 tablet, Rfl: 2    levothyroxine (SYNTHROID, LEVOTHROID) 300 MCG tablet, Take 1 tablet by mouth Every Morning., Disp: , Rfl:     metoprolol succinate XL (TOPROL-XL) 25 MG 24 hr tablet, 25 mg by oral route., Disp: , Rfl:     metoprolol tartrate (LOPRESSOR) 25 MG tablet, Take 1 tablet by mouth 2 (Two) Times a Day., Disp: 180 tablet, Rfl: 3    multivitamin with minerals tablet tablet, Take 1 tablet by mouth Daily., Disp: , Rfl:     nitroglycerin (NITROSTAT) 0.4 MG SL tablet, TAKE 1 UNDER THE TONGUE AS NEEDED FOR ANGINA, MAY REPEAT EVERY 5 MINUTESS FOR UP THREE DOSES, Disp: 25 tablet, Rfl: 3    potassium chloride (KLOR-CON M10) 10 MEQ CR tablet, Take 1 tablet by mouth Daily., Disp: , Rfl:     potassium chloride (MICRO-K) 10 MEQ CR capsule, Take 1 capsule by mouth Daily., Disp: 90 capsule, Rfl: 2    primidone (MYSOLINE) 250 MG tablet, Take 1 tablet by mouth 3 (Three) Times a Day., Disp: , Rfl:     traMADol (ULTRAM) 50 MG tablet, Take 1 tablet by mouth Every 8 (Eight) Hours As Needed for Moderate Pain., Disp: , Rfl:     Turmeric (QC Tumeric Complex) 500 MG capsule, Take 2 capsules by mouth Daily., Disp: , Rfl:   No current facility-administered medications for this visit.    Facility-Administered Medications Ordered in Other Visits:     Chlorhexidine Gluconate Cloth 2 % pads 1 application, 1 application , Topical, Q12H PRN, Aubrei Anaya PA-C     Allergies:   No Known Allergies    Objective     Physical Exam:  Vital Signs:   Vitals:    06/20/24 0857   BP: 116/60   Pulse: 70   SpO2: 97%   Weight: 69.9 kg (154 lb)   Height: 161.9 cm (63.75\")     Body mass " index is 26.64 kg/m².    Physical Exam  Vitals reviewed.   Constitutional:       General: She is not in acute distress.     Appearance: She is not toxic-appearing.   HENT:      Head: Normocephalic and atraumatic.      Mouth/Throat:      Mouth: Mucous membranes are moist.   Eyes:      Conjunctiva/sclera: Conjunctivae normal.   Cardiovascular:      Rate and Rhythm: Normal rate.      Heart sounds: Normal heart sounds.   Pulmonary:      Effort: Pulmonary effort is normal.      Breath sounds: Normal breath sounds.   Abdominal:      General: There is no distension.      Palpations: Abdomen is soft.   Musculoskeletal:      Cervical back: Neck supple.      Right lower leg: Edema present.      Left lower leg: Edema present.      Comments: Deconditioned, in a wheelchair   Skin:     General: Skin is warm and dry.      Findings: No rash.   Neurological:      Mental Status: She is alert and oriented to person, place, and time.   Psychiatric:         Mood and Affect: Mood normal.         Behavior: Behavior normal.       Assessment / Plan      Assessment/Plan:   Diagnoses and all orders for this visit:    1. Daytime hypersomnolence (Primary)    It appears that her home sleep study was not performed previously secondary to her hip fracture and being admitted to inpatient rehab.  The clinic scheduler will now be asked to arrange the home sleep study via [facility].  The patient is willing to wear CPAP if needed.    Follow Up:   Return in about 3 months (around 9/20/2024) for Recheck.  The patient was counseled on diagnostic results, risks and benefits of treatment options, risk factor modifications and the importance of treatment compliance. The patient was advised to contact the clinic with concerns or worsening symptoms.     MCKENZIE Silva   Pulmonary Medicine [place]     This document has been electronically signed by MCKENZIE Silva  June 20, 2024

## 2024-10-24 ENCOUNTER — OFFICE VISIT (OUTPATIENT)
Dept: PULMONOLOGY | Facility: CLINIC | Age: 74
End: 2024-10-24
Payer: MEDICARE

## 2024-10-24 VITALS
OXYGEN SATURATION: 97 % | HEART RATE: 70 BPM | SYSTOLIC BLOOD PRESSURE: 116 MMHG | DIASTOLIC BLOOD PRESSURE: 70 MMHG | WEIGHT: 160 LBS | BODY MASS INDEX: 25.71 KG/M2 | HEIGHT: 66 IN

## 2024-10-24 DIAGNOSIS — G47.33 OSA (OBSTRUCTIVE SLEEP APNEA): Primary | ICD-10-CM

## 2024-10-24 NOTE — PROGRESS NOTES
Follow Up Office Visit      Patient Name: Tiana Cronin    Chief Complaint:  Sleep trouble      History of Present Illness: Tiana Cronin is a 74 y.o. female who is here today for follow up of suspected sleep apnea. Since last visit, she underwent home sleep study earlier this month, report not yet available for review. +fatigue, snoring.    Supplemental Oxygen: No    Subjective      Review of Systems:  Review of Systems   Constitutional:  Negative for fever and unexpected weight change.   Respiratory:  Negative for cough, shortness of breath and wheezing.    Cardiovascular:  Positive for leg swelling. Negative for chest pain.        Past Medical History:   Past Medical History:   Diagnosis Date    Anxiety     Aortic regurgitation     Arthritis     Hands and Knees    CAD (S/P CABG x 1) 2/10/2017    Depression     History of surgery on arm     Hyperlipidemia     Hypertension     Hypothyroidism     Mitral regurgitation     Mitral valve prolapse     Seizures     Several Years since last seizure ( last seizure cannot remember)     Skin cancer, basal cell     basal cell skin cancer on face     Wears dentures     full set     Wears glasses     reading       Past Surgical History:   Past Surgical History:   Procedure Laterality Date    CARDIAC CATHETERIZATION  01/11/2017    EF 55-60%, 50-60% stenosis of circ (small vessel), LAD 30%, severe MR    CARDIAC ELECTROPHYSIOLOGY PROCEDURE N/A 02/10/2017    Dr. Dang. Robodrom    CARDIOVASCULAR STRESS TEST  07/02/2020    Lexiscan- EF 67%. Anterolateral Ischemia.    CARDIOVASCULAR STRESS TEST  05/26/2022    Lexiscan- EF 68%. Negative    ECHO - CONVERTED  09/19/2017    EF 65%, mild LVH, no AS, normal functioning mitral valve, RVSP 29 mmHG    ECHO - CONVERTED  07/02/2020    EF 65%. LA- 4.2 CM. MVR. MVA- 1.9 Cm2. Trace-Mild MR & AI. RVSP- 27 mmHg.    ECHO - CONVERTED  05/26/2022    EF 65%. LA- 4.3.MVR. Trace MR    ECHO - CONVERTED  06/18/2024    TLS. EF 65%. LA- 4.5. Mild AI.  MVR. MVA- 1.5. RVSP- 26 mmHg. Mild-Mod PI    MITRAL VALVE REPAIR/REPLACEMENT N/A 02/03/2017    MVR & One SVG graft    ORIF HIP FRACTURE Right 10/15/2023       Family History:   Family History   Problem Relation Age of Onset    Hypertension Mother     Alzheimer's disease Mother     Coronary artery disease Father     Hypertension Father     Diabetes Father     Heart failure Father        Social History:   Social History     Socioeconomic History    Marital status:     Number of children: 2   Tobacco Use    Smoking status: Never    Smokeless tobacco: Never   Vaping Use    Vaping status: Never Used   Substance and Sexual Activity    Alcohol use: No    Drug use: No    Sexual activity: Defer       Current Medications:     Current Outpatient Medications:     acetaminophen (TYLENOL) 650 MG 8 hr tablet, Take 1 tablet by mouth Every 8 (Eight) Hours As Needed for Mild Pain., Disp: , Rfl:     alendronate (Fosamax) 70 MG tablet, Take 1 tablet by mouth Every 7 (Seven) Days., Disp: , Rfl:     apixaban (ELIQUIS) 5 MG tablet tablet, Take 1 tablet by mouth 2 (Two) Times a Day., Disp: 180 tablet, Rfl: 3    atorvastatin (LIPITOR) 20 MG tablet, Take 1 tablet by mouth Daily., Disp: 90 tablet, Rfl: 2    bumetanide (BUMEX) 0.5 MG tablet, Take 1 tablet by mouth Daily., Disp: , Rfl:     Cholecalciferol (VITAMIN D3) 2000 UNITS tablet, Take 1 tablet by mouth Daily., Disp: , Rfl:     Diclofenac Sodium (VOLTAREN) 1 % gel gel, Apply 4 g topically to the appropriate area as directed 4 (Four) Times a Day As Needed., Disp: , Rfl:     ethosuximide (ZARONTIN) 250 MG capsule, Take 2 capsules by mouth Daily., Disp: , Rfl:     ferrous sulfate 325 (65 FE) MG tablet, Take 1 tablet by mouth Daily., Disp: , Rfl: 5    isosorbide mononitrate (IMDUR) 30 MG 24 hr tablet, Take 1 tablet by mouth Every Night., Disp: 90 tablet, Rfl: 2    levothyroxine (SYNTHROID, LEVOTHROID) 300 MCG tablet, Take 1 tablet by mouth Every Morning., Disp: , Rfl:     metoprolol  "succinate XL (TOPROL-XL) 25 MG 24 hr tablet, 25 mg by oral route., Disp: , Rfl:     metoprolol tartrate (LOPRESSOR) 25 MG tablet, Take 1 tablet by mouth 2 (Two) Times a Day., Disp: 180 tablet, Rfl: 3    multivitamin with minerals tablet tablet, Take 1 tablet by mouth Daily., Disp: , Rfl:     nitroglycerin (NITROSTAT) 0.4 MG SL tablet, TAKE 1 UNDER THE TONGUE AS NEEDED FOR ANGINA, MAY REPEAT EVERY 5 MINUTESS FOR UP THREE DOSES, Disp: 25 tablet, Rfl: 3    potassium chloride (KLOR-CON M10) 10 MEQ CR tablet, Take 1 tablet by mouth Daily., Disp: , Rfl:     potassium chloride (MICRO-K) 10 MEQ CR capsule, Take 1 capsule by mouth Daily., Disp: 90 capsule, Rfl: 2    primidone (MYSOLINE) 250 MG tablet, Take 1 tablet by mouth 3 (Three) Times a Day., Disp: , Rfl:     traMADol (ULTRAM) 50 MG tablet, Take 1 tablet by mouth Every 8 (Eight) Hours As Needed for Moderate Pain., Disp: , Rfl:     Turmeric (QC Tumeric Complex) 500 MG capsule, Take 2 capsules by mouth Daily., Disp: , Rfl:   No current facility-administered medications for this visit.    Facility-Administered Medications Ordered in Other Visits:     Chlorhexidine Gluconate Cloth 2 % pads 1 application, 1 application , Topical, Q12H VIVN, Aubrie Anaya PA-C     Allergies:   No Known Allergies    Objective     Physical Exam:  Vital Signs:   Vitals:    10/24/24 1037   BP: 116/70   Pulse: 70   SpO2: 97%   Weight: 72.6 kg (160 lb)   Height: 167.6 cm (66\")     Body mass index is 25.82 kg/m².    Physical Exam  Vitals reviewed.   Constitutional:       General: She is not in acute distress.     Appearance: She is not toxic-appearing.   HENT:      Head: Normocephalic and atraumatic.      Mouth/Throat:      Mouth: Mucous membranes are moist.   Eyes:      Extraocular Movements: Extraocular movements intact.      Conjunctiva/sclera: Conjunctivae normal.   Cardiovascular:      Rate and Rhythm: Normal rate.      Heart sounds: Normal heart sounds.   Pulmonary:      Effort: Pulmonary " effort is normal.      Breath sounds: Normal breath sounds.   Abdominal:      General: There is no distension.      Palpations: Abdomen is soft.   Musculoskeletal:      Cervical back: Neck supple.      Right lower leg: Edema present.      Left lower leg: Edema present.      Comments: In a wheelchair   Skin:     General: Skin is warm and dry.      Findings: No rash.   Neurological:      Mental Status: She is alert and oriented to person, place, and time.   Psychiatric:         Mood and Affect: Mood normal.         Behavior: Behavior normal.       Results Review:   Results for orders placed during the hospital encounter of 06/18/24    Adult Transthoracic Echo Complete W/ Cont if Necessary Per Protocol    Interpretation Summary    The study is technically difficult    Left ventricular wall thickness is consistent with mild concentric hypertrophy.    Left ventricular ejection fraction appears to be 61 - 65%.    Left ventricular diastolic function is consistent with (grade III w/high LAP) fixed restrictive pattern.    The left atrial cavity is moderately dilated.  4.5 cm    Mild aortic valve regurgitation is present.    There is a 31 mm, porcine, Magna Ease bioprosthetic mitral valve present    Mild mitral valve stenosis is present.  MVA - 1.5 cm^2    Estimated right ventricular systolic pressure from tricuspid regurgitation is normal (26 mmHg).    There is mild to moderate pulmonic valve regurgitation present.    October 2024 home sleep study report requested from DME and obtained during clinic visit, results showed AHI of 5.5/h.    Assessment / Plan      Assessment/Plan:   Diagnoses and all orders for this visit:    1. SHOSHANA (obstructive sleep apnea) (Primary)  -     PAP Therapy    Recent home sleep study results reviewed, showing mild SHOSHANA. Begin nightly use of auotPAP for minimum of 4 hours nightly. The patient was counseled on the risks of untreated sleep apnea and voiced understanding.     Follow Up:   Return in about  3 months (around 1/24/2025) for Recheck.  The patient was counseled on diagnostic results, risks and benefits of treatment options, risk factor modifications and the importance of treatment compliance. The patient was advised to contact the clinic with concerns or worsening symptoms.     MCKENZIE Silva   Pulmonary Medicine [place]     This document has been electronically signed by MCKENZIE Silva  October 24, 2024

## 2024-12-11 ENCOUNTER — TELEPHONE (OUTPATIENT)
Dept: CARDIOLOGY | Facility: CLINIC | Age: 74
End: 2024-12-11

## 2024-12-11 NOTE — TELEPHONE ENCOUNTER
Caller: Tiana Cronin    Relationship to patient: Self    Best call back number: [phone]      Chief complaint:     Type of visit: FOLLOW UP    Requested date: ASAP     If rescheduling, when is the original appointment: 12-12-24     Additional notes:PATIENT IS CALLING NEEDING TO RESCHEDULE APPOINTMENT ON 12-12-24 DUE TO TRANSPORTATION ISSUES. NO AVAILABILITY UNTIL MAY 2025. PLEASE REACH OUT TO PATIENT.

## 2025-02-06 ENCOUNTER — OFFICE VISIT (OUTPATIENT)
Dept: PULMONOLOGY | Facility: CLINIC | Age: 75
End: 2025-02-06
Payer: MEDICARE

## 2025-02-06 VITALS
HEART RATE: 78 BPM | DIASTOLIC BLOOD PRESSURE: 72 MMHG | SYSTOLIC BLOOD PRESSURE: 118 MMHG | HEIGHT: 66 IN | WEIGHT: 168 LBS | OXYGEN SATURATION: 97 % | BODY MASS INDEX: 27 KG/M2

## 2025-02-06 DIAGNOSIS — G47.33 OSA (OBSTRUCTIVE SLEEP APNEA): Primary | ICD-10-CM

## 2025-02-06 PROCEDURE — 3074F SYST BP LT 130 MM HG: CPT | Performed by: NURSE PRACTITIONER

## 2025-02-06 PROCEDURE — 99213 OFFICE O/P EST LOW 20 MIN: CPT | Performed by: NURSE PRACTITIONER

## 2025-02-06 PROCEDURE — 3078F DIAST BP <80 MM HG: CPT | Performed by: NURSE PRACTITIONER

## 2025-02-06 RX ORDER — CITALOPRAM HYDROBROMIDE 20 MG/1
20 TABLET ORAL
COMMUNITY

## 2025-02-06 NOTE — PROGRESS NOTES
Follow Up Office Visit      Patient Name: Tiana Cronin    Chief Complaint:    Chief Complaint   Patient presents with    Sleeping Problem       History of Present Illness: Tiana Cronin is a 74 y.o. female who is here today for follow up of sleep apnea.  Since last visit, she has been using AutoPap with good clinical response.  She notes that she is now feeling much better rested.  Her DME is [employer]  She is using a nasal mask.    Supplemental Oxygen: No    Subjective      Review of Systems:  Review of Systems   Constitutional:  Negative for fatigue, fever and unexpected weight change.   Respiratory:  Negative for cough, shortness of breath and wheezing.    Cardiovascular:  Positive for leg swelling. Negative for chest pain.        Past Medical History:   Past Medical History:   Diagnosis Date    Anxiety     Aortic regurgitation     Arthritis     Hands and Knees    CAD (S/P CABG x 1) 2/10/2017    Depression     History of surgery on arm     Hyperlipidemia     Hypertension     Hypothyroidism     Mitral regurgitation     Mitral valve prolapse     Seizures     Several Years since last seizure ( last seizure cannot remember)     Skin cancer, basal cell     basal cell skin cancer on face     Wears dentures     full set     Wears glasses     reading       Past Surgical History:   Past Surgical History:   Procedure Laterality Date    CARDIAC CATHETERIZATION  01/11/2017    EF 55-60%, 50-60% stenosis of circ (small vessel), LAD 30%, severe MR    CARDIAC ELECTROPHYSIOLOGY PROCEDURE N/A 02/10/2017    Dr. Dang. Floq    CARDIOVASCULAR STRESS TEST  07/02/2020    Lexiscan- EF 67%. Anterolateral Ischemia.    CARDIOVASCULAR STRESS TEST  05/26/2022    Lexiscan- EF 68%. Negative    ECHO - CONVERTED  09/19/2017    EF 65%, mild LVH, no AS, normal functioning mitral valve, RVSP 29 mmHG    ECHO - CONVERTED  07/02/2020    EF 65%. LA- 4.2 CM. MVR. MVA- 1.9 Cm2. Trace-Mild MR & AI. RVSP- 27 mmHg.    ECHO -  CONVERTED  05/26/2022    EF 65%. LA- 4.3.MVR. Trace MR    ECHO - CONVERTED  06/18/2024    TLS. EF 65%. LA- 4.5. Mild AI. MVR. MVA- 1.5. RVSP- 26 mmHg. Mild-Mod PI    MITRAL VALVE REPAIR/REPLACEMENT N/A 02/03/2017    MVR & One SVG graft    ORIF HIP FRACTURE Right 10/15/2023       Family History:   Family History   Problem Relation Age of Onset    Hypertension Mother     Alzheimer's disease Mother     Coronary artery disease Father     Hypertension Father     Diabetes Father     Heart failure Father        Social History:   Social History     Socioeconomic History    Marital status:     Number of children: 2   Tobacco Use    Smoking status: Never    Smokeless tobacco: Never   Vaping Use    Vaping status: Never Used   Substance and Sexual Activity    Alcohol use: No    Drug use: No    Sexual activity: Defer       Current Medications:     Current Outpatient Medications:     acetaminophen (TYLENOL) 650 MG 8 hr tablet, Take 1 tablet by mouth Every 8 (Eight) Hours As Needed for Mild Pain., Disp: , Rfl:     apixaban (ELIQUIS) 5 MG tablet tablet, Take 1 tablet by mouth 2 (Two) Times a Day., Disp: 180 tablet, Rfl: 3    atorvastatin (LIPITOR) 20 MG tablet, Take 1 tablet by mouth Daily., Disp: 90 tablet, Rfl: 2    bumetanide (BUMEX) 0.5 MG tablet, Take 1 tablet by mouth Daily., Disp: , Rfl:     Cholecalciferol (VITAMIN D3) 2000 UNITS tablet, Take 1 tablet by mouth Daily., Disp: , Rfl:     Diclofenac Sodium (VOLTAREN) 1 % gel gel, Apply 4 g topically to the appropriate area as directed 4 (Four) Times a Day As Needed., Disp: , Rfl:     ethosuximide (ZARONTIN) 250 MG capsule, Take 2 capsules by mouth Daily., Disp: , Rfl:     ferrous sulfate 325 (65 FE) MG tablet, Take 1 tablet by mouth Daily., Disp: , Rfl: 5    isosorbide mononitrate (IMDUR) 30 MG 24 hr tablet, Take 1 tablet by mouth Every Night., Disp: 90 tablet, Rfl: 2    levothyroxine (SYNTHROID, LEVOTHROID) 300 MCG tablet, Take 1 tablet by mouth Every Morning., Disp: ,  "Rfl:     metoprolol tartrate (LOPRESSOR) 25 MG tablet, Take 1 tablet by mouth 2 (Two) Times a Day., Disp: 180 tablet, Rfl: 3    multivitamin with minerals tablet tablet, Take 1 tablet by mouth Daily., Disp: , Rfl:     nitroglycerin (NITROSTAT) 0.4 MG SL tablet, TAKE 1 UNDER THE TONGUE AS NEEDED FOR ANGINA, MAY REPEAT EVERY 5 MINUTESS FOR UP THREE DOSES, Disp: 25 tablet, Rfl: 3    potassium chloride (MICRO-K) 10 MEQ CR capsule, Take 1 capsule by mouth Daily., Disp: 90 capsule, Rfl: 2    primidone (MYSOLINE) 250 MG tablet, Take 1 tablet by mouth 3 (Three) Times a Day., Disp: , Rfl:     alendronate (Fosamax) 70 MG tablet, Take 1 tablet by mouth Every 7 (Seven) Days. (Patient not taking: Reported on 2/6/2025), Disp: , Rfl:     citalopram (CeleXA) 20 MG tablet, 1 tablet., Disp: , Rfl:     metoprolol succinate XL (TOPROL-XL) 25 MG 24 hr tablet, 25 mg by oral route., Disp: , Rfl:     potassium chloride (KLOR-CON M10) 10 MEQ CR tablet, Take 1 tablet by mouth Daily., Disp: , Rfl:     traMADol (ULTRAM) 50 MG tablet, Take 1 tablet by mouth Every 8 (Eight) Hours As Needed for Moderate Pain., Disp: , Rfl:   No current facility-administered medications for this visit.    Facility-Administered Medications Ordered in Other Visits:     Chlorhexidine Gluconate Cloth 2 % pads 1 application, 1 application , Topical, Q12H PRN, Aubrie Anaya PA-C     Allergies:   No Known Allergies    Objective     Physical Exam:  Vital Signs:   Vitals:    02/06/25 1404   BP: 118/72   Pulse: 78   SpO2: 97%   Weight: 76.2 kg (168 lb)   Height: 167.6 cm (66\")     Body mass index is 27.12 kg/m².    Physical Exam  Vitals reviewed.   Constitutional:       General: She is not in acute distress.     Appearance: She is not toxic-appearing.   HENT:      Head: Normocephalic and atraumatic.      Mouth/Throat:      Mouth: Mucous membranes are moist.   Eyes:      Extraocular Movements: Extraocular movements intact.      Conjunctiva/sclera: Conjunctivae normal. "   Cardiovascular:      Rate and Rhythm: Normal rate.   Pulmonary:      Effort: Pulmonary effort is normal.      Breath sounds: Normal breath sounds.   Abdominal:      General: There is no distension.      Palpations: Abdomen is soft.   Musculoskeletal:      Cervical back: Neck supple.      Right lower leg: Edema present.      Left lower leg: Edema present.      Comments: In a wheelchair   Skin:     General: Skin is warm and dry.      Findings: No rash.   Neurological:      General: No focal deficit present.      Mental Status: She is alert and oriented to person, place, and time.   Psychiatric:         Mood and Affect: Mood normal.         Behavior: Behavior normal.       Results Review:   November 2024-February 2025 PAP compliance showed 93% usage days with average of 8 hours 8 minutes with AHI of 0.5 on AutoPap at 5-20 cmH2O.    Assessment / Plan      Assessment/Plan:   Diagnoses and all orders for this visit:    1. SHOSHANA (obstructive sleep apnea) (Primary)  -     PAP Therapy  Compliance report reviewed and discussed with patient. Good response to treatment and should continue nightly AutoPap use.       Follow Up:   Return in about 1 year (around 2/6/2026) for Recheck.  The patient was counseled on diagnostic results, risks and benefits of treatment options, risk factor modifications and the importance of treatment compliance. The patient was advised to contact the clinic with concerns or worsening symptoms.     MCKENZIE Silva   Pulmonary Medicine [place] [facility]     This document has been electronically signed by MCKENZIE Silva  February 6, 2025

## 2025-03-30 RX ORDER — METOPROLOL TARTRATE 25 MG/1
TABLET, FILM COATED ORAL
Qty: 60 TABLET | Refills: 3 | OUTPATIENT
Start: 2025-03-30

## 2025-04-01 RX ORDER — ISOSORBIDE MONONITRATE 30 MG/1
TABLET, EXTENDED RELEASE ORAL
Qty: 90 TABLET | Refills: 2 | Status: SHIPPED | OUTPATIENT
Start: 2025-04-01

## 2025-04-01 RX ORDER — ATORVASTATIN CALCIUM 20 MG/1
TABLET, FILM COATED ORAL
Qty: 90 TABLET | Refills: 2 | Status: SHIPPED | OUTPATIENT
Start: 2025-04-01

## 2025-04-01 RX ORDER — POTASSIUM CHLORIDE 750 MG/1
CAPSULE, EXTENDED RELEASE ORAL
Qty: 90 CAPSULE | Refills: 2 | Status: SHIPPED | OUTPATIENT
Start: 2025-04-01

## 2025-04-24 ENCOUNTER — OFFICE VISIT (OUTPATIENT)
Dept: CARDIOLOGY | Facility: CLINIC | Age: 75
End: 2025-04-24
Payer: MEDICARE

## 2025-04-24 VITALS
BODY MASS INDEX: 27.13 KG/M2 | HEART RATE: 119 BPM | HEIGHT: 66 IN | DIASTOLIC BLOOD PRESSURE: 72 MMHG | SYSTOLIC BLOOD PRESSURE: 124 MMHG

## 2025-04-24 DIAGNOSIS — I48.92 ATRIAL FLUTTER WITH RAPID VENTRICULAR RESPONSE: Primary | ICD-10-CM

## 2025-04-24 DIAGNOSIS — Z95.0 PRESENCE OF PERMANENT CARDIAC PACEMAKER: ICD-10-CM

## 2025-04-24 DIAGNOSIS — I25.10 CORONARY ARTERY DISEASE INVOLVING NATIVE CORONARY ARTERY OF NATIVE HEART WITHOUT ANGINA PECTORIS: ICD-10-CM

## 2025-04-24 DIAGNOSIS — E78.00 HYPERCHOLESTEREMIA: ICD-10-CM

## 2025-04-24 DIAGNOSIS — G47.30 SLEEP APNEA, UNSPECIFIED TYPE: ICD-10-CM

## 2025-04-24 DIAGNOSIS — I10 ESSENTIAL HYPERTENSION: ICD-10-CM

## 2025-04-24 DIAGNOSIS — Z95.3 S/P MITRAL VALVE REPLACEMENT WITH BIOPROSTHETIC VALVE: ICD-10-CM

## 2025-04-24 DIAGNOSIS — R60.0 BILATERAL LOWER EXTREMITY EDEMA: ICD-10-CM

## 2025-04-24 DIAGNOSIS — I48.0 PAF (PAROXYSMAL ATRIAL FIBRILLATION): ICD-10-CM

## 2025-04-24 RX ORDER — ISOSORBIDE MONONITRATE 30 MG/1
15 TABLET, EXTENDED RELEASE ORAL 2 TIMES DAILY
Qty: 90 TABLET | Refills: 3 | Status: SHIPPED | OUTPATIENT
Start: 2025-04-24

## 2025-04-24 RX ORDER — ATORVASTATIN CALCIUM 20 MG/1
20 TABLET, FILM COATED ORAL DAILY
Qty: 90 TABLET | Refills: 3 | Status: SHIPPED | OUTPATIENT
Start: 2025-04-24

## 2025-04-24 RX ORDER — BUMETANIDE 0.5 MG/1
0.5 TABLET ORAL DAILY
Qty: 90 TABLET | Refills: 3 | Status: SHIPPED | OUTPATIENT
Start: 2025-04-24

## 2025-04-24 RX ORDER — METOPROLOL TARTRATE 50 MG
50 TABLET ORAL 2 TIMES DAILY
Qty: 180 TABLET | Refills: 3 | Status: SHIPPED | OUTPATIENT
Start: 2025-04-24

## 2025-04-24 RX ORDER — POTASSIUM CHLORIDE 750 MG/1
10 CAPSULE, EXTENDED RELEASE ORAL DAILY
Qty: 90 CAPSULE | Refills: 3 | Status: SHIPPED | OUTPATIENT
Start: 2025-04-24

## 2025-04-24 NOTE — PROGRESS NOTES
Chief Complaint   Patient presents with    Follow-up     Cardiac management    Lab     Last labs a week ago per PCP.    Edema     Having increased swelling in legs, 3+ pitting edema. Also having swelling in face.     Afib     Has noticed occasional flutter, not often.    Med Refill     Needs refills on Metoprolol and Eliquis-90 day.        HPI:  HPI   Tiana Cronin is a 74 y.o. female with hypertension, hyperlipidemia, PAF, hypothyroidism, SSS, mitral valve stenosis s/p replacement, and seizure disorder with no seizure activity since her fifties. She developed increased fatigue and shortness of breath and had an abnormal EKG.  She was seen by another cardiologist noted to have significant mitral regurgitation that did not improve with medical management.  She was referred to Dr. Gaona and underwent mitral valve replacement with #31 magma tissue valve and also had a 1 vessel bypass.  Post-operatively, she developed junctional rhythm and a [employer] pacemaker was placed by Dr. Dang.  In June 2020, patient called the office concerned over development of chest pressure. Repeat stress and echo advised. On 7/2/20 reports showed anterolateral wall ischemia, stable valves and normal EF. Imdur added with plan for cath if symptoms persisted.  She then called in September 2020 with possible right shoulder repair and clearance was given for surgical clearance. In October 2020 visit, more imbalance and falls were reported, carotid US was recommended. US done 10/22/2020 then showed no significant stenosis bilaterally. Stress and echo in 2022 negative for ischemia, good LV function, and no sig valve concerns noted. Overnight 2023 positive for nocturnal desaturation. Venous reflux scan 02/2024 was negative.      She comes today for follow up. Patient denies chest pain, pressure, palpitations, tightness, dizziness, shortness of air. She has BLE edema She reports her diuretic being decreased recently due to havineg to go  to the bathroom all night. She is afraid she will fall. She states the swelling has been the same for yers and did not improve even with the increased diuretic dose. She is in Afib PPM remote checks 100% AF burden    Cardiac History:    Past Surgical History:   Procedure Laterality Date    CARDIAC CATHETERIZATION  01/11/2017    EF 55-60%, 50-60% stenosis of circ (small vessel), LAD 30%, severe MR    CARDIAC ELECTROPHYSIOLOGY PROCEDURE N/A 02/10/2017    Dr. Dang. [place]    CARDIOVASCULAR STRESS TEST  07/02/2020    Lexiscan- EF 67%. Anterolateral Ischemia.    CARDIOVASCULAR STRESS TEST  05/26/2022    Lexiscan- EF 68%. Negative    ECHO - CONVERTED  09/19/2017    EF 65%, mild LVH, no AS, normal functioning mitral valve, RVSP 29 mmHG    ECHO - CONVERTED  07/02/2020    EF 65%. LA- 4.2 CM. MVR. MVA- 1.9 Cm2. Trace-Mild MR & AI. RVSP- 27 mmHg.    ECHO - CONVERTED  05/26/2022    EF 65%. LA- 4.3.MVR. Trace MR    ECHO - CONVERTED  06/18/2024    TLS. EF 65%. LA- 4.5. Mild AI. MVR. MVA- 1.5. RVSP- 26 mmHg. Mild-Mod PI    MITRAL VALVE REPAIR/REPLACEMENT N/A 02/03/2017    MVR & One SVG graft    ORIF HIP FRACTURE Right 10/15/2023       Current Outpatient Medications   Medication Sig Dispense Refill    acetaminophen (TYLENOL) 650 MG 8 hr tablet Take 1 tablet by mouth Every 8 (Eight) Hours As Needed for Mild Pain.      apixaban (ELIQUIS) 5 MG tablet tablet Take 1 tablet by mouth 2 (Two) Times a Day. 180 tablet 3    Apoaequorin (PREVAGEN PO) Take  by mouth Daily.      atorvastatin (LIPITOR) 20 MG tablet Take 1 tablet by mouth Daily. 90 tablet 3    bumetanide (BUMEX) 0.5 MG tablet Take 1 tablet by mouth Daily. 90 tablet 3    Cholecalciferol (VITAMIN D3) 2000 UNITS tablet Take 1 tablet by mouth Daily.      Diclofenac Sodium (VOLTAREN) 1 % gel gel Apply 4 g topically to the appropriate area as directed 4 (Four) Times a Day As Needed.      ethosuximide (ZARONTIN) 250 MG capsule Take 2 capsules by mouth Daily.      ferrous sulfate 325  (65 FE) MG tablet Take 1 tablet by mouth Daily.  5    isosorbide mononitrate (IMDUR) 30 MG 24 hr tablet Take 0.5 tablets by mouth 2 (Two) Times a Day. 90 tablet 3    levothyroxine (SYNTHROID, LEVOTHROID) 300 MCG tablet Take 275 mcg by mouth Every Morning.      metoprolol tartrate (LOPRESSOR) 50 MG tablet Take 1 tablet by mouth 2 (Two) Times a Day. 180 tablet 3    multivitamin with minerals tablet tablet Take 1 tablet by mouth Daily.      nitroglycerin (NITROSTAT) 0.4 MG SL tablet TAKE 1 UNDER THE TONGUE AS NEEDED FOR ANGINA, MAY REPEAT EVERY 5 MINUTESS FOR UP THREE DOSES 25 tablet 3    potassium chloride (KLOR-CON M10) 10 MEQ CR tablet Take 1 tablet by mouth Daily.      potassium chloride (MICRO-K) 10 MEQ CR capsule Take 1 capsule by mouth Daily. 90 capsule 3    primidone (MYSOLINE) 250 MG tablet Take 1 tablet by mouth 3 (Three) Times a Day.       No current facility-administered medications for this visit.     Facility-Administered Medications Ordered in Other Visits   Medication Dose Route Frequency Provider Last Rate Last Admin    Chlorhexidine Gluconate Cloth 2 % pads 1 application  1 application  Topical Q12H PRN Aubrie Anaya PA-C           Patient has no known allergies.    Past Medical History:   Diagnosis Date    Anxiety     Aortic regurgitation     Arthritis     Hands and Knees    CAD (S/P CABG x 1) 2/10/2017    Depression     History of surgery on arm     Hyperlipidemia     Hypertension     Hypothyroidism     Mitral regurgitation     Mitral valve prolapse     Seizures     Several Years since last seizure ( last seizure cannot remember)     Skin cancer, basal cell     basal cell skin cancer on face     Wears dentures     full set     Wears glasses     reading       Social History     Socioeconomic History    Marital status:     Number of children: 2   Tobacco Use    Smoking status: Never     Passive exposure: Past    Smokeless tobacco: Never   Vaping Use    Vaping status: Never Used   Substance  "and Sexual Activity    Alcohol use: No    Drug use: No    Sexual activity: Defer       Family History   Problem Relation Age of Onset    Hypertension Mother     Alzheimer's disease Mother     Coronary artery disease Father     Hypertension Father     Diabetes Father     Heart failure Father        Vitals:   /72 (BP Location: Right arm, Patient Position: Sitting)   Pulse 119   Ht 167.6 cm (65.98\")   BMI 27.13 kg/m²     Physical Exam  Vitals and nursing note reviewed.   Neck:      Vascular: No carotid bruit.   Cardiovascular:      Rate and Rhythm: Normal rate. Rhythm irregularly irregular.      Pulses: Normal pulses.      Heart sounds: Normal heart sounds. No murmur heard.     No friction rub. No gallop.   Pulmonary:      Effort: Pulmonary effort is normal.      Breath sounds: Normal breath sounds and air entry.   Musculoskeletal:      Right lower le+ Pitting No edema.      Left lower le+ Pitting No edema.   Skin:     General: Skin is warm and dry.      Capillary Refill: Capillary refill takes less than 2 seconds.   Neurological:      Mental Status: She is alert and oriented to person, place, and time.         ECG 12 Lead    Date/Time: 2025 12:22 PM  Performed by: Blank Sotomayor APRN    Authorized by: Blank Sotomayor APRN  Comparison: compared with previous ECG from 6/10/2024  Comparison to previous ECG: Rate 63 similar rhythm  Rhythm: atrial flutter  Rate: tachycardic  BPM: 119    Clinical impression: abnormal EKG           Assessment & Plan     Diagnoses and all orders for this visit:    1. Atrial flutter with rapid ventricular response (Primary)  -     ECG 12 Lead    2. S/P mitral valve replacement with bioprosthetic valve    3. Coronary artery disease involving native coronary artery of native heart without angina pectoris    4. Essential hypertension    5. Hypercholesteremia    6. Presence of permanent cardiac pacemaker    7. PAF (paroxysmal atrial fibrillation)  -     ECG 12 Lead    8. " Bilateral lower extremity edema    9. Sleep apnea, unspecified type    Other orders  -     metoprolol tartrate (LOPRESSOR) 50 MG tablet; Take 1 tablet by mouth 2 (Two) Times a Day.  Dispense: 180 tablet; Refill: 3  -     apixaban (ELIQUIS) 5 MG tablet tablet; Take 1 tablet by mouth 2 (Two) Times a Day.  Dispense: 180 tablet; Refill: 3  -     atorvastatin (LIPITOR) 20 MG tablet; Take 1 tablet by mouth Daily.  Dispense: 90 tablet; Refill: 3  -     bumetanide (BUMEX) 0.5 MG tablet; Take 1 tablet by mouth Daily.  Dispense: 90 tablet; Refill: 3  -     isosorbide mononitrate (IMDUR) 30 MG 24 hr tablet; Take 0.5 tablets by mouth 2 (Two) Times a Day.  Dispense: 90 tablet; Refill: 3  -     potassium chloride (MICRO-K) 10 MEQ CR capsule; Take 1 capsule by mouth Daily.  Dispense: 90 capsule; Refill: 3    A flutter/PAF/PPM  - EKG atrial flutter rate 119 bpm  - Increase Lopressor to 50 mg twice daily  - Continue Eliquis  -Consider antiarrhythmic.  She declined antiarrhythmic at last visit  - Remote device checks; last 4/14/25 battery life 3 months, AP 7%, RVP 85% 100% AT/AF burden, no new episodes  - Schedule an office device interrogation with next available [employer]  - Plan scheduling with EP as pacemaker is close to the end of its life    Hypertension  - BP controlled  - Continue isosorbide, Lopressor Bumex    Hypercholesteremia  - Labs with PCP  - Continue atorvastatin    CAD  - 50% to 60% stenosis of circumflex (small vessel), LAD 30%, severe MR heart cath 2017  - Continue Eliquis, statin    BLE edema  - She is taking 0.5 mg Bumex  - Discussed increasing dose of Bumex however she declines at this time    Sleep apnea  - Following with pulmonology  - Compliant with CPAP    Increase metoprolol to 50 mg BID. Refills sent. Schedule in office device interrogation BSX.  Consider antiarrhythmic    Visit Diagnoses:    ICD-10-CM ICD-9-CM   1. Atrial flutter with rapid ventricular response  I48.92 427.32   2. S/P mitral valve  replacement with bioprosthetic valve  Z95.3 V42.2   3. Coronary artery disease involving native coronary artery of native heart without angina pectoris  I25.10 414.01   4. Essential hypertension  I10 401.9   5. Hypercholesteremia  E78.00 272.0   6. Presence of permanent cardiac pacemaker  Z95.0 V45.01   7. PAF (paroxysmal atrial fibrillation)  I48.0 427.31   8. Bilateral lower extremity edema  R60.0 782.3   9. Sleep apnea, unspecified type  G47.30 780.57       Meds Ordered During Visit:  New Medications Ordered This Visit   Medications    metoprolol tartrate (LOPRESSOR) 50 MG tablet     Sig: Take 1 tablet by mouth 2 (Two) Times a Day.     Dispense:  180 tablet     Refill:  3    apixaban (ELIQUIS) 5 MG tablet tablet     Sig: Take 1 tablet by mouth 2 (Two) Times a Day.     Dispense:  180 tablet     Refill:  3    atorvastatin (LIPITOR) 20 MG tablet     Sig: Take 1 tablet by mouth Daily.     Dispense:  90 tablet     Refill:  3     This prescription was filled on 3/4/2025. Any refills authorized will be placed on file.    bumetanide (BUMEX) 0.5 MG tablet     Sig: Take 1 tablet by mouth Daily.     Dispense:  90 tablet     Refill:  3    isosorbide mononitrate (IMDUR) 30 MG 24 hr tablet     Sig: Take 0.5 tablets by mouth 2 (Two) Times a Day.     Dispense:  90 tablet     Refill:  3     This prescription was filled on 3/4/2025. Any refills authorized will be placed on file.    potassium chloride (MICRO-K) 10 MEQ CR capsule     Sig: Take 1 capsule by mouth Daily.     Dispense:  90 capsule     Refill:  3     This prescription was filled on 3/4/2025. Any refills authorized will be placed on file.       Follow Up Appointment:   Return in about 6 months (around 10/24/2025), or if symptoms worsen or fail to improve.           This document has been electronically signed by MCKENZIE Farley  April 28, 2025 08:53 EDT    Dictated Utilizing Dragon Dictation: Part of this note may be an electronic transcription/translation of spoken  language to printed text using the Dragon Dictation System.

## 2025-05-14 ENCOUNTER — OFFICE VISIT (OUTPATIENT)
Dept: CARDIOLOGY | Facility: CLINIC | Age: 75
End: 2025-05-14
Payer: MEDICARE

## 2025-05-14 DIAGNOSIS — I48.0 PAF (PAROXYSMAL ATRIAL FIBRILLATION): Primary | ICD-10-CM

## 2025-05-14 DIAGNOSIS — I49.5 SSS (SICK SINUS SYNDROME): ICD-10-CM

## 2025-05-21 ENCOUNTER — TELEPHONE (OUTPATIENT)
Dept: CARDIOLOGY | Facility: CLINIC | Age: 75
End: 2025-05-21
Payer: MEDICARE

## 2025-05-21 NOTE — TELEPHONE ENCOUNTER
Per remote transmission patient reached FAN on 05/20/2025. Patient is currently on Eliquis, and is device dependant.    Last echo was on 06/18/2024:    Left ventricular wall thickness is consistent with mild concentric hypertrophy.    Left ventricular ejection fraction appears to be 61 - 65%.    Left ventricular diastolic function is consistent with (grade III w/high LAP) fixed restrictive pattern.    Called to alert patient, no answer, mailbox is full unable to leave message.    Report is in [facility] for review.

## 2025-05-22 NOTE — TELEPHONE ENCOUNTER
I spoke with patient to advise we will be contact with her regarding referral. Brianna at Dr Zavala office checking to see if he can add her on at [facility].

## 2025-05-23 NOTE — TELEPHONE ENCOUNTER
This is C&T ppm [facility] for LCR.  After reviewing, I wanted to have you review.  She is in AF/AFL and pt declined AA previously but I was unsure if you might still want to try to put her back in SR at [facility] and if you want Eliquis held or not.  Her AF burden progressively increased to 100% over the past year (was 25%  4/2024)

## 2025-05-28 DIAGNOSIS — Z45.018 ELECTIVE REPLACEMENT INDICATED FOR PACEMAKER: Primary | ICD-10-CM

## 2025-06-06 DIAGNOSIS — I48.0 PAF (PAROXYSMAL ATRIAL FIBRILLATION): ICD-10-CM

## 2025-06-06 DIAGNOSIS — I46.9 ASYSTOLE: ICD-10-CM

## 2025-06-06 DIAGNOSIS — I49.5 SSS (SICK SINUS SYNDROME): ICD-10-CM

## 2025-06-06 DIAGNOSIS — I34.0 NONRHEUMATIC MITRAL VALVE REGURGITATION: Primary | ICD-10-CM

## 2025-06-09 ENCOUNTER — HOSPITAL ENCOUNTER (OUTPATIENT)
Facility: HOSPITAL | Age: 75
Setting detail: HOSPITAL OUTPATIENT SURGERY
Discharge: HOME | End: 2025-06-09
Attending: INTERNAL MEDICINE | Admitting: INTERNAL MEDICINE
Payer: MEDICARE

## 2025-06-09 VITALS
RESPIRATION RATE: 12 BRPM | HEIGHT: 66 IN | WEIGHT: 174.38 LBS | BODY MASS INDEX: 28.03 KG/M2 | HEART RATE: 70 BPM | SYSTOLIC BLOOD PRESSURE: 134 MMHG | TEMPERATURE: 96.6 F | DIASTOLIC BLOOD PRESSURE: 93 MMHG | OXYGEN SATURATION: 98 %

## 2025-06-09 DIAGNOSIS — I48.0 PAF (PAROXYSMAL ATRIAL FIBRILLATION): ICD-10-CM

## 2025-06-09 DIAGNOSIS — I34.0 NONRHEUMATIC MITRAL VALVE REGURGITATION: ICD-10-CM

## 2025-06-09 DIAGNOSIS — I49.5 SSS (SICK SINUS SYNDROME): ICD-10-CM

## 2025-06-09 DIAGNOSIS — I46.9 ASYSTOLE: ICD-10-CM

## 2025-06-09 LAB
ANION GAP SERPL CALCULATED.3IONS-SCNC: 13 MMOL/L (ref 5–15)
BUN SERPL-MCNC: 32 MG/DL (ref 8–23)
BUN/CREAT SERPL: 28.1 (ref 7–25)
CALCIUM SPEC-SCNC: 9.7 MG/DL (ref 8.6–10.5)
CHLORIDE SERPL-SCNC: 101 MMOL/L (ref 98–107)
CO2 SERPL-SCNC: 27 MMOL/L (ref 22–29)
CREAT SERPL-MCNC: 1.14 MG/DL (ref 0.57–1)
DEPRECATED RDW RBC AUTO: 51.6 FL (ref 37–54)
EGFRCR SERPLBLD CKD-EPI 2021: 50.9 ML/MIN/1.73
ERYTHROCYTE [DISTWIDTH] IN BLOOD BY AUTOMATED COUNT: 14.2 % (ref 12.3–15.4)
GLUCOSE SERPL-MCNC: 100 MG/DL (ref 65–99)
HCT VFR BLD AUTO: 40.9 % (ref 34–46.6)
HGB BLD-MCNC: 13.5 G/DL (ref 12–15.9)
MCH RBC QN AUTO: 32.8 PG (ref 26.6–33)
MCHC RBC AUTO-ENTMCNC: 33 G/DL (ref 31.5–35.7)
MCV RBC AUTO: 99.3 FL (ref 79–97)
PLATELET # BLD AUTO: 160 10*3/MM3 (ref 140–450)
PMV BLD AUTO: 10.1 FL (ref 6–12)
POTASSIUM SERPL-SCNC: 4.9 MMOL/L (ref 3.5–5.2)
RBC # BLD AUTO: 4.12 10*6/MM3 (ref 3.77–5.28)
SODIUM SERPL-SCNC: 141 MMOL/L (ref 136–145)
WBC NRBC COR # BLD AUTO: 6.64 10*3/MM3 (ref 3.4–10.8)

## 2025-06-09 PROCEDURE — 93005 ELECTROCARDIOGRAM TRACING: CPT | Performed by: INTERNAL MEDICINE

## 2025-06-09 PROCEDURE — 99152 MOD SED SAME PHYS/QHP 5/>YRS: CPT | Performed by: INTERNAL MEDICINE

## 2025-06-09 PROCEDURE — 25010000002 ONDANSETRON PER 1 MG: Performed by: INTERNAL MEDICINE

## 2025-06-09 PROCEDURE — 85027 COMPLETE CBC AUTOMATED: CPT | Performed by: INTERNAL MEDICINE

## 2025-06-09 PROCEDURE — 33228 REMV&REPLC PM GEN DUAL LEAD: CPT | Performed by: INTERNAL MEDICINE

## 2025-06-09 PROCEDURE — 80048 BASIC METABOLIC PNL TOTAL CA: CPT | Performed by: INTERNAL MEDICINE

## 2025-06-09 PROCEDURE — 25010000002 CEFAZOLIN PER 500 MG: Performed by: INTERNAL MEDICINE

## 2025-06-09 PROCEDURE — 25010000002 FENTANYL CITRATE (PF) 50 MCG/ML SOLUTION: Performed by: INTERNAL MEDICINE

## 2025-06-09 PROCEDURE — 25010000002 MIDAZOLAM PER 1 MG: Performed by: INTERNAL MEDICINE

## 2025-06-09 PROCEDURE — C1785 PMKR, DUAL, RATE-RESP: HCPCS | Performed by: INTERNAL MEDICINE

## 2025-06-09 PROCEDURE — 99153 MOD SED SAME PHYS/QHP EA: CPT | Performed by: INTERNAL MEDICINE

## 2025-06-09 PROCEDURE — 25010000002 LIDOCAINE PF 1% 1 % SOLUTION: Performed by: INTERNAL MEDICINE

## 2025-06-09 DEVICE — PACEMAKER
Type: IMPLANTABLE DEVICE | Site: HEART | Status: FUNCTIONAL
Brand: ACCOLADE™ MRI DR

## 2025-06-09 RX ORDER — SODIUM CHLORIDE 9 MG/ML
40 INJECTION, SOLUTION INTRAVENOUS AS NEEDED
Status: DISCONTINUED | OUTPATIENT
Start: 2025-06-09 | End: 2025-06-09 | Stop reason: HOSPADM

## 2025-06-09 RX ORDER — ONDANSETRON 2 MG/ML
INJECTION INTRAMUSCULAR; INTRAVENOUS
Status: DISCONTINUED | OUTPATIENT
Start: 2025-06-09 | End: 2025-06-09 | Stop reason: HOSPADM

## 2025-06-09 RX ORDER — ONDANSETRON 2 MG/ML
4 INJECTION INTRAMUSCULAR; INTRAVENOUS EVERY 6 HOURS PRN
Status: DISCONTINUED | OUTPATIENT
Start: 2025-06-09 | End: 2025-06-09 | Stop reason: HOSPADM

## 2025-06-09 RX ORDER — NITROGLYCERIN 0.4 MG/1
0.4 TABLET SUBLINGUAL
Status: DISCONTINUED | OUTPATIENT
Start: 2025-06-09 | End: 2025-06-09 | Stop reason: HOSPADM

## 2025-06-09 RX ORDER — MIDAZOLAM HYDROCHLORIDE 1 MG/ML
INJECTION, SOLUTION INTRAMUSCULAR; INTRAVENOUS
Status: DISCONTINUED | OUTPATIENT
Start: 2025-06-09 | End: 2025-06-09 | Stop reason: HOSPADM

## 2025-06-09 RX ORDER — SODIUM CHLORIDE 0.9 % (FLUSH) 0.9 %
10 SYRINGE (ML) INJECTION EVERY 12 HOURS SCHEDULED
Status: DISCONTINUED | OUTPATIENT
Start: 2025-06-09 | End: 2025-06-09 | Stop reason: HOSPADM

## 2025-06-09 RX ORDER — ACETAMINOPHEN 325 MG/1
650 TABLET ORAL EVERY 4 HOURS PRN
Status: DISCONTINUED | OUTPATIENT
Start: 2025-06-09 | End: 2025-06-09 | Stop reason: HOSPADM

## 2025-06-09 RX ORDER — SODIUM CHLORIDE 0.9 % (FLUSH) 0.9 %
1-10 SYRINGE (ML) INJECTION AS NEEDED
Status: DISCONTINUED | OUTPATIENT
Start: 2025-06-09 | End: 2025-06-09 | Stop reason: HOSPADM

## 2025-06-09 RX ORDER — FENTANYL CITRATE 50 UG/ML
INJECTION, SOLUTION INTRAMUSCULAR; INTRAVENOUS
Status: DISCONTINUED | OUTPATIENT
Start: 2025-06-09 | End: 2025-06-09 | Stop reason: HOSPADM

## 2025-06-09 RX ORDER — LIDOCAINE HYDROCHLORIDE 10 MG/ML
INJECTION, SOLUTION EPIDURAL; INFILTRATION; INTRACAUDAL; PERINEURAL
Status: DISCONTINUED | OUTPATIENT
Start: 2025-06-09 | End: 2025-06-09 | Stop reason: HOSPADM

## 2025-06-09 RX ORDER — ETOMIDATE 2 MG/ML
INJECTION INTRAVENOUS
Status: DISCONTINUED | OUTPATIENT
Start: 2025-06-09 | End: 2025-06-09 | Stop reason: HOSPADM

## 2025-06-09 RX ORDER — SODIUM CHLORIDE 0.9 % (FLUSH) 0.9 %
10 SYRINGE (ML) INJECTION AS NEEDED
Status: DISCONTINUED | OUTPATIENT
Start: 2025-06-09 | End: 2025-06-09 | Stop reason: HOSPADM

## 2025-06-09 NOTE — SIGNIFICANT NOTE
Patient discharged from recovery. No acute distress noted. Vitals stable. Dressing CDI. Patient and family verbalized understanding of dc instructions. Taken to car via wheelchair.

## 2025-06-09 NOTE — H&P
[facility] at [facility]  HISTORY AND PHYSICAL    Tianajanette Cronin  : 1950  MRN:[id number]    Date of Admission:2025    PCP: Rangel Lopez MD    Chief Complaint: Device at FAN    PROBLEM LIST:   Active Hospital Problems    Diagnosis     SSS (sick sinus syndrome)     PAF (paroxysmal atrial fibrillation)     Asystole after mitral valve replacement     Severe mitral regurgitation (S/P 31 Magna Ease mitral tissue valve)         ALLERGIES: No Known Allergies    HOME MEDICINES:   Prior to Admission Medications       Prescriptions Last Dose Informant Patient Reported? Taking?    acetaminophen (TYLENOL) 650 MG 8 hr tablet Past Week  Yes Yes    Take 1 tablet by mouth Every 8 (Eight) Hours As Needed for Mild Pain.    apixaban (ELIQUIS) 5 MG tablet tablet 2025  No No    Take 1 tablet by mouth 2 (Two) Times a Day.    Apoaequorin (PREVAGEN PO) 2025  Yes Yes    Take  by mouth Daily.    atorvastatin (LIPITOR) 20 MG tablet 2025  No Yes    Take 1 tablet by mouth Daily.    bumetanide (BUMEX) 0.5 MG tablet 2025  No Yes    Take 1 tablet by mouth Daily.    Cholecalciferol (VITAMIN D3) 2000 UNITS tablet 2025 Medication Bottle Yes Yes    Take 1 tablet by mouth Daily.    Diclofenac Sodium (VOLTAREN) 1 % gel gel Past Week  Yes Yes    Apply 4 g topically to the appropriate area as directed 4 (Four) Times a Day As Needed.    ethosuximide (ZARONTIN) 250 MG capsule 2025 Medication Bottle Yes Yes    Take 1 capsule by mouth 2 (Two) Times a Day.    ferrous sulfate 325 (65 FE) MG tablet 2025  Yes Yes    Take 1 tablet by mouth Daily.    isosorbide mononitrate (IMDUR) 30 MG 24 hr tablet 2025  No Yes    Take 0.5 tablets by mouth 2 (Two) Times a Day.    metoprolol tartrate (LOPRESSOR) 50 MG tablet 2025  No Yes    Take 1 tablet by mouth 2 (Two) Times a Day.    multivitamin with minerals tablet tablet 2025  Yes Yes    Take 1 tablet by mouth Daily.    nitroglycerin  (NITROSTAT) 0.4 MG SL tablet Unknown  No No    TAKE 1 UNDER THE TONGUE AS NEEDED FOR ANGINA, MAY REPEAT EVERY 5 MINUTESS FOR UP THREE DOSES    potassium chloride (KLOR-CON M10) 10 MEQ CR tablet 6/8/2025  Yes Yes    Take 1 tablet by mouth Daily.    potassium chloride (MICRO-K) 10 MEQ CR capsule 6/8/2025  No Yes    Take 1 capsule by mouth Daily.    primidone (MYSOLINE) 250 MG tablet 6/9/2025  Yes Yes    Take 1 tablet by mouth 3 (Three) Times a Day.    LEVOTHYROXINE SODIUM PO 6/8/2025  Yes Yes    Take 275 mcg by mouth Daily.            Subjective     HPI: Tiana Cronin is a 74 y.o. female with a past medical history listed above presents to [facility] today for dual-chamber permanent pacemaker generator change.  Device at [facility] 5/20/2025.  Over the last year patient's A-fib burden has increased to 100%.  Patient is asymptomatic.  Currently patient denies chest pain, shortness of breath, lightheadedness, dizziness and syncope.  She is anticoagulated on Eliquis and denies any bleeding complications or strokelike symptoms.    ROS: All systems have been reviewed and are negative with the exception of those mentioned in the HPI and problem list above.    Past Medical History:   Past Medical History:   Diagnosis Date    Anxiety     Aortic regurgitation     Arthritis     Hands and Knees    CAD (S/P CABG x 1) 2/10/2017    Depression     History of surgery on arm     Hyperlipidemia     Hypertension     Hypothyroidism     Mitral regurgitation     Mitral valve prolapse     Seizures     Several Years since last seizure ( last seizure cannot remember)     Skin cancer, basal cell     basal cell skin cancer on face     Wears dentures     full set     Wears glasses     reading      Surgical History:   Past Surgical History:   Procedure Laterality Date    CARDIAC CATHETERIZATION  01/11/2017    EF 55-60%, 50-60% stenosis of circ (small vessel), LAD 30%, severe MR    CARDIAC ELECTROPHYSIOLOGY PROCEDURE N/A 02/10/2017     "Dr. Dang. [place]    CARDIOVASCULAR STRESS TEST  07/02/2020    Lexiscan- EF 67%. Anterolateral Ischemia.    CARDIOVASCULAR STRESS TEST  05/26/2022    Lexiscan- EF 68%. Negative    ECHO - CONVERTED  09/19/2017    EF 65%, mild LVH, no AS, normal functioning mitral valve, RVSP 29 mmHG    ECHO - CONVERTED  07/02/2020    EF 65%. LA- 4.2 CM. MVR. MVA- 1.9 Cm2. Trace-Mild MR & AI. RVSP- 27 mmHg.    ECHO - CONVERTED  05/26/2022    EF 65%. LA- 4.3.MVR. Trace MR    ECHO - CONVERTED  06/18/2024    TLS. EF 65%. LA- 4.5. Mild AI. MVR. MVA- 1.5. RVSP- 26 mmHg. Mild-Mod PI    MITRAL VALVE REPAIR/REPLACEMENT N/A 02/03/2017    MVR & One SVG graft    ORIF HIP FRACTURE Right 10/15/2023     Social History:   Social History     Socioeconomic History    Marital status: Single    Number of children: 2   Tobacco Use    Smoking status: Never     Passive exposure: Past    Smokeless tobacco: Never   Vaping Use    Vaping status: Never Used   Substance and Sexual Activity    Alcohol use: No    Drug use: No    Sexual activity: Defer     Family History:   Family History   Problem Relation Age of Onset    Hypertension Mother     Alzheimer's disease Mother     Coronary artery disease Father     Hypertension Father     Diabetes Father     Heart failure Father        Objective   /76 (BP Location: Right arm, Patient Position: Lying)   Pulse 70   Temp 96.6 °F (35.9 °C) (Tympanic)   Resp 16   Ht 167.6 cm (66\")   Wt 79.1 kg (174 lb 6.1 oz)   SpO2 96%   BMI 28.15 kg/m²   No intake or output data in the 24 hours ending 06/09/25 0852    PHYSICAL EXAM:  CONSTITUTIONAL: Well nourished, cooperative, in no acute distress  HEENT: Normocephalic, atraumatic, PERRLA, no JVD  CARDIOVASCULAR:  irregular rhythm and normal rate, no murmur, gallop, rub.   RESPIRATORY: Clear to auscultation, normal respiratory effort, no wheezing, rales or ronchi, on RA  EXTREMITIES: No gross deformities, no edema. Peripheral pulses are present and equal bilaterally  SKIN: " Warm, dry. No bleeding, bruising or rash  NEUROLOGICAL: No focal deficits  PSYCHIATRIC: Normal mood and affect. Behavior is normal     Labs/Diagnostic Data          Results from last 7 days   Lab Units 06/09/25  0826   WBC 10*3/mm3 6.64   HEMOGLOBIN g/dL 13.5   HEMATOCRIT % 40.9   PLATELETS 10*3/mm3 160                               EKG/Telemetry: AF    Radiology Data:   No radiology results for the last day   Results for orders placed during the hospital encounter of 06/18/24    Adult Transthoracic Echo Complete W/ Cont if Necessary Per Protocol    Interpretation Summary    The study is technically difficult    Left ventricular wall thickness is consistent with mild concentric hypertrophy.    Left ventricular ejection fraction appears to be 61 - 65%.    Left ventricular diastolic function is consistent with (grade III w/high LAP) fixed restrictive pattern.    The left atrial cavity is moderately dilated.  4.5 cm    Mild aortic valve regurgitation is present.    There is a 31 mm, porcine, Magna Ease bioprosthetic mitral valve present    Mild mitral valve stenosis is present.  MVA - 1.5 cm^2    Estimated right ventricular systolic pressure from tricuspid regurgitation is normal (26 mmHg).    There is mild to moderate pulmonic valve regurgitation present.       Current Medications:    No current facility-administered medications for this encounter.      Assessment:     Atrial fibrillation/atrial flutter/sick sinus syndrome  S/p [place] dual-chamber permanent pacemaker implanted 2/2017  Anticoagulated with Eliquis  Patient has denied antiarrhythmic medications  Follows with Dr. Hoyt's office  Mitral valve replacement with bioprosthetic valve  Coronary artery disease  Hypertension    Plan:     Patient is here today for dual-chamber permanent pacemaker generator change. Patient has held eliquis for 2 days. Procedure, risks, and alternatives have been discussed with the patient and she is agreeable to proceed.  Further  recommendations to follow.    Electronically signed by MCKENZIE Bunch, 06/09/25, 8:52 AM EDT.     Please note that portions of this note were dictated utilizing Dragon dictation.

## 2025-06-09 NOTE — DISCHARGE INSTR - ACTIVITY
Pressure Dressing from device site will be removed the morning after procedure or prior to   discharge if the patient goes home the same day. Patient will have an Aquacel dressing   underneath. Typically, no steri-strips or glue is used. The Aquacel Dressing will be removed at   the wound check appointment in 7-10 days.   Please do not lift more than 10 pounds or raise the affected arm above the shoulder for 2 weeks   after the device was implanted (this does not apply to subcutaneous ICDs).  Avoid activities that involve heavy lifting or rough contact that could result in blows to your   implant site. This allows the incision time to heal.  You may shower the day after your procedure.   Do not apply creams, lotions or powders to the incision.   Please avoid allowing a bra strap or suspenders to lay over the incision until it is completely   healed.   No baths, hot tubs or swimming for 4 weeks.   Call your doctor if you have any swelling, redness or discharge around your incision, notice   anything unusual or unexpected or you develop a fever that does not go away in two to three   days.   Call your doctor if you hear any beeping sounds/vibratory alerts from your device as this   indicates your device needs to be checked immediately.  You will be scheduled for a 7-10 day wound check appointment and a 3 month follow up to   check your device.   Carry your medical device ID card with you at all times.   Please call our office at [phone] with any questions about the device or incisio

## 2025-06-09 NOTE — SIGNIFICANT NOTE
Discharge instructions discussed with patient and family. Verbalized understanding and denies any further questions

## 2025-06-11 LAB
QT INTERVAL: 452 MS
QTC INTERVAL: 488 MS

## 2025-06-18 ENCOUNTER — OFFICE VISIT (OUTPATIENT)
Dept: CARDIOLOGY | Facility: CLINIC | Age: 75
End: 2025-06-18
Payer: MEDICARE

## 2025-06-18 DIAGNOSIS — I48.0 PAF (PAROXYSMAL ATRIAL FIBRILLATION): Primary | ICD-10-CM

## 2025-06-18 NOTE — PROGRESS NOTES
2025    Tiana Cronin, : 1950      Fever: No    Temperature if indicated:     Wound Location: Left Infraclavicular    Dressing Removed: Removed by MA/RN      Old Dressing Appearance:  Clean, dry    Wound Appearance: Redness []                  Drainage []                  Culture obtained []        Color: Clear     Consistency:      Amount: none         Gloves used, wound cleansed with sterile 4x4 and peroxide []       MD notified []     MD orders:     Antibiotic started []      If checked, type     Other:       Appointment for follow-up scheduled for 3 months post procedure []    Future Appointments   Date Time Provider Department Center   2025  1:00 PM Jevon Sim DO [facility]   2025 10:15 AM [facility] DEVICE CHECK [facility]   10/10/2025  1:30 PM Jeromy Schmidt MD [facility]   10/14/2025  1:45 PM Blank Sotomayor APRN [facility]   2026  2:30 PM Patricia Hough APRN [facility] None           Loly Hernandez MA, 25      MD Signature:______________________________ Completed By/Date:

## 2025-07-02 LAB
MC_CV_MDC_IDC_RATE_1: 160
MC_CV_MDC_IDC_ZONE_ID: 1
MDC_IDC_MSMT_BATTERY_REMAINING_LONGEVITY: 78 MO
MDC_IDC_MSMT_BATTERY_REMAINING_PERCENTAGE: 100 %
MDC_IDC_MSMT_BATTERY_STATUS: NORMAL
MDC_IDC_MSMT_LEADCHNL_RA_DTM: NORMAL
MDC_IDC_MSMT_LEADCHNL_RA_IMPEDANCE_VALUE: 499
MDC_IDC_MSMT_LEADCHNL_RA_PACING_THRESHOLD_POLARITY: NORMAL
MDC_IDC_MSMT_LEADCHNL_RA_SENSING_INTR_AMPL: 2.7
MDC_IDC_MSMT_LEADCHNL_RV_DTM: NORMAL
MDC_IDC_MSMT_LEADCHNL_RV_IMPEDANCE_VALUE: 648
MDC_IDC_MSMT_LEADCHNL_RV_PACING_THRESHOLD_AMPLITUDE: 0.6
MDC_IDC_MSMT_LEADCHNL_RV_PACING_THRESHOLD_POLARITY: NORMAL
MDC_IDC_MSMT_LEADCHNL_RV_PACING_THRESHOLD_PULSEWIDTH: 0.4
MDC_IDC_PG_IMPLANT_DTM: NORMAL
MDC_IDC_PG_MFG: NORMAL
MDC_IDC_PG_MODEL: NORMAL
MDC_IDC_PG_SERIAL: NORMAL
MDC_IDC_PG_TYPE: NORMAL
MDC_IDC_SESS_DTM: NORMAL
MDC_IDC_SESS_TYPE: NORMAL
MDC_IDC_SET_BRADY_AT_MODE_SWITCH_RATE: 170
MDC_IDC_SET_BRADY_LOWRATE: 60
MDC_IDC_SET_BRADY_MAX_SENSOR_RATE: 130
MDC_IDC_SET_BRADY_MAX_TRACKING_RATE: 130
MDC_IDC_SET_BRADY_MODE: NORMAL
MDC_IDC_SET_BRADY_PAV_DELAY: 120
MDC_IDC_SET_BRADY_SAV_DELAY: 90
MDC_IDC_SET_LEADCHNL_RA_PACING_AMPLITUDE: 2
MDC_IDC_SET_LEADCHNL_RA_PACING_POLARITY: NORMAL
MDC_IDC_SET_LEADCHNL_RA_PACING_PULSEWIDTH: 0.4
MDC_IDC_SET_LEADCHNL_RA_SENSING_POLARITY: NORMAL
MDC_IDC_SET_LEADCHNL_RA_SENSING_SENSITIVITY: 0.25
MDC_IDC_SET_LEADCHNL_RV_PACING_AMPLITUDE: 2
MDC_IDC_SET_LEADCHNL_RV_PACING_POLARITY: NORMAL
MDC_IDC_SET_LEADCHNL_RV_PACING_PULSEWIDTH: 0.4
MDC_IDC_SET_LEADCHNL_RV_SENSING_POLARITY: NORMAL
MDC_IDC_SET_LEADCHNL_RV_SENSING_SENSITIVITY: 0.6
MDC_IDC_SET_ZONE_STATUS: NORMAL
MDC_IDC_SET_ZONE_TYPE: NORMAL
MDC_IDC_STAT_AT_BURDEN_PERCENT: 100
MDC_IDC_STAT_BRADY_RA_PERCENT_PACED: 0
MDC_IDC_STAT_BRADY_RV_PERCENT_PACED: 100

## 2025-07-09 ENCOUNTER — OFFICE VISIT (OUTPATIENT)
Dept: CARDIOLOGY | Facility: CLINIC | Age: 75
End: 2025-07-09
Payer: MEDICARE

## 2025-07-09 DIAGNOSIS — I48.0 PAF (PAROXYSMAL ATRIAL FIBRILLATION): ICD-10-CM

## 2025-07-09 DIAGNOSIS — I49.5 SSS (SICK SINUS SYNDROME): Primary | ICD-10-CM

## (undated) DEVICE — SUT PROLN 7/0 8747H

## (undated) DEVICE — TUBING, SUCTION, 1/4" X 10', STRAIGHT: Brand: MEDLINE

## (undated) DEVICE — SUT PROLN 3/0 SH D/A 36IN 8522H

## (undated) DEVICE — TRAP,MUCUS SPECIMEN,40CC: Brand: MEDLINE

## (undated) DEVICE — PRESSURE MONITORING SET: Brand: TRUWAVE, VAMP

## (undated) DEVICE — ADULT, W/LG. BACK PAD, RADIOTRANSPARENT ELEMENT AND LEAD WIRE: Brand: DEFIBRILLATION ELECTRODES

## (undated) DEVICE — ORGANIZER SUT GABBAY FRATER GFS10A

## (undated) DEVICE — INTRO TEAR AWAY/LVD W/SD PRT 6F 13CM

## (undated) DEVICE — 12 FOOT DISPOSABLE EXTENSION CABLE WITH SAFE CONNECT / SCREW-DOWN

## (undated) DEVICE — PRESSURE TUBING: Brand: TRUWAVE

## (undated) DEVICE — WIPE THERAWASH SLV SPEC CARE 2PK

## (undated) DEVICE — SPNG GZ STRL 2S 4X4 12PLY

## (undated) DEVICE — BLAKE CARDIO CONNECTOR 1:1: Brand: J-VAC

## (undated) DEVICE — Device

## (undated) DEVICE — SWAN-GANZ THERMODILUTION CATHETER: Brand: SWAN-GANZ

## (undated) DEVICE — DECANTER: Brand: UNBRANDED

## (undated) DEVICE — PLASMABLADE PS210-030S 3.0S LOCK: Brand: PLASMABLADE™

## (undated) DEVICE — SUT SILK 0/0 CT2 18IN C027D

## (undated) DEVICE — ADAPT ANTEGRADE RETRGR

## (undated) DEVICE — MEDI-VAC NON-CONDUCTIVE SUCTION TUBING: Brand: CARDINAL HEALTH

## (undated) DEVICE — AIRWY 90MM NO9

## (undated) DEVICE — RESUSCITATOR,MANUAL,ADLT,MASK,TUBE RES: Brand: MEDLINE INDUSTRIES, INC.

## (undated) DEVICE — CANN AORT ROOT DLP VNT 14G 7F

## (undated) DEVICE — LEX ELECTRO PHYSIOLOGY: Brand: MEDLINE INDUSTRIES, INC.

## (undated) DEVICE — Device: Brand: MEDEX

## (undated) DEVICE — ANTIBACTERIAL UNDYED BRAIDED (POLYGLACTIN 910), SYNTHETIC ABSORBABLE SUTURE: Brand: COATED VICRYL

## (undated) DEVICE — ANTIBACTERIAL VIOLET BRAIDED (POLYGLACTIN 910), SYNTHETIC ABSORBABLE SUTURE: Brand: COATED VICRYL

## (undated) DEVICE — KT FAST STRT ATF120

## (undated) DEVICE — PENCL E/S HNDSWCH ROCKRBTN HOLSTR 10FT

## (undated) DEVICE — DECANT BG O JET

## (undated) DEVICE — SUT SILK 2/0 CT1 CR8 18IN C022D

## (undated) DEVICE — CATH FOL LUBRISIL IC 2WY 20F 5CC

## (undated) DEVICE — PK HEART OPN 10

## (undated) DEVICE — INTRO TEAR AWAY/LVD W/SD PRT 7F 13CM

## (undated) DEVICE — ST INF PRI SMRTSTE 20DRP 2VLV 24ML 117

## (undated) DEVICE — PRESSURE MONITORING ACCESSORY: Brand: TRUWAVE

## (undated) DEVICE — BNDG ELAS ELITE V/CLOSE 4IN 5YD LF STRL

## (undated) DEVICE — IRRIGATOR BULB ASEPTO 60CC STRL

## (undated) DEVICE — CANN VESL DLP 1WY BLNT/TP 3MM

## (undated) DEVICE — SET PRIMARY GRVTY 10DP MALE LL 104IN

## (undated) DEVICE — DRSNG SURG AQUACEL AG/ADVNTGE 9X15CM 3.5X6IN

## (undated) DEVICE — ST EXT IV SMRTSTE 2VLV FIX M LL 6ML 41

## (undated) DEVICE — SUT ETHIB 2/0 SH SH 36IN X523H

## (undated) DEVICE — CATHETER,FOLEY,100%SILICONE,18FR,10ML,LF: Brand: MEDLINE

## (undated) DEVICE — CATH IV ANGIOCATH/AUTOGARD 14G 1.75IN ORG

## (undated) DEVICE — ST BLD Y/TYP W PUMP

## (undated) DEVICE — SOL NACL 0.9PCT 1000ML

## (undated) DEVICE — ST EXT IV SMARTSITE 2VLV SP M LL 5ML IV1

## (undated) DEVICE — ELECTRD RETRN/GRND MEGADYNE SGL/PLT W/CORD 9FT DISP

## (undated) DEVICE — ELECTRD BLD EZ CLN STD 2.5IN

## (undated) DEVICE — 2000CC GUARDIAN II: Brand: GUARDIAN

## (undated) DEVICE — FLTR GAS LN OXYGENATOR 1/4IN STRL

## (undated) DEVICE — TP CLTH MEDIPORE SFT 1IN 10YD

## (undated) DEVICE — SYR LUERLOK 30CC

## (undated) DEVICE — SAFETY SCALPEL: Brand: DEROYAL

## (undated) DEVICE — CANN NASL CAPNOFLEX SMPL CO2/O2 W/O2/DEL A/

## (undated) DEVICE — CAUTERY TIP POLISHER: Brand: DEVON

## (undated) DEVICE — SUT SILK B CARDIO BB 5/0 30IN K880H BX/36

## (undated) DEVICE — SUT SILK 2/0 TIES 18IN A185H

## (undated) DEVICE — CONNECTOR PH 6-IN-1 Y ST: Brand: CARDINAL HEALTH

## (undated) DEVICE — SUT ETHIB 1 CTX CR8 18IN CX30D

## (undated) DEVICE — CATH ART RADL 20GA 1 3/4IN LF

## (undated) DEVICE — 3M™ STERI-STRIP™ REINFORCED ADHESIVE SKIN CLOSURES, R1547, 1/2 IN X 4 IN (12 MM X 100 MM), 6 STRIPS/ENVELOPE: Brand: 3M™ STERI-STRIP™

## (undated) DEVICE — DRSNG SURESITE123 4X4.8IN

## (undated) DEVICE — ADULT, W/LG. BACK PAD, RADIOTRANSPARENT ELEMENT AND LEAD WIRE COMPATIBLE W/: Brand: DEFIBRILLATION ELECTRODES

## (undated) DEVICE — SUT VIC PLS 0 CTX 36IN UD VCP978H

## (undated) DEVICE — ARM SLING II: Brand: DEROYAL

## (undated) DEVICE — MEDI-VAC YANKAUER SUCTION HANDLE W/BULBOUS TIP: Brand: CARDINAL HEALTH

## (undated) DEVICE — SENSR CERBRL O2 SOMASENSOR ADHS A/ LF

## (undated) DEVICE — CANN NASL CO2 DIVIDED A/

## (undated) DEVICE — TONSIL SPONGES: Brand: DEROYAL

## (undated) DEVICE — GLV SURG SENSICARE W/ALOE PF LF 7 STRL

## (undated) DEVICE — SOL NS 500ML

## (undated) DEVICE — CONN SAT/HCT 1/2 X 1/2IN

## (undated) DEVICE — BNDG ELAS ELITE V/CLOSE 6IN 5YD LF STRL

## (undated) DEVICE — BLOOD TRANSFUSION FILTER: Brand: HAEMONETICS

## (undated) DEVICE — INTRAOPERATIVE COVER KIT, 10 PACK: Brand: SITE-RITE

## (undated) DEVICE — LIMB HOLDERS: Brand: DEROYAL

## (undated) DEVICE — SENSR O2 OXIMAX FNGR A/ 18IN NONSTR

## (undated) DEVICE — TP UMB COTN 30IN U11T

## (undated) DEVICE — KT INTRO SHEATH PERC W/FULL DRP 9F

## (undated) DEVICE — SUT SILK 2 SUTUPAK TIE 60IN SA8H 2STRAND

## (undated) DEVICE — PRESSURE MONITORING SET: Brand: TRUWAVE

## (undated) DEVICE — PAD LVL SENSR MOUNTING

## (undated) DEVICE — SUT SILK 3/0 TIES 18IN A184H

## (undated) DEVICE — SKIN AFFIX SURG ADHESIVE 72/CS 0.55ML: Brand: MEDLINE

## (undated) DEVICE — BG TRANSF W/COUPLER SPK 600ML

## (undated) DEVICE — LIMB HOLDER, WRIST/ANKLE: Brand: DEROYAL

## (undated) DEVICE — 3M™ RANGER™ BLOOD/FLUID WARMING STANDARD FLOW SET, 24200, 10/CASE: Brand: 3M™ RANGER™

## (undated) DEVICE — ST INF 10DRP 4 PORT 4WY/STPCOCK 112IN

## (undated) DEVICE — YANKAUER,BULB TIP,W/O VENT,RIGID,STERILE: Brand: MEDLINE